# Patient Record
Sex: MALE | Race: WHITE | NOT HISPANIC OR LATINO | URBAN - METROPOLITAN AREA
[De-identification: names, ages, dates, MRNs, and addresses within clinical notes are randomized per-mention and may not be internally consistent; named-entity substitution may affect disease eponyms.]

---

## 2018-12-07 ENCOUNTER — APPOINTMENT (RX ONLY)
Dept: URBAN - METROPOLITAN AREA CLINIC 4 | Facility: CLINIC | Age: 68
Setting detail: DERMATOLOGY
End: 2018-12-07

## 2018-12-07 DIAGNOSIS — L81.4 OTHER MELANIN HYPERPIGMENTATION: ICD-10-CM

## 2018-12-07 DIAGNOSIS — L91.8 OTHER HYPERTROPHIC DISORDERS OF THE SKIN: ICD-10-CM

## 2018-12-07 DIAGNOSIS — D18.0 HEMANGIOMA: ICD-10-CM

## 2018-12-07 DIAGNOSIS — L82.1 OTHER SEBORRHEIC KERATOSIS: ICD-10-CM

## 2018-12-07 DIAGNOSIS — D22 MELANOCYTIC NEVI: ICD-10-CM

## 2018-12-07 DIAGNOSIS — L57.0 ACTINIC KERATOSIS: ICD-10-CM

## 2018-12-07 PROBLEM — Z85.828 PERSONAL HISTORY OF OTHER MALIGNANT NEOPLASM OF SKIN: Status: ACTIVE | Noted: 2018-12-07

## 2018-12-07 PROBLEM — D22.61 MELANOCYTIC NEVI OF RIGHT UPPER LIMB, INCLUDING SHOULDER: Status: ACTIVE | Noted: 2018-12-07

## 2018-12-07 PROBLEM — D48.5 NEOPLASM OF UNCERTAIN BEHAVIOR OF SKIN: Status: ACTIVE | Noted: 2018-12-07

## 2018-12-07 PROBLEM — D22.5 MELANOCYTIC NEVI OF TRUNK: Status: ACTIVE | Noted: 2018-12-07

## 2018-12-07 PROBLEM — D22.62 MELANOCYTIC NEVI OF LEFT UPPER LIMB, INCLUDING SHOULDER: Status: ACTIVE | Noted: 2018-12-07

## 2018-12-07 PROBLEM — D18.01 HEMANGIOMA OF SKIN AND SUBCUTANEOUS TISSUE: Status: ACTIVE | Noted: 2018-12-07

## 2018-12-07 PROCEDURE — ? COUNSELING

## 2018-12-07 PROCEDURE — 11100: CPT

## 2018-12-07 PROCEDURE — ? DEFER

## 2018-12-07 PROCEDURE — 99202 OFFICE O/P NEW SF 15 MIN: CPT | Mod: 25

## 2018-12-07 PROCEDURE — ? BIOPSY BY SHAVE METHOD

## 2018-12-07 ASSESSMENT — LOCATION SIMPLE DESCRIPTION DERM
LOCATION SIMPLE: CHEST
LOCATION SIMPLE: RIGHT CHEEK
LOCATION SIMPLE: LEFT AXILLARY VAULT
LOCATION SIMPLE: RIGHT UPPER ARM
LOCATION SIMPLE: RIGHT FOREARM
LOCATION SIMPLE: RIGHT LOWER BACK
LOCATION SIMPLE: LEFT HAND
LOCATION SIMPLE: GLABELLA
LOCATION SIMPLE: LEFT FOREARM
LOCATION SIMPLE: LEFT UPPER BACK
LOCATION SIMPLE: LEFT UPPER ARM
LOCATION SIMPLE: LOWER BACK
LOCATION SIMPLE: ABDOMEN
LOCATION SIMPLE: RIGHT HAND
LOCATION SIMPLE: LEFT CHEEK
LOCATION SIMPLE: LEFT ELBOW

## 2018-12-07 ASSESSMENT — LOCATION DETAILED DESCRIPTION DERM
LOCATION DETAILED: GLABELLA
LOCATION DETAILED: LEFT LATERAL INFERIOR CHEST
LOCATION DETAILED: SUPERIOR LUMBAR SPINE
LOCATION DETAILED: LEFT INFERIOR CENTRAL MALAR CHEEK
LOCATION DETAILED: EPIGASTRIC SKIN
LOCATION DETAILED: LEFT RADIAL DORSAL HAND
LOCATION DETAILED: RIGHT VENTRAL PROXIMAL FOREARM
LOCATION DETAILED: LEFT INFERIOR UPPER BACK
LOCATION DETAILED: LEFT ANTERIOR DISTAL UPPER ARM
LOCATION DETAILED: RIGHT CENTRAL MALAR CHEEK
LOCATION DETAILED: LEFT ULNAR DORSAL HAND
LOCATION DETAILED: LEFT MEDIAL INFERIOR CHEST
LOCATION DETAILED: RIGHT RADIAL DORSAL HAND
LOCATION DETAILED: LEFT VENTRAL PROXIMAL FOREARM
LOCATION DETAILED: RIGHT ANTERIOR DISTAL UPPER ARM
LOCATION DETAILED: LEFT AXILLARY VAULT
LOCATION DETAILED: LEFT VENTRAL DISTAL FOREARM
LOCATION DETAILED: RIGHT PROXIMAL DORSAL FOREARM
LOCATION DETAILED: LEFT ANTECUBITAL SKIN
LOCATION DETAILED: RIGHT SUPERIOR MEDIAL MIDBACK

## 2018-12-07 ASSESSMENT — LOCATION ZONE DERM
LOCATION ZONE: AXILLAE
LOCATION ZONE: HAND
LOCATION ZONE: TRUNK
LOCATION ZONE: FACE
LOCATION ZONE: ARM

## 2018-12-07 NOTE — PROCEDURE: BIOPSY BY SHAVE METHOD
Post-Care Instructions: I reviewed with the patient in detail post-care instructions. Patient is to keep the biopsy site dry overnight, and then apply bacitracin twice daily until healed. Patient may apply hydrogen peroxide soaks to remove any crusting.
Was A Bandage Applied: Yes
Silver Nitrate Text: The wound bed was treated with silver nitrate after the biopsy was performed.
Hemostasis: Electrocautery
Bill For Surgical Tray: no
Biopsy Method: Personna blade
Detail Level: Detailed
Notification Instructions: Patient will be notified of biopsy results. However, patient instructed to call the office if not contacted within 2 weeks.
Curettage Text: The wound bed was treated with curettage after the biopsy was performed.
Lab: [id number]
Consent: Written consent was obtained and risks were reviewed including but not limited to scarring, infection, bleeding, scabbing, incomplete removal, nerve damage and allergy to anesthesia.
Anesthesia Type: 1% lidocaine with epinephrine
Wound Care: Vaseline
Lab Facility: 
Additional Anesthesia Volume In Cc (Will Not Render If 0): 0
Cryotherapy Text: The wound bed was treated with cryotherapy after the biopsy was performed.
Size Of Lesion In Cm: 0.8
Electrodesiccation Text: The wound bed was treated with electrodesiccation after the biopsy was performed.
Depth Of Biopsy: dermis
Billing Type: Third-Party Bill
Biopsy Type: H and E
Electrodesiccation And Curettage Text: The wound bed was treated with electrodesiccation and curettage after the biopsy was performed.
Anesthesia Volume In Cc: 0.5
Type Of Destruction Used: Electrodesiccation
Dressing: bandage

## 2019-01-11 ENCOUNTER — APPOINTMENT (RX ONLY)
Dept: URBAN - METROPOLITAN AREA CLINIC 4 | Facility: CLINIC | Age: 69
Setting detail: DERMATOLOGY
End: 2019-01-11

## 2019-01-11 DIAGNOSIS — L82.1 OTHER SEBORRHEIC KERATOSIS: ICD-10-CM

## 2019-01-11 DIAGNOSIS — L81.4 OTHER MELANIN HYPERPIGMENTATION: ICD-10-CM

## 2019-01-11 PROBLEM — I10 ESSENTIAL (PRIMARY) HYPERTENSION: Status: ACTIVE | Noted: 2019-01-11

## 2019-01-11 PROCEDURE — 99213 OFFICE O/P EST LOW 20 MIN: CPT

## 2019-01-11 PROCEDURE — ? COUNSELING

## 2019-01-11 PROCEDURE — ? LIQUID NITROGEN

## 2019-01-11 ASSESSMENT — LOCATION DETAILED DESCRIPTION DERM
LOCATION DETAILED: LEFT SUPERIOR LATERAL NECK
LOCATION DETAILED: LEFT SUPERIOR MEDIAL FOREHEAD
LOCATION DETAILED: LEFT INFERIOR MEDIAL FOREHEAD
LOCATION DETAILED: SUBMENTAL CHIN
LOCATION DETAILED: LEFT SUPERIOR LATERAL BUCCAL CHEEK
LOCATION DETAILED: RIGHT CENTRAL FRONTAL SCALP
LOCATION DETAILED: RIGHT MEDIAL FRONTAL SCALP
LOCATION DETAILED: SUPERIOR MID FOREHEAD
LOCATION DETAILED: RIGHT CENTRAL LATERAL NECK
LOCATION DETAILED: RIGHT CENTRAL BUCCAL CHEEK
LOCATION DETAILED: LEFT INFERIOR LATERAL FOREHEAD
LOCATION DETAILED: RIGHT SUPERIOR FOREHEAD
LOCATION DETAILED: LEFT INFERIOR POSTAURICULAR SKIN
LOCATION DETAILED: RIGHT SUPERIOR LATERAL NECK

## 2019-01-11 ASSESSMENT — LOCATION ZONE DERM
LOCATION ZONE: FACE
LOCATION ZONE: SCALP
LOCATION ZONE: NECK

## 2019-01-11 ASSESSMENT — LOCATION SIMPLE DESCRIPTION DERM
LOCATION SIMPLE: RIGHT FOREHEAD
LOCATION SIMPLE: LEFT FOREHEAD
LOCATION SIMPLE: RIGHT SCALP
LOCATION SIMPLE: RIGHT CHEEK
LOCATION SIMPLE: NECK
LOCATION SIMPLE: SCALP
LOCATION SIMPLE: SUPERIOR FOREHEAD
LOCATION SIMPLE: SUBMENTAL CHIN
LOCATION SIMPLE: LEFT CHEEK

## 2019-01-11 NOTE — PROCEDURE: LIQUID NITROGEN
Duration Of Freeze Thaw-Cycle (Seconds): 0
Bill Insurance (You Assume Risk Of Denial Or Audit By Selecting Yes): No
Detail Level: Detailed
Medical Necessity Information: It is in your best interest to select a reason for this procedure from the list below. All of these items fulfill various CMS LCD requirements except the new and changing color options.
Post-Care Instructions: I reviewed with the patient in detail post-care instructions. Patient is to wear sunprotection, and avoid picking at any of the treated lesions. Pt may apply Vaseline to crusted or scabbing areas.
Consent: The patient's consent was obtained including but not limited to risks of crusting, scabbing, blistering, scarring, darker or lighter pigmentary change, recurrence, incomplete removal and infection.
Medical Necessity Clause: This procedure was medically necessary because the lesions that were treated were:  If lesion does not resolve, bx is needed.

## 2019-01-25 ENCOUNTER — APPOINTMENT (RX ONLY)
Dept: URBAN - METROPOLITAN AREA CLINIC 4 | Facility: CLINIC | Age: 69
Setting detail: DERMATOLOGY
End: 2019-01-25

## 2019-01-25 DIAGNOSIS — D22 MELANOCYTIC NEVI: ICD-10-CM

## 2019-01-25 DIAGNOSIS — L82.1 OTHER SEBORRHEIC KERATOSIS: ICD-10-CM

## 2019-01-25 PROBLEM — D22.5 MELANOCYTIC NEVI OF TRUNK: Status: ACTIVE | Noted: 2019-01-25

## 2019-01-25 PROCEDURE — ? COUNSELING

## 2019-01-25 PROCEDURE — 12031 INTMD RPR S/A/T/EXT 2.5 CM/<: CPT | Mod: 59

## 2019-01-25 PROCEDURE — ? LIQUID NITROGEN

## 2019-01-25 PROCEDURE — 17000 DESTRUCT PREMALG LESION: CPT

## 2019-01-25 PROCEDURE — ? EXCISION

## 2019-01-25 PROCEDURE — 11402 EXC TR-EXT B9+MARG 1.1-2 CM: CPT | Mod: 59

## 2019-01-25 PROCEDURE — 17003 DESTRUCT PREMALG LES 2-14: CPT

## 2019-01-25 ASSESSMENT — LOCATION ZONE DERM
LOCATION ZONE: SCALP
LOCATION ZONE: TRUNK

## 2019-01-25 ASSESSMENT — LOCATION DETAILED DESCRIPTION DERM
LOCATION DETAILED: RIGHT CENTRAL FRONTAL SCALP
LOCATION DETAILED: LEFT LATERAL INFERIOR CHEST
LOCATION DETAILED: RIGHT MEDIAL FRONTAL SCALP
LOCATION DETAILED: MEDIAL FRONTAL SCALP
LOCATION DETAILED: PERIUMBILICAL SKIN

## 2019-01-25 ASSESSMENT — LOCATION SIMPLE DESCRIPTION DERM
LOCATION SIMPLE: FRONTAL SCALP
LOCATION SIMPLE: CHEST
LOCATION SIMPLE: RIGHT SCALP
LOCATION SIMPLE: ABDOMEN

## 2019-09-23 ENCOUNTER — HOSPITAL ENCOUNTER (INPATIENT)
Facility: REHABILITATION | Age: 69
LOS: 16 days | DRG: 057 | End: 2019-10-09
Attending: PHYSICAL MEDICINE & REHABILITATION | Admitting: PHYSICAL MEDICINE & REHABILITATION
Payer: MEDICARE

## 2019-09-23 ENCOUNTER — HOSPITAL ENCOUNTER (OUTPATIENT)
Dept: RADIOLOGY | Facility: MEDICAL CENTER | Age: 69
End: 2019-09-23

## 2019-09-23 PROBLEM — I48.91 A-FIB (HCC): Status: ACTIVE | Noted: 2019-09-23

## 2019-09-23 PROBLEM — M75.92 SHOULDER LESION, LEFT: Status: ACTIVE | Noted: 2019-09-23

## 2019-09-23 PROBLEM — R33.9 URINARY RETENTION: Status: ACTIVE | Noted: 2019-09-23

## 2019-09-23 PROBLEM — I10 ESSENTIAL HYPERTENSION: Status: ACTIVE | Noted: 2019-09-23

## 2019-09-23 PROBLEM — R60.9 EDEMA: Status: ACTIVE | Noted: 2019-09-23

## 2019-09-23 PROBLEM — R91.1 LUNG NODULE: Status: ACTIVE | Noted: 2019-09-23

## 2019-09-23 PROBLEM — I63.9 STROKE (CEREBRUM) (HCC): Status: ACTIVE | Noted: 2019-09-23

## 2019-09-23 PROBLEM — G47.33 OSA (OBSTRUCTIVE SLEEP APNEA): Status: ACTIVE | Noted: 2019-09-23

## 2019-09-23 PROBLEM — G62.9 PERIPHERAL NEUROPATHY: Status: ACTIVE | Noted: 2019-09-23

## 2019-09-23 PROBLEM — G47.09 OTHER INSOMNIA: Status: ACTIVE | Noted: 2019-09-23

## 2019-09-23 PROBLEM — E78.5 DYSLIPIDEMIA: Status: ACTIVE | Noted: 2019-09-23

## 2019-09-23 PROBLEM — I71.03 DISSECTION OF THORACOABDOMINAL AORTA (HCC): Status: ACTIVE | Noted: 2019-09-23

## 2019-09-23 PROCEDURE — A9270 NON-COVERED ITEM OR SERVICE: HCPCS

## 2019-09-23 PROCEDURE — 99223 1ST HOSP IP/OBS HIGH 75: CPT | Mod: AI | Performed by: PHYSICAL MEDICINE & REHABILITATION

## 2019-09-23 PROCEDURE — A9270 NON-COVERED ITEM OR SERVICE: HCPCS | Performed by: PHYSICAL MEDICINE & REHABILITATION

## 2019-09-23 PROCEDURE — 700102 HCHG RX REV CODE 250 W/ 637 OVERRIDE(OP): Performed by: PHYSICAL MEDICINE & REHABILITATION

## 2019-09-23 PROCEDURE — 99223 1ST HOSP IP/OBS HIGH 75: CPT | Mod: AI | Performed by: HOSPITALIST

## 2019-09-23 PROCEDURE — 700102 HCHG RX REV CODE 250 W/ 637 OVERRIDE(OP)

## 2019-09-23 PROCEDURE — 770010 HCHG ROOM/CARE - REHAB SEMI PRIVAT*

## 2019-09-23 PROCEDURE — 94760 N-INVAS EAR/PLS OXIMETRY 1: CPT

## 2019-09-23 RX ORDER — AMIODARONE HYDROCHLORIDE 200 MG/1
200 TABLET ORAL
Status: DISCONTINUED | OUTPATIENT
Start: 2019-09-28 | End: 2019-10-09 | Stop reason: HOSPADM

## 2019-09-23 RX ORDER — BENZOCAINE/MENTHOL 6 MG-10 MG
LOZENGE MUCOUS MEMBRANE 2 TIMES DAILY
Status: DISCONTINUED | OUTPATIENT
Start: 2019-09-23 | End: 2019-09-26

## 2019-09-23 RX ORDER — TRAZODONE HYDROCHLORIDE 50 MG/1
50 TABLET ORAL
Status: DISCONTINUED | OUTPATIENT
Start: 2019-09-23 | End: 2019-09-23

## 2019-09-23 RX ORDER — ACETAMINOPHEN 325 MG/1
650 TABLET ORAL EVERY 4 HOURS PRN
Status: DISCONTINUED | OUTPATIENT
Start: 2019-09-23 | End: 2019-10-09 | Stop reason: HOSPADM

## 2019-09-23 RX ORDER — POLYETHYLENE GLYCOL 3350 17 G/17G
1 POWDER, FOR SOLUTION ORAL
Status: DISCONTINUED | OUTPATIENT
Start: 2019-09-23 | End: 2019-10-01

## 2019-09-23 RX ORDER — ALUMINA, MAGNESIA, AND SIMETHICONE 2400; 2400; 240 MG/30ML; MG/30ML; MG/30ML
20 SUSPENSION ORAL
Status: DISCONTINUED | OUTPATIENT
Start: 2019-09-23 | End: 2019-10-09 | Stop reason: HOSPADM

## 2019-09-23 RX ORDER — POTASSIUM CHLORIDE 20 MEQ/1
10 TABLET, EXTENDED RELEASE ORAL 2 TIMES DAILY
Status: DISCONTINUED | OUTPATIENT
Start: 2019-09-23 | End: 2019-09-27

## 2019-09-23 RX ORDER — SIMETHICONE 80 MG
TABLET,CHEWABLE ORAL
Status: COMPLETED
Start: 2019-09-23 | End: 2019-09-23

## 2019-09-23 RX ORDER — FINASTERIDE 5 MG/1
5 TABLET, FILM COATED ORAL DAILY
Status: DISCONTINUED | OUTPATIENT
Start: 2019-09-24 | End: 2019-10-09 | Stop reason: HOSPADM

## 2019-09-23 RX ORDER — GABAPENTIN 100 MG/1
CAPSULE ORAL
Status: COMPLETED
Start: 2019-09-23 | End: 2019-09-23

## 2019-09-23 RX ORDER — POLYVINYL ALCOHOL 14 MG/ML
1 SOLUTION/ DROPS OPHTHALMIC PRN
Status: DISCONTINUED | OUTPATIENT
Start: 2019-09-23 | End: 2019-10-09 | Stop reason: HOSPADM

## 2019-09-23 RX ORDER — BISACODYL 10 MG
10 SUPPOSITORY, RECTAL RECTAL
Status: DISCONTINUED | OUTPATIENT
Start: 2019-09-23 | End: 2019-10-01

## 2019-09-23 RX ORDER — ONDANSETRON 2 MG/ML
4 INJECTION INTRAMUSCULAR; INTRAVENOUS 4 TIMES DAILY PRN
Status: DISCONTINUED | OUTPATIENT
Start: 2019-09-23 | End: 2019-10-09 | Stop reason: HOSPADM

## 2019-09-23 RX ORDER — TRAZODONE HYDROCHLORIDE 50 MG/1
50 TABLET ORAL
Status: DISCONTINUED | OUTPATIENT
Start: 2019-09-23 | End: 2019-09-26

## 2019-09-23 RX ORDER — HYDROXYZINE HYDROCHLORIDE 25 MG/1
50 TABLET, FILM COATED ORAL EVERY 6 HOURS PRN
Status: DISCONTINUED | OUTPATIENT
Start: 2019-09-23 | End: 2019-10-09 | Stop reason: HOSPADM

## 2019-09-23 RX ORDER — ASPIRIN 325 MG
325 TABLET ORAL DAILY
Status: DISCONTINUED | OUTPATIENT
Start: 2019-09-24 | End: 2019-10-09 | Stop reason: HOSPADM

## 2019-09-23 RX ORDER — AMOXICILLIN 250 MG
1 CAPSULE ORAL EVERY EVENING
Status: DISCONTINUED | OUTPATIENT
Start: 2019-09-23 | End: 2019-09-23

## 2019-09-23 RX ORDER — AMIODARONE HYDROCHLORIDE 200 MG/1
400 TABLET ORAL
Status: COMPLETED | OUTPATIENT
Start: 2019-09-24 | End: 2019-09-27

## 2019-09-23 RX ORDER — DOCUSATE SODIUM 100 MG/1
100 CAPSULE, LIQUID FILLED ORAL DAILY
Status: DISCONTINUED | OUTPATIENT
Start: 2019-09-23 | End: 2019-09-23

## 2019-09-23 RX ORDER — HYDRALAZINE HYDROCHLORIDE 10 MG/1
10 TABLET, FILM COATED ORAL EVERY 8 HOURS PRN
Status: DISCONTINUED | OUTPATIENT
Start: 2019-09-23 | End: 2019-10-09 | Stop reason: HOSPADM

## 2019-09-23 RX ORDER — BISACODYL 10 MG
10 SUPPOSITORY, RECTAL RECTAL
Status: DISCONTINUED | OUTPATIENT
Start: 2019-09-23 | End: 2019-09-23

## 2019-09-23 RX ORDER — ONDANSETRON 4 MG/1
4 TABLET, ORALLY DISINTEGRATING ORAL 4 TIMES DAILY PRN
Status: DISCONTINUED | OUTPATIENT
Start: 2019-09-23 | End: 2019-10-09 | Stop reason: HOSPADM

## 2019-09-23 RX ORDER — FUROSEMIDE 20 MG/1
20 TABLET ORAL
Status: DISCONTINUED | OUTPATIENT
Start: 2019-09-24 | End: 2019-09-27

## 2019-09-23 RX ORDER — OMEPRAZOLE 20 MG/1
20 CAPSULE, DELAYED RELEASE ORAL
Status: DISCONTINUED | OUTPATIENT
Start: 2019-09-24 | End: 2019-10-09 | Stop reason: HOSPADM

## 2019-09-23 RX ORDER — SIMETHICONE 80 MG
160 TABLET,CHEWABLE ORAL
Status: DISCONTINUED | OUTPATIENT
Start: 2019-09-23 | End: 2019-10-09 | Stop reason: HOSPADM

## 2019-09-23 RX ORDER — ECHINACEA PURPUREA EXTRACT 125 MG
2 TABLET ORAL PRN
Status: DISCONTINUED | OUTPATIENT
Start: 2019-09-23 | End: 2019-10-09 | Stop reason: HOSPADM

## 2019-09-23 RX ORDER — FENOFIBRATE 67 MG/1
67 CAPSULE ORAL
Status: DISCONTINUED | OUTPATIENT
Start: 2019-09-24 | End: 2019-10-09 | Stop reason: HOSPADM

## 2019-09-23 RX ORDER — AMOXICILLIN 250 MG
2 CAPSULE ORAL 2 TIMES DAILY
Status: DISCONTINUED | OUTPATIENT
Start: 2019-09-23 | End: 2019-10-01

## 2019-09-23 RX ORDER — LACTULOSE 20 G/30ML
30 SOLUTION ORAL
Status: DISCONTINUED | OUTPATIENT
Start: 2019-09-23 | End: 2019-09-23

## 2019-09-23 RX ORDER — TAMSULOSIN HYDROCHLORIDE 0.4 MG/1
0.8 CAPSULE ORAL
Status: DISCONTINUED | OUTPATIENT
Start: 2019-09-23 | End: 2019-10-09 | Stop reason: HOSPADM

## 2019-09-23 RX ORDER — TRAZODONE HYDROCHLORIDE 50 MG/1
TABLET ORAL
Status: COMPLETED
Start: 2019-09-23 | End: 2019-09-23

## 2019-09-23 RX ORDER — GABAPENTIN 100 MG/1
200 CAPSULE ORAL 3 TIMES DAILY
Status: DISCONTINUED | OUTPATIENT
Start: 2019-09-23 | End: 2019-09-24

## 2019-09-23 RX ORDER — LANOLIN ALCOHOL/MO/W.PET/CERES
3 CREAM (GRAM) TOPICAL
Status: DISCONTINUED | OUTPATIENT
Start: 2019-09-23 | End: 2019-09-26

## 2019-09-23 RX ADMIN — GABAPENTIN 200 MG: 100 CAPSULE ORAL at 20:56

## 2019-09-23 RX ADMIN — POTASSIUM CHLORIDE 10 MEQ: 20 TABLET, EXTENDED RELEASE ORAL at 20:24

## 2019-09-23 RX ADMIN — SIMETHICONE CHEW TAB 80 MG 160 MG: 80 TABLET ORAL at 20:59

## 2019-09-23 RX ADMIN — MELATONIN 3 MG: at 20:24

## 2019-09-23 RX ADMIN — METOPROLOL TARTRATE 25 MG: 25 TABLET ORAL at 17:58

## 2019-09-23 RX ADMIN — TRAZODONE HYDROCHLORIDE 50 MG: 50 TABLET ORAL at 21:00

## 2019-09-23 RX ADMIN — TAMSULOSIN HYDROCHLORIDE 0.8 MG: 0.4 CAPSULE ORAL at 20:24

## 2019-09-23 RX ADMIN — ACETAMINOPHEN 650 MG: 325 TABLET, FILM COATED ORAL at 20:23

## 2019-09-23 RX ADMIN — SENNOSIDES,DOCUSATE SODIUM 2 TABLET: 8.6; 5 TABLET, FILM COATED ORAL at 20:24

## 2019-09-23 ASSESSMENT — LIFESTYLE VARIABLES
EVER FELT BAD OR GUILTY ABOUT YOUR DRINKING: NO
HAVE PEOPLE ANNOYED YOU BY CRITICIZING YOUR DRINKING: NO
TOTAL SCORE: 0
EVER HAD A DRINK FIRST THING IN THE MORNING TO STEADY YOUR NERVES TO GET RID OF A HANGOVER: NO
EVER_SMOKED: YES
CONSUMPTION TOTAL: INCOMPLETE
ON A TYPICAL DAY WHEN YOU DRINK ALCOHOL HOW MANY DRINKS DO YOU HAVE: 1
HAVE YOU EVER FELT YOU SHOULD CUT DOWN ON YOUR DRINKING: NO
TOTAL SCORE: 0
ALCOHOL_USE: YES
DOES PATIENT WANT TO STOP DRINKING: NO
TOTAL SCORE: 0

## 2019-09-23 ASSESSMENT — ENCOUNTER SYMPTOMS
COUGH: 0
ABDOMINAL PAIN: 0
FEVER: 0
EYES NEGATIVE: 1
NAUSEA: 0
SHORTNESS OF BREATH: 0
VOMITING: 0
ROS GI COMMENTS: BLOATING
PALPITATIONS: 0
CHILLS: 0

## 2019-09-23 ASSESSMENT — PATIENT HEALTH QUESTIONNAIRE - PHQ9
3. TROUBLE FALLING OR STAYING ASLEEP OR SLEEPING TOO MUCH: NEARLY EVERY DAY
7. TROUBLE CONCENTRATING ON THINGS, SUCH AS READING THE NEWSPAPER OR WATCHING TELEVISION: MORE THAN HALF THE DAYS
1. LITTLE INTEREST OR PLEASURE IN DOING THINGS: MORE THAN HALF THE DAYS
2. FEELING DOWN, DEPRESSED, IRRITABLE, OR HOPELESS: MORE THAN HALF THE DAYS
4. FEELING TIRED OR HAVING LITTLE ENERGY: NEARLY EVERY DAY
6. FEELING BAD ABOUT YOURSELF - OR THAT YOU ARE A FAILURE OR HAVE LET YOURSELF OR YOUR FAMILY DOWN: MORE THAN HALF THE DAYS
8. MOVING OR SPEAKING SO SLOWLY THAT OTHER PEOPLE COULD HAVE NOTICED. OR THE OPPOSITE, BEING SO FIGETY OR RESTLESS THAT YOU HAVE BEEN MOVING AROUND A LOT MORE THAN USUAL: MORE THAN HALF THE DAYS
SUM OF ALL RESPONSES TO PHQ9 QUESTIONS 1 AND 2: 4
SUM OF ALL RESPONSES TO PHQ QUESTIONS 1-9: 18
5. POOR APPETITE OR OVEREATING: MORE THAN HALF THE DAYS
9. THOUGHTS THAT YOU WOULD BE BETTER OFF DEAD, OR OF HURTING YOURSELF: NOT AT ALL

## 2019-09-23 NOTE — H&P
REHABILITATION HISTORY AND PHYSICAL/POST ADMISSION EVALUATION    9/23/2019  4:28 PM  Xavier Richardson  [id number]  Admission  9/23/2019  1:51 PM  C Code/Reason for admission: 01.3 Bilateral Involvement   Etiologic diagnosis/problem: Stroke (cerebrum) (HCC)  Chief Complaint: left sided weakness    HPI:  The patient is a 69 y.o. male with a past medical history of hypertension off his medications for 3 weeks, sleep apnea, BPH; now admitted for acute inpatient rehabilitation with severe functional debility after an acute aortic dissection.      On admission the patient and medical record report he developed acute onset of chest pain for which she went to his local hospital in [facility], was found to have a type I thoracic aortic aneurysm and dissection for which he was flown to [facility].  He is now status post graft repair on September 4.  Postoperatively patient had atrial fibrillation, acute urinary retention, and a left pleural effusion.  Prior to his surgery he was noted to have left-sided weakness and MRI showed watershed infarcts in the bilateral brain as well as left embolic strokes.  Angiogram showed severe right ICA stenosis.  MRI also showed petechial hemorrhage in the right parietal occipital lobe.  He was seen by ophthalmology with complaints of visual changes thought secondary to hypertension and cardiovascular disease.  Incidental finding on imaging of a left lung nodule as well as left shoulder bone lesion, both of which needs outpatient follow-up.  Needs CT of the chest in 3 months.  Recommended follow-up with Dr. Robles in the aortic clinic in 1 month, [phone].  We will also need to follow-up with urology for the acute urinary retention.    Patient current reports left-sided weakness.  He is right-hand dominant.  He denies any numbness or tingling but just reports his left arm feels heavy.  His family at bedside feels that his cognition is off since his strokes.   He also continues to  "complain of abnormal vision including seeing floaters.  His daughter reports he had 1.5 L of urine in his bladder several days ago prior to the Barnard being replaced.  Patient does report history of enlarged prostate for which he took Flomax. He also reports a history of peripheral neuropathy in his feet as well as \"pain all over\".  He was taking gabapentin 400 mg 3 times a day at home but this was held on his acute admission.    Patient was evaluated by Rehab Medicine physician and Physical Therapy, Occupational Therapy and Speech Therapy and determined to be appropriate for acute inpatient rehab and was transferred to [facility] on 9/23/2019.     With this acute therapeutic intervention, this patient hopes to improve his functional status, and return to independent living with the supportive care of family.    REVIEW OF SYSTEMS:     A complete review of systems was performed and was negative in detail with the exception of items mentioned elsewhere in this document.    PMH:  Past Medical History:   Diagnosis Date   • Hypertension    • JESUS MANUEL (obstructive sleep apnea)        PSH:  No past surgical history on file.    No family history on file.     MEDICATIONS:  Current Facility-Administered Medications   Medication Dose   • Respiratory Care per Protocol     • Pharmacy Consult Request ...Pain Management Review 1 Each  1 Each   • acetaminophen (TYLENOL) tablet 650 mg  650 mg   • hydrALAZINE (APRESOLINE) tablet 10 mg  10 mg   • senna-docusate (PERICOLACE or SENOKOT S) 8.6-50 MG per tablet 2 Tab  2 Tab    And   • polyethylene glycol/lytes (MIRALAX) PACKET 1 Packet  1 Packet    And   • magnesium hydroxide (MILK OF MAGNESIA) suspension 30 mL  30 mL    And   • bisacodyl (DULCOLAX) suppository 10 mg  10 mg   • docusate sodium (COLACE) capsule 100 mg  100 mg    And   • senna-docusate (PERICOLACE or SENOKOT S) 8.6-50 MG per tablet 1 Tab  1 Tab    And   • lactulose 20 GM/30ML solution 30 mL  30 mL    And   • " bisacodyl (DULCOLAX) suppository 10 mg  10 mg   • artificial tears ophthalmic solution 1 Drop  1 Drop   • benzocaine-menthol (CEPACOL) lozenge 1 Lozenge  1 Lozenge   • mag hydrox-al hydrox-simeth (MAALOX PLUS ES or MYLANTA DS) suspension 20 mL  20 mL   • ondansetron (ZOFRAN ODT) dispertab 4 mg  4 mg    Or   • ondansetron (ZOFRAN) syringe/vial injection 4 mg  4 mg   • traZODone (DESYREL) tablet 50 mg  50 mg   • sodium chloride (OCEAN) 0.65 % nasal spray 2 Spray  2 Spray   • hydrOXYzine HCl (ATARAX) tablet 50 mg  50 mg   • melatonin tablet 3 mg  3 mg   • MD Alert...Warfarin per Pharmacy     • [START ON 9/24/2019] amiodarone (CORDARONE) tablet 400 mg  400 mg    Followed by   • [START ON 9/28/2019] amiodarone (CORDARONE) tablet 200 mg  200 mg   • [START ON 9/24/2019] aspirin (ASA) tablet 325 mg  325 mg   • [START ON 9/24/2019] fenofibrate micronized (LOFIBRA) capsule 67 mg  67 mg   • [START ON 9/24/2019] finasteride (PROSCAR) tablet 5 mg  5 mg   • [START ON 9/24/2019] furosemide (LASIX) tablet 20 mg  20 mg   • hydrocortisone 1 % cream     • metoprolol (LOPRESSOR) tablet 25 mg  25 mg   • [START ON 9/24/2019] omeprazole (PRILOSEC) capsule 20 mg  20 mg   • potassium chloride SA (Kdur) tablet 10 mEq  10 mEq   • tamsulosin (FLOMAX) capsule 0.8 mg  0.8 mg       ALLERGIES:  Patient has no known allergies.    PSYCHOSOCIAL HISTORY:  Pre-mobidly, the patient lived in a single level home in [facility] with 2-3 steps to enter.  He was living alone on 4 acres.  He is .  He has a son and a daughter.  His daughter is here from [place] and plans to return to assist him at discharge.  He is to work in distribution and logistics.  He denies tobacco, denies drugs, drinks about 2 alcoholic drinks per week.  His PCP is here in [place] as they only have traveling [facility] ten[facility] doctors in [place].    LEVEL OF FUNCTION PRIOR TO DISABILTY:  Independent, driving    LEVEL OF FUNCTION PRIOR TO ADMISSION to [facility]  Hospital:  Min assist for mobility up to 40 feet  Min to max assist for ADLs    CURRENT LEVEL OF FUNCTION:   Same as level of function prior to admission to [facility]    PHYSICAL EXAM:     VITAL SIGNS:   height is 1.829 m (6'). His oral temperature is 36.5 °C (97.7 °F). His blood pressure is 124/66 and his pulse is 73. His respiration is 18 and oxygen saturation is 92%.     GENERAL: No apparent distress, looks younger than his age  HEENT: Normocephalic/atraumatic, EOMI, PERRL and No nystagmus  CARDIAC: Regular rate and rhythm, normal S1, S2, no murmurs, no peripheral edema   LUNGS: Clear to auscultation, normal respiratory effort, on room air   ABDOMINAL: bowel sounds present, soft, nontender and nondistended    EXTREMITIES: no spasticity, no edema or no calf tenderness bilaterally  MSK: No joint swelling    NEURO:    Mental status: alert, oriented  Speech: fluent, no aphasia or dysarthria    CRANIAL NERVES:  2,3: visual acuity grossly intact, PERRL  3,4,6: EOMI bilaterally, no nystagmus or diplopia  5: intact in all branches  7: no facial asymmetry  8: decreased in right ear  9,10: symmetric palate elevation  11: Decreased 4/5 on left shoulder shrug  12: tongue protrudes midline    Motor:  Shoulder flexors:  Right -  5/5, Left -  4/5  Elbow flexors:  Right -  5/5, Left -  4/5  Elbow extensors:  Right -  5/5, Left -  4/5  Weaker  on left  Hip flexors:  Right -  5/5, Left -  5/5  Knee ext:  Right -  5/5, Left -  5/5  Dorsiflexors:  Right -  5/5, Left -  5/5  EHL:  Right -  5/5, Left -  5/5  Plantar flexors:  Right -  5/5, Left -  5/5   Positive left sided Pronator drift    Sensory:   intact to light touch through out  absent to vibration at bilateral great toes    DTRs: 2+ in bilateral biceps, triceps, brachioradialis, 2+ in bilateral patellar and achilles tendons  No clonus at bilateral ankles  Negative babinski b/l  Negative Ramirez b/l     PRIMARY REHAB DIAGNOSIS:    This patient is a 69  y.o. male admitted for acute inpatient rehabilitation with Stroke (cerebrum) (HCC).    IMPAIRMENTS:   Cognitive  ADLs/IADLs  Mobility    SECONDARY DIAGNOSIS/MEDICAL CO-MORBIDITIES AFFECTING FUNCTION:    Acute urinary retention  Visual deficits  Peripheral neuropathy  Aortic dissection  Hypertension  Atrial fibrillation  Sleep apnea  Left pleural effusion  Left lung nodule  Left shoulder lesion    RELEVANT CHANGES SINCE PREADMISSION EVALUATION:    Status unchanged    The patient's rehabilitation potential is Very Good  The patient's medical prognosis is good    PLAN:   Discussion and Recommendations, discussed with the patient and/or family:   1. The patient requires an acute inpatient rehabilitation program with a coordinated program of care at an intensity and frequency not available at a lower level of care. This recommendation is substantiated by the patient's medical physicians who recommend that the patient's intervention and assessment of medical issues needs to be done at an acute level of care for patient's safety and maximum outcome.     2. A coordinated program of care will be supplied by an interdisciplinary team of physical therapy, occupational therapy, rehab physician, rehab nursing, and, if needed, speech therapy and rehab psychology. Rehab team presents a patient-specific rehabilitation and education program concentrating on prevention of future problems related to accessibility, mobility, skin, bowel, bladder, sexuality, and psychosocial and medical/surgical problems.     3. Need for Rehabilitation Physician: The rehab physician will be evaluating the patient on a multi-weekly basis to help coordinate the program of care. The rehab physician communicates between medical physicians, therapists, and nurses to maximize the patient's potential outcome. Specific areas in which the rehab physician will be providing daily assessment include the following:   A. Assessing the patient's heart rate and blood  pressure response (vitals monitoring) to activity and making adjustments in medications or conservative measures as needed.   B. The rehab physician will be assessing the frequency at which the program can be increased to allow the patient to reach optimal functional outcome.   C. The rehab physician will also provide assessments in daily skin care, especially in light of patient's impairments in mobility.   D. The rehab physician will provide special expertise in understanding how to work with functional impairment and recommend appropriate interventions, compensatory techniques, and education that will facilitate the patient's outcome.     4. Rehab R.N.   The rehab RN will be working with patient to carry over in room mobility and activities of daily living when the patient is not in 3 hours of skilled therapy. Rehab nursing will be working in conjunction with rehab physician to address all the medical issues above and continue to assess laboratory work and discuss abnormalities with the treating physicians, assess vitals, and response to activity, and discuss and report abnormalities with the rehab physician. Rehab RN will also continue daily skin care, supervise bladder/bowel program, instruct in medication administration, and ensure patient safety.     5. Therapies to treat at intensity and frequency of (may change after completion of evaluation by all therapeutic disciplines):       PT:  Physical therapy to address mobility, transfer, gait training and evaluation for adaptive equipment needs 1hour/day at least 5 days/week for the duration of the ELOS (see below)       OT:  Occupational therapy to address ADLs, self-care, home management training, functional mobility/transfers and assistive device evaluation, and community re-integration 1hour/day at least 5 days/week for the duration of the ELOS (see below).        ST/Dysphagia:  Speech therapy to address speech, language, and cognitive deficits as well as  swallowing difficulties with retraining/dysphagia management and community re-integration with comprehension, expression, cognitive training 1hour/day at least 5 days/week for the duration of the ELOS (see below).     6. Medical management / Rehabilitation Issues/Adverse Potential affecting function as part of rehabilitation plan.    Acute urinary retention  Maintain Barnard for now  Does have a history of BPH  Continue Proscar and Flomax  Attempt voiding trial in 1 week  May need outpatient follow-up    Visual deficits  Refer to neuro-ophthalmology on discharge    Peripheral neuropathy  Restart gabapentin at half of his home dosing    Appreciate the assistance of the hospitalist with the patient's medical comorbidities:    Aortic dissection  Hypertension  Atrial fibrillation  Sleep apnea  Left pleural effusion  Left lung nodule  Left shoulder lesion    I performed a complete drug regimen review and did not identify any potential clinically significant medication issues.    The patient's CODE STATUS was confirmed as FULL CODE on admission, with the patient and/or family at bedside.    REHABILITATION ISSUES/ADVERSE POTENTIAL:  1.  CVA (Cerebrovascular Accident): Cont aspirin and coumadin for secondary prophylaxis as well as lipid and blood pressure management. Patient demonstrates functional deficits in strength, balance, coordination, and ADL's. Patient is admitted to [facility] for comprehensive rehabilitation therapy as described below.   Rehabilitation nursing monitors bowel and bladder control, educates on medication administration, co-morbidities and monitors patient safety.    2.  Neurostimulants: None at this time but continue to assess daily for need to initiate should status change.    3.  DVT prophylaxis:  Patient is on coumadin for anticoagulation upon transfer. Encourage OOB. Monitor daily for signs and symptoms of DVT including but not limited to swelling and pain to prevent the  development of DVT that may interfere with therapies.    4.  Pain: No issues with pain currently / Controlled with as needed oral analgesics.    5.  Nutrition/Dysphagia: Dietician monitors nutrient intake, recommend supplements prn and provide nutrition education to pt/family to promote optimal nutrition for wound healing/recovery.     6.  Bladder/bowel:  Start bowel and bladder program as described below, to prevent constipation, urinary retention (which may lead to UTI), and urinary incontinence (which will impact upon pt's functional independence).   - TV Q3h while awake with post void bladder scans, I&O cath for PVRs >400  - up to commode after meal     7.  Skin/dermal ulcer prophylaxis: Monitor for new skin conditions with q.2 h. turns as required to prevent the development of skin breakdown.     8.  Cognition/Behavior:  Psychologist Dr. Mcguire provides adjustment counseling to illness and psychosocial barriers that may be potential barriers to rehabilitation.     9. Respiratory therapy: RT performs O2 management prn, breathing retraining, pulmonary hygiene and bronchospasm management prn to optimize participation in therapies.    Pt was seen today for 75 min, and entire time spent in face-to-face contact was >50% in counseling and coordination of care as detailed in A/P above.        GOALS/EXPECTED LEVEL OF FUNCTION BASED ON CURRENT MEDICAL AND FUNCTIONAL STATUS (may change based on patient's medical status and rate of impairment recovery):  Transfers:   Modified Independent  Mobility/Gait:   Supervision  ADL's:   Modified Independent  Cognition:  Supervision    DISPOSITION: Discharge to pre-morbid independent living setting with the supportive care of patient's family.      ELOS: 14 days    Amira Davis M.D.  Physical Medicine and Rehabilitation

## 2019-09-23 NOTE — FLOWSHEET NOTE
09/23/19 1459   Type of Assessment   Assessment Yes   Patient History   Pulmonary Diagnosis JESUS MANUEL  (pt does not use CPAP since septum surgery)   Surgical Procedures Thoracic for aortic dissection   Home O2 No   Home Treatments/Frequency No   COPD Risk Screening   Do you have a history of COPD? No   Smoking History   Have you ever smoked Yes   Have you smoked in the last 12 months No   Confirm Quit Date 09/23/74   Level Of Consciousness   Level of Consciousness Alert   Respiratory WDL   Respiratory (WDL) X   Chest Exam   Respiration 18   Pulse 73   Breath Sounds   RLL Breath Sounds Diminished   LLL Breath Sounds Diminished   Oximetry   #Pulse Oximetry (Single Determination) Yes   Oxygen   Home O2 Use Prior To Admission? No   Pulse Oximetry 92 %   O2 Daily Delivery Respiratory  Room Air with O2 Available   Protocol Pathways   Protocol Pathways Yes   Incentive Spirometer Instruct   Predicted (ml) 3100   60% (ml) 1860   40% (ml) 1240   O2 Protocol   O2 Protocol Indications Room Air SpO2 Less Than 90%   PRN oxygen initiated for: Sleep Apnea (nocturnal O2)   O2 Protocol Goals/Outcome SpO2 greater than 90% with exertion

## 2019-09-23 NOTE — PROGRESS NOTES
Pharmacy Warfarin Consult   9/23/2019     69 y.o.   male     Indication for anticoagulation: Stroke    Goal INR = 2 - 3    No results for input(s): INR, HEMOGLOBIN, HEMATOCRIT in the last 72 hours.    Pertinent Drug/Drug Interactions:  Amiodarone, Asa, Fenofibrate, PPI, PRN trazodone  Outpatient Warfarin Regimen:  Not noted  Recent Warfarin Dosing:   Date 9/21 9/22 9/23   INR 1.05 1.00 1.02   Dose 4 mg 5 mg 6 mg (given at [facility])       Bridge Therapy: no         1.  Coumadin 6 mg given today at [facility] prior to transport here.        Catalino Stubbs [facility]

## 2019-09-24 ENCOUNTER — ANCILLARY PROCEDURE (OUTPATIENT)
Dept: CARDIOLOGY | Facility: REHABILITATION | Age: 69
DRG: 057 | End: 2019-09-24
Attending: HOSPITALIST
Payer: MEDICARE

## 2019-09-24 PROBLEM — E87.70 FLUID OVERLOAD: Status: ACTIVE | Noted: 2019-09-23

## 2019-09-24 LAB
25(OH)D3 SERPL-MCNC: 27 NG/ML (ref 30–100)
ALBUMIN SERPL BCP-MCNC: 3.1 G/DL (ref 3.2–4.9)
ALBUMIN/GLOB SERPL: 1.3 G/DL
ALP SERPL-CCNC: 64 U/L (ref 30–99)
ALT SERPL-CCNC: 9 U/L (ref 2–50)
ANION GAP SERPL CALC-SCNC: 5 MMOL/L (ref 0–11.9)
ANISOCYTOSIS BLD QL SMEAR: ABNORMAL
AST SERPL-CCNC: 12 U/L (ref 12–45)
BASOPHILS # BLD AUTO: 0.5 % (ref 0–1.8)
BASOPHILS # BLD: 0.02 K/UL (ref 0–0.12)
BILIRUB SERPL-MCNC: 0.6 MG/DL (ref 0.1–1.5)
BUN SERPL-MCNC: 13 MG/DL (ref 8–22)
CALCIUM SERPL-MCNC: 9.2 MG/DL (ref 8.5–10.5)
CHLORIDE SERPL-SCNC: 105 MMOL/L (ref 96–112)
CHOLEST SERPL-MCNC: 104 MG/DL (ref 100–199)
CO2 SERPL-SCNC: 29 MMOL/L (ref 20–33)
COMMENT 1642: NORMAL
CREAT SERPL-MCNC: 1.05 MG/DL (ref 0.5–1.4)
EOSINOPHIL # BLD AUTO: 0.09 K/UL (ref 0–0.51)
EOSINOPHIL NFR BLD: 2.2 % (ref 0–6.9)
ERYTHROCYTE [DISTWIDTH] IN BLOOD BY AUTOMATED COUNT: 59.6 FL (ref 35.9–50)
EST. AVERAGE GLUCOSE BLD GHB EST-MCNC: 74 MG/DL
GLOBULIN SER CALC-MCNC: 2.4 G/DL (ref 1.9–3.5)
GLUCOSE SERPL-MCNC: 86 MG/DL (ref 65–99)
HBA1C MFR BLD: 4.2 % (ref 0–5.6)
HCT VFR BLD AUTO: 26.1 % (ref 42–52)
HDLC SERPL-MCNC: 36 MG/DL
HGB BLD-MCNC: 7.8 G/DL (ref 14–18)
IMM GRANULOCYTES # BLD AUTO: 0.04 K/UL (ref 0–0.11)
IMM GRANULOCYTES NFR BLD AUTO: 1 % (ref 0–0.9)
INR PPP: 1.3 (ref 0.87–1.13)
LDLC SERPL CALC-MCNC: 54 MG/DL
LV EJECT FRACT  99904: 60
LV EJECT FRACT MOD 2C 99903: 70.6
LV EJECT FRACT MOD 4C 99902: 66.28
LV EJECT FRACT MOD BP 99901: 68.85
LYMPHOCYTES # BLD AUTO: 0.59 K/UL (ref 1–4.8)
LYMPHOCYTES NFR BLD: 14.2 % (ref 22–41)
MACROCYTES BLD QL SMEAR: ABNORMAL
MAGNESIUM SERPL-MCNC: 2.1 MG/DL (ref 1.5–2.5)
MCH RBC QN AUTO: 29.4 PG (ref 27–33)
MCHC RBC AUTO-ENTMCNC: 29.9 G/DL (ref 33.7–35.3)
MCV RBC AUTO: 98.5 FL (ref 81.4–97.8)
MONOCYTES # BLD AUTO: 0.43 K/UL (ref 0–0.85)
MONOCYTES NFR BLD AUTO: 10.4 % (ref 0–13.4)
MORPHOLOGY BLD-IMP: NORMAL
NEUTROPHILS # BLD AUTO: 2.98 K/UL (ref 1.82–7.42)
NEUTROPHILS NFR BLD: 71.7 % (ref 44–72)
NRBC # BLD AUTO: 0 K/UL
NRBC BLD-RTO: 0 /100 WBC
NT-PROBNP SERPL IA-MCNC: 2189 PG/ML (ref 0–125)
PLATELET # BLD AUTO: 255 K/UL (ref 164–446)
PLATELET BLD QL SMEAR: NORMAL
PMV BLD AUTO: 8.5 FL (ref 9–12.9)
POLYCHROMASIA BLD QL SMEAR: NORMAL
POTASSIUM SERPL-SCNC: 3.9 MMOL/L (ref 3.6–5.5)
PROT SERPL-MCNC: 5.5 G/DL (ref 6–8.2)
PROTHROMBIN TIME: 16.5 SEC (ref 12–14.6)
RBC # BLD AUTO: 2.65 M/UL (ref 4.7–6.1)
RBC BLD AUTO: PRESENT
SODIUM SERPL-SCNC: 139 MMOL/L (ref 135–145)
TRIGL SERPL-MCNC: 69 MG/DL (ref 0–149)
WBC # BLD AUTO: 4.2 K/UL (ref 4.8–10.8)

## 2019-09-24 PROCEDURE — 97535 SELF CARE MNGMENT TRAINING: CPT

## 2019-09-24 PROCEDURE — 92523 SPEECH SOUND LANG COMPREHEN: CPT

## 2019-09-24 PROCEDURE — 97166 OT EVAL MOD COMPLEX 45 MIN: CPT

## 2019-09-24 PROCEDURE — 99232 SBSQ HOSP IP/OBS MODERATE 35: CPT | Performed by: HOSPITALIST

## 2019-09-24 PROCEDURE — 700102 HCHG RX REV CODE 250 W/ 637 OVERRIDE(OP): Performed by: PHYSICAL MEDICINE & REHABILITATION

## 2019-09-24 PROCEDURE — 83735 ASSAY OF MAGNESIUM: CPT

## 2019-09-24 PROCEDURE — 97530 THERAPEUTIC ACTIVITIES: CPT

## 2019-09-24 PROCEDURE — 83036 HEMOGLOBIN GLYCOSYLATED A1C: CPT

## 2019-09-24 PROCEDURE — 93306 TTE W/DOPPLER COMPLETE: CPT

## 2019-09-24 PROCEDURE — 83880 ASSAY OF NATRIURETIC PEPTIDE: CPT

## 2019-09-24 PROCEDURE — 36415 COLL VENOUS BLD VENIPUNCTURE: CPT

## 2019-09-24 PROCEDURE — A9270 NON-COVERED ITEM OR SERVICE: HCPCS | Performed by: HOSPITALIST

## 2019-09-24 PROCEDURE — 94760 N-INVAS EAR/PLS OXIMETRY 1: CPT

## 2019-09-24 PROCEDURE — 770010 HCHG ROOM/CARE - REHAB SEMI PRIVAT*

## 2019-09-24 PROCEDURE — 93306 TTE W/DOPPLER COMPLETE: CPT | Mod: 26 | Performed by: INTERNAL MEDICINE

## 2019-09-24 PROCEDURE — 97162 PT EVAL MOD COMPLEX 30 MIN: CPT

## 2019-09-24 PROCEDURE — 85610 PROTHROMBIN TIME: CPT

## 2019-09-24 PROCEDURE — 80053 COMPREHEN METABOLIC PANEL: CPT

## 2019-09-24 PROCEDURE — 700102 HCHG RX REV CODE 250 W/ 637 OVERRIDE(OP): Performed by: HOSPITALIST

## 2019-09-24 PROCEDURE — 82306 VITAMIN D 25 HYDROXY: CPT

## 2019-09-24 PROCEDURE — 80061 LIPID PANEL: CPT

## 2019-09-24 PROCEDURE — A9270 NON-COVERED ITEM OR SERVICE: HCPCS | Performed by: PHYSICAL MEDICINE & REHABILITATION

## 2019-09-24 PROCEDURE — 85025 COMPLETE CBC W/AUTO DIFF WBC: CPT

## 2019-09-24 PROCEDURE — 99233 SBSQ HOSP IP/OBS HIGH 50: CPT | Performed by: PHYSICAL MEDICINE & REHABILITATION

## 2019-09-24 RX ORDER — WARFARIN SODIUM 5 MG/1
5 TABLET ORAL DAILY
Status: DISCONTINUED | OUTPATIENT
Start: 2019-09-24 | End: 2019-09-24

## 2019-09-24 RX ORDER — GABAPENTIN 300 MG/1
300 CAPSULE ORAL 3 TIMES DAILY
Status: DISCONTINUED | OUTPATIENT
Start: 2019-09-24 | End: 2019-09-25

## 2019-09-24 RX ORDER — WARFARIN SODIUM 5 MG/1
5 TABLET ORAL
Status: COMPLETED | OUTPATIENT
Start: 2019-09-24 | End: 2019-09-24

## 2019-09-24 RX ADMIN — ALUMINUM HYDROXIDE, MAGNESIUM HYDROXIDE, AND DIMETHICONE 20 ML: 400; 400; 40 SUSPENSION ORAL at 14:17

## 2019-09-24 RX ADMIN — ACETAMINOPHEN 650 MG: 325 TABLET, FILM COATED ORAL at 05:34

## 2019-09-24 RX ADMIN — ASPIRIN 325 MG ORAL TABLET 325 MG: 325 PILL ORAL at 08:37

## 2019-09-24 RX ADMIN — SIMETHICONE CHEW TAB 80 MG 160 MG: 80 TABLET ORAL at 20:37

## 2019-09-24 RX ADMIN — TRAZODONE HYDROCHLORIDE 50 MG: 50 TABLET ORAL at 20:37

## 2019-09-24 RX ADMIN — GABAPENTIN 300 MG: 300 CAPSULE ORAL at 20:37

## 2019-09-24 RX ADMIN — FENOFIBRATE 67 MG: 67 CAPSULE ORAL at 08:38

## 2019-09-24 RX ADMIN — METOPROLOL TARTRATE 25 MG: 25 TABLET ORAL at 05:28

## 2019-09-24 RX ADMIN — VITAMIN D, TAB 1000IU (100/BT) 1000 UNITS: 25 TAB at 11:32

## 2019-09-24 RX ADMIN — HYDROCORTISONE: 1 CREAM TOPICAL at 09:06

## 2019-09-24 RX ADMIN — WARFARIN SODIUM 5 MG: 5 TABLET ORAL at 17:16

## 2019-09-24 RX ADMIN — METOPROLOL TARTRATE 12.5 MG: 25 TABLET ORAL at 17:16

## 2019-09-24 RX ADMIN — FINASTERIDE 5 MG: 5 TABLET, FILM COATED ORAL at 08:37

## 2019-09-24 RX ADMIN — GABAPENTIN 200 MG: 100 CAPSULE ORAL at 08:37

## 2019-09-24 RX ADMIN — AMIODARONE HYDROCHLORIDE 400 MG: 200 TABLET ORAL at 05:28

## 2019-09-24 RX ADMIN — SENNOSIDES,DOCUSATE SODIUM 2 TABLET: 8.6; 5 TABLET, FILM COATED ORAL at 20:37

## 2019-09-24 RX ADMIN — ACETAMINOPHEN 650 MG: 325 TABLET, FILM COATED ORAL at 20:44

## 2019-09-24 RX ADMIN — SIMETHICONE CHEW TAB 80 MG 160 MG: 80 TABLET ORAL at 08:37

## 2019-09-24 RX ADMIN — POTASSIUM CHLORIDE 10 MEQ: 20 TABLET, EXTENDED RELEASE ORAL at 08:38

## 2019-09-24 RX ADMIN — SENNOSIDES,DOCUSATE SODIUM 2 TABLET: 8.6; 5 TABLET, FILM COATED ORAL at 08:37

## 2019-09-24 RX ADMIN — MELATONIN 3 MG: at 20:37

## 2019-09-24 RX ADMIN — POTASSIUM CHLORIDE 10 MEQ: 20 TABLET, EXTENDED RELEASE ORAL at 20:36

## 2019-09-24 RX ADMIN — OMEPRAZOLE 20 MG: 20 CAPSULE, DELAYED RELEASE ORAL at 08:38

## 2019-09-24 RX ADMIN — SIMETHICONE CHEW TAB 80 MG 160 MG: 80 TABLET ORAL at 11:32

## 2019-09-24 RX ADMIN — TAMSULOSIN HYDROCHLORIDE 0.8 MG: 0.4 CAPSULE ORAL at 20:37

## 2019-09-24 RX ADMIN — FUROSEMIDE 20 MG: 20 TABLET ORAL at 05:28

## 2019-09-24 RX ADMIN — GABAPENTIN 200 MG: 100 CAPSULE ORAL at 14:16

## 2019-09-24 RX ADMIN — SIMETHICONE CHEW TAB 80 MG 160 MG: 80 TABLET ORAL at 17:16

## 2019-09-24 ASSESSMENT — ENCOUNTER SYMPTOMS
NERVOUS/ANXIOUS: 0
NAUSEA: 0
ABDOMINAL PAIN: 0
SHORTNESS OF BREATH: 0
FEVER: 0
CHILLS: 0
VOMITING: 0
DIARRHEA: 0

## 2019-09-24 ASSESSMENT — BRIEF INTERVIEW FOR MENTAL STATUS (BIMS)
WHAT MONTH IS IT: ACCURATE WITHIN 5 DAYS
ASKED TO RECALL BLUE: NO, COULD NOT RECALL
ASKED TO RECALL SOCK: YES, NO CUE REQUIRED
WHAT YEAR IS IT: CORRECT
WHAT DAY OF THE WEEK IS IT: CORRECT
INITIAL REPETITION OF BED BLUE SOCK - FIRST ATTEMPT: 3
BIMS SUMMARY SCORE: 11
ASKED TO RECALL BED: NO, COULD NOT RECALL

## 2019-09-24 ASSESSMENT — PATIENT HEALTH QUESTIONNAIRE - PHQ9
3. TROUBLE FALLING OR STAYING ASLEEP OR SLEEPING TOO MUCH: NEARLY EVERY DAY
6. FEELING BAD ABOUT YOURSELF - OR THAT YOU ARE A FAILURE OR HAVE LET YOURSELF OR YOUR FAMILY DOWN: MORE THAN HALF THE DAYS
2. FEELING DOWN, DEPRESSED, IRRITABLE, OR HOPELESS: MORE THAN HALF THE DAYS
1. LITTLE INTEREST OR PLEASURE IN DOING THINGS: MORE THAN HALF THE DAYS
SUM OF ALL RESPONSES TO PHQ QUESTIONS 1-9: 18
7. TROUBLE CONCENTRATING ON THINGS, SUCH AS READING THE NEWSPAPER OR WATCHING TELEVISION: MORE THAN HALF THE DAYS
1. LITTLE INTEREST OR PLEASURE IN DOING THINGS: MORE THAN HALF THE DAYS
5. POOR APPETITE OR OVEREATING: MORE THAN HALF THE DAYS
7. TROUBLE CONCENTRATING ON THINGS, SUCH AS READING THE NEWSPAPER OR WATCHING TELEVISION: MORE THAN HALF THE DAYS
SUM OF ALL RESPONSES TO PHQ QUESTIONS 1-9: 18
5. POOR APPETITE OR OVEREATING: MORE THAN HALF THE DAYS
3. TROUBLE FALLING OR STAYING ASLEEP OR SLEEPING TOO MUCH: NEARLY EVERY DAY
4. FEELING TIRED OR HAVING LITTLE ENERGY: NEARLY EVERY DAY
SUM OF ALL RESPONSES TO PHQ9 QUESTIONS 1 AND 2: 4
8. MOVING OR SPEAKING SO SLOWLY THAT OTHER PEOPLE COULD HAVE NOTICED. OR THE OPPOSITE, BEING SO FIGETY OR RESTLESS THAT YOU HAVE BEEN MOVING AROUND A LOT MORE THAN USUAL: MORE THAN HALF THE DAYS
9. THOUGHTS THAT YOU WOULD BE BETTER OFF DEAD, OR OF HURTING YOURSELF: NOT AT ALL
9. THOUGHTS THAT YOU WOULD BE BETTER OFF DEAD, OR OF HURTING YOURSELF: NOT AT ALL
4. FEELING TIRED OR HAVING LITTLE ENERGY: NEARLY EVERY DAY
6. FEELING BAD ABOUT YOURSELF - OR THAT YOU ARE A FAILURE OR HAVE LET YOURSELF OR YOUR FAMILY DOWN: MORE THAN HALF THE DAYS
2. FEELING DOWN, DEPRESSED, IRRITABLE, OR HOPELESS: MORE THAN HALF THE DAYS
8. MOVING OR SPEAKING SO SLOWLY THAT OTHER PEOPLE COULD HAVE NOTICED. OR THE OPPOSITE, BEING SO FIGETY OR RESTLESS THAT YOU HAVE BEEN MOVING AROUND A LOT MORE THAN USUAL: MORE THAN HALF THE DAYS
SUM OF ALL RESPONSES TO PHQ9 QUESTIONS 1 AND 2: 4

## 2019-09-24 ASSESSMENT — ACTIVITIES OF DAILY LIVING (ADL): TOILETING: INDEPENDENT

## 2019-09-24 NOTE — THERAPY
09/24/19 0929   Prior Living Situation   Prior Services None   Housing / Facility 1 Story House   Steps Into Home 3   Steps In Home 0   Rail Both Rail (Steps into Home)   Elevator No   Equipment Owned None   Lives with - Patient's Self Care Capacity Alone and Able to Care For Self   IRF-MANDO:  Prior Functioning: Everyday Activities   Self Care Independent   Indoor Mobility (Ambulation) Independent   Stairs Independent   Functional Cognition Independent   Prior Device Use None of the given options   Vitals   Pulse 70   Patient BP Position Sitting   Blood Pressure  (!) 98/45   Pain 0 - 10 Group   Location Sternum   Therapist Pain Assessment During Activity   Cognition    Speech/ Communication Delayed Responses   Level of Consciousness Alert   Sequencing Impaired   Passive ROM Lower Body   Passive ROM Lower Body WDL   Active ROM Lower Body    Active ROM Lower Body  WDL   Strength Lower Body   Lower Body Strength  WDL   Sensation Lower Body   Lower Extremity Sensation   WDL   Lower Body Muscle Tone   Lower Body Muscle Tone  WDL   Balance Assessment   Sitting Balance (Static) Fair +   Sitting Balance (Dynamic) Fair +   Standing Balance (Static) Fair   Standing Balance (Dynamic) Fair -   Weight Shift Sitting Good   Weight Shift Standing Fair   Bed Mobility    Supine to Sit Minimal Assist   Sit to Supine Minimal Assist   Sit to Stand Contact Guard Assist   Scooting Stand by Assist   Rolling Supervised   Neurological Concerns   Neurological Concerns No   Coordination Lower Body    Coordination Lower Body  Not Tested   IRF-MANDO:  Roll Left and Right   Assistance Needed Supervision;Verbal cues;Adaptive equipment   Physical Assistance Level No physical assistance or only touching/steadying assist   CARE Score 4   Discharge Goal:  Assistance Needed Independent;Adaptive equipment   Discharge Goal:  Physical Assistance Level No physical assistance or only touching/steadying assist   Discharge Goal:  Score 6   IRF-MANDO:  Sit to  Lying   Assistance Needed Incidental touching;Supervision;Adaptive equipment;Verbal cues   Physical Assistance Level Less than 25%   CARE Score 3   Discharge Goal:  Assistance Needed Independent;Adaptive equipment   Discharge Goal:  Physical Assistance Level No physical assistance or only touching/steadying assist   Discharge Goal:  Score 6   IRF-MANDO:  Lying to Sitting on Side of Bed   Assistance Needed Incidental touching;Verbal cues;Supervision;Adaptive equipment   Physical Assistance Level Less than 25%   CARE Score 3   Discharge Goal:  Assistance Needed Independent;Adaptive equipment   Discharge Goal:  Physical Assistance Level No physical assistance or only touching/steadying assist   Discharge Goal:  Score 6   IRF-MANDO:  Sit to Stand   Assistance Needed Supervision;Verbal cues;Adaptive equipment   Physical Assistance Level No physical assistance or only touching/steadying assist   CARE Score 4   Discharge Goal:  Assistance Needed Independent;Adaptive equipment   Discharge Goal:  Physical Assistance Level No physical assistance or only touching/steadying assist   Discahrge Goal:  Score 6   IRF-MANDO:  Chair/Bed-to-Chair Transfer   Assistance Needed Incidental touching;Verbal cues;Supervision;Adaptive equipment   Physical Assistance Level No physical assistance or only touching/steadying assist   CARE Score 4   Discharge Goal:  Assistance Needed Independent;Adaptive equipment   Discharge Goal:  Physical Assistance Level No physical assistance or only touching/steadying assist   Discharge Goal:  Score 6   IRF-MANDO:  Toilet Transfer   Reason if not Attempted Activity not applicable   CARE Score 9   Discharge Goal:  Assistance Needed Independent;Adaptive equipment   Discharge Goal:  Physical Assistance Level No physical assistance or only touching/steadying assist   Discahrge Goal:  Score 6   IRF-MANDO:  Car Transfer   Reason if not Attempted Medical concerns  (Sternal precaution and low blood pressure)   CARE Score 88    Discharge Goal:  Assistance Needed Independent;Adaptive equipment   Discharge Goal:  Physical Assistance Level No physical assistance or only touching/steadying assist   Discharge Goal:  Score 6   IRF MANDO:  Walking   Does the Patient Walk? Yes   IRF MANDO:  Walk 10 Feet   Assistance Needed Supervision;Verbal cues;Adaptive equipment   Physical Assistance Level No physical assistance or only touching/steadying assist   CARE Score 4   Discharge Goal:  Assistance Needed Independent;Adaptive equipment   Discharge Goal:  Physical Assistance Level No physical assistance or only touching/steadying assist   Discharge Goal:  Score 6   IRF-MANDO:  Walk 50 Feet with Two Turns   Assistance Needed Supervision;Verbal cues;Adaptive equipment   Physical Assistance Level No physical assistance or only touching/steadying assist   CARE Score 4   Discharge Goal:  Assistance Needed Independent;Adaptive equipment   Discharge Goal:  Physical Assistance Level No physical assistance or only touching/steadying assist   Discharge Goal:  Score 6   IRF-MANDO:  Walk 150 Feet   Assistance Needed Supervision;Verbal cues;Adaptive equipment   Physical Assistance Level No physical assistance or only touching/steadying assist   CARE Score 4   Discharge Goal:  Assistance Needed Independent;Adaptive equipment   Discharge Goal:  Physical Assistance Level No physical assistance or only touching/steadying assist   Discharge Goal:  Score 6   IRF MANDO:  Walking 10 Feet on Uneven Surfaces   Reason if not Attempted Safety concerns   CARE Score 88   Discharge Goal:  Assistance Needed Independent;Adaptive equipment   Discharge Goal:  Physical Assistance Level No physical assistance or only touching/steadying assist   Discharge Goal:  Score 6   IRF MANDO:  1 Step (Curb)   Reason if not Attempted Medical concerns  (Sternal precautions)   CARE Score 88   Discharge Goal:  Assistance Needed Independent;Adaptive equipment   Discharge Goal:  Physical Assistance Level No  physical assistance or only touching/steadying assist   Discharge Goal:  Score 6   IRF-MANDO:  4 Steps   Reason if not Attempted Medical concerns  (Low blood pressure, symptomatic)   CARE Score 88   Discharge Goal:  Assistance Needed Independent;Adaptive equipment   Discharge Goal:  Physical Assistance Level No physical assistance or only touching/steadying assist   Discharge Goal:  Score 6   IRF MANDO:  12 Steps   Reason if not Attempted Activity not applicable   CARE Score 9   IRF MANDO:  Picking Up Object   Reason if not Attempted Safety concerns   CARE Score 88   Discharge Goal:  Assistance Needed Independent;Adaptive equipment   Discharge Goal:  Physical Assistance Level No physical assistance or only touching/steadying assist   Discharge Goal:  Score 6   IRF-MANDO:  Wheel 50 Feet with Two Turns   Reason if not Attempted Activity not applicable   CARE Score 9   IRF-MANDO:  Wheel 150 Feet   Reason if not Attempted Activity not applicable   CARE Score 9   Problem List    Problems Impaired Bed Mobility;Impaired Transfers;Impaired Ambulation;Functional Strength Deficit;Impaired Balance;Impaired Vision;Decreased Activity Tolerance;Motor Planning / Sequencing;Other (Comments)  (Symptomatic low blood pressure)   Precautions   Precautions Sternal Precautions (See Comments);Fall Risk   Comments Aortic aneurysm sx   Current Discharge Plan   Current Discharge Plan Return to Prior Living Situation  (The patient's daughter available to assist at discharge)   Interdisciplinary Plan of Care Collaboration   IDT Collaboration with  Occupational Therapist;Physician;Nursing   Collaboration Comments Current functional status and caution for low blood pressure   Benefit   Therapy Benefit Patient Would Benefit from Inpatient Rehabilitation Physical Therapy to Maximize Functional [place] with ADLs, IADLs and Mobility.   PT Total Time Spent   PT Individual Total Time Spent (Mins) 60   PT Charge Group   PT Therapeutic Activities 1   PT  Evaluation PT Evaluation Mod   Physical Therapy   Initial Evaluation     Patient Name: Xavier Richardson  Age:  69 y.o., Sex:  male  Medical Record #: [id number]  Today's Date: 9/24/2019     Subjective    The patient was resting in bed and he agreed to the PT evaluation     Objective    The patient participated in the PT evaluation.  He was unable to participate in all of the activities such as going up and down the stairs due to symptomatic low blood pressure.  He was not able to step up and down a curb due to sternal precautions.    FIM Bed/Chair/Wheelchair Transfers Score: 4 - Minimal Assistance  Bed/Chair/Wheelchair Transfers Description:  Increased time, Verbal cueing, Adaptive equipment(Assist supine to sit torso)    FIM Walking Score:  5 - Standby Prompting/Supervision or Set-up  Walking Description:  Extra time, Verbal cueing, Supervision for safety(235 ft fww)    FIM Wheelchair Score:   0 - Not tested, Activity is not applicable  Wheelchair Description:      FIM Stairs Score:  0 - Not tested,medical condition  Stairs Description:       Assessment  Patient is 69 y.o. male with a diagnosis of left embolic CVA, right parietal occipital CVA, thoracic aortic aneurysm repair, left pleural effusion, left-sided weakness.  Additional factors influencing patient status / progress (ie: cognitive factors, co-morbidities, social support, etc): Impaired tolerance for activity, bed mobility and transfers, gait, fall risk, delayed cognitive processing.      Plan  Recommend Physical Therapy  minutes per day 5-7 days per week for 2 weeks for the following treatments:  PT Gait Training, PT Therapeutic Exercises, PT Neuro Re-Ed/Balance, PT Therapeutic Activity and PT Evaluation.    Goals:  Long term and short term goals have been discussed with patient and they are in agreement.    Physical Therapy Problems     Problem: Mobility     Dates: Start: 09/24/19       Description:     Goal: STG-Within one week, patient will ascend  and descend four to six stairs     Dates: Start: 09/24/19       Description: 1) Individualized goal: Up/down 4-6 stairs, handrails, SBA, 1 week  2) Interventions:  PT Gait Training, PT Therapeutic Exercises, PT Therapeutic Activity and PT Evaluation             Goal: STG-Within one week, patient will     Dates: Start: 09/24/19       Description: 1) Individualized goal: Gait FWW, SBA, 250 FT, 1 week  2) Interventions:  PT Gait Training, PT Therapeutic Exercises, PT Neuro Re-Ed/Balance and PT Therapeutic Activity                   Problem: Mobility Transfers     Dates: Start: 09/24/19       Description:     Goal: STG-Within one week, patient will move supine to sit     Dates: Start: 09/24/19       Description: 1) Individualized goal: Transfer supine to/from sit SBA, sternal precautions, 1 week  2) Interventions:  PT Gait Training, PT Therapeutic Exercises, PT Neuro Re-Ed/Balance and PT Therapeutic Activity             Goal: STG-Within one week, patient will transfer bed to chair     Dates: Start: 09/24/19       Description: 1) Individualized goal: Transfer bed to/from chair SBA, FWW 1 week  2) Interventions:  PT Gait Training, PT Therapeutic Exercises, PT Neuro Re-Ed/Balance and PT Therapeutic Activity                   Problem: PT-Long Term Goals     Dates: Start: 09/24/19       Description:     Goal: LTG-By discharge, patient will ambulate     Dates: Start: 09/24/19       Description: 1) Individualized goal: Gait FWW/-400 FT modified independent at discharge  2) Interventions:  PT Gait Training, PT Therapeutic Exercises, PT Neuro Re-Ed/Balance and PT Therapeutic Activity             Goal: LTG-By discharge, patient will transfer one surface to another     Dates: Start: 09/24/19       Description: 1) Individualized goal: Transfer one surface to another FWW/SPC modified independent at discharge  2) Interventions:  PT Gait Training, PT Therapeutic Exercises, PT Neuro Re-Ed/Balance and PT Therapeutic Activity              Goal: LTG-By discharge, patient will ambulate up/down 4-6 stairs     Dates: Start: 09/24/19       Description: 1) Individualized goal: Up/down 4-6 stairs modified independent at discharge  2) Interventions:  PT Gait Training, PT Therapeutic Exercises, PT Neuro Re-Ed/Balance and PT Therapeutic Activity             Goal: LTG-By discharge, patient will transfer in/out of a car     Dates: Start: 09/24/19       Description: 1) Individualized goal: Car transfer FWW/SPC modified independent at discharge  2) Interventions:  PT Gait Training, PT Therapeutic Exercises, PT Neuro Re-Ed/Balance and PT Therapeutic Activity

## 2019-09-24 NOTE — ASSESSMENT & PLAN NOTE
Likely 2nd to recent heart surgery  Has edema -- has hx but much more recently  O2 sats ok on RA  BNP: 2189 (9/24) --> 1011 (9/29) --> 1406 (10/6)  Echo (9/24): EF 60%, RVSP 45  On Lasix: 40 mg daily   On KCL: 20 meq bid  US LE: no DVT  Monitor K+: 3.8 (10/7)

## 2019-09-24 NOTE — ASSESSMENT & PLAN NOTE
S/P hypothermic protocal and repair at [facility]  Echo EF 60% and RVSP 45 mmHg, suspect mobile echodensity in aortic arch represents post-op changes

## 2019-09-24 NOTE — DISCHARGE PLANNING
CASE MANAGEMENT INITIAL ASSESSMENT    Admit Date:  9/23/2019     I met with patient to discuss role of case management / discharge planning / team conference.   Patient is a  69 y.o. male transferred from [facility] for continued rehabilitation related to recent stroke. Pt retired about 3 months ago. He lives independently in [place], [place]. He has a son in [place], [place] and a daughter who lives in [place]. Patient's daughter plans to come back to [place] to assist patient at discharge. Patient was independent prior to recent hospitalizations.     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[facility]) 09/23/2019   • Lung nodule 09/23/2019   • Shoulder lesion, left 09/23/2019   • Dyslipidemia 09/23/2019   • Urinary retention 09/23/2019   • Edema 09/23/2019   • A-fib ([facility]) 09/23/2019     Prior Living Situation:  Housing / Facility: 1 Story House  Lives with - Patient's Self Care Capacity: Alone and Able to Care For Self    Prior Level of Function:  Medication Management: Independent  Finances: Independent  Home Management: Independent  Shopping: Independent  Prior Level Of Mobility: Independent Without Device in Community  Driving / Transportation: Driving Independent    Support Systems:  Primary : Efren Richardson- [phone]  Other support systems: Roslyn Richardson       Previous Services Utilized:   Equipment Owned: Grab Bar(s) In Tub / Shower  Prior Services: None, Home-Independent    Other Information:  Occupation (Pre-Hospital Vocational): Retired Due To Age     Primary Payor Source: Medicare A, Medicare B  Secondary Payor Source: Supplemental Insurance  Primary Care Practitioner :  Dr. Matos  Other MDs: Dr. Robles- [facility],     Additional Case Management Questions:  Have you ever received case management services for yourself or a family member? No    Do you feel you have and an understanding of what services  provide? Yes    Do you have any additional questions regarding case management? No.      CASE MANAGEMENT PLAN OF CARE   Individualized Goals:   1. To return home with [place] with assistance of daughter.       Barriers:   1. Patient does not have family locally, but family is willing to travel.       Patient / Family Goal:  Patient / Family Goal: To return home to [place], [place]    Plan:  1. Continue to follow patient through hospitalization and provide discharge planning in collaboration with patient, family, physicians and ancillary services.     2. Utilize community resources to ensure a safe discharge.

## 2019-09-24 NOTE — FLOWSHEET NOTE
09/24/19 1317   Events/Summary/Plan   Events/Summary/Plan 02 spot check.  Used noc 02 last night.  Needs reminders to use IS or do deep breathing   Interdisciplinary Plan of Care-Goals (Indications)   Hyperinflation Protocol Indications Chest Trauma (Blunt, Penetrative, or Surgical)   Interdisciplinary Plan of Care-Outcomes    Hyperinflation Protocol Goals/Outcome Greater Than 60% of Predicted I.S. Volume x 24 hrs   Education   Education Yes - Pt. / Family has been Instructed in use of Respiratory Equipment   Respiratory WDL   Respiratory (WDL) X   Chest Exam   Respiration 16   Pulse 74   Oximetry   #Pulse Oximetry (Single Determination) Yes   Oxygen   Home O2 Use Prior To Admission? No   Pulse Oximetry 91 %   O2 Daily Delivery Respiratory  Room Air with O2 Available

## 2019-09-24 NOTE — REHAB-CM IDT TEAM NOTE
Case Management    DC Planning  DC destination/dispostion:  Home with support of daughter  Referrals: None at this time.  DC Needs: Anticipate HH vs OP therapies, DME to be determined. F/u in [facility] in 1 month (Dr. Robles- [phone])  Barriers to discharge: Undetermined  Strengths: Supportive family    Section completed by:  Dina Dukes

## 2019-09-24 NOTE — CARE PLAN
Problem: Safety  Goal: Will remain free from injury  Outcome: PROGRESSING AS EXPECTED   Pt uses call light consistently and appropriately. Waits for assistance does not attempt self transfer this shift. Able to verbalize needs.   Problem: Infection  Goal: Will remain free from infection  Outcome: PROGRESSING AS EXPECTED   Patient remains free of infection as evidenced by normal vital signs and breath sounds. Will continue monitoring.   Problem: Pain Management  Goal: Pain level will decrease to patient's comfort goal  Outcome: PROGRESSING AS EXPECTED   Patient able to verbalize pain level and verbalize an acceptable level of pain.

## 2019-09-24 NOTE — PROGRESS NOTES
Pharmacy Warfarin Consult   9/24/2019     69 y.o.   male     Indication for anticoagulation: Stroke     Goal INR = 2 - 3    Recent Labs     09/24/19  0558   INR 1.30*   HEMOGLOBIN 7.8*   HEMATOCRIT 26.1*       Pertinent Drug/Drug Interactions:  Amiodarone, Asa, Fenofibrate, PPI, PRN trazodone  Outpatient Warfarin Regimen:  Not noted  Recent Warfarin Dosing:   Dose from last 7 days     Date/Time Dose (mg)    09/24/19 0558  5    09/23/19 1600  6        Date 9/21 9/22 9/23   INR 1.05 1.00 1.02   Dose 4 mg 5 mg 6 mg (given at [facility])          Bridge Therapy: no                1.  Coumadin 5 mg tonight per INR = 1.30.           Catalino Stubbs [facility]

## 2019-09-24 NOTE — THERAPY
Speech Language Pathology   Initial Assessment     Patient Name: Xavier Richardson  AGE:  69 y.o., SEX:  male  Medical Record #: [id number]  Today's Date: 9/24/2019     Subjective    The patient is a 69 y.o. male with severe functional debility after an acute aortic dissection. Status post graft repair on 9/4.  MRI showed watershed infarcts in the bilateral brain as well as left embolic strokes and petechial hemorrhage in the right parietal occipital lobe. Past medical history of hypertension, sleep apnea, BPH.  Patient previously lived alone and was independent for all IADL's.   Objective       09/24/19 1302   Prior Living Situation   Prior Services None;Home-Independent   Housing / Facility 1 Story House   Lives with - Patient's Self Care Capacity Alone and Able to Care For Self   Prior Level Of Function   Communication Within Functional Limits   Swallow Within Functional Limits   Dentition Intact   Dentures None   Hearing Within Functional Limits for Evaluation   Hearing Aid None   Vision Reading ;Distance;Wears Corrective Lenses   Patient's Primary Language English   Occupation (Pre-Hospital Vocational) Retired Due To Age   Receptive Language / Auditory Comprehension   Receptive Language / Auditory Comprehension WDL   Expressive Language   Expressive Language (WDL) WDL   Reading Comprehension    Reading Comprehension (WDL) WDL   Written Language Expression   Written Language Expression (WDL) WDL   Cognition   Verbal Short Term Memory 30 Minutes   Outcome Measures   Outcome Measures Utilized SCCAN   SCCAN (Scales of Cognitive and Communicative Ability for Neurorehabilitation)   Oral Expression - Raw Score 19   Oral Expression - Scale Performance Score 100   Orientation - Raw Score 12   Orientation - Scale Performance Score 100   Memory - Raw Score 12   Memory - Scale Performance Score 63   Speech Comprehension - Raw Score 13   Speech Comprehension - Scale Performance Score 100   Reading Comprehension - Raw Score 12    Reading Comprehension - Scale Performance Score 100   Writing - Raw Score 7   Writing - Scale Performance Score 100   Attention - Raw Score 15   Attention - Scale Performance Score 94   Problem Solving - Raw Score 22   Problem Solving - Scale Performance Score 96   SCCAN Total Raw Score 88   SCCAN Degree of Severity Typical Functioning   Problem List   Problem List Cognitive-Linguistic Deficits   Current Discharge Plan   Current Discharge Plan Return to Prior Living Situation   Benefit   Therapy Benefit Patient would not benefit from Inpatient Rehab Speech-Language Pathology.  No further Speech Therapy recommended at this time.   Interdisciplinary Plan of Care Collaboration   IDT Collaboration with  Physician   Collaboration Comments findings from [place]. ST not recommended   Speech Language Pathologist Assigned   Assigned SLP / Pager # No ST   SLP Total Time Spent   SLP Individual Total Time Spent (Mins) 60   SLP Charge Group   SLP Speech Language Evaluation Speech Sound Language Comprehension       FIM Comprehension Score:  7 - Independent  Comprehension Description:       FIM Expression Score:  7 - Independent  Expression Description:       FIM Social Interaction Score:  7 - Independent  Social Interaction Description:       FIM Problem Solving Score:  6 - Modified Independent  Problem Solving Description:       FIM Memory Score:  6 - Modified Independent  Memory Description:       Assessment    Patient is 69 y.o. male with a diagnosis of CVA.  Additional factors influencing patient status/progress (ie: cognitive factors, co-morbidities, social support, etc): family support, IPLOF, family support.  Cognitive linguistic evaluation was completed. Patient reports baseline memory difficulties and denies cognitive changes with current hospitalization. SCCAN was completed. Patient achieved a final raw score of 88 indicating cognition WFL. A sub score of 63% was noted on memory subtest, however, patient does not report  new deficits with memory and states he has baseline memory deficits. All other cognitive domains received score of % Speech therapy is not recommended at this time.     Plan  Speech therapy is not warranted at this time. Discussed with patient and they are in agreement.    Speech Therapy Problems (Active)      There are no active problems.

## 2019-09-24 NOTE — PROGRESS NOTES
Admitted per alberto via ambulance from [facility] s/p cva, open heart surgery, alert oriented x4 with family.  Mid-sternal incision open to air scabbed.  Right groin incision with dressing changed & intact.  Barnard catheter in place with yellow urine.  On sternal precautions, transfers with SBA to [facility]. Assist.  Oriented to hospital routine, admission care done.

## 2019-09-24 NOTE — PROGRESS NOTES
[facility] Medicine Daily Progress Note    Date of Service  9/24/2019    Chief Complaint:  S/P aortic dissection  Hypertension  Afib    Interval History:  No significant events or changes since last visit    Review of Systems  Review of Systems   Constitutional: Negative for chills and fever.   Eyes:        Has occ visual changes (see spots and lines, etc...).   Respiratory: Negative for shortness of breath.    Cardiovascular: Negative for chest pain.   Gastrointestinal: Negative for abdominal pain, diarrhea, nausea and vomiting.   Psychiatric/Behavioral: The patient is not nervous/anxious.         Physical Exam  Temp:  [36.5 °C (97.7 °F)-37.1 °C (98.7 °F)] 36.8 °C (98.2 °F)  Pulse:  [70-81] 70  Resp:  [17-20] 17  BP: ()/(45-68) 98/45  SpO2:  [90 %-96 %] 96 %    Physical Exam   Constitutional: He is oriented to person, place, and time. He appears well-nourished.   HENT:   Head: Atraumatic.   Eyes: Pupils are equal, round, and reactive to light. Conjunctivae are normal.   Neck: Normal range of motion. Neck supple.   Cardiovascular: Normal rate, regular rhythm, S1 normal and S2 normal.   No murmur heard.  Pulmonary/Chest: Effort normal. He has no wheezes. He has no rhonchi. He has no rales.   Abdominal: Soft. He exhibits no distension. There is no tenderness.   Musculoskeletal: He exhibits edema.   Mostly pedal swelling   Neurological: He is alert and oriented to person, place, and time. No sensory deficit.   Skin: Skin is warm and dry. No rash noted. No cyanosis.   Psychiatric: He has a normal mood and affect. His behavior is normal.   Nursing note and vitals reviewed.      Fluids    Intake/Output Summary (Last 24 hours) at 9/24/2019 1313  Last data filed at 9/24/2019 0500  Gross per 24 hour   Intake 560 ml   Output 3225 ml   Net -2665 ml       Laboratory  Recent Labs     09/24/19  0558   WBC 4.2*   RBC 2.65*   HEMOGLOBIN 7.8*   HEMATOCRIT 26.1*   MCV 98.5*   MCH 29.4   MCHC 29.9*   RDW 59.6*   PLATELETCT 255    MPV 8.5*     Recent Labs     09/24/19  0558   SODIUM 139   POTASSIUM 3.9   CHLORIDE 105   CO2 29   GLUCOSE 86   BUN 13   CREATININE 1.05   CALCIUM 9.2     Recent Labs     09/24/19  0558   INR 1.30*         Recent Labs     09/24/19  0558   TRIGLYCERIDE 69   HDL 36*   LDL 54       Imaging    Assessment/Plan  * 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)  Assessment & Plan  Has some visual changes  On ASA and Fenofibrate    A-fib (HCC)  Assessment & Plan  HR ok  On Lopressor: 25 mg bid --> will decrease to 12.5 mg bid 2nd to lower BP (9/24)  On Amiodarone  On Coumadin    Fluid overload  Assessment & Plan  Has pedal swelling -- has hx  BNP: 2189 (no other values in Epic)  F/U echo  2nd to recent heart surgery  On Lasix: 20 mg daily  On KCL: 10 meq daily  On fluid restrictions: 1500 cc/day  Monitor K+: 3.9 (9/24)    Dyslipidemia  Assessment & Plan  On Fenofibrate    Shoulder lesion, left- (present on admission)  Assessment & Plan  Will need F/U imaging    Lung nodule- (present on admission)  Assessment & Plan  Needs f/u imaging    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

## 2019-09-24 NOTE — ASSESSMENT & PLAN NOTE
HR ok  Echo (9/24): EF 60%, RVSP 45; mobile echodensity noted in the aortic arch/proximal left subclavian artery  On Lopressor: 12.5 mg bid   On Amiodarone  On Coumadin  On Lasix

## 2019-09-24 NOTE — REHAB-PHARMACY IDT TEAM NOTE
Pharmacy   Pharmacy  Antibiotics/Antifungals/Antivirals:  Medication:      Active Orders (From admission, onward)    None        Route:        NA  Stop Date:  NA  Reason:      NA  Antihypertensives/Cardiac:  Medication:    Orders (72h ago, onward)     Start     Ordered    09/28/19 0600  amiodarone (CORDARONE) tablet 200 mg  Q DAY      09/23/19 1520    09/24/19 0830  fenofibrate micronized (LOFIBRA) capsule 67 mg  AFTER BREAKFAST      09/23/19 1520    09/24/19 0600  amiodarone (CORDARONE) tablet 400 mg  Q DAY      09/23/19 1520    09/24/19 0600  furosemide (LASIX) tablet 20 mg  Q DAY      09/23/19 1520    09/23/19 1800  metoprolol (LOPRESSOR) tablet 25 mg  TWICE DAILY      09/23/19 1520    09/23/19 1520  hydrALAZINE (APRESOLINE) tablet 10 mg  EVERY 8 HOURS PRN      09/23/19 1520              Patient Vitals for the past 24 hrs:   BP Pulse   09/24/19 0929 (!) 98/45 70   09/24/19 0700 113/64 76   09/24/19 0528 109/60 76   09/23/19 2023 105/68 76   09/23/19 1915 (!) 94/53 81   09/23/19 1459 -- 73   09/23/19 1400 124/66 75     Anticoagulation:  Medication: Coumadin per pharmacy, ASA    Indication for anticoagulation: Stroke     Goal INR = 2 - 3    INR:    Recent Labs     09/24/19  0558   INR 1.30*   Pertinent Drug/Drug Interactions:  Amiodarone, Asa, Fenofibrate, PPI, PRN trazodone  Outpatient Warfarin Regimen:  Not noted  Recent Warfarin Dosing:        Dose from last 7 days      Date/Time Dose (mg)     09/24/19 0558  5     09/23/19 1600  6          Date 9/21 9/22 9/23   INR 1.05 1.00 1.02   Dose 4 mg 5 mg 6 mg (given at [facility])       Other key medications: A review of the medication list reveals no issues at this time. Patient is currently on antihypertensive(s). Recommend home blood pressure monitoring/recording if antihypertensive(s) regimen(s) continue.    Section completed by: Catalino Stubbs [facility]
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
## 2019-09-24 NOTE — CARE PLAN
Problem: Communication  Goal: The ability to communicate needs accurately and effectively will improve  Outcome: PROGRESSING AS EXPECTED  Note:   Patient able to verbalize needs.  Will continue to monitor.      Problem: Safety  Goal: Will remain free from injury  Outcome: PROGRESSING AS EXPECTED  Note:   Pt uses call light consistently and appropriately. Waits for assistance does not attempt self transfer this shift. Able to verbalize needs.      Problem: Bowel/Gastric:  Goal: Normal bowel function is maintained or improved  Outcome: PROGRESSING AS EXPECTED  Note:   Patient having regular bowel movements; last BM 9/23  Denies s/s constipation; bowel meds available if needed.  Will continue to monitor.      Problem: Pain Management  Goal: Pain level will decrease to patient's comfort goal  Outcome: PROGRESSING AS EXPECTED  Note:   Patient able to verbalize pain level and verbalize an acceptable level of pain.

## 2019-09-24 NOTE — THERAPY
"Occupational Therapy   Initial Evaluation     Patient Name: Xavier Richardson  Age:  69 y.o., Sex:  male  Medical Record #: [id number]  Today's Date: 9/24/2019     Subjective    \"I want to be as independent as I can when I get home\"     Objective       09/24/19 1031   Prior Living Situation   Prior Services None;Home-Independent   Housing / Facility 1 Story House   Steps Into Home 3   Steps In Home 0   Bathroom Set up Walk In Shower;Grab Bars  (built in corner seat, one diagonal grab bar inside shower)   Equipment Owned Grab Bar(s) In Tub / Shower   Lives with - Patient's Self Care Capacity Alone and Able to Care For Self   Comments Pt lives alone, has one son in [place] and one daughter in [place], daughter plans to assist at d/c   Prior Level of ADL Function   Self Feeding Independent   Grooming / Hygiene Independent   Bathing Independent   Dressing Independent   Toileting Independent   Prior Level of IADL Function   Medication Management Independent   Laundry Independent   Kitchen Mobility Independent   Finances Independent   Home Management Independent   Shopping Independent   Prior Level Of Mobility Independent Without Device in Community   Driving / Transportation Driving Independent   Occupation (Pre-Hospital Vocational) Retired Due To Age   Leisure Interests Hobbies  (house projects, landscaping, woodworking)   Comments Pt recently (3 months) moved into new home after retiring and has been fixing it up, landscaping, etc.   IRF-MANDO:  Prior Functioning: Everyday Activities   Self Care Independent   Indoor Mobility (Ambulation) Independent   Stairs Independent   Functional Cognition Independent   Prior Device Use None of the given options   Vitals   Pulse 73   Blood Pressure  106/58   Cognition    Speech/ Communication Delayed Responses   Level of Consciousness Alert   IRF-MANDO:  Cognitive Pattern Assessment   Cognitive Pattern Assessment Used BIMS   IRF-MANDO:  Brief Interview for Mental Status (BIMS)   Repetition of Three " "Words (First Attempt) 3   Temporal Orientation: Able to Report the Correct Year Correct   Temporal Orientation: Able to Report the Correct Month Accurate within 5 days   Temporal Orientation: Able to Report the Correct Day of the Week Correct   Able to Recall \"Sock\" Yes, no cue required   Able to Recall \"Blue\" No, could not recall   Able to Recall \"Bed\" No, could not recall   BIMS Summary Score 11   Vision Screen   Vision Not tested  (pt reports intermittently seeing spots)   Passive ROM Upper Body   Passive ROM Upper Body WDL   Comments WDL within limits of spinal precautions   Active ROM Upper Body   Active ROM Upper Body  X   Dominant Hand Right   Comments RUE WFL, LUE WFL distally, L shoulder to 80 degrees flexion and abduction   Strength Upper Body   Upper Body Strength  X   Lt Shoulder Flexion Strength 3- (F-)   Lt Shoulder Abduction Strength 3- (F-)   Left  Impaired   Sensation Upper Body   Upper Extremity Sensation  Not Tested   Upper Body Muscle Tone   Upper Body Muscle Tone  Not Tested   Balance Assessment   Sitting Balance (Static) Fair +   Sitting Balance (Dynamic) Fair +   Standing Balance (Static) Fair   Standing Balance (Dynamic) Fair -   Weight Shift Sitting Good   Weight Shift Standing Fair   Bed Mobility    Sit to Supine Contact Guard Assist   Sit to Stand Contact Guard Assist   Coordination Upper Body   Coordination X   Fine Motor Coordination impaired L hand   Gross Motor Coordination impaired LUE   IRF-MANDO:  Eating   Assistance Needed Set-up / clean-up   [place] Physical Assistance Level No physical assistance or only touching/steadying assist   CARE Score 5   Discharge Goal:  Assistance Needed Independent   Discharge Goal:  Physical Assistance Level No physical assistance or only touching/steadying assist   Discharge Goal:  Score 6   IRF-MANDO:  Oral Hygiene   Assistance Needed Set-up / clean-up   Physical Assistance Level No physical assistance   CARE Score 5   Discharge Goal:  Assistance " Needed Independent   Discharge Goal:  Physical Assistance Level No physical assistance or only touching/steadying assist   Discharge Goal:  Score 6   IRF-MANDO:  Shower/Bathe Self   Assistance Needed Incidental touching   Physical Assistance Level No physical assistance or only touching/steadying assist   CARE Score 4   Discharge Goal:  Assistance Needed Adaptive equipment   Discharge Goal:  Physical Assistance Level No physical assistance or only touching/steadying assist   Discharge Goal Score 6   IRF-MANDO:  Upper Body Dressing   Assistance Needed Set-up / clean-up   Physical Assistance Level No physical assistance or only touching/steadying assist   CARE Score 5   Discharge Goal:  Assistance Needed Independent   Discharge Goal:  Physical Assistance Level No physical assistance or only touching/steadying assist   Dischage Goal:  Score 6   IRF-MANDO:  Lower Body Dressing   Assistance Needed Physical assistance   Physical Assistance Level 25%-49%   CARE Score 3   Discharge Goal:  Assistance Needed Adaptive equipment   Discharge Goal:  Physical Assistance Level No physical assistance or only touching/steadying assist   Discharge Goal:  Score 6   IRF MANDO:  Putting On/Taking Off Footwear   Assistance Needed Physical assistance   Physical Assistance Level 50%-74%   CARE Score 2   Discharge Goal:  Assistance Needed Adaptive equipment   Discharge Goal:  Physical Assistance Level No physical assistance or only touching/steadying assist   Discharge Goal:  Score 6   IRF-MANDO:  Toileting Hygiene   Reason if not Attempted Refused to perform   CARE Score 7   Discharge Goal:  Assistance Needed Independent   Discharge Goal:  Physical Assistance Level No physical assistance or only touching/steadying assist   Discharge Goal:  Score 6   IRF-MANDO:  Toilet Transfer   Assistance Needed Physical assistance   Physical Assistance Level Less than 25%   CARE Score 3   Discharge Goal:  Assistance Needed Adaptive equipment   Discharge Goal:   Physical Assistance Level No physical assistance or only touching/steadying assist   Discahrge Goal:  Score 6   IRF-MANDO:  Hearing, Speech, and Vision   Expression of Ideas and Wants 4   Understanding Verbal and Non-Verbal Content 4   Problem List   Problem List Decreased Active Daily Living Skills;Decreased Homemaking Skills;Decreased Upper Extremity Strength Left;Decreased Upper Extremity AROM Left;Decreased Functional Mobility;Decreased Activity Tolerance;Safety Awareness Deficits / Cognition;Impaired Coordination Left Upper Extremity;Impaired Cognitive Function;Impaired Vision;Impaired Postural Control / Balance;Limited Knowledge of Post Op Precautions   Precautions   Precautions Sternal Precautions (See Comments);Fall Risk   Current Discharge Plan   Current Discharge Plan Return to Prior Living Situation   Benefit    Therapy Benefit Patient Would Benefit from Inpatient Rehab Occupational Therapy to Maximize [place] with ADLs, IADLs and Functional Mobility.   Interdisciplinary Plan of Care Collaboration   IDT Collaboration with  Physical Therapist   Patient Position at End of Therapy In Bed  (for echo)   Collaboration Comments Pt status, BP   Equipment Needs   Assistive Device / DME Grab Bars In Shower / Tub;Shower Chair;Front-Wheel Walker   Adaptive Equipment Long Handled Shoe Horn   OT Total Time Spent   OT Individual Total Time Spent (Mins) 60   OT Charge Group   Charges Yes   OT Self Care / ADL 1   OT Evaluation OT Evaluation Mod       FIM Eating Score:  5 - Standby Prompting/Supervision or Set-up  Eating Description:  (setup for bilateral tasks)    FIM Grooming Score:  5 - Standby Prompting/Supervision or Set-up  Grooming Description:  (SBA standing at sink to comb hair)    FIM Bathing Score:  4 - Minimal Assistance  Bathing Description:  Grab bar, Tub bench, Hand held shower, Supervision for safety, Verbal cueing, Requires minimal contact(Completed primarily in sitting, min A for standing stability,  verbal cues for safety awareness)    FIM Upper Body Dressin - Standby Prompting/Supervision or Set-up  Upper Body Dressing Description:  Set-up of equipment, Increased time    FIM Lower Body Dressing Score:  3 - Moderate Assistance  Lower Body Dressing Description:  (Min A for catheter management and threading, SBA while pt pulled up in standing, min A for shoes)    FIM Toileting Body Dressing:   Declined need this session  Toileting Description:       FIM Toilet Transfer Score:   Declined need this session  Toilet Transfer Description:       FIM Tub/Shower Transfers Score:  4 - Minimal Assistance  Tub/Shower Transfers Description:  Grab bar, Increased time, Supervision for safety, Verbal cueing(stand pivot w/c <> shower chair via grab bar)    Assessment  Patient is 69 y.o. 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 During hospitalization patient found to have watershed infarcts in the bilateral brain as well as L embolic strokes, R ICA stenosis, petechial hemorrhage in R parietal occipital lobe.  Additional factors influencing patient status / progress (ie: cognitive factors, co-morbidities, social support, etc): past medical history of hypertension off his medications for 3 weeks, sleep apnea, BPH. Urinary retention with brenner.      At time of OT evaluation patient presents below baseline of independent requiring min to mod A for ADLs and min A for mobility with FWW and grab bar. His primarily limitations are LUE weakness and impaired coordination, impaired standing balance, impaired functional endurance, impaired safety awareness, and a need for continued education on incorporation of sternal precautions into daily activities.     Plan  Recommend Occupational Therapy  minutes per day 5-7 days per week for 2 weeks for the following treatments:  OT E Stim Attended, OT Group Therapy, OT Self Care/ADL, OT  Cognitive Skill Dev, OT Community Reintegration, OT Manual Ther Technique, OT Neuro Re-Ed/Balance, OT Sensory Int Techniques, OT Therapeutic Activity, OT Evaluation and OT Therapeutic Exercise.    Goals:  Long term and short term goals have been discussed with patient and daughter and they are in agreement.    Occupational Therapy Goals     Problem: Bathing     Dates: Start: 09/24/19       Description:     Goal: STG-Within one week, patient will bathe     Dates: Start: 09/24/19       Description: 1) Individualized Goal:  With SBA  2) Interventions:  OT E Stim Attended, OT Group Therapy, OT Self Care/ADL, OT Cognitive Skill Dev, OT Community Reintegration, OT Manual Ther Technique, OT Neuro Re-Ed/Balance, OT Sensory Int Techniques, OT Therapeutic Activity, OT Evaluation and OT Therapeutic Exercise                   Problem: Dressing     Dates: Start: 09/24/19       Description:     Goal: STG-Within one week, patient will dress LB     Dates: Start: 09/24/19       Description: 1) Individualized Goal:  With SBA  2) Interventions:  OT E Stim Attended, OT Group Therapy, OT Self Care/ADL, OT Cognitive Skill Dev, OT Community Reintegration, OT Manual Ther Technique, OT Neuro Re-Ed/Balance, OT Sensory Int Techniques, OT Therapeutic Activity, OT Evaluation and OT Therapeutic Exercise                   Problem: Functional Transfers     Dates: Start: 09/24/19       Description:     Goal: STG-Within one week, patient will     Dates: Start: 09/24/19       Description: 1) Individualized Goal:  Ambulate to/from bathroom and complete toilet and shower txs with CGA  2) Interventions:  OT E Stim Attended, OT Group Therapy, OT Self Care/ADL, OT Cognitive Skill Dev, OT Community Reintegration, OT Manual Ther Technique, OT Neuro Re-Ed/Balance, OT Sensory Int Techniques, OT Therapeutic Activity, OT Evaluation and OT Therapeutic Exercise                   Problem: OT Long Term Goals     Dates: Start: 09/24/19       Description:     Goal:  LTG-By discharge, patient will complete basic self care tasks     Dates: Start: 09/24/19       Description: 1) Individualized Goal:  With supervision to modified independence  2) Interventions:  OT E Stim Attended, OT Group Therapy, OT Self Care/ADL, OT Cognitive Skill Dev, OT Community Reintegration, OT Manual Ther Technique, OT Neuro Re-Ed/Balance, OT Sensory Int Techniques, OT Therapeutic Activity, OT Evaluation and OT Therapeutic Exercise             Goal: LTG-By discharge, patient will perform bathroom transfers     Dates: Start: 09/24/19       Description: 1) Individualized Goal:  With supervision to modified independence  2) Interventions:  OT E Stim Attended, OT Group Therapy, OT Self Care/ADL, OT Cognitive Skill Dev, OT Community Reintegration, OT Manual Ther Technique, OT Neuro Re-Ed/Balance, OT Sensory Int Techniques, OT Therapeutic Activity, OT Evaluation and OT Therapeutic Exercise             Goal: LTG-By discharge, patient will complete basic home management     Dates: Start: 09/24/19       Description: 1) Individualized Goal:  With supervision to modified independence  2) Interventions:  OT E Stim Attended, OT Group Therapy, OT Self Care/ADL, OT Cognitive Skill Dev, OT Community Reintegration, OT Manual Ther Technique, OT Neuro Re-Ed/Balance, OT Sensory Int Techniques, OT Therapeutic Activity, OT Evaluation and OT Therapeutic Exercise                   Problem: Toileting     Dates: Start: 09/24/19       Description:     Goal: STG-Within one week, patient will complete toileting tasks     Dates: Start: 09/24/19       Description: 1) Individualized Goal:  With SBA  2) Interventions:  OT E Stim Attended, OT Group Therapy, OT Self Care/ADL, OT Cognitive Skill Dev, OT Community Reintegration, OT Manual Ther Technique, OT Neuro Re-Ed/Balance, OT Sensory Int Techniques, OT Therapeutic Activity, OT Evaluation and OT Therapeutic Exercise

## 2019-09-24 NOTE — PROGRESS NOTES
Rehab Progress Note     Date of Service: 9/24/2019  Chief Complaint: follow up stroke    Interval Events (Subjective)    Patient seen and examined in his room today. He is resting after his therapies. His BP was low this morning with therapy and he felt dizzy. Speech therapy evaluation today determined cognitive was within normal.    Patient reports poor sleep, here and at home. Only sleeps on and off for about 5 hours. He does have sleep apnea, non-complaint with his CPAP as he doesn't like the mask.    He has no new complaints today.    Objective:  VITAL SIGNS: /59   Pulse 79   Temp 36.8 °C (98.2 °F) (Oral)   Resp 16   Ht 1.829 m (6')   Wt 123.8 kg (273 lb)   SpO2 90%   BMI 37.03 kg/m²   Gen: alert, no apparent distress  CV: regular rate and rhythm, no murmurs, 1-2+ peripheral edema  Resp: clear to ascultation bilaterally, normal respiratory effort  GI: soft, non-tender abdomen, bowel sounds present  Neuro: notable for mild left arm weakness  : brenner in place      Recent Results (from the past 72 hour(s))   CBC with Differential    Collection Time: 09/24/19  5:58 AM   Result Value Ref Range    WBC 4.2 (L) 4.8 - 10.8 K/uL    RBC 2.65 (L) 4.70 - 6.10 M/uL    Hemoglobin 7.8 (L) 14.0 - 18.0 g/dL    Hematocrit 26.1 (L) 42.0 - 52.0 %    MCV 98.5 (H) 81.4 - 97.8 fL    MCH 29.4 27.0 - 33.0 pg    MCHC 29.9 (L) 33.7 - 35.3 g/dL    RDW 59.6 (H) 35.9 - 50.0 fL    Platelet Count 255 164 - 446 K/uL    MPV 8.5 (L) 9.0 - 12.9 fL    Neutrophils-Polys 71.70 44.00 - 72.00 %    Lymphocytes 14.20 (L) 22.00 - 41.00 %    Monocytes 10.40 0.00 - 13.40 %    Eosinophils 2.20 0.00 - 6.90 %    Basophils 0.50 0.00 - 1.80 %    Immature Granulocytes 1.00 (H) 0.00 - 0.90 %    Nucleated RBC 0.00 /100 WBC    Neutrophils (Absolute) 2.98 1.82 - 7.42 K/uL    Lymphs (Absolute) 0.59 (L) 1.00 - 4.80 K/uL    Monos (Absolute) 0.43 0.00 - 0.85 K/uL    Eos (Absolute) 0.09 0.00 - 0.51 K/uL    Baso (Absolute) 0.02 0.00 - 0.12 K/uL    Immature  Granulocytes (abs) 0.04 0.00 - 0.11 K/uL    NRBC (Absolute) 0.00 K/uL    Anisocytosis 1+     Macrocytosis 1+    Comp Metabolic Panel (CMP)    Collection Time: 09/24/19  5:58 AM   Result Value Ref Range    Sodium 139 135 - 145 mmol/L    Potassium 3.9 3.6 - 5.5 mmol/L    Chloride 105 96 - 112 mmol/L    Co2 29 20 - 33 mmol/L    Anion Gap 5.0 0.0 - 11.9    Glucose 86 65 - 99 mg/dL    Bun 13 8 - 22 mg/dL    Creatinine 1.05 0.50 - 1.40 mg/dL    Calcium 9.2 8.5 - 10.5 mg/dL    AST(SGOT) 12 12 - 45 U/L    ALT(SGPT) 9 2 - 50 U/L    Alkaline Phosphatase 64 30 - 99 U/L    Total Bilirubin 0.6 0.1 - 1.5 mg/dL    Albumin 3.1 (L) 3.2 - 4.9 g/dL    Total Protein 5.5 (L) 6.0 - 8.2 g/dL    Globulin 2.4 1.9 - 3.5 g/dL    A-G Ratio 1.3 g/dL   Magnesium    Collection Time: 09/24/19  5:58 AM   Result Value Ref Range    Magnesium 2.1 1.5 - 2.5 mg/dL   Vitamin D, 25-hydroxy (blood)    Collection Time: 09/24/19  5:58 AM   Result Value Ref Range    25-Hydroxy   Vitamin D 25 27 (L) 30 - 100 ng/mL   Lipid Profile (Lipid Panel)    Collection Time: 09/24/19  5:58 AM   Result Value Ref Range    Cholesterol,Tot 104 100 - 199 mg/dL    Triglycerides 69 0 - 149 mg/dL    HDL 36 (A) >=40 mg/dL    LDL 54 <100 mg/dL   HEMOGLOBIN A1C    Collection Time: 09/24/19  5:58 AM   Result Value Ref Range    Glycohemoglobin 4.2 0.0 - 5.6 %    Est Avg Glucose 74 mg/dL   Prothrombin Time    Collection Time: 09/24/19  5:58 AM   Result Value Ref Range    PT 16.5 (H) 12.0 - 14.6 sec    INR 1.30 (H) 0.87 - 1.13   proBrain Natriuretic Peptide, NT    Collection Time: 09/24/19  5:58 AM   Result Value Ref Range    NT-proBNP 2189 (H) 0 - 125 pg/mL   ESTIMATED GFR    Collection Time: 09/24/19  5:58 AM   Result Value Ref Range    GFR If African American >60 >60 mL/min/1.73 m 2    GFR If Non African American >60 >60 mL/min/1.73 m 2   PERIPHERAL SMEAR REVIEW    Collection Time: 09/24/19  5:58 AM   Result Value Ref Range    Peripheral Smear Review see below    PLATELET ESTIMATE     Collection Time: 09/24/19  5:58 AM   Result Value Ref Range    Plt Estimation Normal    MORPHOLOGY    Collection Time: 09/24/19  5:58 AM   Result Value Ref Range    RBC Morphology Present     Polychromia 1+    DIFFERENTIAL COMMENT    Collection Time: 09/24/19  5:58 AM   Result Value Ref Range    Comments-Diff see below    EC-ECHOCARDIOGRAM COMPLETE W/O CONT    Collection Time: 09/24/19 12:29 PM   Result Value Ref Range    Eject.Frac. MOD BP 68.85     Eject.Frac. MOD 4C 66.28     Eject.Frac. MOD 2C 70.6     Left Ventrical Ejection Fraction 60        Current Facility-Administered Medications   Medication Frequency   • warfarin (COUMADIN) tablet 5 mg DAILY AT 1800   • vitamin D (cholecalciferol) tablet 1,000 Units DAILY   • metoprolol (LOPRESSOR) tablet 12.5 mg TWICE DAILY   • Respiratory Care per Protocol Continuous RT   • Pharmacy Consult Request ...Pain Management Review 1 Each PHARMACY TO DOSE   • acetaminophen (TYLENOL) tablet 650 mg Q4HRS PRN   • hydrALAZINE (APRESOLINE) tablet 10 mg Q8HRS PRN   • senna-docusate (PERICOLACE or SENOKOT S) 8.6-50 MG per tablet 2 Tab BID    And   • polyethylene glycol/lytes (MIRALAX) PACKET 1 Packet QDAY PRN    And   • magnesium hydroxide (MILK OF MAGNESIA) suspension 30 mL QDAY PRN    And   • bisacodyl (DULCOLAX) suppository 10 mg QDAY PRN   • artificial tears ophthalmic solution 1 Drop PRN   • benzocaine-menthol (CEPACOL) lozenge 1 Lozenge Q2HRS PRN   • mag hydrox-al hydrox-simeth (MAALOX PLUS ES or MYLANTA DS) suspension 20 mL Q2HRS PRN   • ondansetron (ZOFRAN ODT) dispertab 4 mg 4X/DAY PRN    Or   • ondansetron (ZOFRAN) syringe/vial injection 4 mg 4X/DAY PRN   • sodium chloride (OCEAN) 0.65 % nasal spray 2 Spray PRN   • hydrOXYzine HCl (ATARAX) tablet 50 mg Q6HRS PRN   • melatonin tablet 3 mg QHS   • MD Alert...Warfarin per Pharmacy PHARMACY TO DOSE   • amiodarone (CORDARONE) tablet 400 mg Q DAY    Followed by   • [START ON 9/28/2019] amiodarone (CORDARONE) tablet 200 mg Q DAY    • aspirin (ASA) tablet 325 mg DAILY   • fenofibrate micronized (LOFIBRA) capsule 67 mg AFTER BREAKFAST   • finasteride (PROSCAR) tablet 5 mg DAILY   • furosemide (LASIX) tablet 20 mg Q DAY   • hydrocortisone 1 % cream BID   • omeprazole (PRILOSEC) capsule 20 mg QAM AC   • potassium chloride SA (Kdur) tablet 10 mEq BID   • tamsulosin (FLOMAX) capsule 0.8 mg QHS   • gabapentin (NEURONTIN) capsule 200 mg TID   • traZODone (DESYREL) tablet 50 mg QHS   • simethicone (MYLICON) chewable tab 160 mg 4X/DAY WITH MEALS + NIGHTLY       Orders Placed This Encounter   Procedures   • Diet Order Regular     Standing Status:   Standing     Number of Occurrences:   1     Order Specific Question:   Diet:     Answer:   Regular [1]     Order Specific Question:   Consistency/Fluid modifications:     Answer:   1500 ml Fluid Restriction [9]       Radiology  Transthoracic  Echo Report      Echocardiography Laboratory    CONCLUSIONS  No prior study is available for comparison.   The left ventricle was normal in size.  Mild concentric left ventricular hypertrophy.  Normal left ventricular systolic function and regional wall motion.  Left ventricular ejection fraction is estimated to be 60%.  Aortic sclerosis without stenosis.  Trace mitral regurgitation.  The left atrium is normal in size.  Dilated inferior vena cava with inspiratory collapse.  Estimated right ventricular systolic pressure  is 45 mmHg.  Mobile echodensity noted in the aortic arch/proximal left subclavian   artery.    DONALD KNOTT  Exam Date:         09/24/2019                      11:45  Exam Location:     Inpatient  Priority:          Routine    Assessment:  Active Hospital Problems    Diagnosis   • *Stroke (cerebrum) (HCC)   • Essential hypertension   • JESUS MANUEL (obstructive sleep apnea)   • Peripheral neuropathy   • Other insomnia   • Dissection of thoracoabdominal aorta (HCC)   • Lung nodule   • Shoulder lesion, left   • Dyslipidemia   • Urinary retention   • Fluid  "overload   • A-fib (HCC)     This patient is a 69 y.o. male admitted for acute inpatient rehabilitation with Stroke (cerebrum) (HCC).    Medical Decision Making and Plan:    Bilateral ischemic strokes  Hemorrhagic transformation in right parietal/occipital lobe  Left hemiparesis  Non-dominant  Visual deficits  PT/OT, 1.5 hr each discipline, 5 days per week  SLP eval, cog within normal  Continue aspirin and coumadin per outside recs    Acute urinary retention  Maintain [place] for now, retained >1.2 L  Does have a history of BPH  Continue Proscar and Flomax  Attempt voiding trial in 1 week  May need outpatient follow-up     Visual deficits  Refer to neuro-ophthalmology on discharge     Peripheral neuropathy  Restart gabapentin at half of his home dosing, increase today, if tolerated, will increase to home dose 400 TID    Bowel  Meds as needed  Last BM 9/23    DVT prophylaxis  Coumadin     Appreciate the assistance of the hospitalist with the patient's medical comorbidities:     Aortic dissection  Hypertension  Hyperlipidemia  Atrial fibrillation  Pulmonary hypertension, RSVP 45  Sleep apnea, non-compliant with CPAP  Left pleural effusion  Left lung nodule  Left shoulder lesion  Anemia  Leukopenia  Vit D insufficiency    ECHO with \"Mobile echodensity noted in the aortic arch/proximal left subclavian artery\" - discussed with Dr. Dawson. Patient is on coumadin.    Total time:  35 minutes.  I spent greater than 50% of the time for patient care, counseling, and coordination on this date, including patient face-to face time, unit/floor time with review of records/pertinent lab data and studies, as well as discussing diagnostic evaluation/work up, planned therapeutic interventions, and future disposition of care, as per the interval events/subjective and the assessment and plan as noted above.      Amira Davis M.D.   Physical Medicine and Rehabilitation        "

## 2019-09-25 ENCOUNTER — HOSPITAL ENCOUNTER (OUTPATIENT)
Dept: RADIOLOGY | Facility: MEDICAL CENTER | Age: 69
End: 2019-09-25

## 2019-09-25 LAB
INR PPP: 1.48 (ref 0.87–1.13)
PROTHROMBIN TIME: 18.3 SEC (ref 12–14.6)

## 2019-09-25 PROCEDURE — 770010 HCHG ROOM/CARE - REHAB SEMI PRIVAT*

## 2019-09-25 PROCEDURE — 97112 NEUROMUSCULAR REEDUCATION: CPT

## 2019-09-25 PROCEDURE — A9270 NON-COVERED ITEM OR SERVICE: HCPCS | Performed by: PHYSICAL MEDICINE & REHABILITATION

## 2019-09-25 PROCEDURE — 700102 HCHG RX REV CODE 250 W/ 637 OVERRIDE(OP): Performed by: HOSPITALIST

## 2019-09-25 PROCEDURE — 97116 GAIT TRAINING THERAPY: CPT

## 2019-09-25 PROCEDURE — 85610 PROTHROMBIN TIME: CPT

## 2019-09-25 PROCEDURE — 700102 HCHG RX REV CODE 250 W/ 637 OVERRIDE(OP): Performed by: PHYSICAL MEDICINE & REHABILITATION

## 2019-09-25 PROCEDURE — A9270 NON-COVERED ITEM OR SERVICE: HCPCS | Performed by: HOSPITALIST

## 2019-09-25 PROCEDURE — 36415 COLL VENOUS BLD VENIPUNCTURE: CPT

## 2019-09-25 PROCEDURE — 97110 THERAPEUTIC EXERCISES: CPT

## 2019-09-25 PROCEDURE — 99233 SBSQ HOSP IP/OBS HIGH 50: CPT | Performed by: PHYSICAL MEDICINE & REHABILITATION

## 2019-09-25 PROCEDURE — 97530 THERAPEUTIC ACTIVITIES: CPT

## 2019-09-25 PROCEDURE — 99232 SBSQ HOSP IP/OBS MODERATE 35: CPT | Performed by: HOSPITALIST

## 2019-09-25 PROCEDURE — 94760 N-INVAS EAR/PLS OXIMETRY 1: CPT

## 2019-09-25 RX ORDER — GABAPENTIN 400 MG/1
400 CAPSULE ORAL 3 TIMES DAILY
Status: DISCONTINUED | OUTPATIENT
Start: 2019-09-25 | End: 2019-10-01

## 2019-09-25 RX ORDER — WARFARIN SODIUM 5 MG/1
5 TABLET ORAL
Status: COMPLETED | OUTPATIENT
Start: 2019-09-25 | End: 2019-09-25

## 2019-09-25 RX ADMIN — OMEPRAZOLE 20 MG: 20 CAPSULE, DELAYED RELEASE ORAL at 08:27

## 2019-09-25 RX ADMIN — SIMETHICONE CHEW TAB 80 MG 160 MG: 80 TABLET ORAL at 21:23

## 2019-09-25 RX ADMIN — AMIODARONE HYDROCHLORIDE 400 MG: 200 TABLET ORAL at 05:42

## 2019-09-25 RX ADMIN — FUROSEMIDE 20 MG: 20 TABLET ORAL at 05:42

## 2019-09-25 RX ADMIN — SIMETHICONE CHEW TAB 80 MG 160 MG: 80 TABLET ORAL at 17:31

## 2019-09-25 RX ADMIN — WARFARIN SODIUM 5 MG: 5 TABLET ORAL at 17:31

## 2019-09-25 RX ADMIN — METOPROLOL TARTRATE 12.5 MG: 25 TABLET ORAL at 05:42

## 2019-09-25 RX ADMIN — TRAZODONE HYDROCHLORIDE 50 MG: 50 TABLET ORAL at 21:24

## 2019-09-25 RX ADMIN — SIMETHICONE CHEW TAB 80 MG 160 MG: 80 TABLET ORAL at 11:28

## 2019-09-25 RX ADMIN — FINASTERIDE 5 MG: 5 TABLET, FILM COATED ORAL at 08:26

## 2019-09-25 RX ADMIN — POTASSIUM CHLORIDE 10 MEQ: 20 TABLET, EXTENDED RELEASE ORAL at 21:23

## 2019-09-25 RX ADMIN — VITAMIN D, TAB 1000IU (100/BT) 1000 UNITS: 25 TAB at 08:26

## 2019-09-25 RX ADMIN — GABAPENTIN 400 MG: 400 CAPSULE ORAL at 21:21

## 2019-09-25 RX ADMIN — ACETAMINOPHEN 650 MG: 325 TABLET, FILM COATED ORAL at 08:26

## 2019-09-25 RX ADMIN — MELATONIN 3 MG: at 21:24

## 2019-09-25 RX ADMIN — GABAPENTIN 400 MG: 400 CAPSULE ORAL at 16:10

## 2019-09-25 RX ADMIN — POTASSIUM CHLORIDE 10 MEQ: 20 TABLET, EXTENDED RELEASE ORAL at 08:26

## 2019-09-25 RX ADMIN — METOPROLOL TARTRATE 12.5 MG: 25 TABLET ORAL at 17:32

## 2019-09-25 RX ADMIN — FENOFIBRATE 67 MG: 67 CAPSULE ORAL at 08:27

## 2019-09-25 RX ADMIN — SENNOSIDES,DOCUSATE SODIUM 2 TABLET: 8.6; 5 TABLET, FILM COATED ORAL at 08:27

## 2019-09-25 RX ADMIN — ASPIRIN 325 MG ORAL TABLET 325 MG: 325 PILL ORAL at 08:27

## 2019-09-25 RX ADMIN — GABAPENTIN 300 MG: 300 CAPSULE ORAL at 08:26

## 2019-09-25 RX ADMIN — SIMETHICONE CHEW TAB 80 MG 160 MG: 80 TABLET ORAL at 08:26

## 2019-09-25 RX ADMIN — SENNOSIDES,DOCUSATE SODIUM 2 TABLET: 8.6; 5 TABLET, FILM COATED ORAL at 21:21

## 2019-09-25 RX ADMIN — TAMSULOSIN HYDROCHLORIDE 0.8 MG: 0.4 CAPSULE ORAL at 21:23

## 2019-09-25 ASSESSMENT — ENCOUNTER SYMPTOMS
FEVER: 0
PALPITATIONS: 0
NAUSEA: 0
HALLUCINATIONS: 0
VOMITING: 0
BLURRED VISION: 0
HEADACHES: 0
SHORTNESS OF BREATH: 0
DIZZINESS: 0

## 2019-09-25 NOTE — CARE PLAN
Problem: Communication  Goal: The ability to communicate needs accurately and effectively will improve  Outcome: PROGRESSING AS EXPECTED  Note:   Patient able to verbalize needs.  Will continue to monitor.      Problem: Safety  Goal: Will remain free from injury  Outcome: PROGRESSING AS EXPECTED  Note:   Pt uses call light consistently and appropriately. Waits for assistance does not attempt self transfer this shift. Able to verbalize needs.      Problem: Bowel/Gastric:  Goal: Normal bowel function is maintained or improved  Outcome: PROGRESSING AS EXPECTED  Note:   Patient having regular bowel movements; last BM 9/24.  Denies s/s constipation; bowel meds available if needed.  Will continue to monitor.      Problem: Pain Management  Goal: Pain level will decrease to patient's comfort goal  Outcome: PROGRESSING AS EXPECTED  Note:   Patient able to verbalize pain level and verbalize an acceptable level of pain.

## 2019-09-25 NOTE — THERAPY
"Occupational Therapy  Daily Treatment     Patient Name: Xavier Richardson  Age:  69 y.o., Sex:  male  Medical Record #: [id number]  Today's Date: 9/25/2019     Precautions  Precautions: (P) Sternal Precautions (See Comments), Fall Risk  Comments: Arctic aneurysm SX         Subjective    \"The littlest things are so difficult\"     Objective       09/25/19 1401   Precautions   Precautions Sternal Precautions (See Comments);Fall Risk   Sitting Upper Body Exercises   Sitting Upper Body Exercises Yes   Upper Extremity Bike Level 2 Resistance  (motomed 15 minutes, 47/53 symmetry)   Other Exercise tabletop horizontal adduction/abduction with washcloth for decreased resistance 1x20 clockwise/counterclockwise, sliderbox 1x20 on level surface, 3x10 on level one incline   Sitting Lower Body Exercises   Sitting Lower Body Exercises Yes   Sit to Stand 1 set of 10  (no UE support)   OT Total Time Spent   OT Individual Total Time Spent (Mins) 60   OT Charge Group   Charges Yes   OT Neuromuscular Re-education / Balance 4     Assessment    Pt seen to progress LUE function, demos decreased active strength grossly but more significant proximally in deltoids and upper traps. Tolerated therex well with use of tabletop for reduced weight, fatigues quickly requiring consistent rest breaks for motor recovery. Sister present and supportive     Plan    Continue to progress LUE GM/FM coordination and strength, functional standing balance/tolerance, endurance, dynavision screen, continue to assess  deficits    "

## 2019-09-25 NOTE — THERAPY
09/25/19 0929   Precautions   Precautions Sternal Precautions (See Comments);Fall Risk;Other (See Comments)   Comments Arctic aneurysm SX   Vitals   Pulse 82   Patient BP Position Standing 3 minutes   Blood Pressure  126/50   Pain 0 - 10 Group   Therapist Pain Assessment 0   Cognition    Speech/ Communication Delayed Responses   Level of Consciousness Alert   Sequencing Impaired   Standing Lower Body Exercises   Hip Flexion 1 set of 15;Bilateral   Hip Extension 2 sets of 15;Bilateral    Hip Abduction 2 sets of 15;Bilateral   Heel Rise 2 sets of 15;Bilateral   Mini Squat 2 sets of 15;Partial   PT Total Time Spent   PT Individual Total Time Spent (Mins) 60   PT Charge Group   Charges Yes   PT Gait Training 1   PT Therapeutic Exercise 2   PT Therapeutic Activities 1   Physical Therapy   Daily Treatment     Patient Name: Xavier Richardson  Age:  69 y.o., Sex:  male  Medical Record #: [id number]  Today's Date: 9/25/2019     Precautions  Precautions: Sternal Precautions (See Comments), Fall Risk  Comments: Arctic aneurysm SX    Subjective    The patient was up in his chair and ready for PT.  He had no complaints of pain but he said he has mild lightheadedness.     Objective    The patient participated in gait training using a FWW and tolerated 225 FT x2, SBA.  He also participated in standing bilateral lower extremity therapeutic exercise 2 sets of 15 as indicated above.  He required 1 sitting rest during the exercises.    FIM Bed/Chair/Wheelchair Transfers Score: 5 - Standby Prompting/Supervision or Set-up  Bed/Chair/Wheelchair Transfers Description:  Increased time, Verbal cueing, Adaptive equipment    FIM Walking Score:  5 - Standby Prompting/Supervision or Set-up  Walking Description:  Extra time, Verbal cueing, Supervision for safety, Walker(225 FT x2, FWW, SBA)    FIM Wheelchair Score:     Wheelchair Description:  Extra time, Supervision for safety    FIM Stairs Score:  0 - Not tested,medical condition  Stairs  Description:         Assessment    The patient is making progress and gait and increasing his strength.  His primary complaint is feeling lethargic and sleepy and not fully present.  He does have some lightheadedness as well.    Plan    Reinforce sternal precautions, bed mobility training with sternal precautions, gait training, NuStep, therapeutic exercise, safety education

## 2019-09-25 NOTE — REHAB-PT IDT TEAM NOTE
Physical Therapy   Mobility  Bed mobility:   Minimal to contact-guard assistance supine to/from sit  Bed /Chair/Wheelchair Transfer Initial:  4 - Minimal Assistance  Bed /Chair/Wheelchair Transfer Current:  5 - Standby  FWW Prompting/Supervision    Bed/Chair/Wheelchair Transfer Description:  Increased time, Verbal cueing, Adaptive equipment  Walk Initial:  5 - Standby Prompting/Supervision or Set-up  Walk Current:  5 - Standby Prompting/Supervision or Set-up   Walk Description:  Extra time, Verbal cueing, Supervision for safety, Walker(225 FT x2, FWW, SBA)  Wheelchair Initial:     Wheelchair Current:      Wheelchair Description:  Extra time, Supervision for safety  Stairs Initial:  0 - Not tested,medical condition  Stairs Current: 0 - Not tested,medical condition   Stairs Description:    Patient/Family Training/Education: In progress  DME/DC Recommendations:  TBD  Strengths:  Making steady progress towards goals, Motivated for self care and independence, Pleasant and cooperative, Supportive family and Willingly participates in therapeutic activities  Barriers:   Other: Sternal precautions, impaired bed mobility, gait, endurance tolerance for activity, hypotension, fall risk  # of short term goals set= 4  # of short term goals met=1  Physical Therapy Problems     Problem: Mobility     Dates: Start: 09/24/19       Description:     Goal: STG-Within one week, patient will ascend and descend four to six stairs     Dates: Start: 09/24/19       Description: 1) Individualized goal: Up/down 4-6 stairs, handrails, SBA, 1 week  2) Interventions:  PT Gait Training, PT Therapeutic Exercises, PT Therapeutic Activity and PT Evaluation             Goal: STG-Within one week, patient will     Dates: Start: 09/24/19       Description: 1) Individualized goal: Gait FWW, SBA, 250 FT, 1 week  2) Interventions:  PT Gait Training, PT Therapeutic Exercises, PT Neuro Re-Ed/Balance and PT Therapeutic Activity                   Problem:  Mobility Transfers     Dates: Start: 09/24/19       Description:     Goal: STG-Within one week, patient will move supine to sit     Dates: Start: 09/24/19       Description: 1) Individualized goal: Transfer supine to/from sit SBA, sternal precautions, 1 week  2) Interventions:  PT Gait Training, PT Therapeutic Exercises, PT Neuro Re-Ed/Balance and PT Therapeutic Activity                   Problem: PT-Long Term Goals     Dates: Start: 09/24/19       Description:     Goal: LTG-By discharge, patient will ambulate     Dates: Start: 09/24/19       Description: 1) Individualized goal: Gait FWW/-400 FT modified independent at discharge  2) Interventions:  PT Gait Training, PT Therapeutic Exercises, PT Neuro Re-Ed/Balance and PT Therapeutic Activity             Goal: LTG-By discharge, patient will transfer one surface to another     Dates: Start: 09/24/19       Description: 1) Individualized goal: Transfer one surface to another FWW/SPC modified independent at discharge  2) Interventions:  PT Gait Training, PT Therapeutic Exercises, PT Neuro Re-Ed/Balance and PT Therapeutic Activity             Goal: LTG-By discharge, patient will ambulate up/down 4-6 stairs     Dates: Start: 09/24/19       Description: 1) Individualized goal: Up/down 4-6 stairs modified independent at discharge  2) Interventions:  PT Gait Training, PT Therapeutic Exercises, PT Neuro Re-Ed/Balance and PT Therapeutic Activity             Goal: LTG-By discharge, patient will transfer in/out of a car     Dates: Start: 09/24/19       Description: 1) Individualized goal: Car transfer FWW/SPC modified independent at discharge  2) Interventions:  PT Gait Training, PT Therapeutic Exercises, PT Neuro Re-Ed/Balance and PT Therapeutic Activity                           Section completed by:  YULIA Toscano

## 2019-09-25 NOTE — PROGRESS NOTES
Pharmacy Warfarin Consult   9/25/2019     69 y.o.   male     Indication for anticoagulation: Stroke    Goal INR = 2 - 3    Recent Labs     09/24/19  0558 09/25/19  0604   INR 1.30* 1.48*   HEMOGLOBIN 7.8*  --    HEMATOCRIT 26.1*  --        Pertinent Drug/Drug Interactions:  Amiodarone, ASA, Fenofibrate, PPI, trazodone  Outpatient Warfarin Regimen:  Not noted  Recent Warfarin Dosing:    Dose from last 7 days     Date/Time Dose (mg)    09/25/19 0604  5    09/24/19 0558  5    09/23/19 1600  6          Bridge Therapy: No        1.  Warfarin 5 mg tonight for INR = 1.48        Artem Wiggins [facility]

## 2019-09-25 NOTE — THERAPY
"Occupational Therapy  Daily Treatment     Patient Name: Xavier Richardson  Age:  69 y.o., Sex:  male  Medical Record #: [id number]  Today's Date: 9/25/2019     Precautions  Precautions: (P) Sternal Precautions (See Comments), Fall Risk  Comments: Aortic aneurysm sx         Subjective    \"I just feel slower than usual. In my thinking\"     Objective       09/25/19 0931   Precautions   Precautions Sternal Precautions (See Comments);Fall Risk   Hand Strengthening   Hand Strengthening Left ;Left Pinch;Left Finger Extension;Left Finger Flexion;Left Thumb Opposition;Theraputty (Comment on Resistance);Gross Grasp Left  (red/medium firm)   Comment Pt issued squeeze block and theraputty HEP with focus on gross grasp, finger extension, and isolated and sustained tip to tip and three jaw sebastian pinch   OT Total Time Spent   OT Individual Total Time Spent (Mins) 30   OT Charge Group   Charges Yes   OT Therapeutic Exercise  2     Assessment    Pt issued HEP for in-room use to promote increased FM/GM coordination and dexterity in L hand. He was able to return demo the therex appropriately given additional time and able to repeat exercises back at end of session. Issued simple written instructions to assist with carryover due to STM deficits.     Plan    Continue to progress LUE GM/FM coordination and strength, functional standing balance/tolerance, endurance, dynavision screen, continue to assess  deficits    "

## 2019-09-25 NOTE — REHAB-RESPIRATORY IDT TEAM NOTE
Respiratory Therapy   Respiratory Therapy    Pt is stable on RA during the day with SATS in the low 90s.  He does not like the IS so encouraged and reminded to do deep breathing on his own.  He is using 2 lpm NOCS for JESUS MANUEL.    Section completed by:  Demi Vásquez, RRT

## 2019-09-25 NOTE — REHAB-NURSING IDT TEAM NOTE
Nursing   Nursing  Diet/Nutrition:  Regular  Medication Administration:  Whole with Liquid Wash  % consumed at meals in last 24 hours:  Consumed % of meals per documentation.  Meal/Snack Consumption for the past 24 hrs:   Oral Nutrition   09/24/19 1900 Dinner;Between % Consumed     Snack schedule:  None  Supplement:  Other:   Appetite:  Excellent  Fluid Intake/Output in past 24 hours: In: 240 [P.O.:240]  Out: 2875   Admit Weight:  Weight: 123.8 kg (273 lb)  Weight Last 7 Days   [123.8 kg (273 lb)] 123.8 kg (273 lb) (09/23 1400)    Pain Issues:    Location:  Sternum (09/24 2044)  Mid;Posterior (09/24 2044)         Severity:  Moderate   Scheduled pain medications:  gabapentin (NEURONTIN)      PRN pain medications used in last 24 hours:  acetaminophen (TYLENOL)    Non Pharmacologic Interventions:  repositioned  Effectiveness of pain management plan:  good=patient states acceptable comfort after interventions    Bowel:    Bowel Assist Initial Score:  5 - Standby Prompting/Supervision or Set-up  Bowel Assist Current Score:  5 - Standby Prompting/Supervision or Set-up  Bowl Accidents in last 7 days:     Last bowel movement: 09/24/19  Stool Description: Large, Brown, Formed     Usual bowel pattern:  every other day  Scheduled bowel medications:  senna-docusate (PERICOLACE or SENOKOT S)   PRN bowel medications used in last 24 hours:  None  Nursing Interventions:  Scheduled medication, Positioning on commode/toilet, Increased time  Effectiveness of bowel program:   good=regular, predictable, controlled emptying of bowel  Bladder:    Bladder Assist Initial Score:  6 - Modified Independent  Bladder Assist Current Score:  6 - Modified Independent  Bladder Accidents in last 7 days:     PVR range for past 24-48 hours: -- ()  Intermittent Catheterization:  na  Medications affecting bladder:  Flomax    Time void schedule/voiding pattern:  Indwelling catheter  Interventions:  Indwelling catheter, Emptying of  device  Effectiveness of bladder training:  Good=regular, predictable, emptying of bladder, patient initiates time voiding    [place]:    Type:     Patient Lines/Drains/Airways Status    Active Catheter     None              Date placed:          Justification:    Diabetes:  Blood Sugar Frequency:  None    Range of BS for last 48 hours:       Scheduled diabetic medications:  None  Sliding scale usage in past 24 hours:  No  Total Short acting insulin in the past 24 hours:  None  Total Long acting insulin in the past 24 hours:  None    Wound:         Patient Lines/Drains/Airways Status    Active Current Wounds     None                   Interventions:  Dressing  Effectiveness of intervention:  wound is unchanged     Wound Vac Location:  Not applicable  Dressing last changed:  Not applicable  Pump Mode Pressure Setting       Description of drainage:  Not applicable    Sleep/wake cycle:   Average hours slept:  Sleeps greater than 6 hours without waking  Sleep medication usage:  Other Trazadone     Patient/Family Training/Education:  Diet/Nutrition, Fall Prevention, General Self Care and Skin Care  Discharge Supply Recommendations:  Wound Care Supplies  Strengths: Alert and oriented, Pleasant and cooperative and Good carryover of learning   Barriers:   Fatigue and Generalized weakness       Nursing Problems     Problem: Bowel/Gastric:     Description:     Goal: Normal bowel function is maintained or improved     Description:           Goal: Will not experience complications related to bowel motility     Description:                 Problem: Communication     Description:     Goal: The ability to communicate needs accurately and effectively will improve     Description:                 Problem: Discharge Barriers/Planning     Description:     Goal: Patient's continuum of care needs will be met     Description:                 Problem: Infection     Description:     Goal: Will remain free from infection     Description:                  Problem: Knowledge Deficit     Description:     Goal: Knowledge of disease process/condition, treatment plan, diagnostic tests, and medications will improve     Description:           Goal: Knowledge of the prescribed therapeutic regimen will improve     Description:                 Problem: Pain Management     Description:     Goal: Pain level will decrease to patient's comfort goal     Description:                 Problem: Safety     Description:     Goal: Will remain free from injury     Description:           Goal: Will remain free from falls     Description:                 Problem: Skin Integrity     Description:     Goal: Risk for impaired skin integrity will decrease     Description:                 Problem: Venous Thromboembolism (VTW)/Deep Vein Thrombosis (DVT) Prevention:     Description:     Goal: Patient will participate in Venous Thrombosis (VTE)/Deep Vein Thrombosis (DVT)Prevention Measures     Description:                        Long Term Goals:   At discharge patient will be able to function safely at home and in the community with support.    Section completed by:  Dolly Huggins R.N.       Read and reviewed by: Princess Sommer R.N.

## 2019-09-25 NOTE — REHAB-COLLABORATIVE ONGOING IDT TEAM NOTE
Weekly Interdisciplinary Team Conference Note    Weekly Interdisciplinary Team Conference # 2  Date:  9/25/2019    Clinicians present and reporting at team conference include the following:   MD: Amira Davis MD   RN:  Princess Sommer RN    PT:   Juan Luis Jackson PT, MPT, MEd  OT:  Kalyn Galindo MS, OTR/L    ST:  Not Applicable.   CM:  Sara Anand RN CCM  REC:  Not Applicable  RT:  Not Applicable  RPh:  Law Wiggins, [facility]  Other:   None  All reporting clinicians have a working knowledge of this patient's plan of care.    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   • Lung nodule 09/23/2019   • Shoulder lesion, left 09/23/2019   • Dyslipidemia 09/23/2019   • Urinary retention 09/23/2019   • Fluid overload 09/23/2019   • A-fib ([facility]) 09/23/2019     Results     Procedure Component Value Ref Range Date/Time    Prothrombin Time [[id number]]  (Abnormal) Collected:  09/25/19 0604    Order Status:  Completed Lab Status:  Final result Updated:  09/25/19 0649    Specimen:  Blood      PT 18.3 12.0 - 14.6 sec      INR 1.48 0.87 - 1.13      Comment: INR - Non-therapeutic Reference Range: 0.87-1.13  INR - Therapeutic Reference Range: 2.0-4.0         Narrative:       Indicate which anticoagulants the patient is on:->COUMADIN        Nursing  Diet/Nutrition:  Regular  Medication Administration:  Whole with Liquid Wash  % consumed at meals in last 24 hours:  Consumed % of meals per documentation.  Meal/Snack Consumption for the past 24 hrs:   Oral Nutrition   09/24/19 1900 Dinner;Between % Consumed     Snack schedule:  None  Supplement:  Other:   Appetite:  Excellent  Fluid Intake/Output in past 24 hours: In: 240 [P.O.:240]  Out:  2870   Admit Weight:  Weight: 123.8 kg (273 lb)  Weight Last 7 Days   [123.8 kg (273 lb)] 123.8 kg (273 lb) (09/23 1400)    Pain Issues:    Location:  Sternum (09/24 2044)  Mid;Posterior (09/24 2044)         Severity:  Moderate   Scheduled pain medications:  gabapentin (NEURONTIN)      PRN pain medications used in last 24 hours:  acetaminophen (TYLENOL)    Non Pharmacologic Interventions:  repositioned  Effectiveness of pain management plan:  good=patient states acceptable comfort after interventions    Bowel:    Bowel Assist Initial Score:  5 - Standby Prompting/Supervision or Set-up  Bowel Assist Current Score:  5 - Standby Prompting/Supervision or Set-up  Bowl Accidents in last 7 days:     Last bowel movement: 09/24/19  Stool Description: Large, Brown, Formed     Usual bowel pattern:  every other day  Scheduled bowel medications:  senna-docusate (PERICOLACE or SENOKOT S)   PRN bowel medications used in last 24 hours:  None  Nursing Interventions:  Scheduled medication, Positioning on commode/toilet, Increased time  Effectiveness of bowel program:   good=regular, predictable, controlled emptying of bowel  Bladder:    Bladder Assist Initial Score:  6 - Modified Independent  Bladder Assist Current Score:  6 - Modified Independent  Bladder Accidents in last 7 days:     PVR range for past 24-48 hours: -- ()  Intermittent Catheterization:  na  Medications affecting bladder:  Flomax    Time void schedule/voiding pattern:  Indwelling catheter  Interventions:  Indwelling catheter, Emptying of device  Effectiveness of bladder training:  Good=regular, predictable, emptying of bladder, patient initiates time voiding    [place]:    Type:     Patient Lines/Drains/Airways Status    Active Catheter     None              Date placed:          Justification:    Diabetes:  Blood Sugar Frequency:  None    Range of BS for last 48 hours:       Scheduled diabetic medications:  None  Sliding scale usage in past 24 hours:  No  Total Short  acting insulin in the past 24 hours:  None  Total Long acting insulin in the past 24 hours:  None    Wound:         Patient Lines/Drains/Airways Status    Active Current Wounds     None                   Interventions:  Dressing  Effectiveness of intervention:  wound is unchanged     Wound Vac Location:  Not applicable  Dressing last changed:  Not applicable  Pump Mode Pressure Setting       Description of drainage:  Not applicable    Sleep/wake cycle:   Average hours slept:  Sleeps greater than 6 hours without waking  Sleep medication usage:  Other Trazadone     Patient/Family Training/Education:  Diet/Nutrition, Fall Prevention, General Self Care and Skin Care  Discharge Supply Recommendations:  Wound Care Supplies  Strengths: Alert and oriented, Pleasant and cooperative and Good carryover of learning   Barriers:   Fatigue and Generalized weakness       Nursing Problems     Problem: Bowel/Gastric:     Description:     Goal: Normal bowel function is maintained or improved     Description:           Goal: Will not experience complications related to bowel motility     Description:                 Problem: Communication     Description:     Goal: The ability to communicate needs accurately and effectively will improve     Description:                 Problem: Discharge Barriers/Planning     Description:     Goal: Patient's continuum of care needs will be met     Description:                 Problem: Infection     Description:     Goal: Will remain free from infection     Description:                 Problem: Knowledge Deficit     Description:     Goal: Knowledge of disease process/condition, treatment plan, diagnostic tests, and medications will improve     Description:           Goal: Knowledge of the prescribed therapeutic regimen will improve     Description:                 Problem: Pain Management     Description:     Goal: Pain level will decrease to patient's comfort goal     Description:                  Problem: Safety     Description:     Goal: Will remain free from injury     Description:           Goal: Will remain free from falls     Description:                 Problem: Skin Integrity     Description:     Goal: Risk for impaired skin integrity will decrease     Description:                 Problem: Venous Thromboembolism (VTW)/Deep Vein Thrombosis (DVT) Prevention:     Description:     Goal: Patient will participate in Venous Thrombosis (VTE)/Deep Vein Thrombosis (DVT)Prevention Measures     Description:                        Long Term Goals:   At discharge patient will be able to function safely at home and in the community with support.    Section completed by:  Dolly Huggins R.N.        Respiratory Therapy    Pt is stable on RA during the day with SATS in the low 90s.  He does not like the IS so encouraged and reminded to do deep breathing on his own.  He is using 2 lpm NOCS for JESUS MANUEL.    Section completed by:  Demi Vásquez, RRT    Mobility  Bed mobility:   Minimal to contact-guard assistance supine to/from sit  Bed /Chair/Wheelchair Transfer Initial:  4 - Minimal Assistance  Bed /Chair/Wheelchair Transfer Current:  5 - Standby  FWW Prompting/Supervision    Bed/Chair/Wheelchair Transfer Description:  Increased time, Verbal cueing, Adaptive equipment  Walk Initial:  5 - Standby Prompting/Supervision or Set-up  Walk Current:  5 - Standby Prompting/Supervision or Set-up   Walk Description:  Extra time, Verbal cueing, Supervision for safety, Walker(225 FT x2, FWW, SBA)  Wheelchair Initial:     Wheelchair Current:      Wheelchair Description:  Extra time, Supervision for safety  Stairs Initial:  0 - Not tested,medical condition  Stairs Current: 0 - Not tested,medical condition   Stairs Description:    Patient/Family Training/Education: In progress  DME/DC Recommendations:  TBD  Strengths:  Making steady progress towards goals, Motivated for self care and independence, Pleasant and cooperative,  Supportive family and Willingly participates in therapeutic activities  Barriers:   Other: Sternal precautions, impaired bed mobility, gait, endurance tolerance for activity, hypotension, fall risk  # of short term goals set= 4  # of short term goals met=1  Physical Therapy Problems     Problem: Mobility     Dates: Start: 09/24/19       Description:     Goal: STG-Within one week, patient will ascend and descend four to six stairs     Dates: Start: 09/24/19       Description: 1) Individualized goal: Up/down 4-6 stairs, handrails, SBA, 1 week  2) Interventions:  PT Gait Training, PT Therapeutic Exercises, PT Therapeutic Activity and PT Evaluation             Goal: STG-Within one week, patient will     Dates: Start: 09/24/19       Description: 1) Individualized goal: Gait FWW, SBA, 250 FT, 1 week  2) Interventions:  PT Gait Training, PT Therapeutic Exercises, PT Neuro Re-Ed/Balance and PT Therapeutic Activity                   Problem: Mobility Transfers     Dates: Start: 09/24/19       Description:     Goal: STG-Within one week, patient will move supine to sit     Dates: Start: 09/24/19       Description: 1) Individualized goal: Transfer supine to/from sit SBA, sternal precautions, 1 week  2) Interventions:  PT Gait Training, PT Therapeutic Exercises, PT Neuro Re-Ed/Balance and PT Therapeutic Activity                   Problem: PT-Long Term Goals     Dates: Start: 09/24/19       Description:     Goal: LTG-By discharge, patient will ambulate     Dates: Start: 09/24/19       Description: 1) Individualized goal: Gait FWW/-400 FT modified independent at discharge  2) Interventions:  PT Gait Training, PT Therapeutic Exercises, PT Neuro Re-Ed/Balance and PT Therapeutic Activity             Goal: LTG-By discharge, patient will transfer one surface to another     Dates: Start: 09/24/19       Description: 1) Individualized goal: Transfer one surface to another FWW/SPC modified independent at discharge  2) Interventions:   PT Gait Training, PT Therapeutic Exercises, PT Neuro Re-Ed/Balance and PT Therapeutic Activity             Goal: LTG-By discharge, patient will ambulate up/down 4-6 stairs     Dates: Start: 09/24/19       Description: 1) Individualized goal: Up/down 4-6 stairs modified independent at discharge  2) Interventions:  PT Gait Training, PT Therapeutic Exercises, PT Neuro Re-Ed/Balance and PT Therapeutic Activity             Goal: LTG-By discharge, patient will transfer in/out of a car     Dates: Start: 09/24/19       Description: 1) Individualized goal: Car transfer FWW/SPC modified independent at discharge  2) Interventions:  PT Gait Training, PT Therapeutic Exercises, PT Neuro Re-Ed/Balance and PT Therapeutic Activity                           Section completed by:  YULIA Toscano    Activities of Daily Living  Eating Initial:  5 - Standby Prompting/Supervision or Set-up  Eating Current:  5 - Standby Prompting/Supervision or Set-up   Eating Description:  (setup for bilateral tasks)  Grooming Initial:  5 - Standby Prompting/Supervision or Set-up  Grooming Current:  5 - Standby Prompting/Supervision or Set-up   Grooming Description:  (SBA standing at sink to comb hair)  Bathing Initial:  4 - Minimal Assistance  Bathing Current:  4 - Minimal Assistance   Bathing Description:  Grab bar, Tub bench, Hand held shower, Supervision for safety, Verbal cueing, Requires minimal contact(Completed primarily in sitting, min A for standing stability, verbal cues for safety awareness)  Upper Body Dressing Initial:  5 - Standby Prompting/Supervision or Set-up  Upper Body Dressing Current:  5 - Standby Prompting/Supervision or Set-up   Upper Body Dressing Description:  Set-up of equipment, Increased time  Lower Body Dressing Initial:  3 - Moderate Assistance  Lower Body Dressing Current:  3 - Moderate Assistance   Lower Body Dressing Description:  3 - Moderate Assistance  Toileting Initial:     Toileting Current:      Toileting  Description:     Toilet Transfer Initial:     Toilet Transfer Current:      Toilet Transfer Description:     Tub / Shower Transfer Initial:  4 - Minimal Assistance  Tub / Shower Transfer Current:  4 - Minimal Assistance   Tub / Shower Transfer Description:  Grab bar, Increased time, Supervision for safety, Verbal cueing(stand pivot w/c <> shower chair via grab bar)  IADL:  Not yet addressed   Family Training/Education:  Daughter educated on role of OT, goals of care   DME/DC Recommendations:  TBD pending progress; may need additional grab bar placement in shower, shower chair, toilet riser     Strengths:  Able to follow instructions, Alert and oriented, Independent PLOF, Making steady progress towards goals, Manages pain appropriately, Motivated for self care and independence, Pleasant and cooperative, Supportive family and Willingly participates in therapeutic activities  Barriers:  Decreased endurance, Poor balance and Other: LUE weakness and impaired FM skills     # of short term goals set= 4    # of short term goals met= 0 (pt admitted yesterday)     Occupational Therapy Goals     Problem: Bathing     Dates: Start: 09/24/19       Description:     Goal: STG-Within one week, patient will bathe     Dates: Start: 09/24/19       Description: 1) Individualized Goal:  With SBA  2) Interventions:  OT E Stim Attended, OT Group Therapy, OT Self Care/ADL, OT Cognitive Skill Dev, OT Community Reintegration, OT Manual Ther Technique, OT Neuro Re-Ed/Balance, OT Sensory Int Techniques, OT Therapeutic Activity, OT Evaluation and OT Therapeutic Exercise       Note:     Goal Note filed on 09/25/19 0818 by Kalyn Galindo, MS,OTR/L    Admitted yesterday                        Problem: Dressing     Dates: Start: 09/24/19       Description:     Goal: STG-Within one week, patient will dress LB     Dates: Start: 09/24/19       Description: 1) Individualized Goal:  With SBA  2) Interventions:  OT E Stim Attended, OT Group Therapy, OT Self  Care/ADL, OT Cognitive Skill Dev, OT Community Reintegration, OT Manual Ther Technique, OT Neuro Re-Ed/Balance, OT Sensory Int Techniques, OT Therapeutic Activity, OT Evaluation and OT Therapeutic Exercise       Note:     Goal Note filed on 09/25/19 0818 by Kalyntarsha Galindo, MS,OTR/L    Admitted yesterday                        Problem: Functional Transfers     Dates: Start: 09/24/19       Description:     Goal: STG-Within one week, patient will     Dates: Start: 09/24/19       Description: 1) Individualized Goal:  Ambulate to/from bathroom and complete toilet and shower txs with CGA  2) Interventions:  OT E Stim Attended, OT Group Therapy, OT Self Care/ADL, OT Cognitive Skill Dev, OT Community Reintegration, OT Manual Ther Technique, OT Neuro Re-Ed/Balance, OT Sensory Int Techniques, OT Therapeutic Activity, OT Evaluation and OT Therapeutic Exercise       Note:     Goal Note filed on 09/25/19 0818 by Kalyn Galindo, MS,OTR/L    Admitted yesterday                        Problem: OT Long Term Goals     Dates: Start: 09/24/19       Description:     Goal: LTG-By discharge, patient will complete basic self care tasks     Dates: Start: 09/24/19       Description: 1) Individualized Goal:  With supervision to modified independence  2) Interventions:  OT E Stim Attended, OT Group Therapy, OT Self Care/ADL, OT Cognitive Skill Dev, OT Community Reintegration, OT Manual Ther Technique, OT Neuro Re-Ed/Balance, OT Sensory Int Techniques, OT Therapeutic Activity, OT Evaluation and OT Therapeutic Exercise             Goal: LTG-By discharge, patient will perform bathroom transfers     Dates: Start: 09/24/19       Description: 1) Individualized Goal:  With supervision to modified independence  2) Interventions:  OT E Stim Attended, OT Group Therapy, OT Self Care/ADL, OT Cognitive Skill Dev, OT Community Reintegration, OT Manual Ther Technique, OT Neuro Re-Ed/Balance, OT Sensory Int Techniques, OT Therapeutic Activity, OT Evaluation and OT  Therapeutic Exercise             Goal: LTG-By discharge, patient will complete basic home management     Dates: Start: 09/24/19       Description: 1) Individualized Goal:  With supervision to modified independence  2) Interventions:  OT E Stim Attended, OT Group Therapy, OT Self Care/ADL, OT Cognitive Skill Dev, OT Community Reintegration, OT Manual Ther Technique, OT Neuro Re-Ed/Balance, OT Sensory Int Techniques, OT Therapeutic Activity, OT Evaluation and OT Therapeutic Exercise                   Problem: Toileting     Dates: Start: 09/24/19       Description:     Goal: STG-Within one week, patient will complete toileting tasks     Dates: Start: 09/24/19       Description: 1) Individualized Goal:  With SBA  2) Interventions:  OT E Stim Attended, OT Group Therapy, OT Self Care/ADL, OT Cognitive Skill Dev, OT Community Reintegration, OT Manual Ther Technique, OT Neuro Re-Ed/Balance, OT Sensory Int Techniques, OT Therapeutic Activity, OT Evaluation and OT Therapeutic Exercise       Note:     Goal Note filed on 09/25/19 0818 by Kalyn Galindo MS,OTR/L    Admitted yesterday                              Section completed by:  Kalyn Galindo MS,OTR/L             REHAB-Pharmacy IDT Team Note by Catalino Stubbs RPH at 9/24/2019 12:58 PM  Version 1 of 1    Author:  Catalino Stubbs RPH Service:  -- Author Type:  Pharmacist    Filed:  9/24/2019  1:00 PM Date of Service:  9/24/2019 12:58 PM Status:  Signed    :  Catalino Stubbs RPH (Pharmacist)         Pharmacy   Pharmacy  Antibiotics/Antifungals/Antivirals:  Medication:      Active Orders (From admission, onward)    None        Route:        NA  Stop Date:  NA  Reason:      NA  Antihypertensives/Cardiac:  Medication:    Orders (72h ago, onward)     Start     Ordered    09/28/19 0600  amiodarone (CORDARONE) tablet 200 mg  Q DAY      09/23/19 1520    09/24/19 0830  fenofibrate micronized (LOFIBRA) capsule 67 mg  AFTER BREAKFAST      09/23/19 1520    09/24/19 0600   amiodarone (CORDARONE) tablet 400 mg  Q DAY      09/23/19 1520    09/24/19 0600  furosemide (LASIX) tablet 20 mg  Q DAY      09/23/19 1520    09/23/19 1800  metoprolol (LOPRESSOR) tablet 25 mg  TWICE DAILY      09/23/19 1520    09/23/19 1520  hydrALAZINE (APRESOLINE) tablet 10 mg  EVERY 8 HOURS PRN      09/23/19 1520              Patient Vitals for the past 24 hrs:   BP Pulse   09/24/19 0929 (!) 98/45 70   09/24/19 0700 113/64 76   09/24/19 0528 109/60 76   09/23/19 2023 105/68 76   09/23/19 1915 (!) 94/53 81   09/23/19 1459 -- 73   09/23/19 1400 124/66 75     Anticoagulation:  Medication: Coumadin per pharmacy, ASA    Indication for anticoagulation: Stroke     Goal INR = 2 - 3    INR:    Recent Labs     09/24/19  0558   INR 1.30*   Pertinent Drug/Drug Interactions:  Amiodarone, Asa, Fenofibrate, PPI, PRN trazodone  Outpatient Warfarin Regimen:  Not noted  Recent Warfarin Dosing:        Dose from last 7 days      Date/Time Dose (mg)     09/24/19 0558  5     09/23/19 1600  6          Date 9/21 9/22 9/23   INR 1.05 1.00 1.02   Dose 4 mg 5 mg 6 mg (given at [facility])       Other key medications: A review of the medication list reveals no issues at this time. Patient is currently on antihypertensive(s). Recommend home blood pressure monitoring/recording if antihypertensive(s) regimen(s) continue.    Section completed by: Catalino Stubbs [facility][[email or web address]]     Attribution Key     AW.1 - Catalino Stubbs [facility] on 9/24/2019 12:58 PM                  DC Planning  DC destination/dispostion:  Home with support of daughter  Referrals: None at this time.  DC Needs: Anticipate HH vs OP therapies, DME to be determined. F/u in [facility] in 1 month (Dr. Robles- [phone])  Barriers to discharge: Undetermined  Strengths: Supportive family    Section completed by:  Dina Dukes      Physician Summary  Amira Davis MD participated and led team conference discussion.

## 2019-09-25 NOTE — PROGRESS NOTES
Rehab Progress Note     Date of Service: 9/25/2019  Chief Complaint: follow up stroke    Interval Events (Subjective)    Patient seen and examined in his room today. His sister is present. They have questions about his follow up for his lung lesion and his left shoulder lesion. Patient denies any pain in his left shoulder. He still has some mild weakness in his left arm.    Objective:  VITAL SIGNS: /76   Pulse 77   Temp 36.6 °C (97.8 °F) (Oral)   Resp 18   Ht 1.829 m (6')   Wt 123.8 kg (273 lb)   SpO2 93%   BMI 37.03 kg/m²   Gen: alert, no apparent distress  CV: regular rate and rhythm, no murmurs, no peripheral edema  Resp: clear to ascultation bilaterally, normal respiratory effort  GI: soft, non-tender abdomen, bowel sounds present  Neuro: notable for left arm/hand weakness/incoordination      Recent Results (from the past 72 hour(s))   CBC with Differential    Collection Time: 09/24/19  5:58 AM   Result Value Ref Range    WBC 4.2 (L) 4.8 - 10.8 K/uL    RBC 2.65 (L) 4.70 - 6.10 M/uL    Hemoglobin 7.8 (L) 14.0 - 18.0 g/dL    Hematocrit 26.1 (L) 42.0 - 52.0 %    MCV 98.5 (H) 81.4 - 97.8 fL    MCH 29.4 27.0 - 33.0 pg    MCHC 29.9 (L) 33.7 - 35.3 g/dL    RDW 59.6 (H) 35.9 - 50.0 fL    Platelet Count 255 164 - 446 K/uL    MPV 8.5 (L) 9.0 - 12.9 fL    Neutrophils-Polys 71.70 44.00 - 72.00 %    Lymphocytes 14.20 (L) 22.00 - 41.00 %    Monocytes 10.40 0.00 - 13.40 %    Eosinophils 2.20 0.00 - 6.90 %    Basophils 0.50 0.00 - 1.80 %    Immature Granulocytes 1.00 (H) 0.00 - 0.90 %    Nucleated RBC 0.00 /100 WBC    Neutrophils (Absolute) 2.98 1.82 - 7.42 K/uL    Lymphs (Absolute) 0.59 (L) 1.00 - 4.80 K/uL    Monos (Absolute) 0.43 0.00 - 0.85 K/uL    Eos (Absolute) 0.09 0.00 - 0.51 K/uL    Baso (Absolute) 0.02 0.00 - 0.12 K/uL    Immature Granulocytes (abs) 0.04 0.00 - 0.11 K/uL    NRBC (Absolute) 0.00 K/uL    Anisocytosis 1+     Macrocytosis 1+    Comp Metabolic Panel (CMP)    Collection Time: 09/24/19  5:58 AM    Result Value Ref Range    Sodium 139 135 - 145 mmol/L    Potassium 3.9 3.6 - 5.5 mmol/L    Chloride 105 96 - 112 mmol/L    Co2 29 20 - 33 mmol/L    Anion Gap 5.0 0.0 - 11.9    Glucose 86 65 - 99 mg/dL    Bun 13 8 - 22 mg/dL    Creatinine 1.05 0.50 - 1.40 mg/dL    Calcium 9.2 8.5 - 10.5 mg/dL    AST(SGOT) 12 12 - 45 U/L    ALT(SGPT) 9 2 - 50 U/L    Alkaline Phosphatase 64 30 - 99 U/L    Total Bilirubin 0.6 0.1 - 1.5 mg/dL    Albumin 3.1 (L) 3.2 - 4.9 g/dL    Total Protein 5.5 (L) 6.0 - 8.2 g/dL    Globulin 2.4 1.9 - 3.5 g/dL    A-G Ratio 1.3 g/dL   Magnesium    Collection Time: 09/24/19  5:58 AM   Result Value Ref Range    Magnesium 2.1 1.5 - 2.5 mg/dL   Vitamin D, 25-hydroxy (blood)    Collection Time: 09/24/19  5:58 AM   Result Value Ref Range    25-Hydroxy   Vitamin D 25 27 (L) 30 - 100 ng/mL   Lipid Profile (Lipid Panel)    Collection Time: 09/24/19  5:58 AM   Result Value Ref Range    Cholesterol,Tot 104 100 - 199 mg/dL    Triglycerides 69 0 - 149 mg/dL    HDL 36 (A) >=40 mg/dL    LDL 54 <100 mg/dL   HEMOGLOBIN A1C    Collection Time: 09/24/19  5:58 AM   Result Value Ref Range    Glycohemoglobin 4.2 0.0 - 5.6 %    Est Avg Glucose 74 mg/dL   Prothrombin Time    Collection Time: 09/24/19  5:58 AM   Result Value Ref Range    PT 16.5 (H) 12.0 - 14.6 sec    INR 1.30 (H) 0.87 - 1.13   proBrain Natriuretic Peptide, NT    Collection Time: 09/24/19  5:58 AM   Result Value Ref Range    NT-proBNP 2189 (H) 0 - 125 pg/mL   ESTIMATED GFR    Collection Time: 09/24/19  5:58 AM   Result Value Ref Range    GFR If African American >60 >60 mL/min/1.73 m 2    GFR If Non African American >60 >60 mL/min/1.73 m 2   PERIPHERAL SMEAR REVIEW    Collection Time: 09/24/19  5:58 AM   Result Value Ref Range    Peripheral Smear Review see below    PLATELET ESTIMATE    Collection Time: 09/24/19  5:58 AM   Result Value Ref Range    Plt Estimation Normal    MORPHOLOGY    Collection Time: 09/24/19  5:58 AM   Result Value Ref Range    RBC  Morphology Present     Polychromia 1+    DIFFERENTIAL COMMENT    Collection Time: 09/24/19  5:58 AM   Result Value Ref Range    Comments-Diff see below    EC-ECHOCARDIOGRAM COMPLETE W/O CONT    Collection Time: 09/24/19 12:29 PM   Result Value Ref Range    Eject.Frac. MOD BP 68.85     Eject.Frac. MOD 4C 66.28     Eject.Frac. MOD 2C 70.6     Left Ventrical Ejection Fraction 60    Prothrombin Time    Collection Time: 09/25/19  6:04 AM   Result Value Ref Range    PT 18.3 (H) 12.0 - 14.6 sec    INR 1.48 (H) 0.87 - 1.13       Current Facility-Administered Medications   Medication Frequency   • warfarin (COUMADIN) tablet 5 mg ONCE AT 1800   • vitamin D (cholecalciferol) tablet 1,000 Units DAILY   • metoprolol (LOPRESSOR) tablet 12.5 mg TWICE DAILY   • gabapentin (NEURONTIN) capsule 300 mg TID   • Respiratory Care per Protocol Continuous RT   • Pharmacy Consult Request ...Pain Management Review 1 Each PHARMACY TO DOSE   • acetaminophen (TYLENOL) tablet 650 mg Q4HRS PRN   • hydrALAZINE (APRESOLINE) tablet 10 mg Q8HRS PRN   • senna-docusate (PERICOLACE or SENOKOT S) 8.6-50 MG per tablet 2 Tab BID    And   • polyethylene glycol/lytes (MIRALAX) PACKET 1 Packet QDAY PRN    And   • magnesium hydroxide (MILK OF MAGNESIA) suspension 30 mL QDAY PRN    And   • bisacodyl (DULCOLAX) suppository 10 mg QDAY PRN   • artificial tears ophthalmic solution 1 Drop PRN   • benzocaine-menthol (CEPACOL) lozenge 1 Lozenge Q2HRS PRN   • mag hydrox-al hydrox-simeth (MAALOX PLUS ES or MYLANTA DS) suspension 20 mL Q2HRS PRN   • ondansetron (ZOFRAN ODT) dispertab 4 mg 4X/DAY PRN    Or   • ondansetron (ZOFRAN) syringe/vial injection 4 mg 4X/DAY PRN   • sodium chloride (OCEAN) 0.65 % nasal spray 2 Spray PRN   • hydrOXYzine HCl (ATARAX) tablet 50 mg Q6HRS PRN   • melatonin tablet 3 mg QHS   • MD Alert...Warfarin per Pharmacy PHARMACY TO DOSE   • amiodarone (CORDARONE) tablet 400 mg Q DAY    Followed by   • [START ON 9/28/2019] amiodarone (CORDARONE)  tablet 200 mg Q DAY   • aspirin (ASA) tablet 325 mg DAILY   • fenofibrate micronized (LOFIBRA) capsule 67 mg AFTER BREAKFAST   • finasteride (PROSCAR) tablet 5 mg DAILY   • furosemide (LASIX) tablet 20 mg Q DAY   • hydrocortisone 1 % cream BID   • omeprazole (PRILOSEC) capsule 20 mg QAM AC   • potassium chloride SA (Kdur) tablet 10 mEq BID   • tamsulosin (FLOMAX) capsule 0.8 mg QHS   • traZODone (DESYREL) tablet 50 mg QHS   • simethicone (MYLICON) chewable tab 160 mg 4X/DAY WITH MEALS + NIGHTLY       Orders Placed This Encounter   Procedures   • Diet Order Regular     Standing Status:   Standing     Number of Occurrences:   1     Order Specific Question:   Diet:     Answer:   Regular [1]     Order Specific Question:   Consistency/Fluid modifications:     Answer:   1500 ml Fluid Restriction [9]       Radiology  Transthoracic  Echo Report      Echocardiography Laboratory    CONCLUSIONS  No prior study is available for comparison.   The left ventricle was normal in size.  Mild concentric left ventricular hypertrophy.  Normal left ventricular systolic function and regional wall motion.  Left ventricular ejection fraction is estimated to be 60%.  Aortic sclerosis without stenosis.  Trace mitral regurgitation.  The left atrium is normal in size.  Dilated inferior vena cava with inspiratory collapse.  Estimated right ventricular systolic pressure  is 45 mmHg.  Mobile echodensity noted in the aortic arch/proximal left subclavian   artery.    DONALD KNOTT  Exam Date:         09/24/2019                      11:45  Exam Location:     Inpatient  Priority:          Routine    Assessment:  Active Hospital Problems    Diagnosis   • *Stroke (cerebrum) (HCC)   • Essential hypertension   • JESUS MANUEL (obstructive sleep apnea)   • Peripheral neuropathy   • Other insomnia   • Dissection of thoracoabdominal aorta (HCC)   • Lung nodule   • Shoulder lesion, left   • Dyslipidemia   • Urinary retention   • Fluid overload   • A-fib (HCC)      This patient is a 69 y.o. male admitted for acute inpatient rehabilitation with Stroke (cerebrum) (HCC).    I led and attended the weekly conference today, and agree with the IDT conference documentation and plan of care as noted below.    Date of conference: 9/25/2019    Goals and barriers: See IDT note.    Biggest barriers: low blood pressures, left sided arm/hand weakness/incoordination, decreased endurance, fatigue    Therapy update 9/25/2019  Supervision eating  Supervision grooming  Min assist bathing  Supervision upper body dressing  Mod assist lower body dressing  Not tested toileting  Modified Independentbladder  Supervision bowel  Min assist bed/chair transfer  Not tested toilet transfer  Min assist tub/shower transfer  Supervision ambulation  Not tested wheelchair propulsion  Not tested stairs  Independent comprehension  Independent expression  Modified Independent social interaction  Modified Independent problem solving  Modified Independent memory    CM/social support: lives alone, daughter to return from [facility] to assist at discharge    Anticipated DC date: 10/4/2019    Home health: PT/OT/RN    Equip: FWW    Follow up: PCP, cardiology    Medical Decision Making and Plan:    Bilateral ischemic strokes  Hemorrhagic transformation in right parietal/occipital lobe  Left hemiparesis  Non-dominant  Visual deficits  PT/OT, 1.5 hr each discipline, 5 days per week  SLP eval, cog within normal  Continue aspirin and coumadin per outside recs  Neuro-ophthalmology referral - Dr. Wilder    Acute urinary retention  Maintain Barnard for now, retained >1.2 L  Does have a history of BPH  Continue Proscar and Flomax  Attempt voiding trial in 1 week  May need outpatient follow-up     Peripheral neuropathy  Increase gabapentin 400 mg TID, home dosing    Bowel  Meds as needed  Last  9/24    DVT prophylaxis  Coumadin     Appreciate the assistance of the hospitalist with the patient's medical comorbidities:     Aortic  dissection  Hypertension  Hyperlipidemia  Atrial fibrillation  Pulmonary hypertension, RSVP 45  Sleep apnea, non-compliant with CPAP  Left pleural effusion  Left lung nodule, outpatient follow up  Left shoulder lesion, outpatient follow up  Anemia, recheck CBC in am  Leukopenia  Vit D insufficiency    Total time:  40 minutes.  I spent greater than 50% of the time for patient care, counseling, and coordination on this date, including patient face-to face time, unit/floor time with review of records/pertinent lab data and studies, as well as discussing diagnostic evaluation/work up, planned therapeutic interventions, and future disposition of care, as per the interval events/subjective and the assessment and plan as noted above.        Amira Davis M.D.   Physical Medicine and Rehabilitation

## 2019-09-25 NOTE — PROGRESS NOTES
[facility] Daily Progress Note    Date of Service  9/25/2019    Chief Complaint:  S/P aortic dissection  Hypertension  Afib    Interval History:  No significant events or changes since last visit    Review of Systems  Review of Systems   Constitutional: Negative for fever.   Eyes: Negative for blurred vision.   Respiratory: Negative for shortness of breath.    Cardiovascular: Negative for palpitations.   Gastrointestinal: Negative for nausea and vomiting.   Neurological: Negative for dizziness and headaches.   Psychiatric/Behavioral: Negative for hallucinations.        Physical Exam  Temp:  [36.5 °C (97.7 °F)-36.6 °C (97.8 °F)] 36.6 °C (97.8 °F)  Pulse:  [73-79] 77  Resp:  [16-20] 18  BP: (103-128)/(58-76) 128/76  SpO2:  [90 %-96 %] 93 %    Physical Exam   Constitutional: He is oriented to person, place, and time.   HENT:   Mouth/Throat: Oropharynx is clear and moist.   Eyes: No scleral icterus.   Cardiovascular: Normal rate, regular rhythm, S1 normal and S2 normal.   No murmur heard.  Pulmonary/Chest: Effort normal and breath sounds normal. No stridor. He has no rhonchi.   Abdominal: Soft. He exhibits no distension. There is no tenderness.   Musculoskeletal: He exhibits edema.   Mostly pedal swelling   Neurological: He is alert and oriented to person, place, and time. No sensory deficit.   Skin: Skin is warm and dry. No rash noted. He is not diaphoretic. No cyanosis.   Psychiatric: He has a normal mood and affect. His behavior is normal.   Nursing note and vitals reviewed.      Fluids    Intake/Output Summary (Last 24 hours) at 9/25/2019 1011  Last data filed at 9/25/2019 0900  Gross per 24 hour   Intake 660 ml   Output 2875 ml   Net -2215 ml       Laboratory  Recent Labs     09/24/19  0558   WBC 4.2*   RBC 2.65*   HEMOGLOBIN 7.8*   HEMATOCRIT 26.1*   MCV 98.5*   MCH 29.4   MCHC 29.9*   RDW 59.6*   PLATELETCT 255   MPV 8.5*     Recent Labs     09/24/19  0558   SODIUM 139   POTASSIUM 3.9   CHLORIDE 105   CO2  29   GLUCOSE 86   BUN 13   CREATININE 1.05   CALCIUM 9.2     Recent Labs     09/24/19  0558 09/25/19  0604   INR 1.30* 1.48*         Recent Labs     09/24/19  0558   TRIGLYCERIDE 69   HDL 36*   LDL 54       Imaging    Assessment/Plan  * 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)  Assessment & Plan  Has some visual changes  On ASA and Fenofibrate    A-fib (HCC)  Assessment & Plan  HR ok  Echo (9/24): EF 60%, RVSP 45; mobile echodensity noted in the aortic arch/proximal left subclavian artery  On Lopressor: 25 mg bid --> 12.5 mg bid 2nd to lower BP (9/24)  On Amiodarone  On Coumadin    Fluid overload  Assessment & Plan  Likely 2nd to recent heart surgery  Has pedal swelling -- has hx  BNP: 2189 (no other values in Epic)  On Lasix: 20 mg daily  On KCL: 10 meq daily  On fluid restrictions: 1500 cc/day  Monitor K+: 3.9 (9/24)    Dyslipidemia  Assessment & Plan  On Fenofibrate    Shoulder lesion, left- (present on admission)  Assessment & Plan  Will need F/U imaging    Lung nodule- (present on admission)  Assessment & Plan  Needs f/u imaging    Dissection of thoracoabdominal aorta (HCC)- (present on admission)  Assessment & Plan  S/P hypothermic protocal and repair at [facility]    Essential hypertension- (present on admission)  Assessment & Plan  BP better recently  On Lopressor: 25 mg bid --> 12.5 mg bid (9/24)  Note: on Lasix 20 mg daily  Note: on Flomax & Proscar  Cont to monitor

## 2019-09-25 NOTE — FLOWSHEET NOTE
09/25/19 0806   Events/Summary/Plan   Events/Summary/Plan 02 spot check, breathing exercises   Interdisciplinary Plan of Care-Goals (Indications)   Hyperinflation Protocol Indications Chest Trauma (Blunt, Penetrative, or Surgical)   Interdisciplinary Plan of Care-Outcomes    Hyperinflation Protocol Goals/Outcome Greater Than 60% of Predicted I.S. Volume x 24 hrs   Education   Education Yes - Pt. / Family has been Instructed in use of Respiratory Equipment   Incentive Spirometry Group   Incentive Spirometry Instruction Yes   Breathing Exercises   (pt does breathing exercises on his own)   Incentive Spirometer Volume   (max inhalation)   Respiratory WDL   Respiratory (WDL) X   Chest Exam   Respiration 18   Pulse 77   Oximetry   #Pulse Oximetry (Single Determination) Yes   Oxygen   Pulse Oximetry 93 %   O2 Daily Delivery Respiratory  Room Air with O2 Available

## 2019-09-25 NOTE — THERAPY
09/25/19 1529   Precautions   Precautions Sternal Precautions (See Comments);Fall Risk   Comments Aortic aneurysm sx   Pain 0 - 10 Group   Therapist Pain Assessment 0   PT Total Time Spent   PT Individual Total Time Spent (Mins) 30   PT Charge Group   PT Gait Training 1   PT Therapeutic Activities 1   Physical Therapy   Daily Treatment     Patient Name: Xavier Richardson  Age:  69 y.o., Sex:  male  Medical Record #: [id number]  Today's Date: 9/25/2019     Precautions  Precautions: Sternal Precautions (See Comments), Fall Risk  Comments: Aortic aneurysm sx    Subjective    The patient agreed to PT.  He said he was tired but was motivated.     Objective    The patient participated in practicing sequencing sit to/from stand, gait with a front wheeled walker and tolerated 220 FT and 550 FT    FIM Bed/Chair/Wheelchair Transfers Score: 5 - Standby Prompting/Supervision or Set-up  Bed/Chair/Wheelchair Transfers Description:  Increased time, Verbal cueing, Adaptive equipment    FIM Walking Score:  5 - Standby Prompting/Supervision or Set-up  Walking Description:  Extra time, Supervision for safety, Walker, Verbal cueing(FWW, 220 FT, 550 FT)    FIM Wheelchair Score:     Wheelchair Description:  Extra time, Supervision for safety    FIM Stairs Score:  0 - Not tested,medical condition  Stairs Description:         Assessment    The patient demonstrated improved gait tolerance and quality.  He increased his distance during one event to 550 FT.  He was not shuffling his gait but walked it a more normal pace and quality.    Plan    Therapeutic exercise, practice sit to/from supine on a bed, gait training, stairs, safety education

## 2019-09-25 NOTE — REHAB-OT IDT TEAM NOTE
Occupational Therapy   Activities of Daily Living  Eating Initial:  5 - Standby Prompting/Supervision or Set-up  Eating Current:  5 - Standby Prompting/Supervision or Set-up   Eating Description:  (setup for bilateral tasks)  Grooming Initial:  5 - Standby Prompting/Supervision or Set-up  Grooming Current:  5 - Standby Prompting/Supervision or Set-up   Grooming Description:  (SBA standing at sink to comb hair)  Bathing Initial:  4 - Minimal Assistance  Bathing Current:  4 - Minimal Assistance   Bathing Description:  Grab bar, Tub bench, Hand held shower, Supervision for safety, Verbal cueing, Requires minimal contact(Completed primarily in sitting, min A for standing stability, verbal cues for safety awareness)  Upper Body Dressing Initial:  5 - Standby Prompting/Supervision or Set-up  Upper Body Dressing Current:  5 - Standby Prompting/Supervision or Set-up   Upper Body Dressing Description:  Set-up of equipment, Increased time  Lower Body Dressing Initial:  3 - Moderate Assistance  Lower Body Dressing Current:  3 - Moderate Assistance   Lower Body Dressing Description:  3 - Moderate Assistance  Toileting Initial:     Toileting Current:      Toileting Description:     Toilet Transfer Initial:     Toilet Transfer Current:      Toilet Transfer Description:     Tub / Shower Transfer Initial:  4 - Minimal Assistance  Tub / Shower Transfer Current:  4 - Minimal Assistance   Tub / Shower Transfer Description:  Grab bar, Increased time, Supervision for safety, Verbal cueing(stand pivot w/c <> shower chair via grab bar)  IADL:  Not yet addressed   Family Training/Education:  Daughter educated on role of OT, goals of care   DME/DC Recommendations:  TBD pending progress; may need additional grab bar placement in shower, shower chair, toilet riser     Strengths:  Able to follow instructions, Alert and oriented, Independent PLOF, Making steady progress towards goals, Manages pain appropriately, Motivated for self care and  independence, Pleasant and cooperative, Supportive family and Willingly participates in therapeutic activities  Barriers:  Decreased endurance, Poor balance and Other: LUE weakness and impaired FM skills     # of short term goals set= 4    # of short term goals met= 0 (pt admitted yesterday)     Occupational Therapy Goals     Problem: Bathing     Dates: Start: 09/24/19       Description:     Goal: STG-Within one week, patient will bathe     Dates: Start: 09/24/19       Description: 1) Individualized Goal:  With SBA  2) Interventions:  OT E Stim Attended, OT Group Therapy, OT Self Care/ADL, OT Cognitive Skill Dev, OT Community Reintegration, OT Manual Ther Technique, OT Neuro Re-Ed/Balance, OT Sensory Int Techniques, OT Therapeutic Activity, OT Evaluation and OT Therapeutic Exercise       Note:     Goal Note filed on 09/25/19 0818 by Kalyn Galindo MS,OTR/L    Admitted yesterday                        Problem: Dressing     Dates: Start: 09/24/19       Description:     Goal: STG-Within one week, patient will dress LB     Dates: Start: 09/24/19       Description: 1) Individualized Goal:  With SBA  2) Interventions:  OT E Stim Attended, OT Group Therapy, OT Self Care/ADL, OT Cognitive Skill Dev, OT Community Reintegration, OT Manual Ther Technique, OT Neuro Re-Ed/Balance, OT Sensory Int Techniques, OT Therapeutic Activity, OT Evaluation and OT Therapeutic Exercise       Note:     Goal Note filed on 09/25/19 0818 by Kalyn Galindo MS,OTR/L    Admitted yesterday                        Problem: Functional Transfers     Dates: Start: 09/24/19       Description:     Goal: STG-Within one week, patient will     Dates: Start: 09/24/19       Description: 1) Individualized Goal:  Ambulate to/from bathroom and complete toilet and shower txs with CGA  2) Interventions:  OT E Stim Attended, OT Group Therapy, OT Self Care/ADL, OT Cognitive Skill Dev, OT Community Reintegration, OT Manual Ther Technique, OT Neuro Re-Ed/Balance, OT Sensory  Int Techniques, OT Therapeutic Activity, OT Evaluation and OT Therapeutic Exercise       Note:     Goal Note filed on 09/25/19 0818 by Kalyn Galindo, MS,OTR/L    Admitted yesterday                        Problem: OT Long Term Goals     Dates: Start: 09/24/19       Description:     Goal: LTG-By discharge, patient will complete basic self care tasks     Dates: Start: 09/24/19       Description: 1) Individualized Goal:  With supervision to modified independence  2) Interventions:  OT E Stim Attended, OT Group Therapy, OT Self Care/ADL, OT Cognitive Skill Dev, OT Community Reintegration, OT Manual Ther Technique, OT Neuro Re-Ed/Balance, OT Sensory Int Techniques, OT Therapeutic Activity, OT Evaluation and OT Therapeutic Exercise             Goal: LTG-By discharge, patient will perform bathroom transfers     Dates: Start: 09/24/19       Description: 1) Individualized Goal:  With supervision to modified independence  2) Interventions:  OT E Stim Attended, OT Group Therapy, OT Self Care/ADL, OT Cognitive Skill Dev, OT Community Reintegration, OT Manual Ther Technique, OT Neuro Re-Ed/Balance, OT Sensory Int Techniques, OT Therapeutic Activity, OT Evaluation and OT Therapeutic Exercise             Goal: LTG-By discharge, patient will complete basic home management     Dates: Start: 09/24/19       Description: 1) Individualized Goal:  With supervision to modified independence  2) Interventions:  OT E Stim Attended, OT Group Therapy, OT Self Care/ADL, OT Cognitive Skill Dev, OT Community Reintegration, OT Manual Ther Technique, OT Neuro Re-Ed/Balance, OT Sensory Int Techniques, OT Therapeutic Activity, OT Evaluation and OT Therapeutic Exercise                   Problem: Toileting     Dates: Start: 09/24/19       Description:     Goal: STG-Within one week, patient will complete toileting tasks     Dates: Start: 09/24/19       Description: 1) Individualized Goal:  With SBA  2) Interventions:  OT E Stim Attended, OT Group Therapy,  OT Self Care/ADL, OT Cognitive Skill Dev, OT Community Reintegration, OT Manual Ther Technique, OT Neuro Re-Ed/Balance, OT Sensory Int Techniques, OT Therapeutic Activity, OT Evaluation and OT Therapeutic Exercise       Note:     Goal Note filed on 09/25/19 0818 by Kalyn Galindo MS,OTR/L    Admitted yesterday                              Section completed by:  Kalyn Galindo MS,OTR/L

## 2019-09-26 LAB
BASOPHILS # BLD AUTO: 0.5 % (ref 0–1.8)
BASOPHILS # BLD: 0.02 K/UL (ref 0–0.12)
EOSINOPHIL # BLD AUTO: 0.1 K/UL (ref 0–0.51)
EOSINOPHIL NFR BLD: 2.4 % (ref 0–6.9)
ERYTHROCYTE [DISTWIDTH] IN BLOOD BY AUTOMATED COUNT: 61.1 FL (ref 35.9–50)
HCT VFR BLD AUTO: 26.3 % (ref 42–52)
HGB BLD-MCNC: 8.1 G/DL (ref 14–18)
IMM GRANULOCYTES # BLD AUTO: 0.03 K/UL (ref 0–0.11)
IMM GRANULOCYTES NFR BLD AUTO: 0.7 % (ref 0–0.9)
INR PPP: 1.82 (ref 0.87–1.13)
LYMPHOCYTES # BLD AUTO: 0.7 K/UL (ref 1–4.8)
LYMPHOCYTES NFR BLD: 16.9 % (ref 22–41)
MCH RBC QN AUTO: 30.3 PG (ref 27–33)
MCHC RBC AUTO-ENTMCNC: 30.8 G/DL (ref 33.7–35.3)
MCV RBC AUTO: 98.5 FL (ref 81.4–97.8)
MONOCYTES # BLD AUTO: 0.49 K/UL (ref 0–0.85)
MONOCYTES NFR BLD AUTO: 11.8 % (ref 0–13.4)
NEUTROPHILS # BLD AUTO: 2.8 K/UL (ref 1.82–7.42)
NEUTROPHILS NFR BLD: 67.7 % (ref 44–72)
NRBC # BLD AUTO: 0 K/UL
NRBC BLD-RTO: 0 /100 WBC
PLATELET # BLD AUTO: 213 K/UL (ref 164–446)
PMV BLD AUTO: 8.5 FL (ref 9–12.9)
PROTHROMBIN TIME: 21.7 SEC (ref 12–14.6)
RBC # BLD AUTO: 2.67 M/UL (ref 4.7–6.1)
WBC # BLD AUTO: 4.1 K/UL (ref 4.8–10.8)

## 2019-09-26 PROCEDURE — 99233 SBSQ HOSP IP/OBS HIGH 50: CPT | Performed by: PHYSICAL MEDICINE & REHABILITATION

## 2019-09-26 PROCEDURE — 85610 PROTHROMBIN TIME: CPT

## 2019-09-26 PROCEDURE — 97150 GROUP THERAPEUTIC PROCEDURES: CPT

## 2019-09-26 PROCEDURE — 36415 COLL VENOUS BLD VENIPUNCTURE: CPT

## 2019-09-26 PROCEDURE — 97112 NEUROMUSCULAR REEDUCATION: CPT

## 2019-09-26 PROCEDURE — 97110 THERAPEUTIC EXERCISES: CPT

## 2019-09-26 PROCEDURE — A9270 NON-COVERED ITEM OR SERVICE: HCPCS | Performed by: PHYSICAL MEDICINE & REHABILITATION

## 2019-09-26 PROCEDURE — 700102 HCHG RX REV CODE 250 W/ 637 OVERRIDE(OP): Performed by: PHYSICAL MEDICINE & REHABILITATION

## 2019-09-26 PROCEDURE — 94760 N-INVAS EAR/PLS OXIMETRY 1: CPT

## 2019-09-26 PROCEDURE — 85025 COMPLETE CBC W/AUTO DIFF WBC: CPT

## 2019-09-26 PROCEDURE — A9270 NON-COVERED ITEM OR SERVICE: HCPCS | Performed by: HOSPITALIST

## 2019-09-26 PROCEDURE — 700102 HCHG RX REV CODE 250 W/ 637 OVERRIDE(OP): Performed by: HOSPITALIST

## 2019-09-26 PROCEDURE — 99232 SBSQ HOSP IP/OBS MODERATE 35: CPT | Performed by: HOSPITALIST

## 2019-09-26 PROCEDURE — 97530 THERAPEUTIC ACTIVITIES: CPT

## 2019-09-26 PROCEDURE — 770010 HCHG ROOM/CARE - REHAB SEMI PRIVAT*

## 2019-09-26 PROCEDURE — 97116 GAIT TRAINING THERAPY: CPT

## 2019-09-26 RX ORDER — TRAZODONE HYDROCHLORIDE 50 MG/1
100 TABLET ORAL
Status: DISCONTINUED | OUTPATIENT
Start: 2019-09-26 | End: 2019-09-27

## 2019-09-26 RX ORDER — LANOLIN ALCOHOL/MO/W.PET/CERES
6 CREAM (GRAM) TOPICAL
Status: DISCONTINUED | OUTPATIENT
Start: 2019-09-26 | End: 2019-10-09 | Stop reason: HOSPADM

## 2019-09-26 RX ORDER — WARFARIN SODIUM 5 MG/1
5 TABLET ORAL
Status: COMPLETED | OUTPATIENT
Start: 2019-09-26 | End: 2019-09-26

## 2019-09-26 RX ADMIN — POTASSIUM CHLORIDE 10 MEQ: 20 TABLET, EXTENDED RELEASE ORAL at 21:55

## 2019-09-26 RX ADMIN — FUROSEMIDE 20 MG: 20 TABLET ORAL at 06:01

## 2019-09-26 RX ADMIN — SIMETHICONE CHEW TAB 80 MG 160 MG: 80 TABLET ORAL at 11:43

## 2019-09-26 RX ADMIN — GABAPENTIN 400 MG: 400 CAPSULE ORAL at 21:54

## 2019-09-26 RX ADMIN — ASPIRIN 325 MG ORAL TABLET 325 MG: 325 PILL ORAL at 08:20

## 2019-09-26 RX ADMIN — WARFARIN SODIUM 5 MG: 5 TABLET ORAL at 17:13

## 2019-09-26 RX ADMIN — TAMSULOSIN HYDROCHLORIDE 0.8 MG: 0.4 CAPSULE ORAL at 21:54

## 2019-09-26 RX ADMIN — TRAZODONE HYDROCHLORIDE 100 MG: 50 TABLET ORAL at 21:55

## 2019-09-26 RX ADMIN — SIMETHICONE CHEW TAB 80 MG 160 MG: 80 TABLET ORAL at 17:12

## 2019-09-26 RX ADMIN — SIMETHICONE CHEW TAB 80 MG 160 MG: 80 TABLET ORAL at 08:20

## 2019-09-26 RX ADMIN — POTASSIUM CHLORIDE 10 MEQ: 20 TABLET, EXTENDED RELEASE ORAL at 08:20

## 2019-09-26 RX ADMIN — FINASTERIDE 5 MG: 5 TABLET, FILM COATED ORAL at 09:00

## 2019-09-26 RX ADMIN — ACETAMINOPHEN 650 MG: 325 TABLET, FILM COATED ORAL at 01:34

## 2019-09-26 RX ADMIN — SENNOSIDES,DOCUSATE SODIUM 2 TABLET: 8.6; 5 TABLET, FILM COATED ORAL at 08:22

## 2019-09-26 RX ADMIN — OMEPRAZOLE 20 MG: 20 CAPSULE, DELAYED RELEASE ORAL at 08:21

## 2019-09-26 RX ADMIN — VITAMIN D, TAB 1000IU (100/BT) 1000 UNITS: 25 TAB at 08:21

## 2019-09-26 RX ADMIN — SENNOSIDES,DOCUSATE SODIUM 2 TABLET: 8.6; 5 TABLET, FILM COATED ORAL at 21:57

## 2019-09-26 RX ADMIN — METOPROLOL TARTRATE 12.5 MG: 25 TABLET ORAL at 17:13

## 2019-09-26 RX ADMIN — METOPROLOL TARTRATE 12.5 MG: 25 TABLET ORAL at 06:00

## 2019-09-26 RX ADMIN — SIMETHICONE CHEW TAB 80 MG 160 MG: 80 TABLET ORAL at 21:58

## 2019-09-26 RX ADMIN — AMIODARONE HYDROCHLORIDE 400 MG: 200 TABLET ORAL at 05:59

## 2019-09-26 RX ADMIN — MELATONIN 6 MG: at 21:53

## 2019-09-26 RX ADMIN — GABAPENTIN 400 MG: 400 CAPSULE ORAL at 14:59

## 2019-09-26 RX ADMIN — FENOFIBRATE 67 MG: 67 CAPSULE ORAL at 08:20

## 2019-09-26 RX ADMIN — GABAPENTIN 400 MG: 400 CAPSULE ORAL at 08:20

## 2019-09-26 ASSESSMENT — ENCOUNTER SYMPTOMS
FEVER: 0
COUGH: 0
DIARRHEA: 0
DIZZINESS: 0
NERVOUS/ANXIOUS: 0
BLURRED VISION: 0

## 2019-09-26 NOTE — THERAPY
Occupational Therapy  Daily Treatment     Patient Name: Xavier Richardson  Age:  69 y.o., Sex:  male  Medical Record #: [id number]  Today's Date: 9/26/2019     Precautions  Precautions: (P) Fall Risk, Sternal Precautions (See Comments)  Comments: (P) Aortic aneurysm sx         Subjective    Pt attended stroke education class. Pt was alert and participatory throughout.      Objective       09/26/19 1031   Precautions   Precautions Fall Risk;Sternal Precautions (See Comments)   Comments Aortic aneurysm sx   Cognition    Level of Consciousness Alert   Reason for Group Therapy   Reason for Group Therapy To Increase Active Participation through Peer Pressure;To Enhance Motivation;To Increase Patient Interaction during Physical Recovery;To Facilitate Goal Discussion    Interdisciplinary Plan of Care Collaboration   IDT Collaboration with  Pharmacist   Patient Position at End of Therapy Seated;Other (Comments)  (cafeteria for lunch)   Collaboration Comments pharmacist led stroke education class.   OT Total Time Spent   OT Group Total Time Spent (Mins) 45   OT Charge Group   OT Group Therapy Group Activities     Pt was educated on stroke risk factors via auditory and visual modalities (printed handout reviewed and provided). Topics reviewed included stroke types, controllable stroke risk factors including HTN and cholesterol management medications, weight management, diet, stress management, daily BP monitoring, smoking cessation, and moderating alcohol intake. Patient attended appropriately to information. Patient was encouraged to ask questions, did so, and all questions were answered to patient’s satisfaction. Patient was counseled on the importance of active communication with MD regarding medications, BP tracking and trends to ensure safe BP management. Patient benefited from participating in this stroke education class as evidenced by verbalizing improved understanding of hypertension and management  strategies.    Assessment    Pt benefited from participating in the stroke education class and demonstrated increased understanding of controllable risk factors as evidenced by participation in group stroke discussion.     Plan    Continue to progress LUE GM/FM coordination and strength, functional standing balance/tolerance, endurance, dynavision screen, continue to assess  deficits.

## 2019-09-26 NOTE — PROGRESS NOTES
Rehab Progress Note     Date of Service: 9/26/2019  Chief Complaint: follow up stroke    Interval Events (Subjective)    Patient seen and examined in his room today. His family and friends are present. Patient reports he's still not sleeping well at night. He is concerned about his discharge date and being ready.     We reviewed his MRI imaging. His biggest area of stroke was in his right occipital lobe, which may explain his visual deficits. Explained he cannot return to driving until cleared by a physician and may need an outpatient OT assessment.     Objective:  VITAL SIGNS: /73   Pulse 76   Temp 36.8 °C (98.3 °F) (Temporal)   Resp 20   Ht 1.829 m (6')   Wt 123.8 kg (273 lb)   SpO2 94%   BMI 37.03 kg/m²   Gen: alert, no apparent distress  CV: regular rate and rhythm, no murmurs, no peripheral edema  Resp: clear to ascultation bilaterally, normal respiratory effort  GI: soft, non-tender abdomen, bowel sounds present  Neuro: notable for very mild left hand weakness/incoordination      Recent Results (from the past 72 hour(s))   CBC with Differential    Collection Time: 09/24/19  5:58 AM   Result Value Ref Range    WBC 4.2 (L) 4.8 - 10.8 K/uL    RBC 2.65 (L) 4.70 - 6.10 M/uL    Hemoglobin 7.8 (L) 14.0 - 18.0 g/dL    Hematocrit 26.1 (L) 42.0 - 52.0 %    MCV 98.5 (H) 81.4 - 97.8 fL    MCH 29.4 27.0 - 33.0 pg    MCHC 29.9 (L) 33.7 - 35.3 g/dL    RDW 59.6 (H) 35.9 - 50.0 fL    Platelet Count 255 164 - 446 K/uL    MPV 8.5 (L) 9.0 - 12.9 fL    Neutrophils-Polys 71.70 44.00 - 72.00 %    Lymphocytes 14.20 (L) 22.00 - 41.00 %    Monocytes 10.40 0.00 - 13.40 %    Eosinophils 2.20 0.00 - 6.90 %    Basophils 0.50 0.00 - 1.80 %    Immature Granulocytes 1.00 (H) 0.00 - 0.90 %    Nucleated RBC 0.00 /100 WBC    Neutrophils (Absolute) 2.98 1.82 - 7.42 K/uL    Lymphs (Absolute) 0.59 (L) 1.00 - 4.80 K/uL    Monos (Absolute) 0.43 0.00 - 0.85 K/uL    Eos (Absolute) 0.09 0.00 - 0.51 K/uL    Baso (Absolute) 0.02 0.00 - 0.12  K/uL    Immature Granulocytes (abs) 0.04 0.00 - 0.11 K/uL    NRBC (Absolute) 0.00 K/uL    Anisocytosis 1+     Macrocytosis 1+    Comp Metabolic Panel (CMP)    Collection Time: 09/24/19  5:58 AM   Result Value Ref Range    Sodium 139 135 - 145 mmol/L    Potassium 3.9 3.6 - 5.5 mmol/L    Chloride 105 96 - 112 mmol/L    Co2 29 20 - 33 mmol/L    Anion Gap 5.0 0.0 - 11.9    Glucose 86 65 - 99 mg/dL    Bun 13 8 - 22 mg/dL    Creatinine 1.05 0.50 - 1.40 mg/dL    Calcium 9.2 8.5 - 10.5 mg/dL    AST(SGOT) 12 12 - 45 U/L    ALT(SGPT) 9 2 - 50 U/L    Alkaline Phosphatase 64 30 - 99 U/L    Total Bilirubin 0.6 0.1 - 1.5 mg/dL    Albumin 3.1 (L) 3.2 - 4.9 g/dL    Total Protein 5.5 (L) 6.0 - 8.2 g/dL    Globulin 2.4 1.9 - 3.5 g/dL    A-G Ratio 1.3 g/dL   Magnesium    Collection Time: 09/24/19  5:58 AM   Result Value Ref Range    Magnesium 2.1 1.5 - 2.5 mg/dL   Vitamin D, 25-hydroxy (blood)    Collection Time: 09/24/19  5:58 AM   Result Value Ref Range    25-Hydroxy   Vitamin D 25 27 (L) 30 - 100 ng/mL   Lipid Profile (Lipid Panel)    Collection Time: 09/24/19  5:58 AM   Result Value Ref Range    Cholesterol,Tot 104 100 - 199 mg/dL    Triglycerides 69 0 - 149 mg/dL    HDL 36 (A) >=40 mg/dL    LDL 54 <100 mg/dL   HEMOGLOBIN A1C    Collection Time: 09/24/19  5:58 AM   Result Value Ref Range    Glycohemoglobin 4.2 0.0 - 5.6 %    Est Avg Glucose 74 mg/dL   Prothrombin Time    Collection Time: 09/24/19  5:58 AM   Result Value Ref Range    PT 16.5 (H) 12.0 - 14.6 sec    INR 1.30 (H) 0.87 - 1.13   proBrain Natriuretic Peptide, NT    Collection Time: 09/24/19  5:58 AM   Result Value Ref Range    NT-proBNP 2189 (H) 0 - 125 pg/mL   ESTIMATED GFR    Collection Time: 09/24/19  5:58 AM   Result Value Ref Range    GFR If African American >60 >60 mL/min/1.73 m 2    GFR If Non African American >60 >60 mL/min/1.73 m 2   PERIPHERAL SMEAR REVIEW    Collection Time: 09/24/19  5:58 AM   Result Value Ref Range    Peripheral Smear Review see below     PLATELET ESTIMATE    Collection Time: 09/24/19  5:58 AM   Result Value Ref Range    Plt Estimation Normal    MORPHOLOGY    Collection Time: 09/24/19  5:58 AM   Result Value Ref Range    RBC Morphology Present     Polychromia 1+    DIFFERENTIAL COMMENT    Collection Time: 09/24/19  5:58 AM   Result Value Ref Range    Comments-Diff see below    EC-ECHOCARDIOGRAM COMPLETE W/O CONT    Collection Time: 09/24/19 12:29 PM   Result Value Ref Range    Eject.Frac. MOD BP 68.85     Eject.Frac. MOD 4C 66.28     Eject.Frac. MOD 2C 70.6     Left Ventrical Ejection Fraction 60    Prothrombin Time    Collection Time: 09/25/19  6:04 AM   Result Value Ref Range    PT 18.3 (H) 12.0 - 14.6 sec    INR 1.48 (H) 0.87 - 1.13   Prothrombin Time    Collection Time: 09/26/19  5:56 AM   Result Value Ref Range    PT 21.7 (H) 12.0 - 14.6 sec    INR 1.82 (H) 0.87 - 1.13   CBC WITH DIFFERENTIAL    Collection Time: 09/26/19  5:56 AM   Result Value Ref Range    WBC 4.1 (L) 4.8 - 10.8 K/uL    RBC 2.67 (L) 4.70 - 6.10 M/uL    Hemoglobin 8.1 (L) 14.0 - 18.0 g/dL    Hematocrit 26.3 (L) 42.0 - 52.0 %    MCV 98.5 (H) 81.4 - 97.8 fL    MCH 30.3 27.0 - 33.0 pg    MCHC 30.8 (L) 33.7 - 35.3 g/dL    RDW 61.1 (H) 35.9 - 50.0 fL    Platelet Count 213 164 - 446 K/uL    MPV 8.5 (L) 9.0 - 12.9 fL    Neutrophils-Polys 67.70 44.00 - 72.00 %    Lymphocytes 16.90 (L) 22.00 - 41.00 %    Monocytes 11.80 0.00 - 13.40 %    Eosinophils 2.40 0.00 - 6.90 %    Basophils 0.50 0.00 - 1.80 %    Immature Granulocytes 0.70 0.00 - 0.90 %    Nucleated RBC 0.00 /100 WBC    Neutrophils (Absolute) 2.80 1.82 - 7.42 K/uL    Lymphs (Absolute) 0.70 (L) 1.00 - 4.80 K/uL    Monos (Absolute) 0.49 0.00 - 0.85 K/uL    Eos (Absolute) 0.10 0.00 - 0.51 K/uL    Baso (Absolute) 0.02 0.00 - 0.12 K/uL    Immature Granulocytes (abs) 0.03 0.00 - 0.11 K/uL    NRBC (Absolute) 0.00 K/uL       Current Facility-Administered Medications   Medication Frequency   • warfarin (COUMADIN) tablet 5 mg ONCE AT 1800    • gabapentin (NEURONTIN) capsule 400 mg TID   • vitamin D (cholecalciferol) tablet 1,000 Units DAILY   • metoprolol (LOPRESSOR) tablet 12.5 mg TWICE DAILY   • Respiratory Care per Protocol Continuous RT   • Pharmacy Consult Request ...Pain Management Review 1 Each PHARMACY TO DOSE   • acetaminophen (TYLENOL) tablet 650 mg Q4HRS PRN   • hydrALAZINE (APRESOLINE) tablet 10 mg Q8HRS PRN   • senna-docusate (PERICOLACE or SENOKOT S) 8.6-50 MG per tablet 2 Tab BID    And   • polyethylene glycol/lytes (MIRALAX) PACKET 1 Packet QDAY PRN    And   • magnesium hydroxide (MILK OF MAGNESIA) suspension 30 mL QDAY PRN    And   • bisacodyl (DULCOLAX) suppository 10 mg QDAY PRN   • artificial tears ophthalmic solution 1 Drop PRN   • benzocaine-menthol (CEPACOL) lozenge 1 Lozenge Q2HRS PRN   • mag hydrox-al hydrox-simeth (MAALOX PLUS ES or MYLANTA DS) suspension 20 mL Q2HRS PRN   • ondansetron (ZOFRAN ODT) dispertab 4 mg 4X/DAY PRN    Or   • ondansetron (ZOFRAN) syringe/vial injection 4 mg 4X/DAY PRN   • sodium chloride (OCEAN) 0.65 % nasal spray 2 Spray PRN   • hydrOXYzine HCl (ATARAX) tablet 50 mg Q6HRS PRN   • melatonin tablet 3 mg QHS   • MD Alert...Warfarin per Pharmacy PHARMACY TO DOSE   • amiodarone (CORDARONE) tablet 400 mg Q DAY    Followed by   • [START ON 9/28/2019] amiodarone (CORDARONE) tablet 200 mg Q DAY   • aspirin (ASA) tablet 325 mg DAILY   • fenofibrate micronized (LOFIBRA) capsule 67 mg AFTER BREAKFAST   • finasteride (PROSCAR) tablet 5 mg DAILY   • furosemide (LASIX) tablet 20 mg Q DAY   • hydrocortisone 1 % cream BID   • omeprazole (PRILOSEC) capsule 20 mg QAM AC   • potassium chloride SA (Kdur) tablet 10 mEq BID   • tamsulosin (FLOMAX) capsule 0.8 mg QHS   • traZODone (DESYREL) tablet 50 mg QHS   • simethicone (MYLICON) chewable tab 160 mg 4X/DAY WITH MEALS + NIGHTLY       Orders Placed This Encounter   Procedures   • Diet Order Regular     Standing Status:   Standing     Number of Occurrences:   1     Order  Specific Question:   Diet:     Answer:   Regular [1]       Radiology  Transthoracic  Echo Report      Echocardiography Laboratory    CONCLUSIONS  No prior study is available for comparison.   The left ventricle was normal in size.  Mild concentric left ventricular hypertrophy.  Normal left ventricular systolic function and regional wall motion.  Left ventricular ejection fraction is estimated to be 60%.  Aortic sclerosis without stenosis.  Trace mitral regurgitation.  The left atrium is normal in size.  Dilated inferior vena cava with inspiratory collapse.  Estimated right ventricular systolic pressure  is 45 mmHg.  Mobile echodensity noted in the aortic arch/proximal left subclavian   artery.    DONALD KNOTT  Exam Date:         09/24/2019                      11:45  Exam Location:     Inpatient  Priority:          Routine    Assessment:  Active Hospital Problems    Diagnosis   • *Stroke (cerebrum) (HCC)   • Essential hypertension   • JESUS MANUEL (obstructive sleep apnea)   • Peripheral neuropathy   • Other insomnia   • Dissection of thoracoabdominal aorta (HCC)   • Lung nodule   • Shoulder lesion, left   • Dyslipidemia   • Urinary retention   • Fluid overload   • A-fib (HCC)     This patient is a 69 y.o. male admitted for acute inpatient rehabilitation with Stroke (cerebrum) (HCC).    I led and attended the weekly conference and agree with the IDT conference documentation and plan of care as noted below.    Date of conference: 9/25/2019    Goals and barriers: See IDT note.    Biggest barriers: low blood pressures, left sided arm/hand weakness/incoordination, decreased endurance, fatigue    Admission FIM 80    CM/social support: lives alone, daughter to return from [facility] to assist at discharge    Anticipated DC date: 10/4/2019    Home health: PT/OT/RN    Equip: FWW    Follow up: PCP, cardiology    Medical Decision Making and Plan:    Bilateral ischemic strokes  Hemorrhagic transformation in right parietal/occipital  lobe  Left hemiparesis, improved  Non-dominant  Visual deficits, continued  PT/OT, 1.5 hr each discipline, 5 days per week  SLP eval, cog within normal  Continue aspirin and coumadin per outside recs  Neuro-ophthalmology referral - Dr. Wilder    MRI imaging reviewed with patient/family today    Acute urinary retention  Maintain Barnard for now, retained >1.2 L  Does have a history of BPH  Continue Proscar and Flomax  Attempt voiding trial in 1 week - will try next Monday  May need outpatient follow-up     Peripheral neuropathy  Increase gabapentin 400 mg TID, home dosing    Insomnia  Increase melatonin and trazodone  Poor sleep may also be due to untreated sleep apnea    Bowel  Meds as needed  Last BM 9/25    DVT prophylaxis  Coumadin     Appreciate the assistance of the hospitalist with the patient's medical comorbidities:     Aortic dissection  Hypertension  Hyperlipidemia  Atrial fibrillation  Pulmonary hypertension, RSVP 45  Sleep apnea, non-compliant with CPAP  Left pleural effusion  Left lung nodule, outpatient follow up  Left shoulder lesion, outpatient follow up  Anemia, recheck CBC in am  Leukopenia  Vit D insufficiency    Total time:  36 minutes.  I spent greater than 50% of the time for patient care, counseling, and coordination on this date, including patient face-to face time, unit/floor time with review of records/pertinent lab data and studies, as well as discussing diagnostic evaluation/work up, planned therapeutic interventions, and future disposition of care, as per the interval events/subjective and the assessment and plan as noted above.    I have performed a physical exam, reviewed and updated ROS, as well as the assessment and plan today 9/26/2019. In review of note from 9/25/2019 there are no new changes except as documented above.            Amira Davis M.D.   Physical Medicine and Rehabilitation

## 2019-09-26 NOTE — DISCHARGE PLANNING
Met with patient and his sister. Patient had to leave for therapy so finished conversation with sister. Patient's daughter will return next week and will most likely be here on 10/3/19. Set up family training for discharge date 10/4/19 at 9AM. Ginny Hummel notified and schedule 30 minutes with PT and OT.   Patient is calling his PCP to see if PCP can come see him while he is here. If not, he will see if he can get an appointment for 10/4 afternoon. If not, PCP accepts walk in appointments. PCP is here in [place] and patient lives in [place], [place].  There is a pulmonary appointment with CIELO Garg with Dr. Panchal on 10/18/2019 @ 1:30PM. I will try to make f/u with aorta clinic on Thursday afternoon or Friday AM to minimize travel.

## 2019-09-26 NOTE — PROGRESS NOTES
Pharmacy Warfarin Consult   9/26/2019     69 y.o.   male     Indication for anticoagulation: Stroke     Goal INR = 2 - 3    Recent Labs     09/24/19  0558 09/25/19  0604 09/26/19  0556   INR 1.30* 1.48* 1.82*   HEMOGLOBIN 7.8*  --  8.1*   HEMATOCRIT 26.1*  --  26.3*       Pertinent Drug/Drug Interactions:  Amiodarone, ASA, Fenofibrate, PPI, trazodone  Outpatient Warfarin Regimen:  Not noted  Recent Warfarin Dosing:    Dose from last 7 days     Date/Time Dose (mg)    09/26/19 0556  5    09/25/19 0604  5    09/24/19 0558  5    09/23/19 1600  6          Bridge Therapy: No           1.  Warfarin 5 mg tonight for INR = 1.82           Catalino Stubbs [facility]

## 2019-09-26 NOTE — THERAPY
09/26/19 0929   Precautions   Precautions Sternal Precautions (See Comments);Fall Risk;Other (See Comments)   Comments Aortic aneurysm SX   Pain 0 - 10 Group   Therapist Pain Assessment 0   Cognition    Level of Consciousness Alert   Standing Lower Body Exercises   Hip Flexion 2 sets of 15;Bilateral   Hip Extension 2 sets of 15;Bilateral    Hip Abduction 2 sets of 15;Bilateral   Heel Rise 2 sets of 15;Bilateral   Mini Squat 2 sets of 15;Partial   Other Exercises 2 sets of 15 hip flexion diagonals   Bed Mobility    Supine to Sit Stand by Assist   Sit to Supine Stand by Assist   Sit to Stand Stand by Assist   Scooting Stand by Assist   Rolling Supervised   PT Total Time Spent   PT Individual Total Time Spent (Mins) 60   PT Charge Group   PT Gait Training 1   PT Therapeutic Exercise 2   PT Therapeutic Activities 1   Physical Therapy   Daily Treatment     Patient Name: Xavier Richardson  Age:  69 y.o., Sex:  male  Medical Record #: [id number]  Today's Date: 9/26/2019     Precautions  Precautions: Fall Risk, Sternal Precautions (See Comments)  Comments: Aortic aneurysm sx    Subjective    The patient was in his room and he agreed to PT.  He said his legs were tired today because of yesterday's activities.     Objective    Patient participated in gait training in the parallel bars without upper extremity support.  He tolerated 100 FT without any loss of balance.  In the parallel bars he also participated in standing lower extremity therapeutic exercise as indicated above.  He tolerated gait using a walker 250 FT x2.  He was able to transfer on and off a normal flat bed with verbal cues for his sternal precautions.  He was able to do this without any stress on the sternum.    FIM Bed/Chair/Wheelchair Transfers Score: 5 - Standby Prompting/Supervision or Set-up  Bed/Chair/Wheelchair Transfers Description:  Increased time, Adaptive equipment, Verbal cueing    FIM Walking Score:  5 - Standby Prompting/Supervision or  Set-up  Walking Description:  Extra time, Supervision for safety, Walker(250 FT x2, FWW, SBA, 100 FT x1 parallel bars without UE support)    FIM Wheelchair Score:     Wheelchair Description:       FIM Stairs Score:  0 - Not tested,medical condition  Stairs Description:         Assessment    The patient continues to demonstrate improvement in gait quality and distance, functional mobility such as sit to/from supine or in a normal flat bed and he successfully walked without upper extremity support in the parallel bars 100 feet.  Stairs were not attempted because his lower extremities were fatigued from yesterday's activities.    Plan    Therapeutic exercise, bed mobility without physical assistance, gait training, safety education and sternal precautions during functional tasks.

## 2019-09-26 NOTE — DISCHARGE PLANNING
Met with patient following Team Conference on 9/25/2019 and providing progress report and anticipated discharge date of 10/4/2019.   Patient's daughter was on her way home to [facility] to see her . She will return to accompany patient home and provide support at his home when he is discharged. Home health care was recommended by the team, but there is no home health service in [place], [place].   Patient states he can get to OP therapy and will find out the name of the OP therapy services a friend has used to provide me choice.   Patient needs a FWW. Patient has no preference for DME provider.   We discussed patient's sister providing support. He does not want to do that as he would have to stay at his sister's home here in [place].  If he stayed with his sister, we discussed that he could receive home health care for a short time.   Patient plans to have his daughter drive him until he is able to care for himself. He will then use neighbors and friends to drive him.   Patient is used to living alone and being self reliant.

## 2019-09-26 NOTE — CARE PLAN
Problem: Safety  Goal: Will remain free from injury  Intervention: Educate patient and significant other/support system about adaptive mobility strategies and safe transfers  Note:   Using call light for assist as needed. Bed in low position, call light within reach.      Problem: Pain Management  Goal: Pain level will decrease to patient's comfort goal  Intervention: Follow pain managment plan developed in collaboration with patient and Interdisciplinary Team  Note:   PRN pain med administered as requested. Good effect, resting in bed, eyes closed, resp even, unlabored.

## 2019-09-26 NOTE — THERAPY
"Occupational Therapy  Daily Treatment     Patient Name: Xavier Richardson  Age:  69 y.o., Sex:  male  Medical Record #: [id number]  Today's Date: 9/26/2019     Precautions  Precautions: (P) Fall Risk, Sternal Precautions (See Comments)  Comments: Aortic aneurysm sx         Subjective    \"This thing is kind of cool\"     Objective       09/26/19 1331   Precautions   Precautions Fall Risk;Sternal Precautions (See Comments)   Sitting Upper Body Exercises   Other Exercise AAROM scapular retraction 1x10 w/ 5 sec holds, 1x10 scapular elevation/depression AAROM with 2 sec holds   Neuro-Muscular Treatments   Neuro-Muscular Treatments Electrical Stimulation;Postural Facilitation   Comments Setup for  for FES to promote functional use of LUE; distance travelled 3.31 miles, 0 kCal 0.0 Dillon/hour, 0 W 0% asymmetry. Pt completed 34:30 minutes - completed all in passive to avoid sternal irritation   OT Total Time Spent   OT Individual Total Time Spent (Mins) 90   OT Charge Group   OT Neuromuscular Re-education / Balance 6     Assessment    Pt setup for  for FES to LUE to promote funcitonal use. Tolerated electrical stimulation well, completed first trial w/ passive support to avoid sternal irritation. No c/o sternal irritation this session so advised to increase active motion during next session. Pt continues with decreased active movement in upper traps and deltoids, demonstrating increased activation for scapular retraction and elevation after FES.     Plan    Continue to progress LUE GM/FM coordination and strength, functional standing balance/tolerance, endurance, dynavision screen, continue to assess  deficits,  with tech as adjunct     "

## 2019-09-26 NOTE — PROGRESS NOTES
Received patient during shift change, report rec'd from day shift RN. Resting in bed, VS stable on room air. Per report continent of Bowel, brenner cath in place. Max assist for transfers. A&O x 4, able to make needs known. Bed in low position, call light within reach.

## 2019-09-26 NOTE — CARE PLAN
Problem: Safety  Goal: Will remain free from injury  Note:   Reinforced safety precautions to pt. Pt uses call light when assistance is needed. Wears non skid socks, uses lap belt when in w/c, bed is lock and at lowest position.      Problem: Infection  Goal: Will remain free from infection  Note:   No acute signs and symptoms of infection noted to pt.

## 2019-09-26 NOTE — THERAPY
Physical Therapy   Daily Treatment     Patient Name: Xavier Richardson  Age:  69 y.o., Sex:  male  Medical Record #: [id number]  Today's Date: 9/26/2019     Precautions  Precautions: Fall Risk, Sternal Precautions (See Comments)  Comments: Aortic aneurysm sx    Subjective    Patient agreeable to PT.     Objective       09/26/19 0931   Vitals   O2 (LPM) 0   O2 Delivery None (Room Air)   Sitting Lower Body Exercises   Nustep Resistance Level 3  (1x 3 min and 1x 9 min; BUE/BLE; avg 35 steps/min)   Bed Mobility    Sit to Stand Contact Guard Assist   Interdisciplinary Plan of Care Collaboration   IDT Collaboration with  Physical Therapist   Collaboration Comments handoff for PT; pt fatigue, just having completed sit<>supine, ambulation   PT Total Time Spent   PT Individual Total Time Spent (Mins) 30   PT Charge Group   PT Therapeutic Exercise 2     Discussed treatment goals with repetition for carryover.    FIM Wheelchair Score:  1 - Total Assistance  Wheelchair Description:         Assessment    Patient has improving endurance with NuStep usage; education requires repetition; great motivation.    Plan    Therapeutic exercise, bed mobility without physical assistance, gait training, safety education and sternal precautions during functional tasks.

## 2019-09-26 NOTE — PROGRESS NOTES
[facility] Medicine Daily Progress Note    Date of Service  9/26/2019    Chief Complaint:  S/P aortic dissection  Hypertension  Afib    Interval History:  No significant events or changes since last visit    Review of Systems  Review of Systems   Constitutional: Negative for fever.   Eyes: Negative for blurred vision.   Respiratory: Negative for cough.    Cardiovascular: Negative for chest pain.   Gastrointestinal: Negative for diarrhea.   Musculoskeletal: Negative for joint pain.   Neurological: Negative for dizziness.   Psychiatric/Behavioral: The patient is not nervous/anxious.         Physical Exam  Temp:  [36.3 °C (97.4 °F)-36.7 °C (98.1 °F)] 36.3 °C (97.4 °F)  Pulse:  [72-73] 73  Resp:  [16-18] 18  BP: (116-123)/(64-69) 116/64  SpO2:  [94 %-97 %] 97 %    Physical Exam   Constitutional: He is oriented to person, place, and time. No distress.   HENT:   Mouth/Throat: No oropharyngeal exudate.   Eyes: EOM are normal.   Neck: No JVD present. No tracheal deviation present.   Cardiovascular: Normal rate, regular rhythm, S1 normal and S2 normal.   No murmur heard.  Pulmonary/Chest: Effort normal and breath sounds normal. He has no rhonchi.   Abdominal: Soft. Bowel sounds are normal.   Musculoskeletal: He exhibits edema.   Mostly pedal swelling   Neurological: He is alert and oriented to person, place, and time. No sensory deficit.   Skin: Skin is warm and dry. No rash noted. No cyanosis.   Psychiatric: He has a normal mood and affect. His behavior is normal.   Nursing note and vitals reviewed.      Fluids    Intake/Output Summary (Last 24 hours) at 9/26/2019 1038  Last data filed at 9/26/2019 1000  Gross per 24 hour   Intake 740 ml   Output 1900 ml   Net -1160 ml       Laboratory  Recent Labs     09/24/19  0558 09/26/19  0556   WBC 4.2* 4.1*   RBC 2.65* 2.67*   HEMOGLOBIN 7.8* 8.1*   HEMATOCRIT 26.1* 26.3*   MCV 98.5* 98.5*   MCH 29.4 30.3   MCHC 29.9* 30.8*   RDW 59.6* 61.1*   PLATELETCT 255 213   MPV 8.5* 8.5*      Recent Labs     09/24/19  0558   SODIUM 139   POTASSIUM 3.9   CHLORIDE 105   CO2 29   GLUCOSE 86   BUN 13   CREATININE 1.05   CALCIUM 9.2     Recent Labs     09/24/19  0558 09/25/19  0604 09/26/19  0556   INR 1.30* 1.48* 1.82*         Recent Labs     09/24/19  0558   TRIGLYCERIDE 69   HDL 36*   LDL 54       Imaging    Assessment/Plan  * 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)  Assessment & Plan  Has some visual changes  On ASA and Fenofibrate    A-fib (HCC)  Assessment & Plan  HR ok  Echo (9/24): EF 60%, RVSP 45; mobile echodensity noted in the aortic arch/proximal left subclavian artery  On Lopressor: 25 mg bid --> 12.5 mg bid 2nd to lower BP (9/24)  On Amiodarone  On Coumadin    Fluid overload  Assessment & Plan  Likely 2nd to recent heart surgery  Has pedal swelling -- has hx  O2 sats ok on RA  BNP: 2189 (9/24) -- no other values in Epic  Echo (9/24): EF 60%, RVSP 45  On Lasix: 20 mg daily  On KCL: 10 meq daily  On fluid restrictions: 1500 cc/day --> will remove (9/26)  Monitor K+: 3.9 (9/24)    Dyslipidemia  Assessment & Plan  On Fenofibrate    Shoulder lesion, left- (present on admission)  Assessment & Plan  Will need F/U imaging    Lung nodule- (present on admission)  Assessment & Plan  Needs f/u imaging    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

## 2019-09-27 DIAGNOSIS — I63.9 CEREBROVASCULAR ACCIDENT (CVA), UNSPECIFIED MECHANISM (HCC): ICD-10-CM

## 2019-09-27 DIAGNOSIS — H54.7 VISUAL IMPAIRMENT: ICD-10-CM

## 2019-09-27 LAB
INR PPP: 2.15 (ref 0.87–1.13)
PROTHROMBIN TIME: 24.7 SEC (ref 12–14.6)

## 2019-09-27 PROCEDURE — 97535 SELF CARE MNGMENT TRAINING: CPT

## 2019-09-27 PROCEDURE — 97116 GAIT TRAINING THERAPY: CPT

## 2019-09-27 PROCEDURE — A9270 NON-COVERED ITEM OR SERVICE: HCPCS | Performed by: PHYSICAL MEDICINE & REHABILITATION

## 2019-09-27 PROCEDURE — 99231 SBSQ HOSP IP/OBS SF/LOW 25: CPT | Performed by: PHYSICAL MEDICINE & REHABILITATION

## 2019-09-27 PROCEDURE — 36415 COLL VENOUS BLD VENIPUNCTURE: CPT

## 2019-09-27 PROCEDURE — 97112 NEUROMUSCULAR REEDUCATION: CPT

## 2019-09-27 PROCEDURE — 85610 PROTHROMBIN TIME: CPT

## 2019-09-27 PROCEDURE — A9270 NON-COVERED ITEM OR SERVICE: HCPCS | Performed by: HOSPITALIST

## 2019-09-27 PROCEDURE — 97110 THERAPEUTIC EXERCISES: CPT

## 2019-09-27 PROCEDURE — 700102 HCHG RX REV CODE 250 W/ 637 OVERRIDE(OP): Performed by: HOSPITALIST

## 2019-09-27 PROCEDURE — 770010 HCHG ROOM/CARE - REHAB SEMI PRIVAT*

## 2019-09-27 PROCEDURE — G0515 COGNITIVE SKILLS DEVELOPMENT: HCPCS

## 2019-09-27 PROCEDURE — 99232 SBSQ HOSP IP/OBS MODERATE 35: CPT | Performed by: HOSPITALIST

## 2019-09-27 PROCEDURE — 94760 N-INVAS EAR/PLS OXIMETRY 1: CPT

## 2019-09-27 PROCEDURE — 700102 HCHG RX REV CODE 250 W/ 637 OVERRIDE(OP): Performed by: PHYSICAL MEDICINE & REHABILITATION

## 2019-09-27 PROCEDURE — 97530 THERAPEUTIC ACTIVITIES: CPT

## 2019-09-27 RX ORDER — WARFARIN SODIUM 5 MG/1
5 TABLET ORAL
Status: COMPLETED | OUTPATIENT
Start: 2019-09-27 | End: 2019-09-27

## 2019-09-27 RX ORDER — TRAZODONE HYDROCHLORIDE 50 MG/1
50 TABLET ORAL
Status: DISCONTINUED | OUTPATIENT
Start: 2019-09-27 | End: 2019-10-09 | Stop reason: HOSPADM

## 2019-09-27 RX ORDER — POTASSIUM CHLORIDE 20 MEQ/1
20 TABLET, EXTENDED RELEASE ORAL 2 TIMES DAILY
Status: DISCONTINUED | OUTPATIENT
Start: 2019-09-27 | End: 2019-10-09 | Stop reason: HOSPADM

## 2019-09-27 RX ORDER — FUROSEMIDE 40 MG/1
40 TABLET ORAL
Status: DISCONTINUED | OUTPATIENT
Start: 2019-09-28 | End: 2019-10-09 | Stop reason: HOSPADM

## 2019-09-27 RX ORDER — FUROSEMIDE 20 MG/1
20 TABLET ORAL ONCE
Status: COMPLETED | OUTPATIENT
Start: 2019-09-27 | End: 2019-09-27

## 2019-09-27 RX ADMIN — FENOFIBRATE 67 MG: 67 CAPSULE ORAL at 08:16

## 2019-09-27 RX ADMIN — VITAMIN D, TAB 1000IU (100/BT) 1000 UNITS: 25 TAB at 08:16

## 2019-09-27 RX ADMIN — FUROSEMIDE 20 MG: 20 TABLET ORAL at 06:26

## 2019-09-27 RX ADMIN — METOPROLOL TARTRATE 12.5 MG: 25 TABLET ORAL at 06:27

## 2019-09-27 RX ADMIN — SIMETHICONE CHEW TAB 80 MG 160 MG: 80 TABLET ORAL at 22:13

## 2019-09-27 RX ADMIN — MELATONIN 6 MG: at 22:12

## 2019-09-27 RX ADMIN — SENNOSIDES,DOCUSATE SODIUM 2 TABLET: 8.6; 5 TABLET, FILM COATED ORAL at 22:12

## 2019-09-27 RX ADMIN — POTASSIUM CHLORIDE 20 MEQ: 1500 TABLET, EXTENDED RELEASE ORAL at 21:00

## 2019-09-27 RX ADMIN — GABAPENTIN 400 MG: 400 CAPSULE ORAL at 16:00

## 2019-09-27 RX ADMIN — TAMSULOSIN HYDROCHLORIDE 0.8 MG: 0.4 CAPSULE ORAL at 22:11

## 2019-09-27 RX ADMIN — METOPROLOL TARTRATE 12.5 MG: 25 TABLET ORAL at 18:04

## 2019-09-27 RX ADMIN — SENNOSIDES,DOCUSATE SODIUM 2 TABLET: 8.6; 5 TABLET, FILM COATED ORAL at 08:16

## 2019-09-27 RX ADMIN — OMEPRAZOLE 20 MG: 20 CAPSULE, DELAYED RELEASE ORAL at 08:17

## 2019-09-27 RX ADMIN — SIMETHICONE CHEW TAB 80 MG 160 MG: 80 TABLET ORAL at 08:16

## 2019-09-27 RX ADMIN — ASPIRIN 325 MG ORAL TABLET 325 MG: 325 PILL ORAL at 08:16

## 2019-09-27 RX ADMIN — WARFARIN SODIUM 5 MG: 5 TABLET ORAL at 18:03

## 2019-09-27 RX ADMIN — GABAPENTIN 400 MG: 400 CAPSULE ORAL at 08:15

## 2019-09-27 RX ADMIN — FINASTERIDE 5 MG: 5 TABLET, FILM COATED ORAL at 08:16

## 2019-09-27 RX ADMIN — GABAPENTIN 400 MG: 400 CAPSULE ORAL at 22:09

## 2019-09-27 RX ADMIN — FUROSEMIDE 20 MG: 40 TABLET ORAL at 15:59

## 2019-09-27 RX ADMIN — POTASSIUM CHLORIDE 10 MEQ: 20 TABLET, EXTENDED RELEASE ORAL at 08:17

## 2019-09-27 RX ADMIN — TRAZODONE HYDROCHLORIDE 50 MG: 50 TABLET ORAL at 22:13

## 2019-09-27 RX ADMIN — AMIODARONE HYDROCHLORIDE 400 MG: 200 TABLET ORAL at 06:26

## 2019-09-27 RX ADMIN — SIMETHICONE CHEW TAB 80 MG 160 MG: 80 TABLET ORAL at 18:03

## 2019-09-27 RX ADMIN — SIMETHICONE CHEW TAB 80 MG 160 MG: 80 TABLET ORAL at 12:16

## 2019-09-27 ASSESSMENT — ENCOUNTER SYMPTOMS
CHILLS: 0
NERVOUS/ANXIOUS: 0
ABDOMINAL PAIN: 0
FEVER: 0
DIARRHEA: 0
VOMITING: 0
NAUSEA: 0
SHORTNESS OF BREATH: 0

## 2019-09-27 NOTE — CARE PLAN
Problem: Safety  Goal: Will remain free from injury  Intervention: Educate patient and significant other/support system about adaptive mobility strategies and safe transfers  Note:   Using call light for assist as needed. Bed in low position, call light within reach.     Problem: Pain Management  Goal: Pain level will decrease to patient's comfort goal  Intervention: Follow pain managment plan developed in collaboration with patient and Interdisciplinary Team  Note:   No c/o pain at hs, no request for PRN pain meds. Resting in bed, eyes closed, resp even, unlabored.

## 2019-09-27 NOTE — THERAPY
"Physical Therapy   Daily Treatment     Patient Name: Xavier Richardson  Age:  69 y.o., Sex:  male  Medical Record #: [id number]  Today's Date: 9/27/2019     Precautions  Precautions: (P) Fall Risk, Sternal Precautions (See Comments)  Comments: (P)  Aortic aneurysm sx    Subjective    Pt up in wc upon arrival, he was agreeable to PT     Objective       09/27/19 0931   Precautions   Precautions Fall Risk;Sternal Precautions (See Comments)   Comments  Aortic aneurysm sx   Bed Mobility    Sit to Supine Supervised   Sit to Stand Stand by Assist   Rolling Supervised   Neuro-Muscular Treatments   Neuro-Muscular Treatments Weight Shift Left;Weight Shift Right;Verbal Cuing;Postural Changes;Biofeedback   Comments see note   Interdisciplinary Plan of Care Collaboration   IDT Collaboration with  Occupational Therapist   Patient Position at End of Therapy In Bed;Call Light within Reach;Tray Table within Reach;Phone within Reach   Collaboration Comments CLOF   PT Total Time Spent   PT Individual Total Time Spent (Mins) 60   PT Charge Group   PT Gait Training 2   PT Neuromuscular Re-Education / Balance 1   PT Therapeutic Activities 1   Supervising Physical Therapist Errol Jackson       FIM Walking Score:  5 - Standby Prompting/Supervision or Set-up  Walking Description:  Assist device/equipment, Verbal cueing, Supervision for safety, Requires incidental assist, Extra time(220' x 2 SPC SBA indoor level surfaces)  -additional gait training completed with ' x 2 SPV    Neuor re ed   Static/dynamic balance activities in // including step ups to 5.5\" step with 1 UE progressing to no UE support, 2 sets x 10 alt toe taps   Standing on air ex pad with normal and narrow KIKI, no UE, with a/p and lateral ws to promote ankle/hip strategy       Assessment    The patient tolerated gait training with a SPC, he initially needed slight steadying assist with the first few feet and quickly progressed to SBA. Pt demonstrated good chitra and " appropriate sequencing following demonstration and instruction.    Plan    Therapeutic exercise, bed mobility without physical assistance, gait training, safety education and sternal precautions during functional tasks.

## 2019-09-27 NOTE — PROGRESS NOTES
Rehab Progress Note     Date of Service: 9/27/2019  Chief Complaint: follow up stroke    Interval Events (Subjective)    Patient seen and examined in the therapy gym today. He reports he felt too sedated this morning on the higher dose of the trazodone, and would like to decrease back down. He thinks that therapy is going well. He denies any pain. He was able to walk with a cane today. He has no new complaints.    Objective:  VITAL SIGNS: /70   Pulse 77   Temp 37 °C (98.6 °F) (Temporal)   Resp 18   Ht 1.829 m (6')   Wt 123.8 kg (273 lb)   SpO2 94%   BMI 37.03 kg/m²   Gen: alert, no apparent distress  CV: regular rate and rhythm, no murmurs, no peripheral edema  Resp: clear to ascultation bilaterally, normal respiratory effort  GI: soft, non-tender abdomen, bowel sounds present  Neuro: notable for mild right hemiparesis/incoordination    Recent Results (from the past 72 hour(s))   Prothrombin Time    Collection Time: 09/25/19  6:04 AM   Result Value Ref Range    PT 18.3 (H) 12.0 - 14.6 sec    INR 1.48 (H) 0.87 - 1.13   Prothrombin Time    Collection Time: 09/26/19  5:56 AM   Result Value Ref Range    PT 21.7 (H) 12.0 - 14.6 sec    INR 1.82 (H) 0.87 - 1.13   CBC WITH DIFFERENTIAL    Collection Time: 09/26/19  5:56 AM   Result Value Ref Range    WBC 4.1 (L) 4.8 - 10.8 K/uL    RBC 2.67 (L) 4.70 - 6.10 M/uL    Hemoglobin 8.1 (L) 14.0 - 18.0 g/dL    Hematocrit 26.3 (L) 42.0 - 52.0 %    MCV 98.5 (H) 81.4 - 97.8 fL    MCH 30.3 27.0 - 33.0 pg    MCHC 30.8 (L) 33.7 - 35.3 g/dL    RDW 61.1 (H) 35.9 - 50.0 fL    Platelet Count 213 164 - 446 K/uL    MPV 8.5 (L) 9.0 - 12.9 fL    Neutrophils-Polys 67.70 44.00 - 72.00 %    Lymphocytes 16.90 (L) 22.00 - 41.00 %    Monocytes 11.80 0.00 - 13.40 %    Eosinophils 2.40 0.00 - 6.90 %    Basophils 0.50 0.00 - 1.80 %    Immature Granulocytes 0.70 0.00 - 0.90 %    Nucleated RBC 0.00 /100 WBC    Neutrophils (Absolute) 2.80 1.82 - 7.42 K/uL    Lymphs (Absolute) 0.70 (L) 1.00 -  4.80 K/uL    Monos (Absolute) 0.49 0.00 - 0.85 K/uL    Eos (Absolute) 0.10 0.00 - 0.51 K/uL    Baso (Absolute) 0.02 0.00 - 0.12 K/uL    Immature Granulocytes (abs) 0.03 0.00 - 0.11 K/uL    NRBC (Absolute) 0.00 K/uL   Prothrombin Time    Collection Time: 09/27/19  6:04 AM   Result Value Ref Range    PT 24.7 (H) 12.0 - 14.6 sec    INR 2.15 (H) 0.87 - 1.13       Current Facility-Administered Medications   Medication Frequency   • warfarin (COUMADIN) tablet 5 mg ONCE AT 1800   • traZODone (DESYREL) tablet 50 mg QHS   • [START ON 9/28/2019] furosemide (LASIX) tablet 40 mg Q DAY   • furosemide (LASIX) tablet 20 mg Once   • potassium chloride SA (Kdur) tablet 20 mEq BID   • melatonin tablet 6 mg QHS   • gabapentin (NEURONTIN) capsule 400 mg TID   • vitamin D (cholecalciferol) tablet 1,000 Units DAILY   • metoprolol (LOPRESSOR) tablet 12.5 mg TWICE DAILY   • Respiratory Care per Protocol Continuous RT   • Pharmacy Consult Request ...Pain Management Review 1 Each PHARMACY TO DOSE   • acetaminophen (TYLENOL) tablet 650 mg Q4HRS PRN   • hydrALAZINE (APRESOLINE) tablet 10 mg Q8HRS PRN   • senna-docusate (PERICOLACE or SENOKOT S) 8.6-50 MG per tablet 2 Tab BID    And   • polyethylene glycol/lytes (MIRALAX) PACKET 1 Packet QDAY PRN    And   • magnesium hydroxide (MILK OF MAGNESIA) suspension 30 mL QDAY PRN    And   • bisacodyl (DULCOLAX) suppository 10 mg QDAY PRN   • artificial tears ophthalmic solution 1 Drop PRN   • benzocaine-menthol (CEPACOL) lozenge 1 Lozenge Q2HRS PRN   • mag hydrox-al hydrox-simeth (MAALOX PLUS ES or MYLANTA DS) suspension 20 mL Q2HRS PRN   • ondansetron (ZOFRAN ODT) dispertab 4 mg 4X/DAY PRN    Or   • ondansetron (ZOFRAN) syringe/vial injection 4 mg 4X/DAY PRN   • sodium chloride (OCEAN) 0.65 % nasal spray 2 Spray PRN   • hydrOXYzine HCl (ATARAX) tablet 50 mg Q6HRS PRN   • MD Alert...Warfarin per Pharmacy PHARMACY TO DOSE   • [START ON 9/28/2019] amiodarone (CORDARONE) tablet 200 mg Q DAY   • aspirin  (ASA) tablet 325 mg DAILY   • fenofibrate micronized (LOFIBRA) capsule 67 mg AFTER BREAKFAST   • finasteride (PROSCAR) tablet 5 mg DAILY   • omeprazole (PRILOSEC) capsule 20 mg QAM AC   • tamsulosin (FLOMAX) capsule 0.8 mg QHS   • simethicone (MYLICON) chewable tab 160 mg 4X/DAY WITH MEALS + NIGHTLY       Orders Placed This Encounter   Procedures   • Diet Order Regular     Standing Status:   Standing     Number of Occurrences:   1     Order Specific Question:   Diet:     Answer:   Regular [1]       Radiology  Transthoracic  Echo Report      Echocardiography Laboratory    CONCLUSIONS  No prior study is available for comparison.   The left ventricle was normal in size.  Mild concentric left ventricular hypertrophy.  Normal left ventricular systolic function and regional wall motion.  Left ventricular ejection fraction is estimated to be 60%.  Aortic sclerosis without stenosis.  Trace mitral regurgitation.  The left atrium is normal in size.  Dilated inferior vena cava with inspiratory collapse.  Estimated right ventricular systolic pressure  is 45 mmHg.  Mobile echodensity noted in the aortic arch/proximal left subclavian   artery.    DONALD KNOTT  Exam Date:         09/24/2019                      11:45  Exam Location:     Inpatient  Priority:          Routine    Assessment:  Active Hospital Problems    Diagnosis   • *Stroke (cerebrum) (HCC)   • Essential hypertension   • JESUS MANUEL (obstructive sleep apnea)   • Peripheral neuropathy   • Other insomnia   • Dissection of thoracoabdominal aorta (HCC)   • Lung nodule   • Shoulder lesion, left   • Dyslipidemia   • Urinary retention   • Fluid overload   • A-fib (HCC)     This patient is a 69 y.o. male admitted for acute inpatient rehabilitation with Stroke (cerebrum) (HCC).    I led and attended the weekly conference and agree with the IDT conference documentation and plan of care as noted below.    Date of conference: 9/25/2019    Goals and barriers: See IDT note.    Biggest  barriers: low blood pressures, left sided arm/hand weakness/incoordination, decreased endurance, fatigue    Admission FIM 80    CM/social support: lives alone, daughter to return from [place] to assist at discharge    Anticipated DC date: 10/4/2019    Home health: PT/OT/RN    Equip: FWW    Follow up: PCP, cardiology    Medical Decision Making and Plan:    Bilateral ischemic strokes  Hemorrhagic transformation in right parietal/occipital lobe  Left hemiparesis, improved  Non-dominant  Visual deficits, continued  PT/OT, 1.5 hr each discipline, 5 days per week  SLP eval, cog within normal  Continue aspirin and coumadin per outside recs  Neuro-ophthalmology referral - Dr. Wilder, made today    MRI imaging reviewed with patient/family    Acute urinary retention  Maintain Barnard for now, retained >1.2 L  Does have a history of BPH  Continue Proscar and Flomax  Attempt voiding trial in 1 week - will try next Monday  May need outpatient follow-up     Peripheral neuropathy  Increase gabapentin 400 mg TID, home dosing    Insomnia  Increase melatonin and trazodone, did not tolerate increase dose of trazodone, decrease back down to 50 mg  Poor sleep may also be due to untreated sleep apnea    Bowel  Meds as needed  Last BM 9/25    DVT prophylaxis  Coumadin     Appreciate the assistance of the hospitalist with the patient's medical comorbidities:     Aortic dissection  Hypertension  Hyperlipidemia  Atrial fibrillation  Pulmonary hypertension, RSVP 45  Sleep apnea, non-compliant with CPAP  Left pleural effusion  Left lung nodule, outpatient follow up  Left shoulder lesion, outpatient follow up  Anemia, stable  Leukopenia  Vit D insufficiency    Total time:  16 minutes.  I spent greater than 50% of the time for patient care, counseling, and coordination on this date, including patient face-to face time, unit/floor time with review of records/pertinent lab data and studies, as well as discussing diagnostic evaluation/work up,  planned therapeutic interventions, and future disposition of care, as per the interval events/subjective and the assessment and plan as noted above.      I have performed a physical exam, reviewed and updated ROS, as well as the assessment and plan today 9/27/2019. In review of note from 9/26/2019 there are no new changes except as documented above.            Amira Davis M.D.   Physical Medicine and Rehabilitation

## 2019-09-27 NOTE — PROGRESS NOTES
Pharmacy Warfarin Consult   9/27/2019     69 y.o.   male     Indication for anticoagulation: Stroke     Goal INR = 2 - 3    Recent Labs     09/25/19  0604 09/26/19  0556 09/27/19  0604   INR 1.48* 1.82* 2.15*   HEMOGLOBIN  --  8.1*  --    HEMATOCRIT  --  26.3*  --        Pertinent Drug/Drug Interactions:  Amiodarone, ASA, Fenofibrate, PPI, trazodone  Outpatient Warfarin Regimen:  Not noted  Recent Warfarin Dosing:    Dose from last 7 days     Date/Time Dose (mg)    09/27/19 0605  5    09/26/19 0556  5    09/25/19 0604  5    09/24/19 0558  5    09/23/19 1600  6      From [facility]  Date 9/21 9/22 9/23   INR 1.05 1.00 1.02   Dose 4 mg 5 mg 6 mg (given at [facility])       Bridge Therapy: No           1.  Warfarin 5 mg tonight for INR = 2.15           Catalino Stubbs [facility]

## 2019-09-27 NOTE — FLOWSHEET NOTE
09/26/19 1758   Events/Summary/Plan   Events/Summary/Plan O2 spot check   Chest Exam   Respiration 16   Pulse 74   Oximetry   #Pulse Oximetry (Single Determination) Yes   Oxygen   Home O2 Use Prior To Admission? No   Pulse Oximetry 95 %   O2 Daily Delivery Respiratory  Room Air with O2 Available

## 2019-09-27 NOTE — THERAPY
"Occupational Therapy  Daily Treatment     Patient Name: Xavier Richardson  Age:  69 y.o., Sex:  male  Medical Record #: [id number]  Today's Date: 2019     Precautions  Precautions: (P) Fall Risk, Sternal Precautions (See Comments)  Comments: (P) Aortic aneurysm sx    Safety   ADL Safety : (P) Requires Supervision for Safety  Bathroom Safety: (P) Requires Supervision for Safety    Subjective    \" That would be great .\" when offered shower this AM      Objective       19 0701   Precautions   Precautions Fall Risk;Sternal Precautions (See Comments)   Comments Aortic aneurysm sx   Safety    ADL Safety  Requires Supervision for Safety   Bathroom Safety Requires Supervision for Safety   Interdisciplinary Plan of Care Collaboration   Patient Position at End of Therapy Seated;Call Light within Reach;Tray Table within Reach   OT Total Time Spent   OT Individual Total Time Spent (Mins) 45   OT Charge Group   OT Self Care / ADL 3       FIM Bathing Score:  5 - Standby Prompting/Supervision or Set-up  Bathing Description:       FIM Upper Body Dressin - Standby Prompting/Supervision or Set-up  Upper Body Dressing Description:       FIM Lower Body Dressing Score:  4 - Minimal Assistance  Lower Body Dressing Description:  ( min breezy to don over left LE ( note did not use julian technique)  assist to adjust back of each slipper. )    FIM Tub/Shower Transfers Score:  5 - Standby Prompting/Supervision or Set-up  Tub/Shower Transfers Description:  Grab bar      Assessment    Supervision cues for julian technique LB dressing . Reported no fatigue at end of session    patient reported improved left UE function with tasks performed     Plan    Continue to progress LUE GM/FM coordination and strength, functional standing balance/tolerance, endurance, dynavision screen, continue to assess  deficits,  with tech as adjunct       "

## 2019-09-27 NOTE — PROGRESS NOTES
[facility] Medicine Daily Progress Note    Date of Service  9/27/2019    Chief Complaint:  S/P aortic dissection  Hypertension  Afib    Interval History:  No significant events or changes since last visit    Review of Systems  Review of Systems   Constitutional: Negative for chills and fever.   Respiratory: Negative for shortness of breath.    Cardiovascular: Negative for chest pain.   Gastrointestinal: Negative for abdominal pain, diarrhea, nausea and vomiting.   Psychiatric/Behavioral: The patient is not nervous/anxious.         Physical Exam  Temp:  [36.6 °C (97.8 °F)-37.1 °C (98.7 °F)] 36.6 °C (97.8 °F)  Pulse:  [72-78] 78  Resp:  [16-20] 16  BP: (121-125)/(68-73) 121/68  SpO2:  [92 %-96 %] 92 %    Physical Exam   Constitutional: He is oriented to person, place, and time. He appears well-nourished.   HENT:   Head: Atraumatic.   Eyes: Pupils are equal, round, and reactive to light. Conjunctivae are normal.   Neck: Normal range of motion. Neck supple.   Cardiovascular: Normal rate, regular rhythm, S1 normal and S2 normal.   Pulmonary/Chest: Effort normal and breath sounds normal. He has no rhonchi.   Abdominal: Soft. Bowel sounds are normal.   Musculoskeletal: He exhibits edema.   Neurological: He is alert and oriented to person, place, and time. No sensory deficit.   Skin: Skin is warm and dry. No cyanosis.   Psychiatric: He has a normal mood and affect. His behavior is normal.   Nursing note and vitals reviewed.      Fluids    Intake/Output Summary (Last 24 hours) at 9/27/2019 1152  Last data filed at 9/27/2019 1002  Gross per 24 hour   Intake 500 ml   Output 1850 ml   Net -1350 ml       Laboratory  Recent Labs     09/26/19  0556   WBC 4.1*   RBC 2.67*   HEMOGLOBIN 8.1*   HEMATOCRIT 26.3*   MCV 98.5*   MCH 30.3   MCHC 30.8*   RDW 61.1*   PLATELETCT 213   MPV 8.5*         Recent Labs     09/25/19  0604 09/26/19  0556 09/27/19  0604   INR 1.48* 1.82* 2.15*               Imaging    Assessment/Plan  * Stroke  (cerebrum) (HCC)- (present on admission)  Assessment & Plan  Has some visual changes  On ASA and Fenofibrate    A-fib (HCC)  Assessment & Plan  HR ok  Echo (9/24): EF 60%, RVSP 45; mobile echodensity noted in the aortic arch/proximal left subclavian artery  On Lopressor: 25 mg bid --> 12.5 mg bid 2nd to lower BP (9/24)  On Amiodarone  On Coumadin  On Lasix    Fluid overload  Assessment & Plan  Likely 2nd to recent heart surgery  Has edema -- has hx but much more recently  O2 sats ok on RA  BNP: 2189 (9/24) -- no other values in Epic  Echo (9/24): EF 60%, RVSP 45  On Lasix: 20 mg daily --> will increase to 40 mg daily  On KCL: 10 meq daily --> will increase to 20 meq daily  Off fluid restrictions (9/26)  Will get US LE to r/o DVT  Monitor K+: 3.9 (9/24)    Dyslipidemia  Assessment & Plan  On Fenofibrate    Shoulder lesion, left- (present on admission)  Assessment & Plan  Will need F/U imaging    Lung nodule- (present on admission)  Assessment & Plan  Needs f/u imaging    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

## 2019-09-27 NOTE — THERAPY
Occupational Therapy  Daily Treatment     Patient Name: Xavier Richardson  Age:  69 y.o., Sex:  male  Medical Record #: [id number]  Today's Date: 9/27/2019     Precautions  Precautions: (P) Fall Risk, Sternal Precautions (See Comments)  Comments: aortic aneurysm sx    Safety   ADL Safety : Requires Supervision for Safety  Bathroom Safety: Requires Supervision for Safety    Subjective    Patient agreeable to OT     Objective       09/27/19 1401   Precautions   Precautions Fall Risk;Sternal Precautions (See Comments)   Sitting Upper Body Exercises   Other Exercise core stabilization seated edge of mat; modified sit ups back 2x10, side to side 1x10 each side, knee raise 2x10 each side   Sitting Lower Body Exercises   Sit to Stand 3 sets of 10  (no UE support, 2nd & 3rd set with 4# ball)   Outcome Measures   Outcome Measures Utilized DPAB   DPAB (Driving Performance Assessment Battery)   Simple Dynavision Task (SDT) # of Hits 39   Difficult Dynavision Task (DDT) # of Hits 14   Complex Dynavision Task (CDT) # Correct Responses 33  (9/10 #s)   Endurance Dynavision Task (EDT) # of Hits 165   Level of Severity   (scored below norms in 4/4 categories)   Interdisciplinary Plan of Care Collaboration   IDT Collaboration with  Physical Therapist Assistant (PTA)   Patient Position at End of Therapy Seated;Tray Table within Reach;Call Light within Reach;Phone within Reach   Collaboration Comments mobility recommendations   OT Total Time Spent   OT Individual Total Time Spent (Mins) 60   OT Charge Group   Charges Yes   OT Cognitive Skill Development 2   OT Therapeutic Exercise  2     Assessment    Pt tolerated session well, continues with decreased strength/coordination in LUE but improving. Demonstrates delayed reaction time globally during dynavision driving screening, discussed results of screen and plan for follow up next week to monitor progress. Continues to fatigue quickly requiring consistent rest breaks.     Plan    Continue to  progress LUE GM/FM coordination and strength, functional standing balance/tolerance, endurance, continue to assess  deficits,  with tech as adjunct

## 2019-09-27 NOTE — FLOWSHEET NOTE
09/27/19 1109   Events/Summary/Plan   Events/Summary/Plan O2 spot check   Chest Exam   Respiration 16   Pulse 78   Oximetry   #Pulse Oximetry (Single Determination) Yes   Oxygen   Home O2 Use Prior To Admission? No   Pulse Oximetry 92 %   O2 Daily Delivery Respiratory  Room Air with O2 Available

## 2019-09-27 NOTE — CARE PLAN
Problem: Safety  Goal: Will remain free from injury  Outcome: PROGRESSING AS EXPECTED     Problem: Venous Thromboembolism (VTW)/Deep Vein Thrombosis (DVT) Prevention:  Goal: Patient will participate in Venous Thrombosis (VTE)/Deep Vein Thrombosis (DVT)Prevention Measures  Outcome: PROGRESSING AS EXPECTED     Problem: Bowel/Gastric:  Goal: Normal bowel function is maintained or improved  Outcome: PROGRESSING AS EXPECTED     Problem: Knowledge Deficit  Goal: Knowledge of disease process/condition, treatment plan, diagnostic tests, and medications will improve  Outcome: PROGRESSING AS EXPECTED

## 2019-09-27 NOTE — THERAPY
Physical Therapy   Daily Treatment     Patient Name: Xavier Richardson  Age:  69 y.o., Sex:  male  Medical Record #: [id number]  Today's Date: 9/27/2019     Precautions  Precautions: (P) Fall Risk, Sternal Precautions (See Comments)  Comments: (P) aortic aneurysm sx    Subjective    Pt is up in wc agreeable to CV exercise training     Objective       09/27/19 1331   Precautions   Precautions Fall Risk;Sternal Precautions (See Comments)   Comments aortic aneurysm sx   Sitting Lower Body Exercises   Nustep Resistance Level 3;Resistance Level 1;Time (See Comments)  (10' L3 and 5' L1)   Interdisciplinary Plan of Care Collaboration   Patient Position at End of Therapy Seated;Other (Comments)  (transfer of care to OT)   PT Total Time Spent   PT Individual Total Time Spent (Mins) 30   PT Charge Group   PT Gait Training 1   PT Therapeutic Exercise 1   Supervising Physical Therapist Errol Jackson       Gait training 200' x 1 room > therapy gym SB/SPV    Assessment    Pt tolerated LE exercise on nustep with a total of 15minutes and 1 rest break. Hr 80-83 with exercise    Plan    10MWT with SPC, 6MWT as appropriate, Therapeutic exercise, bed mobility without physical assistance, gait training with SPC, safety education and sternal precautions during functional tasks, energy cons, pacing edu

## 2019-09-27 NOTE — CARE PLAN
Problem: Safety  Goal: Will remain free from injury  Outcome: PROGRESSING AS EXPECTED     Problem: Bowel/Gastric:  Goal: Normal bowel function is maintained or improved  Outcome: PROGRESSING AS EXPECTED  Flowsheets (Taken 9/26/2019 0710)  Last BM: 09/25/19

## 2019-09-28 LAB
INR PPP: 2.45 (ref 0.87–1.13)
PROTHROMBIN TIME: 27.5 SEC (ref 12–14.6)

## 2019-09-28 PROCEDURE — 36415 COLL VENOUS BLD VENIPUNCTURE: CPT

## 2019-09-28 PROCEDURE — 700102 HCHG RX REV CODE 250 W/ 637 OVERRIDE(OP): Performed by: HOSPITALIST

## 2019-09-28 PROCEDURE — A9270 NON-COVERED ITEM OR SERVICE: HCPCS | Performed by: HOSPITALIST

## 2019-09-28 PROCEDURE — A9270 NON-COVERED ITEM OR SERVICE: HCPCS | Performed by: PHYSICAL MEDICINE & REHABILITATION

## 2019-09-28 PROCEDURE — 99232 SBSQ HOSP IP/OBS MODERATE 35: CPT | Performed by: HOSPITALIST

## 2019-09-28 PROCEDURE — 770010 HCHG ROOM/CARE - REHAB SEMI PRIVAT*

## 2019-09-28 PROCEDURE — 700102 HCHG RX REV CODE 250 W/ 637 OVERRIDE(OP): Performed by: PHYSICAL MEDICINE & REHABILITATION

## 2019-09-28 PROCEDURE — 85610 PROTHROMBIN TIME: CPT

## 2019-09-28 RX ORDER — WARFARIN SODIUM 5 MG/1
5 TABLET ORAL DAILY
Status: COMPLETED | OUTPATIENT
Start: 2019-09-28 | End: 2019-09-28

## 2019-09-28 RX ADMIN — ASPIRIN 325 MG ORAL TABLET 325 MG: 325 PILL ORAL at 08:41

## 2019-09-28 RX ADMIN — VITAMIN D, TAB 1000IU (100/BT) 1000 UNITS: 25 TAB at 08:41

## 2019-09-28 RX ADMIN — AMIODARONE HYDROCHLORIDE 200 MG: 200 TABLET ORAL at 05:58

## 2019-09-28 RX ADMIN — METOPROLOL TARTRATE 12.5 MG: 25 TABLET ORAL at 05:59

## 2019-09-28 RX ADMIN — ACETAMINOPHEN 650 MG: 325 TABLET, FILM COATED ORAL at 08:46

## 2019-09-28 RX ADMIN — TAMSULOSIN HYDROCHLORIDE 0.8 MG: 0.4 CAPSULE ORAL at 21:55

## 2019-09-28 RX ADMIN — POTASSIUM CHLORIDE 20 MEQ: 1500 TABLET, EXTENDED RELEASE ORAL at 21:54

## 2019-09-28 RX ADMIN — ACETAMINOPHEN 650 MG: 325 TABLET, FILM COATED ORAL at 21:53

## 2019-09-28 RX ADMIN — SIMETHICONE CHEW TAB 80 MG 160 MG: 80 TABLET ORAL at 12:03

## 2019-09-28 RX ADMIN — POTASSIUM CHLORIDE 20 MEQ: 1500 TABLET, EXTENDED RELEASE ORAL at 08:42

## 2019-09-28 RX ADMIN — MELATONIN 6 MG: at 21:54

## 2019-09-28 RX ADMIN — FUROSEMIDE 40 MG: 40 TABLET ORAL at 05:58

## 2019-09-28 RX ADMIN — GABAPENTIN 400 MG: 400 CAPSULE ORAL at 21:53

## 2019-09-28 RX ADMIN — GABAPENTIN 400 MG: 400 CAPSULE ORAL at 15:29

## 2019-09-28 RX ADMIN — TRAZODONE HYDROCHLORIDE 50 MG: 50 TABLET ORAL at 21:55

## 2019-09-28 RX ADMIN — OMEPRAZOLE 20 MG: 20 CAPSULE, DELAYED RELEASE ORAL at 08:41

## 2019-09-28 RX ADMIN — GABAPENTIN 400 MG: 400 CAPSULE ORAL at 08:41

## 2019-09-28 RX ADMIN — SIMETHICONE CHEW TAB 80 MG 160 MG: 80 TABLET ORAL at 08:41

## 2019-09-28 RX ADMIN — METOPROLOL TARTRATE 12.5 MG: 25 TABLET ORAL at 18:01

## 2019-09-28 RX ADMIN — WARFARIN SODIUM 5 MG: 5 TABLET ORAL at 18:00

## 2019-09-28 RX ADMIN — SIMETHICONE CHEW TAB 80 MG 160 MG: 80 TABLET ORAL at 18:00

## 2019-09-28 RX ADMIN — SENNOSIDES,DOCUSATE SODIUM 2 TABLET: 8.6; 5 TABLET, FILM COATED ORAL at 08:42

## 2019-09-28 RX ADMIN — SENNOSIDES,DOCUSATE SODIUM 2 TABLET: 8.6; 5 TABLET, FILM COATED ORAL at 21:54

## 2019-09-28 RX ADMIN — FINASTERIDE 5 MG: 5 TABLET, FILM COATED ORAL at 08:41

## 2019-09-28 RX ADMIN — SIMETHICONE CHEW TAB 80 MG 160 MG: 80 TABLET ORAL at 21:56

## 2019-09-28 RX ADMIN — FENOFIBRATE 67 MG: 67 CAPSULE ORAL at 08:41

## 2019-09-28 ASSESSMENT — ENCOUNTER SYMPTOMS
SHORTNESS OF BREATH: 0
VOMITING: 0
BLURRED VISION: 0
PALPITATIONS: 0
DIZZINESS: 0
NAUSEA: 0
FEVER: 0
HEADACHES: 0
HALLUCINATIONS: 0

## 2019-09-28 NOTE — CARE PLAN
Problem: Safety  Goal: Will remain free from injury  Intervention: Educate patient and significant other/support system about adaptive mobility strategies and safe transfers  Note:   Using call light for assist as needed. Bed in low position, call light within reach.     Problem: Skin Integrity  Goal: Risk for impaired skin integrity will decrease  Intervention: Assess and monitor skin integrity, appearance and/or temperature  Note:   Incision sites clean dry, edges approximated. New photos uploaded to file.

## 2019-09-28 NOTE — FLOWSHEET NOTE
09/28/19 1030   Events/Summary/Plan   Events/Summary/Plan 02 check   Non-Invasive Resp Device Site Inspection Completed Intact   Chest Exam   Respiration 16   Pulse 72   Oxygen   Home O2 Use Prior To Admission? No   Pulse Oximetry 93 %   O2 Daily Delivery Respiratory  Room Air with O2 Available

## 2019-09-28 NOTE — PROGRESS NOTES
[facility] Medicine Daily Progress Note    Date of Service  9/28/2019    Chief Complaint:  S/P aortic dissection  Hypertension  Afib    Interval History:  No significant events or changes since last visit    Review of Systems  Review of Systems   Constitutional: Negative for fever.   Eyes: Negative for blurred vision.   Respiratory: Negative for shortness of breath.    Cardiovascular: Negative for palpitations.   Gastrointestinal: Negative for nausea and vomiting.   Neurological: Negative for dizziness and headaches.   Psychiatric/Behavioral: Negative for hallucinations.        Physical Exam  Temp:  [36.7 °C (98.1 °F)-37 °C (98.6 °F)] 36.7 °C (98.1 °F)  Pulse:  [76-84] 76  Resp:  [16-18] 18  BP: (116-124)/(66-71) 116/71  SpO2:  [92 %-94 %] 93 %    Physical Exam   Constitutional: He is oriented to person, place, and time.   HENT:   Mouth/Throat: Oropharynx is clear and moist.   Eyes: No scleral icterus.   Cardiovascular: Normal rate, regular rhythm, S1 normal and S2 normal.   Pulmonary/Chest: Effort normal. No stridor. He has no wheezes. He has no rhonchi. He has no rales.   Abdominal: Soft. Bowel sounds are normal.   Musculoskeletal: He exhibits edema.   Neurological: He is alert and oriented to person, place, and time. No sensory deficit.   Skin: Skin is warm and dry. He is not diaphoretic. No cyanosis.   Psychiatric: He has a normal mood and affect. His behavior is normal.   Nursing note and vitals reviewed.      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)  Assessment & Plan  Legally blind -- cortical blindness  On ASA and Fenofibrate    A-fib (HCC)  Assessment & Plan  HR ok  Echo (9/24): EF 60%, RVSP 45; mobile echodensity noted in the aortic arch/proximal left subclavian artery  On Lopressor: 25 mg bid --> 12.5 mg bid 2nd to lower BP (9/24)  On Amiodarone  On Coumadin  On Lasix    Fluid overload  Assessment & Plan  Likely 2nd to recent heart surgery  Has edema -- has hx but much more recently  O2 sats ok on RA  BNP: 2189 (9/24) -- no other values in Epic  Echo (9/24): EF 60%, RVSP 45  On Lasix: 20 mg daily --> 40 mg daily (9/27)  On KCL: 10 meq bidy --> 20 meq bid  Off fluid restrictions (9/26)  F/U US LE to r/o DVT  Monitor K+: 3.9 (9/24)    Dyslipidemia  Assessment & Plan  On Fenofibrate    Shoulder lesion, left- (present on admission)  Assessment & Plan  Will need F/U imaging    Lung nodule- (present on admission)  Assessment & Plan  Needs f/u imaging    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

## 2019-09-29 ENCOUNTER — ANCILLARY PROCEDURE (OUTPATIENT)
Dept: CARDIOLOGY | Facility: REHABILITATION | Age: 69
DRG: 057 | End: 2019-09-29
Attending: HOSPITALIST
Payer: MEDICARE

## 2019-09-29 PROBLEM — D64.9 ANEMIA: Status: ACTIVE | Noted: 2019-09-29

## 2019-09-29 LAB
ANION GAP SERPL CALC-SCNC: 8 MMOL/L (ref 0–11.9)
BUN SERPL-MCNC: 12 MG/DL (ref 8–22)
CALCIUM SERPL-MCNC: 9.1 MG/DL (ref 8.5–10.5)
CHLORIDE SERPL-SCNC: 107 MMOL/L (ref 96–112)
CO2 SERPL-SCNC: 25 MMOL/L (ref 20–33)
CREAT SERPL-MCNC: 1.02 MG/DL (ref 0.5–1.4)
ERYTHROCYTE [DISTWIDTH] IN BLOOD BY AUTOMATED COUNT: 60.2 FL (ref 35.9–50)
GLUCOSE SERPL-MCNC: 83 MG/DL (ref 65–99)
HCT VFR BLD AUTO: 27.6 % (ref 42–52)
HGB BLD-MCNC: 8.4 G/DL (ref 14–18)
INR PPP: 2.96 (ref 0.87–1.13)
MAGNESIUM SERPL-MCNC: 2 MG/DL (ref 1.5–2.5)
MCH RBC QN AUTO: 29.3 PG (ref 27–33)
MCHC RBC AUTO-ENTMCNC: 30.4 G/DL (ref 33.7–35.3)
MCV RBC AUTO: 96.2 FL (ref 81.4–97.8)
NT-PROBNP SERPL IA-MCNC: 1011 PG/ML (ref 0–125)
PHOSPHATE SERPL-MCNC: 3.8 MG/DL (ref 2.5–4.5)
PLATELET # BLD AUTO: 169 K/UL (ref 164–446)
PMV BLD AUTO: 8.1 FL (ref 9–12.9)
POTASSIUM SERPL-SCNC: 3.8 MMOL/L (ref 3.6–5.5)
PROTHROMBIN TIME: 31.9 SEC (ref 12–14.6)
RBC # BLD AUTO: 2.87 M/UL (ref 4.7–6.1)
SODIUM SERPL-SCNC: 140 MMOL/L (ref 135–145)
WBC # BLD AUTO: 3.9 K/UL (ref 4.8–10.8)

## 2019-09-29 PROCEDURE — 97112 NEUROMUSCULAR REEDUCATION: CPT

## 2019-09-29 PROCEDURE — 97110 THERAPEUTIC EXERCISES: CPT

## 2019-09-29 PROCEDURE — 80048 BASIC METABOLIC PNL TOTAL CA: CPT

## 2019-09-29 PROCEDURE — 83735 ASSAY OF MAGNESIUM: CPT

## 2019-09-29 PROCEDURE — 770010 HCHG ROOM/CARE - REHAB SEMI PRIVAT*

## 2019-09-29 PROCEDURE — 700102 HCHG RX REV CODE 250 W/ 637 OVERRIDE(OP): Performed by: HOSPITALIST

## 2019-09-29 PROCEDURE — A9270 NON-COVERED ITEM OR SERVICE: HCPCS | Performed by: HOSPITALIST

## 2019-09-29 PROCEDURE — 85027 COMPLETE CBC AUTOMATED: CPT

## 2019-09-29 PROCEDURE — 84100 ASSAY OF PHOSPHORUS: CPT

## 2019-09-29 PROCEDURE — 97530 THERAPEUTIC ACTIVITIES: CPT

## 2019-09-29 PROCEDURE — 85610 PROTHROMBIN TIME: CPT

## 2019-09-29 PROCEDURE — 700102 HCHG RX REV CODE 250 W/ 637 OVERRIDE(OP): Performed by: PHYSICAL MEDICINE & REHABILITATION

## 2019-09-29 PROCEDURE — 83880 ASSAY OF NATRIURETIC PEPTIDE: CPT

## 2019-09-29 PROCEDURE — 97032 APPL MODALITY 1+ESTIM EA 15: CPT

## 2019-09-29 PROCEDURE — 97116 GAIT TRAINING THERAPY: CPT

## 2019-09-29 PROCEDURE — 36415 COLL VENOUS BLD VENIPUNCTURE: CPT

## 2019-09-29 PROCEDURE — 99232 SBSQ HOSP IP/OBS MODERATE 35: CPT | Performed by: HOSPITALIST

## 2019-09-29 PROCEDURE — 94760 N-INVAS EAR/PLS OXIMETRY 1: CPT

## 2019-09-29 PROCEDURE — A9270 NON-COVERED ITEM OR SERVICE: HCPCS | Performed by: PHYSICAL MEDICINE & REHABILITATION

## 2019-09-29 PROCEDURE — 93970 EXTREMITY STUDY: CPT

## 2019-09-29 RX ORDER — WARFARIN SODIUM 1 MG/1
1 TABLET ORAL DAILY
Status: COMPLETED | OUTPATIENT
Start: 2019-09-29 | End: 2019-09-29

## 2019-09-29 RX ADMIN — SIMETHICONE CHEW TAB 80 MG 160 MG: 80 TABLET ORAL at 08:20

## 2019-09-29 RX ADMIN — FINASTERIDE 5 MG: 5 TABLET, FILM COATED ORAL at 08:20

## 2019-09-29 RX ADMIN — SIMETHICONE CHEW TAB 80 MG 160 MG: 80 TABLET ORAL at 17:56

## 2019-09-29 RX ADMIN — ACETAMINOPHEN 650 MG: 325 TABLET, FILM COATED ORAL at 12:03

## 2019-09-29 RX ADMIN — FUROSEMIDE 40 MG: 40 TABLET ORAL at 06:06

## 2019-09-29 RX ADMIN — TAMSULOSIN HYDROCHLORIDE 0.8 MG: 0.4 CAPSULE ORAL at 22:29

## 2019-09-29 RX ADMIN — AMIODARONE HYDROCHLORIDE 200 MG: 200 TABLET ORAL at 06:05

## 2019-09-29 RX ADMIN — SIMETHICONE CHEW TAB 80 MG 160 MG: 80 TABLET ORAL at 22:29

## 2019-09-29 RX ADMIN — FENOFIBRATE 67 MG: 67 CAPSULE ORAL at 08:20

## 2019-09-29 RX ADMIN — GABAPENTIN 400 MG: 400 CAPSULE ORAL at 22:29

## 2019-09-29 RX ADMIN — GABAPENTIN 400 MG: 400 CAPSULE ORAL at 08:20

## 2019-09-29 RX ADMIN — TRAZODONE HYDROCHLORIDE 50 MG: 50 TABLET ORAL at 22:29

## 2019-09-29 RX ADMIN — WARFARIN SODIUM 1 MG: 1 TABLET ORAL at 17:56

## 2019-09-29 RX ADMIN — MELATONIN 6 MG: at 22:29

## 2019-09-29 RX ADMIN — VITAMIN D, TAB 1000IU (100/BT) 1000 UNITS: 25 TAB at 08:20

## 2019-09-29 RX ADMIN — SENNOSIDES,DOCUSATE SODIUM 2 TABLET: 8.6; 5 TABLET, FILM COATED ORAL at 22:29

## 2019-09-29 RX ADMIN — SENNOSIDES,DOCUSATE SODIUM 2 TABLET: 8.6; 5 TABLET, FILM COATED ORAL at 08:20

## 2019-09-29 RX ADMIN — METOPROLOL TARTRATE 12.5 MG: 25 TABLET ORAL at 17:56

## 2019-09-29 RX ADMIN — POTASSIUM CHLORIDE 20 MEQ: 1500 TABLET, EXTENDED RELEASE ORAL at 22:28

## 2019-09-29 RX ADMIN — ASPIRIN 325 MG ORAL TABLET 325 MG: 325 PILL ORAL at 08:20

## 2019-09-29 RX ADMIN — GABAPENTIN 400 MG: 400 CAPSULE ORAL at 14:34

## 2019-09-29 RX ADMIN — METOPROLOL TARTRATE 12.5 MG: 25 TABLET ORAL at 06:06

## 2019-09-29 RX ADMIN — ACETAMINOPHEN 650 MG: 325 TABLET, FILM COATED ORAL at 22:29

## 2019-09-29 RX ADMIN — SIMETHICONE CHEW TAB 80 MG 160 MG: 80 TABLET ORAL at 12:01

## 2019-09-29 RX ADMIN — OMEPRAZOLE 20 MG: 20 CAPSULE, DELAYED RELEASE ORAL at 08:20

## 2019-09-29 RX ADMIN — POTASSIUM CHLORIDE 20 MEQ: 1500 TABLET, EXTENDED RELEASE ORAL at 08:20

## 2019-09-29 ASSESSMENT — PATIENT HEALTH QUESTIONNAIRE - PHQ9
9. THOUGHTS THAT YOU WOULD BE BETTER OFF DEAD, OR OF HURTING YOURSELF: NOT AT ALL
5. POOR APPETITE OR OVEREATING: MORE THAN HALF THE DAYS
8. MOVING OR SPEAKING SO SLOWLY THAT OTHER PEOPLE COULD HAVE NOTICED. OR THE OPPOSITE, BEING SO FIGETY OR RESTLESS THAT YOU HAVE BEEN MOVING AROUND A LOT MORE THAN USUAL: MORE THAN HALF THE DAYS
1. LITTLE INTEREST OR PLEASURE IN DOING THINGS: MORE THAN HALF THE DAYS
SUM OF ALL RESPONSES TO PHQ9 QUESTIONS 1 AND 2: 4
7. TROUBLE CONCENTRATING ON THINGS, SUCH AS READING THE NEWSPAPER OR WATCHING TELEVISION: MORE THAN HALF THE DAYS
2. FEELING DOWN, DEPRESSED, IRRITABLE, OR HOPELESS: MORE THAN HALF THE DAYS
6. FEELING BAD ABOUT YOURSELF - OR THAT YOU ARE A FAILURE OR HAVE LET YOURSELF OR YOUR FAMILY DOWN: MORE THAN HALF THE DAYS
4. FEELING TIRED OR HAVING LITTLE ENERGY: NEARLY EVERY DAY
SUM OF ALL RESPONSES TO PHQ QUESTIONS 1-9: 18
3. TROUBLE FALLING OR STAYING ASLEEP OR SLEEPING TOO MUCH: NEARLY EVERY DAY

## 2019-09-29 ASSESSMENT — ENCOUNTER SYMPTOMS
COUGH: 0
DIARRHEA: 0
FEVER: 0
DIZZINESS: 0
NERVOUS/ANXIOUS: 0
BLURRED VISION: 0

## 2019-09-29 NOTE — FLOWSHEET NOTE
09/29/19 1252   Events/Summary/Plan   Events/Summary/Plan 02 spot check. Pt is doing well using oxygen at night   Interdisciplinary Plan of Care-Goals (Indications)   Hyperinflation Protocol Indications Chest Trauma (Blunt, Penetrative, or Surgical)   Interdisciplinary Plan of Care-Outcomes    Hyperinflation Protocol Goals/Outcome Greater Than 60% of Predicted I.S. Volume x 24 hrs   Education   Education Yes - Pt. / Family has been Instructed in use of Respiratory Equipment  (on continuing to do breathing exercises)   Respiratory WDL   Respiratory (WDL) X   Chest Exam   Respiration 18   Pulse 80   Oximetry   #Pulse Oximetry (Single Determination) Yes   Oxygen   Home O2 Use Prior To Admission? No   Pulse Oximetry 95 %   O2 Daily Delivery Respiratory  Room Air with O2 Available

## 2019-09-29 NOTE — THERAPY
"Occupational Therapy  Daily Treatment     Patient Name: Xavier Richardson  Age:  69 y.o., Sex:  male  Medical Record #: [id number]  Today's Date: 9/29/2019     Precautions  Precautions: (P) Fall Risk, Sternal Precautions (See Comments)  Comments: (P) aortic aneurysm sx      Subjective    \"I need to work on strengthening this shoulder\"     Objective       09/29/19 1001   Precautions   Precautions Fall Risk;Sternal Precautions (See Comments)   Comments aortic aneurysm sx   Cognition    Level of Consciousness Alert   Sitting Upper Body Exercises   Other Exercise BUE strengthening exercises with 3# dowel, performed seated in front of mirror for visual feedback. Reviewed sternal prec with pt prior to completing exercises, and pt maintained throughout. 3x10 each: front arm raise, fwd/bwd arm circles, and bicep curls.    Interdisciplinary Plan of Care Collaboration   Patient Position at End of Therapy Seated;Self Releasing Lap Belt Applied;Call Light within Reach;Tray Table within Reach;Phone within Reach   OT Total Time Spent   OT Individual Total Time Spent (Mins) 30   OT Charge Group   OT Therapeutic Exercise  2       Assessment    Pt tolerated session well and con't to make gains with LUE strength. Pt maintained sternal prec during therex and had no c/o pain throughout. Pt con't to fatigue quickly and requires consistent rest breaks. Pt would like a BUE strengtheing HEP to complete after d/c.     Plan       Continue to progress LUE GM/FM coordination and strength, functional standing balance/tolerance, endurance, continue to assess  deficits,  with tech as adjunct     "

## 2019-09-29 NOTE — PROGRESS NOTES
Inpatient Anticoagulation Service Note    Date: 9/28/2019    Reason for Anticoagulation: Stroke   Target INR: 2.0 to 3.0         Hemoglobin Value: (Abnormal) 8.1  Hematocrit Value: (Abnormal) 26.3    INR from last 7 days     Date/Time INR Value    09/28/19 0610  (Abnormal) 2.45    09/27/19 0604  (Abnormal) 2.15    09/26/19 0556  (Abnormal) 1.82    09/25/19 0604  (Abnormal) 1.48    09/24/19 0558  (Abnormal) 1.3        Dose from last 7 days     Date/Time Dose (mg)    09/28/19 1700  5    09/27/19 0605  5    09/26/19 0556  5    09/25/19 0604  5    09/24/19 0558  5    09/23/19 1600  6        Average Dose (mg): 5  Significant Interactions: Amiodarone, Aspirin, Proton Pump Inhibitor, Statin (If less than 5 days and overlap therapy discontinued -- document reason (i.e. Bleed Risk))    (If still on overlap therapy, if No -- document reason (i.e. Bleed Risk))         Plan:  Warfarin 5 mg PO today     Pharmacist suggested discharge dosing: to be determined     Cristian Jernigan, PharmD

## 2019-09-29 NOTE — PROGRESS NOTES
Received patient during shift change, report rec'd from day shift RN. Sitting up in w/c, VS stable on room air. Continent of Bowel, brenner catheter in place. Contact guard assist for transfers. A&O x 4, able to make needs known. Call light within reach.

## 2019-09-29 NOTE — THERAPY
"Physical Therapy   Daily Treatment     Patient Name: Xavier Richardson  Age:  69 y.o., Sex:  male  Medical Record #: [id number]  Today's Date: 9/29/2019     Precautions  Precautions: Fall Risk, Sternal Precautions (See Comments)  Comments: aortic aneurysm sx    Subjective    Patient reports he would like to do  UE more consistently, thinks it is \"really helping my arm function\"     Objective       09/29/19 0831   Precautions   Precautions Fall Risk;Sternal Precautions (See Comments)   Comments aortic aneurysm sx   Sitting Lower Body Exercises   Nustep Resistance Level 3  (15min warm-up)   Therapy Missed   Missed Therapy (Minutes) 15   Reason For Missed Therapy Non-Medical - Other (Please Comment)  (late breakfast; will make up today @12:45)   PT Total Time Spent   PT Individual Total Time Spent (Mins) 45   PT Charge Group   PT Gait Training 2   PT Therapeutic Exercise 1       FIM Bed/Chair/Wheelchair Transfers Score: 5 - Standby Prompting/Supervision or Set-up  Bed/Chair/Wheelchair Transfers Description:  (SBA SPT; requires assist for catheter management)    FIM Walking Score:  5 - Standby Prompting/Supervision or Set-up  Walking Description:  (200ft FWW SBA, 200ft SPC SBA; patient prefers SPC)      Assessment    Patient reports ambulation distance limited by general fatigue; no noted signs of legs shaking, knees buckling, or toe drag with fatigue    Plan    6MWT with SPC, begin stair sequencing    "

## 2019-09-29 NOTE — THERAPY
Physical Therapy   Daily Treatment     Patient Name: Xavier Richardson  Age:  69 y.o., Sex:  male  Medical Record #: [id number]  Today's Date: 9/29/2019     Precautions  Precautions: Fall Risk, Sternal Precautions (See Comments)  Comments: aortic aneurysm sx    Subjective    Patient asking for RT left UE session     Objective       09/29/19 1246   Precautions   Precautions Fall Risk   Comments aortic aneurysm sx   Interdisciplinary Plan of Care Collaboration   IDT Collaboration with  Family / Caregiver   Collaboration Comments sister observed session; encouraged patient progress   PT Total Time Spent   PT Individual Total Time Spent (Mins) 75   PT Charge Group   PT Electrical Stimulation Attended 1   PT Gait Training 1   PT Neuromuscular Re-Education / Balance 1   PT Therapeutic Activities 2     RT Left UE session 30:50= 6.17mi at 2.5kcal/hour; average power=2.9W, with energy expended=1.2kcal  FIM Walking Score:  5 - Standby Prompting/Supervision or Set-up  Walking Description:  (1000ft with SPC SBA in 6min)    FIM Stairs Score:  2 - Max Assistance  Stairs Description:  (up/down 4 6in stairs bilateral rails CGA, using foot-to-foot pattern)      Assessment    Patient completed [id number] with SPC SBA    Plan    Reinforce step-to stair sequencing; continue to progress ambulation distance with SPC

## 2019-09-29 NOTE — CONSULTS
DATE OF SERVICE:  09/25/2019    BEHAVIORAL MEDICINE EVALUATION    BRIEF HISTORY OF PRESENTING COMPLAINTS:  The patient is a 69-year-old single   white male who is referred for a behavioral medicine evaluation by Dr. Davis.    The patient was transferred to rehab from acute where he presented to an   outside hospital after developing an acute chest pain.  He was found to have a   type 1 thoracic aortic aneurysm and dissection for which he was flown to [facility].  The patient is now status post graft repair that he received on   09/04/2019.  Postoperatively, the patient developed atrial fibrillation, acute   urinary retention, and left pleural effusion.  The patient was treated for   these complications.  Eventually, he was stabilized acutely and then [place]   [facility] [place] continued care to address his general debility.    PAST MEDICAL HISTORY:  Significant for hypertension, obstructive sleep apnea.    PSYCHOLOGICAL STATUS:  MENTAL STATUS EXAMINATION:  The patient is a well-nourished, obese male of   tall stature who appeared his stated age of 69.  At presentation, the patient   was alert.  He was sitting in a wheelchair next to his bed when approached,   talking to his sister.  The patient was kempt in appearance.  He was dressed   casually in street attire that was appropriate to his age and setting.  The   patient's manner of presentation was cooperative and friendly.    The patient was grossly oriented to time, place, and person.  His language was   logical and goal oriented, and his speech was normal for rate and rhythm.    The patient's concentration and memory functioning appeared generally intact.    The patient's affect was well modulated, stable, and mildly intense.  He   related well.  His mood appeared euthymic and appropriate to the context.    There was no evidence of delusional or perceptual disturbance.  Also, the   patient showed no unusual pain or motor behavior during the interview.    SPECIFIC  BEHAVIORAL COMPLAINTS:  The patient denied any clinically significant   symptoms of a negative mood.  He reported no strong feelings of depression,   anxiety or anger.  The patient also reported no significant back pain or any   other acute or chronic pain condition.  He did mention that his sleep has been   very restless, but this is chronic condition.  He also reported his appetite   is good and his energy level is slowly returning.    The patient also reported no interpersonal discord or discomfort, family   disharmony or problems managing his day-to-day stressors.  Moreover, he denied   any ETOH or other drug abuse or any use of tobacco.    PSYCHIATRIC HISTORY:  The patient denied any history significant for   psychiatric disturbance or treatment including in or outpatient care.    PSYCHOMETRIC TESTING:  The patient was administered 2 psychometric tests and 2   screening instruments.  The PS/PC-R revealed no clinically significant   symptoms of a negative mood.  His CDR survey showed no problems with level of   consciousness, attention, thinking, perception, speech or memory.  He did   reveal deficits in behavioral activation and basic self-care.  He denied any   mood disturbance.  Finally, the patient reported some loss of vigor, chronic   sleep disturbance, and diminished appetite.    The patient was screened for any elder abuse or risk of suicide.  There is no   strong evidence for either problem.    SOCIAL HISTORY:  The patient is single and retired.  He lives alone in [place], [place].    IMPRESSION:  Minor adjustment difficulties.    RECOMMENDATIONS:  The patient will be followed for status and supportive care.       ____________________________________     SAUL SANDERSON, PHD    PAVEL / REMI    DD:  09/29/2019 12:23:40  DT:  09/29/2019 16:39:27    D#:  [id number]  Job#:  [id number]

## 2019-09-29 NOTE — CARE PLAN
Problem: Bowel/Gastric:  Goal: Normal bowel function is maintained or improved  Outcome: PROGRESSING AS EXPECTED  Goal: Will not experience complications related to bowel motility  Outcome: PROGRESSING AS EXPECTED     Problem: Knowledge Deficit  Goal: Knowledge of disease process/condition, treatment plan, diagnostic tests, and medications will improve  Outcome: PROGRESSING AS EXPECTED  Goal: Knowledge of the prescribed therapeutic regimen will improve  Outcome: PROGRESSING AS EXPECTED     Problem: Discharge Barriers/Planning  Goal: Patient's continuum of care needs will be met  Outcome: PROGRESSING AS EXPECTED

## 2019-09-29 NOTE — PROGRESS NOTES
[facility] Medicine Daily Progress Note    Date of Service  9/29/2019    Chief Complaint:  S/P aortic dissection  Hypertension  Afib    Interval History:  No significant events or changes since last visit    Review of Systems  Review of Systems   Constitutional: Negative for fever.   Eyes: Negative for blurred vision.   Respiratory: Negative for cough.    Cardiovascular: Negative for chest pain.   Gastrointestinal: Negative for diarrhea.   Musculoskeletal: Negative for joint pain.   Neurological: Negative for dizziness.   Psychiatric/Behavioral: The patient is not nervous/anxious.         Physical Exam  Temp:  [36.4 °C (97.5 °F)-36.9 °C (98.4 °F)] 36.7 °C (98 °F)  Pulse:  [70-80] 78  Resp:  [18] 18  BP: (121-132)/(65-82) 132/82    Physical Exam   Constitutional: He is oriented to person, place, and time. No distress.   HENT:   Mouth/Throat: No oropharyngeal exudate.   Eyes: EOM are normal.   Cardiovascular: Normal rate, regular rhythm, S1 normal and S2 normal.   Pulmonary/Chest: Effort normal. He has no wheezes. He has no rhonchi. He has no rales.   Abdominal: Soft. He exhibits no distension. There is no tenderness.   Musculoskeletal: He exhibits edema.   Neurological: He is alert and oriented to person, place, and time. No sensory deficit.   Skin: Skin is warm and dry. No cyanosis.   Psychiatric: He has a normal mood and affect. His behavior is normal.   Nursing note and vitals reviewed.      Fluids    Intake/Output Summary (Last 24 hours) at 9/29/2019 1234  Last data filed at 9/29/2019 1000  Gross per 24 hour   Intake 540 ml   Output 3500 ml   Net -2960 ml       Laboratory  Recent Labs     09/29/19  0547   WBC 3.9*   RBC 2.87*   HEMOGLOBIN 8.4*   HEMATOCRIT 27.6*   MCV 96.2   MCH 29.3   MCHC 30.4*   RDW 60.2*   PLATELETCT 169   MPV 8.1*     Recent Labs     09/29/19  0547   SODIUM 140   POTASSIUM 3.8   CHLORIDE 107   CO2 25   GLUCOSE 83   BUN 12   CREATININE 1.02   CALCIUM 9.1     Recent Labs     09/27/19  0604  09/28/19  0610 09/29/19  0547   INR 2.15* 2.45* 2.96*               Imaging    Assessment/Plan  * 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)  Assessment & Plan  Legally blind -- cortical blindness  On ASA and Fenofibrate    Anemia  Assessment & Plan  H&H slowly improving    A-fib (HCC)  Assessment & Plan  HR ok  Echo (9/24): EF 60%, RVSP 45; mobile echodensity noted in the aortic arch/proximal left subclavian artery  On Lopressor: 25 mg bid --> 12.5 mg bid 2nd to lower BP (9/24)  On Amiodarone  On Coumadin  On Lasix    Fluid overload  Assessment & Plan  Likely 2nd to recent heart surgery  Has edema -- has hx but much more recently  O2 sats ok on RA  BNP: 2189 (9/24) --> 1011 (9/29)  Echo (9/24): EF 60%, RVSP 45  On Lasix: 20 mg daily --> 40 mg daily (9/27)  On KCL: 10 meq bidy --> 20 meq bid  Off fluid restrictions (9/26)  US LE: no DVT  Monitor K+: 3.9 (9/24) --> 3.8 (9/29)    Dyslipidemia  Assessment & Plan  On Fenofibrate    Shoulder lesion, left- (present on admission)  Assessment & Plan  Will need F/U imaging    Lung nodule- (present on admission)  Assessment & Plan  Needs f/u imaging    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

## 2019-09-29 NOTE — THERAPY
"Occupational Therapy  Daily Treatment     Patient Name: Xavier Richardson  Age:  69 y.o., Sex:  male  Medical Record #: [id number]  Today's Date: 9/29/2019     Precautions  Precautions: (P) Fall Risk, Sternal Precautions (See Comments)  Comments: (P) aortic aneurysm sx    Safety   ADL Safety : Requires Supervision for Safety  Bathroom Safety: Requires Supervision for Safety    Subjective    \"I'm a little concerned that I won't make it to the bathroom in time when this is taken out.\" re: brenner bag    Patient education provided re: bladder retraining.      Objective       09/29/19 1401   Precautions   Precautions Fall Risk;Sternal Precautions (See Comments)   Comments aortic aneurysm sx   Sitting Upper Body Exercises   Sitting Upper Body Exercises Yes   Other Exercise BUE AROM HEP exercises seated EOB with visual demonstration and sternal precaution modification.     Neuro-Muscular Treatments   Neuro-Muscular Treatments Taping   Comments Tri-pull taping method applied to LUE shoulder secondary to slight subluxation (1 finger width)    Balance   Sitting Balance (Static) Good   Sitting Balance (Dynamic) Good   Standing Balance (Static) Fair -   Standing Balance (Dynamic) Fair -   Weight Shift Sitting Good   Weight Shift Standing Fair   Skilled Intervention Verbal Cuing;Tactile Cuing;Postural Facilitation   Comments Unsupported bimanual throwing task standing at trampoline with CGA support with increased reps between seated RBs (20, 30, 40).  In-facility ambulation with SPC at a CGA level with R/L head turns at conversational pace without LOB or lateral sway (415 ft).  Unsupported LUE throwing task with ladder ball at 5 and 10 ft distance with increasing grasp/release fluidity and throwing accuracy (1st trial 3/10; 2nd trial 7/10 at 5 ft; 6/10, and 4/4 at 10 ft)    Interdisciplinary Plan of Care Collaboration   IDT Collaboration with  Family / Caregiver   Patient Position at End of Therapy Seated;Family / Friend in Room "   Collaboration Comments Sister present for session   OT Total Time Spent   OT Individual Total Time Spent (Mins) 60   OT Charge Group   Charges Yes   OT Neuromuscular Re-education / Balance 1   OT Therapy Activity 3       Assessment    Pt agreeable to dynamic BUE tasks to increase speed/accuracy of LUE gross motor coordination in standing.  Pt demonstrated fair dynamic standing balance with dual attention tasks and good adherence to sternal precautions.  BUE HEP provided per patient request with pt demonstrating BUE movement seated EOB with fading verbal/visual cues.     Plan    Cont' LUE neuro re-ed, FES, dynamic balance activities with least restrictive AD, overall strengthening/endurance building for increased safety and independence with ADL/IADL pursuits.

## 2019-09-29 NOTE — CARE PLAN
Problem: Safety  Goal: Will remain free from injury  Intervention: Educate patient and significant other/support system about adaptive mobility strategies and safe transfers  Note:   Using call light for assist as needed. Bed in low position, call light within reach.     Problem: Pain Management  Goal: Pain level will decrease to patient's comfort goal  Intervention: Follow pain managment plan developed in collaboration with patient and Interdisciplinary Team  Note:   PRN tylenol administered at hs per request. Good effect, resting in bed, eyes closed, resp even, unlabored.

## 2019-09-30 LAB
INR PPP: 3.13 (ref 0.87–1.13)
PROTHROMBIN TIME: 33.4 SEC (ref 12–14.6)

## 2019-09-30 PROCEDURE — 700102 HCHG RX REV CODE 250 W/ 637 OVERRIDE(OP): Performed by: HOSPITALIST

## 2019-09-30 PROCEDURE — A9270 NON-COVERED ITEM OR SERVICE: HCPCS | Performed by: HOSPITALIST

## 2019-09-30 PROCEDURE — 36415 COLL VENOUS BLD VENIPUNCTURE: CPT

## 2019-09-30 PROCEDURE — 97530 THERAPEUTIC ACTIVITIES: CPT

## 2019-09-30 PROCEDURE — 97110 THERAPEUTIC EXERCISES: CPT

## 2019-09-30 PROCEDURE — 97112 NEUROMUSCULAR REEDUCATION: CPT

## 2019-09-30 PROCEDURE — 97116 GAIT TRAINING THERAPY: CPT

## 2019-09-30 PROCEDURE — 94760 N-INVAS EAR/PLS OXIMETRY 1: CPT

## 2019-09-30 PROCEDURE — 85610 PROTHROMBIN TIME: CPT

## 2019-09-30 PROCEDURE — 99232 SBSQ HOSP IP/OBS MODERATE 35: CPT | Performed by: HOSPITALIST

## 2019-09-30 PROCEDURE — 99232 SBSQ HOSP IP/OBS MODERATE 35: CPT | Performed by: PHYSICAL MEDICINE & REHABILITATION

## 2019-09-30 PROCEDURE — 700102 HCHG RX REV CODE 250 W/ 637 OVERRIDE(OP): Performed by: PHYSICAL MEDICINE & REHABILITATION

## 2019-09-30 PROCEDURE — 770010 HCHG ROOM/CARE - REHAB SEMI PRIVAT*

## 2019-09-30 PROCEDURE — A9270 NON-COVERED ITEM OR SERVICE: HCPCS | Performed by: PHYSICAL MEDICINE & REHABILITATION

## 2019-09-30 RX ORDER — WARFARIN SODIUM 2.5 MG/1
2.5 TABLET ORAL
Status: COMPLETED | OUTPATIENT
Start: 2019-09-30 | End: 2019-09-30

## 2019-09-30 RX ADMIN — ACETAMINOPHEN 650 MG: 325 TABLET, FILM COATED ORAL at 20:46

## 2019-09-30 RX ADMIN — SENNOSIDES,DOCUSATE SODIUM 2 TABLET: 8.6; 5 TABLET, FILM COATED ORAL at 07:46

## 2019-09-30 RX ADMIN — AMIODARONE HYDROCHLORIDE 200 MG: 200 TABLET ORAL at 05:10

## 2019-09-30 RX ADMIN — FENOFIBRATE 67 MG: 67 CAPSULE ORAL at 07:46

## 2019-09-30 RX ADMIN — GABAPENTIN 400 MG: 400 CAPSULE ORAL at 15:07

## 2019-09-30 RX ADMIN — SIMETHICONE CHEW TAB 80 MG 160 MG: 80 TABLET ORAL at 20:46

## 2019-09-30 RX ADMIN — SIMETHICONE CHEW TAB 80 MG 160 MG: 80 TABLET ORAL at 17:43

## 2019-09-30 RX ADMIN — MELATONIN 6 MG: at 20:46

## 2019-09-30 RX ADMIN — GABAPENTIN 400 MG: 400 CAPSULE ORAL at 07:46

## 2019-09-30 RX ADMIN — POTASSIUM CHLORIDE 20 MEQ: 1500 TABLET, EXTENDED RELEASE ORAL at 07:46

## 2019-09-30 RX ADMIN — GABAPENTIN 400 MG: 400 CAPSULE ORAL at 20:46

## 2019-09-30 RX ADMIN — VITAMIN D, TAB 1000IU (100/BT) 1000 UNITS: 25 TAB at 07:46

## 2019-09-30 RX ADMIN — TRAZODONE HYDROCHLORIDE 50 MG: 50 TABLET ORAL at 20:46

## 2019-09-30 RX ADMIN — TAMSULOSIN HYDROCHLORIDE 0.8 MG: 0.4 CAPSULE ORAL at 20:45

## 2019-09-30 RX ADMIN — POTASSIUM CHLORIDE 20 MEQ: 1500 TABLET, EXTENDED RELEASE ORAL at 20:46

## 2019-09-30 RX ADMIN — METOPROLOL TARTRATE 12.5 MG: 25 TABLET ORAL at 17:43

## 2019-09-30 RX ADMIN — FINASTERIDE 5 MG: 5 TABLET, FILM COATED ORAL at 07:45

## 2019-09-30 RX ADMIN — OMEPRAZOLE 20 MG: 20 CAPSULE, DELAYED RELEASE ORAL at 07:45

## 2019-09-30 RX ADMIN — SIMETHICONE CHEW TAB 80 MG 160 MG: 80 TABLET ORAL at 11:19

## 2019-09-30 RX ADMIN — SIMETHICONE CHEW TAB 80 MG 160 MG: 80 TABLET ORAL at 07:45

## 2019-09-30 RX ADMIN — METOPROLOL TARTRATE 12.5 MG: 25 TABLET ORAL at 05:10

## 2019-09-30 RX ADMIN — ASPIRIN 325 MG ORAL TABLET 325 MG: 325 PILL ORAL at 07:46

## 2019-09-30 RX ADMIN — FUROSEMIDE 40 MG: 40 TABLET ORAL at 05:10

## 2019-09-30 RX ADMIN — WARFARIN SODIUM 2.5 MG: 2.5 TABLET ORAL at 17:43

## 2019-09-30 ASSESSMENT — BALANCE ASSESSMENTS
STANDING UNSUPPORTED: 4
PICK UP OBJECT FROM THE FLOOR FROM A STANDING POSITION: 4
TURN 360 DEGREES: 2
LONG VERSION TOTAL SCORE (MAX 56): 49
TRANSFERS: 4
PLACE ALTERNATE FOOT ON STEP OR STOOL WHILE STANDING UNSUPPORTED: 4
SITTING UNSUPPORTED: 4
STANDING UNSUPPORTED WITH EYES CLOSED: 4
SITTING TO STANDING: 4
STANDING ON ONE LEG: 3
STANDING TO SITTING: 3
STANDING UNSUPPORTED ONE FOOT IN FRONT: 3
LOOK OVER LEFT AND RIGHT SHOULDERS WHILE STANDING: 3
REACHING FORWARD WITH OUTSTRETCHED ARM WHILE STANDING: 3
LONG VERSION TOTAL SCORE (MAX 56): 49
STANDING UNSUPPORTED WITH FEET TOGETHER: 4

## 2019-09-30 ASSESSMENT — PATIENT HEALTH QUESTIONNAIRE - PHQ9
SUM OF ALL RESPONSES TO PHQ QUESTIONS 1-9: 18
4. FEELING TIRED OR HAVING LITTLE ENERGY: NEARLY EVERY DAY
6. FEELING BAD ABOUT YOURSELF - OR THAT YOU ARE A FAILURE OR HAVE LET YOURSELF OR YOUR FAMILY DOWN: MORE THAN HALF THE DAYS
3. TROUBLE FALLING OR STAYING ASLEEP OR SLEEPING TOO MUCH: NEARLY EVERY DAY
1. LITTLE INTEREST OR PLEASURE IN DOING THINGS: MORE THAN HALF THE DAYS
5. POOR APPETITE OR OVEREATING: MORE THAN HALF THE DAYS
7. TROUBLE CONCENTRATING ON THINGS, SUCH AS READING THE NEWSPAPER OR WATCHING TELEVISION: MORE THAN HALF THE DAYS
9. THOUGHTS THAT YOU WOULD BE BETTER OFF DEAD, OR OF HURTING YOURSELF: NOT AT ALL
SUM OF ALL RESPONSES TO PHQ9 QUESTIONS 1 AND 2: 4
8. MOVING OR SPEAKING SO SLOWLY THAT OTHER PEOPLE COULD HAVE NOTICED. OR THE OPPOSITE, BEING SO FIGETY OR RESTLESS THAT YOU HAVE BEEN MOVING AROUND A LOT MORE THAN USUAL: MORE THAN HALF THE DAYS
2. FEELING DOWN, DEPRESSED, IRRITABLE, OR HOPELESS: MORE THAN HALF THE DAYS

## 2019-09-30 ASSESSMENT — LIFESTYLE VARIABLES
HOW MANY TIMES IN THE PAST YEAR HAVE YOU HAD 5 OR MORE DRINKS IN A DAY: 0
TOTAL SCORE: 0
AVERAGE NUMBER OF DAYS PER WEEK YOU HAVE A DRINK CONTAINING ALCOHOL: 2
EVER HAD A DRINK FIRST THING IN THE MORNING TO STEADY YOUR NERVES TO GET RID OF A HANGOVER: NO
DOES PATIENT WANT TO STOP DRINKING: NO
TOTAL SCORE: 0
TOTAL SCORE: 0
HAVE PEOPLE ANNOYED YOU BY CRITICIZING YOUR DRINKING: NO
ALCOHOL_USE: YES
EVER FELT BAD OR GUILTY ABOUT YOUR DRINKING: NO
CONSUMPTION TOTAL: NEGATIVE
ON A TYPICAL DAY WHEN YOU DRINK ALCOHOL HOW MANY DRINKS DO YOU HAVE: 1
HAVE YOU EVER FELT YOU SHOULD CUT DOWN ON YOUR DRINKING: NO

## 2019-09-30 ASSESSMENT — ENCOUNTER SYMPTOMS
VOMITING: 0
SHORTNESS OF BREATH: 0
DIARRHEA: 0
NERVOUS/ANXIOUS: 0
FEVER: 0
CHILLS: 0
NAUSEA: 0
ABDOMINAL PAIN: 0

## 2019-09-30 ASSESSMENT — 6 MINUTE WALK TEST (6MWT)
TOTAL DISTANCE WALKED (FT): 1000
LEVEL OF ASSISTANCE: SUPERVISION
GAIT SPEED - METERS PER SECOND: .85

## 2019-09-30 NOTE — PROGRESS NOTES
Received bedside shift report from Berkley NICOLE RN regarding patient and assumed care. Patient awake, calm and stable, currently positioned in bed for comfort and safety; call light within reach. Denies pain or discomfort at this time. Will continue to monitor.

## 2019-09-30 NOTE — THERAPY
Occupational Therapy  Daily Treatment     Patient Name: Xavier Richardson  Age:  69 y.o., Sex:  male  Medical Record #: [id number]  Today's Date: 9/30/2019     Precautions  Precautions: (P) Fall Risk, Sternal Precautions (See Comments)  Comments: aortic aneurysm sx    Safety   ADL Safety : Requires Supervision for Safety  Bathroom Safety: Requires Supervision for Safety    Subjective    Pt agreeable to OT     Objective       09/30/19 0931   Precautions   Precautions Fall Risk;Sternal Precautions (See Comments)   Supine Upper Body Exercises   Supine Upper Body Exercises Yes   Chest Press 2 sets of 15  (3# dowel, 5 sec holds in extension)   Front Arm Raise 2 sets of 15  (3# dowel)   Comments educated on using SPC to complete therex in room when supine in bed   Sitting Upper Body Exercises   Upper Extremity Bike Level 3 Resistance  (12 minutes)   Other Exercise tabletop horizontal adduction/abduction with washcloth for decreased resistance 1x20 clockwise/counterclockwise, sliderbox 1x20 on level surface, 1x20 level 1, 1x20 level 2, 1x20 level 3 incline, AAROM scap retraction 1x10 with 5 sec holds, shoulder shrugs   Balance   Comments standing at rebounder with unweighted ball 1x35 reps for bilateral integration   OT Total Time Spent   OT Individual Total Time Spent (Mins) 60   OT Charge Group   OT Therapeutic Exercise  4     Assessment    Pt continues with steady progress, tolerating mobility household distances with SPC at supervised level. Demos improved proximal strength in LUE though continues below baseline, tolerating therex well, remains motivated and participatory.     Plan    Continue to progress LUE GM/FM coordination and strength, functional standing balance/tolerance, endurance, continue to assess  deficits,  with tech as adjunct

## 2019-09-30 NOTE — PROGRESS NOTES
[facility] Medicine Daily Progress Note    Date of Service  9/30/2019    Chief Complaint:  S/P aortic dissection  Hypertension  Afib    Interval History:  No significant events or changes since last visit    Review of Systems  Review of Systems   Constitutional: Negative for chills and fever.   Respiratory: Negative for shortness of breath.    Cardiovascular: Negative for chest pain.   Gastrointestinal: Negative for abdominal pain, diarrhea, nausea and vomiting.   Psychiatric/Behavioral: The patient is not nervous/anxious.         Physical Exam  Temp:  [36.4 °C (97.5 °F)-36.8 °C (98.2 °F)] 36.8 °C (98.2 °F)  Pulse:  [65-80] 65  Resp:  [16-20] 16  BP: (116-138)/(70-80) 118/70  SpO2:  [95 %] 95 %    Physical Exam   Constitutional: He is oriented to person, place, and time. He appears well-nourished.   HENT:   Head: Atraumatic.   Eyes: Pupils are equal, round, and reactive to light. Conjunctivae are normal.   Neck: Normal range of motion. Neck supple.   Cardiovascular: Normal rate, regular rhythm, S1 normal and S2 normal.   No murmur heard.  Pulmonary/Chest: Effort normal. He has no wheezes. He has no rhonchi. He has no rales.   Abdominal: Soft. He exhibits no distension. There is no tenderness.   Musculoskeletal: He exhibits edema.   Neurological: He is alert and oriented to person, place, and time. No sensory deficit.   Skin: Skin is warm and dry. No rash noted. No cyanosis.   Psychiatric: He has a normal mood and affect. His behavior is normal.   Nursing note and vitals reviewed.      Fluids    Intake/Output Summary (Last 24 hours) at 9/30/2019 1101  Last data filed at 9/30/2019 0510  Gross per 24 hour   Intake 480 ml   Output 3500 ml   Net -3020 ml       Laboratory  Recent Labs     09/29/19  0547   WBC 3.9*   RBC 2.87*   HEMOGLOBIN 8.4*   HEMATOCRIT 27.6*   MCV 96.2   MCH 29.3   MCHC 30.4*   RDW 60.2*   PLATELETCT 169   MPV 8.1*     Recent Labs     09/29/19  0547   SODIUM 140   POTASSIUM 3.8   CHLORIDE 107   CO2 25    GLUCOSE 83   BUN 12   CREATININE 1.02   CALCIUM 9.1     Recent Labs     09/28/19  0610 09/29/19  0547 09/30/19  0555   INR 2.45* 2.96* 3.13*               Imaging    Assessment/Plan  * 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)  Assessment & Plan  Legally blind -- cortical blindness  On ASA and Fenofibrate    Anemia  Assessment & Plan  H&H slowly improving    A-fib (HCC)  Assessment & Plan  HR ok  Echo (9/24): EF 60%, RVSP 45; mobile echodensity noted in the aortic arch/proximal left subclavian artery  On Lopressor: 25 mg bid --> 12.5 mg bid 2nd to lower BP (9/24)  On Amiodarone  On Coumadin  On Lasix    Fluid overload  Assessment & Plan  Likely 2nd to recent heart surgery  Has edema -- has hx but much more recently  O2 sats ok on RA  BNP: 2189 (9/24) --> 1011 (9/29)  Echo (9/24): EF 60%, RVSP 45  On Lasix: 20 mg daily --> 40 mg daily (9/27)  On KCL: 10 meq bidy --> 20 meq bid  Off fluid restrictions (9/26)  US LE: no DVT  Monitor K+: 3.9 (9/24) --> 3.8 (9/29)    Dyslipidemia  Assessment & Plan  On Fenofibrate    Shoulder lesion, left- (present on admission)  Assessment & Plan  Will need F/U imaging    Lung nodule- (present on admission)  Assessment & Plan  Needs f/u imaging    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

## 2019-09-30 NOTE — CARE PLAN
Problem: Communication  Goal: The ability to communicate needs accurately and effectively will improve  Note:   Makes needs known to staff.  Encouraged to use call light for staff assist.      Problem: Safety  Goal: Will remain free from falls  Note:   Call light kept within reach and encouraged to use for assistance and with toileting needs. Encouraged to call staff and to wait for staff before transfers.      Problem: Pain Management  Goal: Pain level will decrease to patient's comfort goal  Note:   Complains of generalized pain.  Medicated with scheduled Gabapentin and prn Tylenol.  Has + relief.  See MAR and doc flow sheet.  Will continue to monitor patient.

## 2019-09-30 NOTE — PROGRESS NOTES
Inpatient Anticoagulation Service Note    Date: 9/29/2019    Reason for Anticoagulation: Stroke   Target INR: 2.0 to 3.0         Hemoglobin Value: (Abnormal) 8.4  Hematocrit Value: (Abnormal) 27.6    INR from last 7 days     Date/Time INR Value    09/29/19 0547  (Abnormal) 2.96    09/28/19 0610  (Abnormal) 2.45    09/27/19 0604  (Abnormal) 2.15    09/26/19 0556  (Abnormal) 1.82    09/25/19 0604  (Abnormal) 1.48    09/24/19 0558  (Abnormal) 1.3        Dose from last 7 days     Date/Time Dose (mg)    09/29/19 1700 1    09/28/19 1700  5    09/27/19 0605  5    09/26/19 0556  5    09/25/19 0604  5    09/24/19 0558  5    09/23/19 1600  6        Average Dose (mg): 5  Significant Interactions: Amiodarone, Aspirin, Proton Pump Inhibitor, Statin (If less than 5 days and overlap therapy discontinued -- document reason (i.e. Bleed Risk))    (If still on overlap therapy, if No -- document reason (i.e. Bleed Risk))         Plan:  Warfarin 1 mg PO today     Pharmacist suggested discharge dosing: to be determined     Cristian Jernigan, PharmD

## 2019-09-30 NOTE — THERAPY
"Occupational Therapy  Daily Treatment     Patient Name: Xavier Richardson  Age:  69 y.o., Sex:  male  Medical Record #: [id number]  Today's Date: 9/30/2019     Precautions  Precautions: (P) Fall Risk, Sternal Precautions (See Comments)  Comments: aortic aneurysm sx    Safety   ADL Safety : Requires Supervision for Safety  Bathroom Safety: Requires Supervision for Safety    Subjective    \"I don't do a lot of cooking, I will need to now. I need to get healthier\"     Objective       09/30/19 1431   Precautions   Precautions Fall Risk;Sternal Precautions (See Comments)   IADL Treatments   Kitchen Mobility Education Pt completed mobility in kitchen, able to reach high/low cabinets, low drawers in refridgerator, load/unload bottom drawer of  and oven. Required one verbal cue for optimal positioning when reaching for low items with good carryover for duration of session. Able to complete 2 laps in gym with SPC holding full water glass with no spilling. SBA for all activities    OT Total Time Spent   OT Individual Total Time Spent (Mins) 30   OT Charge Group   OT Therapy Activity 2     Assessment    Pt continues with good progress, tolerated 30 min on his feet with no rest breaks for simulated household mobility. Required SBA for kitchen mobility with initial verbal cues for optimal positioning for safety but good carryover during session. Discussed avoiding high reach and heavy items for adherence to sternal precautions. Completed stepping in/out of walk in shower with threshold, per pt an additional grab bar has been installed as well as a handrail for the entry stairs.     Plan    Continue to progress LUE GM/FM coordination and strength, functional standing balance/tolerance, endurance, continue to assess  deficits,  with tech as adjunct     "

## 2019-09-30 NOTE — THERAPY
Physical Therapy   Daily Treatment     Patient Name: Xavier Richardson  Age:  69 y.o., Sex:  male  Medical Record #: [id number]  Today's Date: 9/30/2019     Precautions  Precautions: (P) Fall Risk, Sternal Precautions (See Comments)  Comments: (P) aortic aneurysm sx    Subjective    Pt is visiting with CARLITA, agreeable to balance assessment     Objective       09/30/19 1231   Precautions   Precautions Fall Risk;Sternal Precautions (See Comments)   Comments aortic aneurysm sx   Bed Mobility    Supine to Sit Modified Independent   Sit to Supine Modified Independent   Sit to Stand Supervised   Scooting Modified Independent   Rolling Modified Independent   Neuro-Muscular Treatments   Neuro-Muscular Treatments Other (See Comments)   Comments TILLEY balance assessment   Tilley Balance Scale   Sitting Unsupported (Score 0-4) 4   Change Of Positon: Sitting To Standing (Score 0-4) 4   Change Of Positon: Standing To Sitting (Score 0-4) 3   Transfers (Score 0-4) 4   Standing Unsupported (Score 0-4) 4   Standing With Eyes Closed (Score 0-4) 4   Standing With Feet Together (Score 0-4) 4   Tandem Standing (Score 0-4) 3   Standing On One Leg (Score 0-4) 3   Turning Trunk (Feet Fixed) (Score 0-4) 3   Retrieving Objects From Floor (Score 0-4) 4   Turning 360 Degrees (Score 0-4) 2   Stool Stepping (Score 0-4) 4   Reaching Forward While Standing (Score 0-4) 3   Tilley Balance Total Score (0-56) 49   Interdisciplinary Plan of Care Collaboration   IDT Collaboration with  Occupational Therapist   Collaboration Comments CLOF/Verónica in room wc level   PT Total Time Spent   PT Individual Total Time Spent (Mins) 60   PT Charge Group   PT Gait Training 1   PT Neuromuscular Re-Education / Balance 2   PT Therapeutic Activities 1   Supervising Physical Therapist Errol Jackson     Pt amb room <> gym with SPC and SBA/SPV    Transfer training in room at wc level, EOB <> wc and wc <> toilet with emphasis on wc safety, sequencing, and pacing as  needed    Assessment    Pt demonstrated ability to transfer safely at wc level in room mod Indep. Pt scored 49/56 on Tinajero balance assessment indicating a low fall risk    Plan    Reinforce step-to stair sequencing; continue to progress ambulation distance with SPC. Assess Verónica in room SPC as appropriate

## 2019-09-30 NOTE — FLOWSHEET NOTE
09/30/19 1033   Events/Summary/Plan   Events/Summary/Plan 02 spot check   Interdisciplinary Plan of Care-Goals (Indications)   Hyperinflation Protocol Indications Chest Trauma (Blunt, Penetrative, or Surgical)   Interdisciplinary Plan of Care-Outcomes    Hyperinflation Protocol Goals/Outcome Greater Than 60% of Predicted I.S. Volume x 24 hrs   Education   Education Yes - Pt. / Family has been Instructed in use of Respiratory Equipment   Respiratory WDL   Respiratory (WDL) X   Chest Exam   Respiration 18   Pulse 69   Oximetry   #Pulse Oximetry (Single Determination) Yes   Oxygen   Home O2 Use Prior To Admission? No   Pulse Oximetry 95 %   O2 Daily Delivery Respiratory  Room Air with O2 Available

## 2019-09-30 NOTE — PROGRESS NOTES
Rehab Progress Note     Date of Service: 9/30/2019  Chief Complaint: follow up stroke    Interval Events (Subjective)    Patient seen and examined in his room today. He is ready for the catheter to be removed. He denies any pain. He reports his left arm is getting stronger, though is incoordinated. He is still sleeping about 5 hours per night, and prefers the lower dose of the trazodone. He has no new complaints.    Objective:  VITAL SIGNS: /70   Pulse 65   Temp 36.8 °C (98.2 °F) (Temporal)   Resp 16   Ht 1.829 m (6')   Wt 120.7 kg (266 lb 1.5 oz)   SpO2 95%   BMI 36.09 kg/m²   Gen: alert, no apparent distress  CV: regular rate and rhythm, no murmurs, no peripheral edema  Resp: clear to ascultation bilaterally, normal respiratory effort  GI: soft, non-tender abdomen, bowel sounds present  Neuro: notable for increase active ROM of left shoulder, mild weakness, +pronator drift, incoordination with finger to nose    Recent Results (from the past 72 hour(s))   Prothrombin Time    Collection Time: 09/28/19  6:10 AM   Result Value Ref Range    PT 27.5 (H) 12.0 - 14.6 sec    INR 2.45 (H) 0.87 - 1.13   Prothrombin Time    Collection Time: 09/29/19  5:47 AM   Result Value Ref Range    PT 31.9 (H) 12.0 - 14.6 sec    INR 2.96 (H) 0.87 - 1.13   Basic Metabolic Panel    Collection Time: 09/29/19  5:47 AM   Result Value Ref Range    Sodium 140 135 - 145 mmol/L    Potassium 3.8 3.6 - 5.5 mmol/L    Chloride 107 96 - 112 mmol/L    Co2 25 20 - 33 mmol/L    Glucose 83 65 - 99 mg/dL    Bun 12 8 - 22 mg/dL    Creatinine 1.02 0.50 - 1.40 mg/dL    Calcium 9.1 8.5 - 10.5 mg/dL    Anion Gap 8.0 0.0 - 11.9   MAGNESIUM    Collection Time: 09/29/19  5:47 AM   Result Value Ref Range    Magnesium 2.0 1.5 - 2.5 mg/dL   PHOSPHORUS    Collection Time: 09/29/19  5:47 AM   Result Value Ref Range    Phosphorus 3.8 2.5 - 4.5 mg/dL   CBC WITHOUT DIFFERENTIAL    Collection Time: 09/29/19  5:47 AM   Result Value Ref Range    WBC 3.9 (L) 4.8 -  10.8 K/uL    RBC 2.87 (L) 4.70 - 6.10 M/uL    Hemoglobin 8.4 (L) 14.0 - 18.0 g/dL    Hematocrit 27.6 (L) 42.0 - 52.0 %    MCV 96.2 81.4 - 97.8 fL    MCH 29.3 27.0 - 33.0 pg    MCHC 30.4 (L) 33.7 - 35.3 g/dL    RDW 60.2 (H) 35.9 - 50.0 fL    Platelet Count 169 164 - 446 K/uL    MPV 8.1 (L) 9.0 - 12.9 fL   proBrain Natriuretic Peptide, NT    Collection Time: 09/29/19  5:47 AM   Result Value Ref Range    NT-proBNP 1011 (H) 0 - 125 pg/mL   ESTIMATED GFR    Collection Time: 09/29/19  5:47 AM   Result Value Ref Range    GFR If African American >60 >60 mL/min/1.73 m 2    GFR If Non African American >60 >60 mL/min/1.73 m 2   Prothrombin Time    Collection Time: 09/30/19  5:55 AM   Result Value Ref Range    PT 33.4 (H) 12.0 - 14.6 sec    INR 3.13 (H) 0.87 - 1.13       Current Facility-Administered Medications   Medication Frequency   • warfarin (COUMADIN) tablet 2.5 mg ONCE AT 1800   • traZODone (DESYREL) tablet 50 mg QHS   • furosemide (LASIX) tablet 40 mg Q DAY   • potassium chloride SA (Kdur) tablet 20 mEq BID   • melatonin tablet 6 mg QHS   • gabapentin (NEURONTIN) capsule 400 mg TID   • vitamin D (cholecalciferol) tablet 1,000 Units DAILY   • metoprolol (LOPRESSOR) tablet 12.5 mg TWICE DAILY   • Respiratory Care per Protocol Continuous RT   • Pharmacy Consult Request ...Pain Management Review 1 Each PHARMACY TO DOSE   • acetaminophen (TYLENOL) tablet 650 mg Q4HRS PRN   • hydrALAZINE (APRESOLINE) tablet 10 mg Q8HRS PRN   • senna-docusate (PERICOLACE or SENOKOT S) 8.6-50 MG per tablet 2 Tab BID    And   • polyethylene glycol/lytes (MIRALAX) PACKET 1 Packet QDAY PRN    And   • magnesium hydroxide (MILK OF MAGNESIA) suspension 30 mL QDAY PRN    And   • bisacodyl (DULCOLAX) suppository 10 mg QDAY PRN   • artificial tears ophthalmic solution 1 Drop PRN   • benzocaine-menthol (CEPACOL) lozenge 1 Lozenge Q2HRS PRN   • mag hydrox-al hydrox-simeth (MAALOX PLUS ES or MYLANTA DS) suspension 20 mL Q2HRS PRN   • ondansetron (ZOFRAN  ODT) dispertab 4 mg 4X/DAY PRN    Or   • ondansetron (ZOFRAN) syringe/vial injection 4 mg 4X/DAY PRN   • sodium chloride (OCEAN) 0.65 % nasal spray 2 Spray PRN   • hydrOXYzine HCl (ATARAX) tablet 50 mg Q6HRS PRN   • MD Alert...Warfarin per Pharmacy PHARMACY TO DOSE   • amiodarone (CORDARONE) tablet 200 mg Q DAY   • aspirin (ASA) tablet 325 mg DAILY   • fenofibrate micronized (LOFIBRA) capsule 67 mg AFTER BREAKFAST   • finasteride (PROSCAR) tablet 5 mg DAILY   • omeprazole (PRILOSEC) capsule 20 mg QAM AC   • tamsulosin (FLOMAX) capsule 0.8 mg QHS   • simethicone (MYLICON) chewable tab 160 mg 4X/DAY WITH MEALS + NIGHTLY       Orders Placed This Encounter   Procedures   • Diet Order Regular     Standing Status:   Standing     Number of Occurrences:   1     Order Specific Question:   Diet:     Answer:   Regular [1]       Radiology  Transthoracic  Echo Report      Echocardiography Laboratory    CONCLUSIONS  No prior study is available for comparison.   The left ventricle was normal in size.  Mild concentric left ventricular hypertrophy.  Normal left ventricular systolic function and regional wall motion.  Left ventricular ejection fraction is estimated to be 60%.  Aortic sclerosis without stenosis.  Trace mitral regurgitation.  The left atrium is normal in size.  Dilated inferior vena cava with inspiratory collapse.  Estimated right ventricular systolic pressure  is 45 mmHg.  Mobile echodensity noted in the aortic arch/proximal left subclavian   artery.    DONALD KNOTT  Exam Date:         09/24/2019                      11:45  Exam Location:     Inpatient  Priority:          Routine    Assessment:  Active Hospital Problems    Diagnosis   • *Stroke (cerebrum) (HCC)   • Essential hypertension   • JESUS MANUEL (obstructive sleep apnea)   • Peripheral neuropathy   • Other insomnia   • Dissection of thoracoabdominal aorta (HCC)   • Lung nodule   • Shoulder lesion, left   • Dyslipidemia   • Urinary retention   • Fluid overload   •  A-fib ([facility])     This patient is a 69 y.o. male admitted for acute inpatient rehabilitation with Stroke (cerebrum) ([facility]).    I led and attended the weekly conference and agree with the IDT conference documentation and plan of care as noted below.    Date of conference: 9/25/2019    Goals and barriers: See IDT note.    Biggest barriers: low blood pressures, left sided arm/hand weakness/incoordination, decreased endurance, fatigue    Admission FIM 80    CM/social support: lives alone, daughter to return from [facility] to assist at discharge    Anticipated DC date: 10/4/2019    Home health: PT/OT/RN    Equip: FWW    Follow up: PCP, cardiology    Therapy update 9/30/2019  Supervision eating  Supervision grooming  Supervision bathing  Supervision upper body dressing  Min assist lower body dressing  Mod assist toileting  Supervisionbladder  Modified Independent bowel  Supervision bed/chair transfer  Not tested toilet transfer  Supervision tub/shower transfer  Supervision ambulation  Modified Independent wheelchair propulsion  Max assist stairs  Independent comprehension  Independent expression  Independent social interaction  Modified Independent problem solving  Modified Independent memory      Medical Decision Making and Plan:    Bilateral ischemic strokes  Hemorrhagic transformation in right parietal/occipital lobe  Left hemiparesis, improved  Non-dominant  Visual deficits, continued  PT/OT, 1.5 hr each discipline, 5 days per week  SLP eval, cog within normal  Continue aspirin and coumadin per outside recs  Neuro-ophthalmology referral - Dr. Wilder    MRI imaging reviewed with patient/family    Acute urinary retention  Does have a history of BPH  Continue Proscar and Flomax  Attempt voiding trial itoday  May need outpatient follow-up     Peripheral neuropathy  Increased gabapentin 400 mg TID, home dosing    Insomnia  Continue melatonin and trazodone at 50 mg - did not tolerate higher doses    Bowel  Meds as needed  Last  BM 9/28    DVT prophylaxis  Coumadin     Appreciate the assistance of the hospitalist with the patient's medical comorbidities:     Aortic dissection  Hypertension, controlled  Hyperlipidemia  Atrial fibrillation  Pulmonary hypertension, RSVP 45  Sleep apnea, non-compliant with CPAP  Left pleural effusion  Left lung nodule, outpatient follow up  Left shoulder lesion, outpatient follow up  Anemia, stable  Leukopenia, stable  Vit D insufficiency, on supplementation    Total time:  25 minutes.  I spent greater than 50% of the time for patient care, counseling, and coordination on this date, including patient face-to face time, unit/floor time with review of records/pertinent lab data and studies, as well as discussing diagnostic evaluation/work up, planned therapeutic interventions, and future disposition of care, as per the interval events/subjective and the assessment and plan as noted above.    Most of time today spent discussing the plan for a voiding trial and the plan if this is not successful.    I have performed a physical exam, reviewed and updated ROS, as well as the assessment and plan today 9/30/2019. In review of note from 9/27/2019 there are no new changes except as documented above.    Amira Davis M.D.   Physical Medicine and Rehabilitation

## 2019-09-30 NOTE — THERAPY
Physical Therapy   Daily Treatment     Patient Name: Xavier Richardson  Age:  69 y.o., Sex:  male  Medical Record #: [id number]  Today's Date: 9/30/2019     Precautions  Precautions: Fall Risk, Sternal Precautions (See Comments)  Comments: aortic aneurysm sx    Subjective    Pt is agreeable to therapy, he reports he had a good weekend and wants to continue to work on balance     Objective       09/30/19 0831   Precautions   Precautions Fall Risk;Sternal Precautions (See Comments)   Comments aortic aneurysm sx   Sitting Lower Body Exercises   Nustep Time (See Comments);Resistance Level 4  (10' L4 )   Bed Mobility    Sit to Stand Supervised   6 Minute Walk Test   Distance (feet) 1000   Gait Speed (meters per second) 0.85   Level of Assistance 5   Interdisciplinary Plan of Care Collaboration   Patient Position at End of Therapy Seated;Other (Comments)  (self propelled to room in wc)   PT Total Time Spent   PT Individual Total Time Spent (Mins) 30   PT Charge Group   PT Gait Training 1   PT Therapeutic Exercise 1   Supervising Physical Therapist Errol Jackson       FIM Walking Score:  5 - Standby Prompting/Supervision or Set-up  Walking Description:  Assist device/equipment, Extra time, Supervision for safety(200' x 1 SPC SPV)    Assessment    Pt tolerated 10' on recumbent stepper with increased resistance and no rest breaks.     Plan    Reinforce step-to stair sequencing; continue to progress ambulation distance with TREVA LEE. Assess Verónica in room wc level when elidia baeza

## 2019-10-01 LAB
INR PPP: 2.93 (ref 0.87–1.13)
PROTHROMBIN TIME: 31.7 SEC (ref 12–14.6)

## 2019-10-01 PROCEDURE — A9270 NON-COVERED ITEM OR SERVICE: HCPCS | Performed by: HOSPITALIST

## 2019-10-01 PROCEDURE — 97530 THERAPEUTIC ACTIVITIES: CPT

## 2019-10-01 PROCEDURE — 97110 THERAPEUTIC EXERCISES: CPT

## 2019-10-01 PROCEDURE — 94760 N-INVAS EAR/PLS OXIMETRY 1: CPT

## 2019-10-01 PROCEDURE — 770010 HCHG ROOM/CARE - REHAB SEMI PRIVAT*

## 2019-10-01 PROCEDURE — 700102 HCHG RX REV CODE 250 W/ 637 OVERRIDE(OP): Performed by: HOSPITALIST

## 2019-10-01 PROCEDURE — 99233 SBSQ HOSP IP/OBS HIGH 50: CPT | Performed by: PHYSICAL MEDICINE & REHABILITATION

## 2019-10-01 PROCEDURE — 85610 PROTHROMBIN TIME: CPT

## 2019-10-01 PROCEDURE — 97116 GAIT TRAINING THERAPY: CPT

## 2019-10-01 PROCEDURE — 36415 COLL VENOUS BLD VENIPUNCTURE: CPT

## 2019-10-01 PROCEDURE — 97112 NEUROMUSCULAR REEDUCATION: CPT

## 2019-10-01 PROCEDURE — 700102 HCHG RX REV CODE 250 W/ 637 OVERRIDE(OP): Performed by: PHYSICAL MEDICINE & REHABILITATION

## 2019-10-01 PROCEDURE — A9270 NON-COVERED ITEM OR SERVICE: HCPCS | Performed by: PHYSICAL MEDICINE & REHABILITATION

## 2019-10-01 PROCEDURE — 99232 SBSQ HOSP IP/OBS MODERATE 35: CPT | Performed by: HOSPITALIST

## 2019-10-01 RX ORDER — WARFARIN SODIUM 4 MG/1
4 TABLET ORAL
Status: COMPLETED | OUTPATIENT
Start: 2019-10-01 | End: 2019-10-01

## 2019-10-01 RX ORDER — GABAPENTIN 400 MG/1
400 CAPSULE ORAL 4 TIMES DAILY
Status: DISCONTINUED | OUTPATIENT
Start: 2019-10-01 | End: 2019-10-09 | Stop reason: HOSPADM

## 2019-10-01 RX ORDER — BISACODYL 10 MG
10 SUPPOSITORY, RECTAL RECTAL
Status: DISCONTINUED | OUTPATIENT
Start: 2019-10-01 | End: 2019-10-02

## 2019-10-01 RX ORDER — GABAPENTIN 400 MG/1
400 CAPSULE ORAL 3 TIMES DAILY
Status: DISCONTINUED | OUTPATIENT
Start: 2019-10-01 | End: 2019-10-01

## 2019-10-01 RX ORDER — AMOXICILLIN 250 MG
2 CAPSULE ORAL 2 TIMES DAILY PRN
Status: DISCONTINUED | OUTPATIENT
Start: 2019-10-01 | End: 2019-10-02

## 2019-10-01 RX ORDER — POLYETHYLENE GLYCOL 3350 17 G/17G
1 POWDER, FOR SOLUTION ORAL
Status: DISCONTINUED | OUTPATIENT
Start: 2019-10-01 | End: 2019-10-02

## 2019-10-01 RX ADMIN — POTASSIUM CHLORIDE 20 MEQ: 1500 TABLET, EXTENDED RELEASE ORAL at 20:04

## 2019-10-01 RX ADMIN — ACETAMINOPHEN 650 MG: 325 TABLET, FILM COATED ORAL at 05:46

## 2019-10-01 RX ADMIN — AMIODARONE HYDROCHLORIDE 200 MG: 200 TABLET ORAL at 05:08

## 2019-10-01 RX ADMIN — HYDROXYZINE HYDROCHLORIDE 50 MG: 25 TABLET, FILM COATED ORAL at 20:05

## 2019-10-01 RX ADMIN — ACETAMINOPHEN 650 MG: 325 TABLET, FILM COATED ORAL at 18:41

## 2019-10-01 RX ADMIN — METOPROLOL TARTRATE 12.5 MG: 25 TABLET ORAL at 05:08

## 2019-10-01 RX ADMIN — SIMETHICONE CHEW TAB 80 MG 160 MG: 80 TABLET ORAL at 08:17

## 2019-10-01 RX ADMIN — METOPROLOL TARTRATE 12.5 MG: 25 TABLET ORAL at 17:19

## 2019-10-01 RX ADMIN — MELATONIN 6 MG: at 20:04

## 2019-10-01 RX ADMIN — TAMSULOSIN HYDROCHLORIDE 0.8 MG: 0.4 CAPSULE ORAL at 20:04

## 2019-10-01 RX ADMIN — SIMETHICONE CHEW TAB 80 MG 160 MG: 80 TABLET ORAL at 11:09

## 2019-10-01 RX ADMIN — FENOFIBRATE 67 MG: 67 CAPSULE ORAL at 08:17

## 2019-10-01 RX ADMIN — POTASSIUM CHLORIDE 20 MEQ: 1500 TABLET, EXTENDED RELEASE ORAL at 08:18

## 2019-10-01 RX ADMIN — GABAPENTIN 400 MG: 400 CAPSULE ORAL at 14:26

## 2019-10-01 RX ADMIN — FINASTERIDE 5 MG: 5 TABLET, FILM COATED ORAL at 08:18

## 2019-10-01 RX ADMIN — VITAMIN D, TAB 1000IU (100/BT) 1000 UNITS: 25 TAB at 08:18

## 2019-10-01 RX ADMIN — SIMETHICONE CHEW TAB 80 MG 160 MG: 80 TABLET ORAL at 20:04

## 2019-10-01 RX ADMIN — WARFARIN SODIUM 4 MG: 4 TABLET ORAL at 17:19

## 2019-10-01 RX ADMIN — GABAPENTIN 400 MG: 400 CAPSULE ORAL at 08:17

## 2019-10-01 RX ADMIN — FUROSEMIDE 40 MG: 40 TABLET ORAL at 05:08

## 2019-10-01 RX ADMIN — SIMETHICONE CHEW TAB 80 MG 160 MG: 80 TABLET ORAL at 17:19

## 2019-10-01 RX ADMIN — OMEPRAZOLE 20 MG: 20 CAPSULE, DELAYED RELEASE ORAL at 08:17

## 2019-10-01 RX ADMIN — TRAZODONE HYDROCHLORIDE 50 MG: 50 TABLET ORAL at 20:04

## 2019-10-01 RX ADMIN — ASPIRIN 325 MG ORAL TABLET 325 MG: 325 PILL ORAL at 08:17

## 2019-10-01 RX ADMIN — GABAPENTIN 400 MG: 400 CAPSULE ORAL at 20:04

## 2019-10-01 NOTE — PROGRESS NOTES
Received bedside shift report from Berkley NICOLE RN regarding patient and assumed care. Patient awake, calm and stable, currently positioned in bed for comfort and safety; call light within reach. Denies pain or discomfort at this time. Will continue to monitor

## 2019-10-01 NOTE — THERAPY
10/01/19 1359   Precautions   Precautions Fall Risk;Sternal Precautions (See Comments)   Comments Aortic aneurysm SX   Pain 0 - 10 Group   Therapist Pain Assessment 0   Cognition    Level of Consciousness Alert   Sequencing Impaired   Sitting Lower Body Exercises   Nustep Resistance Level 4  (55 steps per minute, 12 minutes)   Bed Mobility    Supine to Sit Modified Independent   Sit to Stand Modified Independent   Scooting Modified Independent   Rolling Modified Independent   Neuro-Muscular Treatments   Neuro-Muscular Treatments Sequencing   PT Total Time Spent   PT Individual Total Time Spent (Mins) 30   PT Charge Group   PT Therapeutic Exercise 1   PT Therapeutic Activities 1   Physical Therapy   Daily Treatment     Patient Name: Xavier Richardson  Age:  69 y.o., Sex:  male  Medical Record #: [id number]  Today's Date: 10/1/2019     Precautions  Precautions: Fall Risk, Sternal Precautions (See Comments)  Comments: Aortic aneurysm SX    Subjective    The patient was sleeping in bed and he agreed to PT.  He said he was very tired but wanted to do therapy time.     Objective     He participated in practicing bed mobility and transfer to the wheelchair.  He demonstrated safe strategies and utilized sternal precautions.  This was followed by using the NuStep with the information indicated above.    FIM Bed/Chair/Wheelchair Transfers Score: 6 - Modified Independent  Bed/Chair/Wheelchair Transfers Description:  Adaptive equipment(Modified independent)    FIM Walking Score:  5 - Standby Prompting/Supervision or Set-up  Walking Description:  Extra time, Verbal cueing, Supervision for safety, Assist device/equipment(655 FT x2, SBA, SPC)    FIM Wheelchair Score:     Wheelchair Description:       FIM Stairs Score:  0 - Not tested,medical condition  Stairs Description:         Assessment    The patient participated in bed mobility and transfer training followed by utilizing the NuStep.  He complained of being very tired and  lethargic and was not sure why.  PT reported his complaint of fatigue and lethargy    to his doctor who requested vital signs and temperature.    Plan    Continue therapeutic exercise, bed mobility and transfer training, safety education gait training as tolerated

## 2019-10-01 NOTE — THERAPY
"Occupational Therapy  Daily Treatment     Patient Name: Xavier Richardson  Age:  69 y.o., Sex:  male  Medical Record #: [id number]  Today's Date: 10/1/2019     Precautions  Precautions: (P) Fall Risk, Sternal Precautions (See Comments)  Comments: Aortic aneurysm SX    Safety   ADL Safety : Requires Supervision for Safety  Bathroom Safety: Requires Supervision for Safety    Subjective    \"I'm still just so tired today. Its frustrating\"     Objective       10/01/19 1401   Precautions   Precautions Fall Risk;Sternal Precautions (See Comments)   Balance   Comments tolerated functional mobility outside with SPC over varying terrain and up/down slight incline, requiring close SBA for stability and consistent rest breaks   OT Total Time Spent   OT Individual Total Time Spent (Mins) 30   OT Charge Group   OT Therapy Activity 2     Assessment    Pt continues to present with increased fatigue and decreased stability but participates to best of ability. Demos good pacing and adjustment for varying terrain outside, initiates rest breaks as appropriate.     Plan    Continue to progress LUE GM/FM coordination and strength, functional standing balance/tolerance, endurance, continue to assess  deficits,  with tech as adjunct     "

## 2019-10-01 NOTE — FLOWSHEET NOTE
10/01/19 1250   Events/Summary/Plan   Events/Summary/Plan 02 spot check,    Interdisciplinary Plan of Care-Goals (Indications)   Hyperinflation Protocol Indications Chest Trauma (Blunt, Penetrative, or Surgical)   Interdisciplinary Plan of Care-Outcomes    Hyperinflation Protocol Goals/Outcome Greater Than 60% of Predicted I.S. Volume x 24 hrs   Education   Education Yes - Pt. / Family has been Instructed in use of Respiratory Equipment   Incentive Spirometry Group   Breathing Exercises Yes   Respiratory WDL   Respiratory (WDL) X   Chest Exam   Respiration 16   Pulse 90   Oximetry   #Pulse Oximetry (Single Determination) Yes   Oxygen   Home O2 Use Prior To Admission? No   Pulse Oximetry 95 %   O2 Daily Delivery Respiratory  Room Air with O2 Available

## 2019-10-01 NOTE — THERAPY
10/01/19 0929   Precautions   Precautions Fall Risk;Sternal Precautions (See Comments);Other (See Comments)   Comments Aortic aneurysm SX   Pain 0 - 10 Group   Therapist Pain Assessment 0   Cognition    Level of Consciousness Alert   Standing Lower Body Exercises   Hip Flexion 1 set of 15;Bilateral   Hip Extension 1 set of 15;Bilateral    Hip Abduction 1 set of 15;Bilateral   Heel Rise 1 set of 15;Bilateral   Mini Squat 1 set of 15;Partial   Other Exercises 2 sets of 15 lower extremity hip flexion diagonals   Neuro-Muscular Treatments   Neuro-Muscular Treatments Sequencing;Verbal Cuing;Weight Shift Right;Weight Shift Left   Comments Sequencing gait with a SPC and SBA, upper extremity diagonals and torso rotations holding a ball and standing on foam pad   PT Total Time Spent   PT Individual Total Time Spent (Mins) 60   PT Charge Group   PT Gait Training 1   PT Therapeutic Exercise 1   PT Neuromuscular Re-Education / Balance 1   PT Therapeutic Activities 1   Physical Therapy   Daily Treatment     Patient Name: Xavier Richardson  Age:  69 y.o., Sex:  male  Medical Record #: [id number]  Today's Date: 10/1/2019     Precautions  Precautions: Fall Risk, Sternal Precautions (See Comments)  Comments: Aortic aneurysm SX    Subjective    The patient agreed to PT.  He said he felt a little sluggish today but otherwise he was okay.     Objective    The patient participated in gait training using a SPC with SBA.  He tolerated 655 FT x2.  He also participated in mobility in his room.  The patient performed standing exercises for the lower extremities as indicated above as well as lower extremity hip flexion diagonals, upper extremity diagonals with torso rotations while standing on a foam pad.    FIM Bed/Chair/Wheelchair Transfers Score: 5 - Standby Prompting/Supervision or Set-up  Bed/Chair/Wheelchair Transfers Description:       FIM Walking Score:  5 - Standby Prompting/Supervision or Set-up  Walking Description:  Extra time,  Verbal cueing, Supervision for safety, Assist device/equipment(655 FT x2, SBA, SPC)    FIM Wheelchair Score:     Wheelchair Description:       FIM Stairs Score:  0 - Not tested,medical condition  Stairs Description:         Assessment    The patient demonstrates appropriate skill using the single-point cane for gait.  However, he was not a stable as he has been during recent days as demonstrated by catching his foot on the floor and very slight losses of balance.    Plan    Continue neuromuscular reeducation, change of direction gait, dynamic standing balance, safety education

## 2019-10-01 NOTE — PROGRESS NOTES
"Hospital Medicine Daily Progress Note        Chief Complaint  S/P Aortic Dissection, AFib, HTN    Interval Problem Update  Pt c/o not \"feeling right\" w/ body aches.  Will check morning labs.    Review of Systems  Review of Systems   Constitutional: Positive for malaise/fatigue. Negative for chills and fever.   HENT: Negative.    Eyes: Negative.    Respiratory: Negative for cough and shortness of breath.    Cardiovascular: Negative for chest pain and palpitations.   Gastrointestinal: Negative for abdominal pain, nausea and vomiting.   Skin: Negative for itching and rash.        Physical Exam  Temp:  [36.7 °C (98.1 °F)-37.6 °C (99.7 °F)] 37.3 °C (99.1 °F)  Pulse:  [85-98] 95  Resp:  [15-18] 18  BP: (113-142)/(66-76) 113/66  SpO2:  [90 %-95 %] 93 %    Physical Exam   Constitutional: He is oriented to person, place, and time. No distress.   HENT:   Head: Normocephalic and atraumatic.   Right Ear: External ear normal.   Left Ear: External ear normal.   Eyes: Conjunctivae and EOM are normal. Right eye exhibits no discharge. Left eye exhibits no discharge.   Neck: Normal range of motion. Neck supple. No tracheal deviation present.   Cardiovascular:   irreg irreg   Pulmonary/Chest: No stridor. No respiratory distress. He has no wheezes.   Decreased BS   Abdominal: Soft. Bowel sounds are normal. He exhibits no distension. There is no tenderness.   Musculoskeletal: He exhibits edema.   Neurological: He is alert and oriented to person, place, and time.   Skin: Skin is warm and dry. He is not diaphoretic.   Vitals reviewed.      Fluids    Intake/Output Summary (Last 24 hours) at 10/2/2019 0940  Last data filed at 10/2/2019 0800  Gross per 24 hour   Intake 1200 ml   Output 2525 ml   Net -1325 ml       Laboratory  Recent Labs     10/02/19  0545   WBC 14.0*   RBC 3.02*   HEMOGLOBIN 9.0*   HEMATOCRIT 28.8*   MCV 95.4   MCH 29.8   MCHC 31.3*   RDW 60.6*   PLATELETCT 148*   MPV 8.2*     Recent Labs     10/02/19  0545   SODIUM 135 "   POTASSIUM 3.6   CHLORIDE 104   CO2 24   GLUCOSE 101*   BUN 10   CREATININE 1.02   CALCIUM 9.2     Recent Labs     09/30/19  0555 10/01/19  0537 10/02/19  0545   INR 3.13* 2.93* 2.90*               Assessment/Plan  * 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)  Assessment & Plan  On ASA and Fenofibrate    Vitamin D deficiency  Assessment & Plan  Vit D level 27  On supplementation    Anemia  Assessment & Plan  Follow H/H on anticoagulation    A-fib (HCC)  Assessment & Plan  On Amiodarone  Anticoagulated on Coumadin    Fluid overload  Assessment & Plan  Echo EF 60% and RVSP 45 mmHg  U/S BLE negative for DVT  Edema most likely 2/2 recent cardiac surgery  BNP improving w/ diuresis   Continue Lasix    Urinary retention  Assessment & Plan  Monitor for orthostatic hypotension on Flomax and Proscar    Dyslipidemia  Assessment & Plan  On Fenofibrate    Shoulder lesion, left- (present on admission)  Assessment & Plan  Will need F/U imaging    Lung nodule- (present on admission)  Assessment & Plan  Will need F/U imaging    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

## 2019-10-01 NOTE — PROGRESS NOTES
Pharmacy Warfarin Consult   10/1/2019     69 y.o.   male     Indication for anticoagulation: Stroke    Goal INR = 2 - 3    Recent Labs     09/29/19  0547 09/30/19  0555 10/01/19  0537   INR 2.96* 3.13* 2.93*   HEMOGLOBIN 8.4*  --   --    HEMATOCRIT 27.6*  --   --        Pertinent Drug/Drug Interactions:  Amiodarone, Aspirin, Proton Pump Inhibitor, Statin (  Outpatient Warfarin Regimen: new start  Recent Warfarin Dosing:    Dose from last 7 days     Date/Time Dose (mg)    10/01/19 0537  4    09/30/19 0555  2.5    09/29/19 1700  1    09/28/19 1700  5    09/27/19 0605  5    09/26/19 0556  5    09/25/19 0604  5          Bridge Therapy: No           1.  Warfarin 4 mg tonight for INR = 2.93           Catalino Stubbs [facility]

## 2019-10-01 NOTE — REHAB-PHARMACY IDT TEAM NOTE
Pharmacy   Pharmacy  Antibiotics/Antifungals/Antivirals:  Medication:      Active Orders (From admission, onward)    None        Route:         n/a  Stop Date:  n/a  Reason:   Antihypertensives/Cardiac:  Medication:    Orders (72h ago, onward)     Start     Ordered    09/28/19 0600  amiodarone (CORDARONE) tablet 200 mg  Q DAY      09/23/19 1520    09/28/19 0600  furosemide (LASIX) tablet 40 mg  Q DAY      09/27/19 1339    09/24/19 1800  metoprolol (LOPRESSOR) tablet 12.5 mg  TWICE DAILY      09/24/19 1533    09/24/19 0830  fenofibrate micronized (LOFIBRA) capsule 67 mg  AFTER BREAKFAST      09/23/19 1520    09/23/19 1520  hydrALAZINE (APRESOLINE) tablet 10 mg  EVERY 8 HOURS PRN      09/23/19 1520              Patient Vitals for the past 24 hrs:   BP Pulse   10/01/19 0945 131/71 91   10/01/19 0700 149/77 81   10/01/19 0507 134/78 82   09/30/19 1900 130/79 80   09/30/19 1345 130/77 79     Anticoagulation:  Medication:  Aspirin, Coumadin   INR:    Recent Labs     09/28/19  0610 09/29/19  0547 09/30/19  0555 10/01/19  0537   INR 2.45* 2.96* 3.13* 2.93*     Other key medications:gabapentin, finasteride, tamsulosin    A review of the medication list reveals no issues at this time.  Section completed by:  Ondina Blackwell RP

## 2019-10-01 NOTE — THERAPY
"Occupational Therapy  Daily Treatment     Patient Name: Xavier Richardson  Age:  69 y.o., Sex:  male  Medical Record #: [id number]  Today's Date: 10/1/2019     Precautions  Precautions: (P) Fall Risk, Sternal Precautions (See Comments)  Comments: aortic aneurysm sx    Safety   ADL Safety : Requires Supervision for Safety  Bathroom Safety: Requires Supervision for Safety    Subjective    \"I'm just feeling off today I guess. It started this morning, I can't seem to shake it\"     Objective       10/01/19 0931   Precautions   Precautions Fall Risk;Sternal Precautions (See Comments)   Sitting Upper Body Exercises   Sitting Upper Body Exercises Yes   Front Arm Raise   (2x5 unweighted)   Side Arm Raise   (2x5 unweighted)   Other Exercise AAROM scap retraction 1x10 with 5 sec holds, shoulder shrugs. Punches with R/L UE fwd and across midline 2x20 each   Comments Noted delay in LUE after 5 reps of unweighted UE therex against gravity   Balance   Comments Functional mobility from room <> gym with SPC, outside one lap with SPC wiht transition in/out of picnice table. Req CGA due to increased instability   OT Total Time Spent   OT Individual Total Time Spent (Mins) 60   OT Charge Group   OT Therapy Activity 4     Assessment    Pt notably more lethargic this session, requiring CGA during mobility with SPC compared to SBA the previous 2 days. Req increased rest during therex. Remains motivated and participated to best of his ability. Vitals stable during session, MD notified of change in status.     Plan    Continue to progress LUE GM/FM coordination and strength, functional standing balance/tolerance, endurance, continue to assess  deficits,  with tech as adjunct     "

## 2019-10-01 NOTE — PROGRESS NOTES
Rehab Progress Note     Date of Service: 10/1/2019  Chief Complaint: follow up stroke    Interval Events (Subjective)    Patient seen and examined in the hallway today. He is asking to take his first gabapentin medication at 5 am. He was able to urinate after his catheter was removed, but is still retaining. He does have a history of BPH, but doesn't have an outpatient urology. He is agreeable for a referral.    OT reports he is slightly more tired today. No new focal deficits.     Objective:  VITAL SIGNS: /77   Pulse 81   Temp 36.8 °C (98.3 °F) (Temporal)   Resp 16   Ht 1.829 m (6')   Wt 120.7 kg (266 lb 1.5 oz)   SpO2 95%   BMI 36.09 kg/m²   Gen: alert, no apparent distress  CV: regular rate and rhythm, no murmurs, no peripheral edema  Resp: clear to ascultation bilaterally, normal respiratory effort  GI: soft, non-tender abdomen, bowel sounds present  Neuro: notable for mild left arm hemiparesis    Recent Results (from the past 72 hour(s))   Prothrombin Time    Collection Time: 09/29/19  5:47 AM   Result Value Ref Range    PT 31.9 (H) 12.0 - 14.6 sec    INR 2.96 (H) 0.87 - 1.13   Basic Metabolic Panel    Collection Time: 09/29/19  5:47 AM   Result Value Ref Range    Sodium 140 135 - 145 mmol/L    Potassium 3.8 3.6 - 5.5 mmol/L    Chloride 107 96 - 112 mmol/L    Co2 25 20 - 33 mmol/L    Glucose 83 65 - 99 mg/dL    Bun 12 8 - 22 mg/dL    Creatinine 1.02 0.50 - 1.40 mg/dL    Calcium 9.1 8.5 - 10.5 mg/dL    Anion Gap 8.0 0.0 - 11.9   MAGNESIUM    Collection Time: 09/29/19  5:47 AM   Result Value Ref Range    Magnesium 2.0 1.5 - 2.5 mg/dL   PHOSPHORUS    Collection Time: 09/29/19  5:47 AM   Result Value Ref Range    Phosphorus 3.8 2.5 - 4.5 mg/dL   CBC WITHOUT DIFFERENTIAL    Collection Time: 09/29/19  5:47 AM   Result Value Ref Range    WBC 3.9 (L) 4.8 - 10.8 K/uL    RBC 2.87 (L) 4.70 - 6.10 M/uL    Hemoglobin 8.4 (L) 14.0 - 18.0 g/dL    Hematocrit 27.6 (L) 42.0 - 52.0 %    MCV 96.2 81.4 - 97.8 fL    MCH  29.3 27.0 - 33.0 pg    MCHC 30.4 (L) 33.7 - 35.3 g/dL    RDW 60.2 (H) 35.9 - 50.0 fL    Platelet Count 169 164 - 446 K/uL    MPV 8.1 (L) 9.0 - 12.9 fL   proBrain Natriuretic Peptide, NT    Collection Time: 09/29/19  5:47 AM   Result Value Ref Range    NT-proBNP 1011 (H) 0 - 125 pg/mL   ESTIMATED GFR    Collection Time: 09/29/19  5:47 AM   Result Value Ref Range    GFR If African American >60 >60 mL/min/1.73 m 2    GFR If Non African American >60 >60 mL/min/1.73 m 2   Prothrombin Time    Collection Time: 09/30/19  5:55 AM   Result Value Ref Range    PT 33.4 (H) 12.0 - 14.6 sec    INR 3.13 (H) 0.87 - 1.13   Prothrombin Time    Collection Time: 10/01/19  5:37 AM   Result Value Ref Range    PT 31.7 (H) 12.0 - 14.6 sec    INR 2.93 (H) 0.87 - 1.13       Current Facility-Administered Medications   Medication Frequency   • senna-docusate (PERICOLACE or SENOKOT S) 8.6-50 MG per tablet 2 Tab BID PRN    And   • polyethylene glycol/lytes (MIRALAX) PACKET 1 Packet QDAY PRN    And   • magnesium hydroxide (MILK OF MAGNESIA) suspension 30 mL QDAY PRN    And   • bisacodyl (DULCOLAX) suppository 10 mg QDAY PRN   • gabapentin (NEURONTIN) capsule 400 mg TID   • traZODone (DESYREL) tablet 50 mg QHS   • furosemide (LASIX) tablet 40 mg Q DAY   • potassium chloride SA (Kdur) tablet 20 mEq BID   • melatonin tablet 6 mg QHS   • vitamin D (cholecalciferol) tablet 1,000 Units DAILY   • metoprolol (LOPRESSOR) tablet 12.5 mg TWICE DAILY   • Respiratory Care per Protocol Continuous RT   • Pharmacy Consult Request ...Pain Management Review 1 Each PHARMACY TO DOSE   • acetaminophen (TYLENOL) tablet 650 mg Q4HRS PRN   • hydrALAZINE (APRESOLINE) tablet 10 mg Q8HRS PRN   • artificial tears ophthalmic solution 1 Drop PRN   • benzocaine-menthol (CEPACOL) lozenge 1 Lozenge Q2HRS PRN   • mag hydrox-al hydrox-simeth (MAALOX PLUS ES or MYLANTA DS) suspension 20 mL Q2HRS PRN   • ondansetron (ZOFRAN ODT) dispertab 4 mg 4X/DAY PRN    Or   • ondansetron  (ZOFRAN) syringe/vial injection 4 mg 4X/DAY PRN   • sodium chloride (OCEAN) 0.65 % nasal spray 2 Spray PRN   • hydrOXYzine HCl (ATARAX) tablet 50 mg Q6HRS PRN   • MD Alert...Warfarin per Pharmacy PHARMACY TO DOSE   • amiodarone (CORDARONE) tablet 200 mg Q DAY   • aspirin (ASA) tablet 325 mg DAILY   • fenofibrate micronized (LOFIBRA) capsule 67 mg AFTER BREAKFAST   • finasteride (PROSCAR) tablet 5 mg DAILY   • omeprazole (PRILOSEC) capsule 20 mg QAM AC   • tamsulosin (FLOMAX) capsule 0.8 mg QHS   • simethicone (MYLICON) chewable tab 160 mg 4X/DAY WITH MEALS + NIGHTLY       Orders Placed This Encounter   Procedures   • Diet Order Regular     Standing Status:   Standing     Number of Occurrences:   1     Order Specific Question:   Diet:     Answer:   Regular [1]       Radiology  Transthoracic  Echo Report      Echocardiography Laboratory    CONCLUSIONS  No prior study is available for comparison.   The left ventricle was normal in size.  Mild concentric left ventricular hypertrophy.  Normal left ventricular systolic function and regional wall motion.  Left ventricular ejection fraction is estimated to be 60%.  Aortic sclerosis without stenosis.  Trace mitral regurgitation.  The left atrium is normal in size.  Dilated inferior vena cava with inspiratory collapse.  Estimated right ventricular systolic pressure  is 45 mmHg.  Mobile echodensity noted in the aortic arch/proximal left subclavian   artery.    DONALD KNOTT  Exam Date:         09/24/2019                      11:45  Exam Location:     Inpatient  Priority:          Routine    Assessment:  Active Hospital Problems    Diagnosis   • *Stroke (cerebrum) (HCC)   • Essential hypertension   • JESUS MANUEL (obstructive sleep apnea)   • Peripheral neuropathy   • Other insomnia   • Dissection of thoracoabdominal aorta (HCC)   • Lung nodule   • Shoulder lesion, left   • Dyslipidemia   • Urinary retention   • Fluid overload   • A-fib (HCC)     This patient is a 69 y.o. male  admitted for acute inpatient rehabilitation with Stroke (cerebrum) ([facility]).    I led and attended the weekly conference and agree with the IDT conference documentation and plan of care as noted below.    Date of conference: 9/25/2019    Goals and barriers: See IDT note.    Biggest barriers: low blood pressures, left sided arm/hand weakness/incoordination, decreased endurance, fatigue    Admission FIM 80    CM/social support: lives alone, daughter to return from [place] to assist at discharge    Anticipated DC date: 10/4/2019    Home health: PT/OT/RN    Equip: FWW    Follow up: PCP, cardiology    We will have his weekly conference tomorrow to get an update on his functional status and readiness for discharge.     Medical Decision Making and Plan:    Bilateral ischemic strokes  Hemorrhagic transformation in right parietal/occipital lobe  Left hemiparesis, improved  Non-dominant  Visual deficits, continued  PT/OT, 1.5 hr each discipline, 5 days per week  SLP eval, cog within normal  Continue aspirin and coumadin per outside recs  Neuro-ophthalmology referral - Dr. Wilder, made    MRI imaging reviewed with patient/family    Acute urinary retention  Does have a history of BPH  Continue Proscar and Flomax  Voiding after Barnard removal, but still retaining  Referral to urology     Peripheral neuropathy  Increased gabapentin 400 mg TID, home dosing  Change am dose to 5 am, may need QID, continue to monitor    Insomnia  Continue melatonin and trazodone at 50 mg - did not tolerate higher doses    Bowel  Meds as needed  Last BM 9/29    DVT prophylaxis  Coumadin     Appreciate the assistance of the hospitalist with the patient's medical comorbidities:     Aortic dissection  Hypertension, controlled  Hyperlipidemia  Atrial fibrillation  Pulmonary hypertension, RSVP 45  Sleep apnea, non-compliant with CPAP  Left pleural effusion  Left lung nodule, outpatient follow up  Left shoulder lesion, outpatient follow up  Anemia,  stable  Leukopenia, stable  Vit D insufficiency, on supplementation    Total time:  36 minutes.  I spent greater than 50% of the time for patient care, counseling, and coordination on this date, including patient face-to face time, unit/floor time with review of records/pertinent lab data and studies, as well as discussing diagnostic evaluation/work up, planned therapeutic interventions, and future disposition of care, as per the interval events/subjective and the assessment and plan as noted above.    I have performed a physical exam, reviewed and updated ROS, as well as the assessment and plan today 10/1/2019. In review of note from 9/30/2019 there are no new changes except as documented above.    Amira Davis M.D.   Physical Medicine and Rehabilitation

## 2019-10-02 ENCOUNTER — APPOINTMENT (OUTPATIENT)
Dept: RADIOLOGY | Facility: REHABILITATION | Age: 69
DRG: 057 | End: 2019-10-02
Attending: PHYSICAL MEDICINE & REHABILITATION
Payer: MEDICARE

## 2019-10-02 PROBLEM — D69.6 THROMBOCYTOPENIA (HCC): Status: ACTIVE | Noted: 2019-10-02

## 2019-10-02 PROBLEM — E55.9 VITAMIN D DEFICIENCY: Status: ACTIVE | Noted: 2019-10-02

## 2019-10-02 PROBLEM — D72.829 LEUKOCYTOSIS: Status: ACTIVE | Noted: 2019-10-02

## 2019-10-02 LAB
ANION GAP SERPL CALC-SCNC: 7 MMOL/L (ref 0–11.9)
APPEARANCE UR: CLEAR
BACTERIA #/AREA URNS HPF: ABNORMAL /HPF
BILIRUB UR QL STRIP.AUTO: NEGATIVE
BUN SERPL-MCNC: 10 MG/DL (ref 8–22)
CALCIUM SERPL-MCNC: 9.2 MG/DL (ref 8.5–10.5)
CHLORIDE SERPL-SCNC: 104 MMOL/L (ref 96–112)
CO2 SERPL-SCNC: 24 MMOL/L (ref 20–33)
COLOR UR: YELLOW
CREAT SERPL-MCNC: 1.02 MG/DL (ref 0.5–1.4)
EPI CELLS #/AREA URNS HPF: NEGATIVE /HPF
ERYTHROCYTE [DISTWIDTH] IN BLOOD BY AUTOMATED COUNT: 60.6 FL (ref 35.9–50)
GLUCOSE SERPL-MCNC: 101 MG/DL (ref 65–99)
GLUCOSE UR STRIP.AUTO-MCNC: NEGATIVE MG/DL
HCT VFR BLD AUTO: 28.8 % (ref 42–52)
HGB BLD-MCNC: 9 G/DL (ref 14–18)
HYALINE CASTS #/AREA URNS LPF: ABNORMAL /LPF
INR PPP: 2.9 (ref 0.87–1.13)
KETONES UR STRIP.AUTO-MCNC: NEGATIVE MG/DL
LEUKOCYTE ESTERASE UR QL STRIP.AUTO: ABNORMAL
MCH RBC QN AUTO: 29.8 PG (ref 27–33)
MCHC RBC AUTO-ENTMCNC: 31.3 G/DL (ref 33.7–35.3)
MCV RBC AUTO: 95.4 FL (ref 81.4–97.8)
MICRO URNS: ABNORMAL
NITRITE UR QL STRIP.AUTO: NEGATIVE
PH UR STRIP.AUTO: 6.5 [PH] (ref 5–8)
PLATELET # BLD AUTO: 148 K/UL (ref 164–446)
PMV BLD AUTO: 8.2 FL (ref 9–12.9)
POTASSIUM SERPL-SCNC: 3.6 MMOL/L (ref 3.6–5.5)
PROT UR QL STRIP: NEGATIVE MG/DL
PROTHROMBIN TIME: 31.4 SEC (ref 12–14.6)
RBC # BLD AUTO: 3.02 M/UL (ref 4.7–6.1)
RBC # URNS HPF: ABNORMAL /HPF
RBC UR QL AUTO: ABNORMAL
SODIUM SERPL-SCNC: 135 MMOL/L (ref 135–145)
SP GR UR STRIP.AUTO: 1.01
UROBILINOGEN UR STRIP.AUTO-MCNC: 1 MG/DL
WBC # BLD AUTO: 14 K/UL (ref 4.8–10.8)
WBC #/AREA URNS HPF: ABNORMAL /HPF

## 2019-10-02 PROCEDURE — 770010 HCHG ROOM/CARE - REHAB SEMI PRIVAT*

## 2019-10-02 PROCEDURE — 700102 HCHG RX REV CODE 250 W/ 637 OVERRIDE(OP): Performed by: PHYSICAL MEDICINE & REHABILITATION

## 2019-10-02 PROCEDURE — 71045 X-RAY EXAM CHEST 1 VIEW: CPT

## 2019-10-02 PROCEDURE — 700102 HCHG RX REV CODE 250 W/ 637 OVERRIDE(OP): Performed by: HOSPITALIST

## 2019-10-02 PROCEDURE — 80048 BASIC METABOLIC PNL TOTAL CA: CPT

## 2019-10-02 PROCEDURE — 700105 HCHG RX REV CODE 258: Performed by: HOSPITALIST

## 2019-10-02 PROCEDURE — A9270 NON-COVERED ITEM OR SERVICE: HCPCS | Performed by: HOSPITALIST

## 2019-10-02 PROCEDURE — 87077 CULTURE AEROBIC IDENTIFY: CPT | Mod: 91

## 2019-10-02 PROCEDURE — 87040 BLOOD CULTURE FOR BACTERIA: CPT

## 2019-10-02 PROCEDURE — 99233 SBSQ HOSP IP/OBS HIGH 50: CPT | Performed by: PHYSICAL MEDICINE & REHABILITATION

## 2019-10-02 PROCEDURE — 87086 URINE CULTURE/COLONY COUNT: CPT

## 2019-10-02 PROCEDURE — 94760 N-INVAS EAR/PLS OXIMETRY 1: CPT

## 2019-10-02 PROCEDURE — A9270 NON-COVERED ITEM OR SERVICE: HCPCS | Performed by: PHYSICAL MEDICINE & REHABILITATION

## 2019-10-02 PROCEDURE — 87070 CULTURE OTHR SPECIMN AEROBIC: CPT

## 2019-10-02 PROCEDURE — 99232 SBSQ HOSP IP/OBS MODERATE 35: CPT | Performed by: HOSPITALIST

## 2019-10-02 PROCEDURE — 36415 COLL VENOUS BLD VENIPUNCTURE: CPT

## 2019-10-02 PROCEDURE — 700111 HCHG RX REV CODE 636 W/ 250 OVERRIDE (IP): Performed by: HOSPITALIST

## 2019-10-02 PROCEDURE — 97530 THERAPEUTIC ACTIVITIES: CPT

## 2019-10-02 PROCEDURE — 87186 SC STD MICRODIL/AGAR DIL: CPT | Mod: 91

## 2019-10-02 PROCEDURE — 87205 SMEAR GRAM STAIN: CPT

## 2019-10-02 PROCEDURE — 85610 PROTHROMBIN TIME: CPT

## 2019-10-02 PROCEDURE — 97110 THERAPEUTIC EXERCISES: CPT

## 2019-10-02 PROCEDURE — 85027 COMPLETE CBC AUTOMATED: CPT

## 2019-10-02 PROCEDURE — 81001 URINALYSIS AUTO W/SCOPE: CPT

## 2019-10-02 RX ORDER — WARFARIN SODIUM 4 MG/1
4 TABLET ORAL
Status: COMPLETED | OUTPATIENT
Start: 2019-10-02 | End: 2019-10-02

## 2019-10-02 RX ORDER — BISACODYL 10 MG
10 SUPPOSITORY, RECTAL RECTAL
Status: DISCONTINUED | OUTPATIENT
Start: 2019-10-02 | End: 2019-10-09 | Stop reason: HOSPADM

## 2019-10-02 RX ORDER — POLYETHYLENE GLYCOL 3350 17 G/17G
1 POWDER, FOR SOLUTION ORAL
Status: DISCONTINUED | OUTPATIENT
Start: 2019-10-02 | End: 2019-10-09 | Stop reason: HOSPADM

## 2019-10-02 RX ORDER — LEVOFLOXACIN 250 MG/1
500 TABLET, FILM COATED ORAL EVERY 24 HOURS
Status: DISCONTINUED | OUTPATIENT
Start: 2019-10-02 | End: 2019-10-02

## 2019-10-02 RX ORDER — LINEZOLID 600 MG/1
600 TABLET, FILM COATED ORAL EVERY 12 HOURS
Status: DISCONTINUED | OUTPATIENT
Start: 2019-10-02 | End: 2019-10-04

## 2019-10-02 RX ORDER — AMOXICILLIN 250 MG
2 CAPSULE ORAL DAILY
Status: DISCONTINUED | OUTPATIENT
Start: 2019-10-02 | End: 2019-10-09 | Stop reason: HOSPADM

## 2019-10-02 RX ORDER — SODIUM CHLORIDE 9 MG/ML
1000 INJECTION, SOLUTION INTRAVENOUS ONCE
Status: COMPLETED | OUTPATIENT
Start: 2019-10-02 | End: 2019-10-02

## 2019-10-02 RX ADMIN — GABAPENTIN 400 MG: 400 CAPSULE ORAL at 15:38

## 2019-10-02 RX ADMIN — MELATONIN 6 MG: at 20:56

## 2019-10-02 RX ADMIN — VITAMIN D, TAB 1000IU (100/BT) 1000 UNITS: 25 TAB at 08:25

## 2019-10-02 RX ADMIN — WARFARIN SODIUM 4 MG: 4 TABLET ORAL at 18:38

## 2019-10-02 RX ADMIN — GABAPENTIN 400 MG: 400 CAPSULE ORAL at 10:58

## 2019-10-02 RX ADMIN — TRAZODONE HYDROCHLORIDE 50 MG: 50 TABLET ORAL at 20:56

## 2019-10-02 RX ADMIN — SIMETHICONE CHEW TAB 80 MG 160 MG: 80 TABLET ORAL at 08:25

## 2019-10-02 RX ADMIN — FENOFIBRATE 67 MG: 67 CAPSULE ORAL at 08:25

## 2019-10-02 RX ADMIN — AMIODARONE HYDROCHLORIDE 200 MG: 200 TABLET ORAL at 05:34

## 2019-10-02 RX ADMIN — FUROSEMIDE 40 MG: 40 TABLET ORAL at 05:34

## 2019-10-02 RX ADMIN — ASPIRIN 325 MG ORAL TABLET 325 MG: 325 PILL ORAL at 08:25

## 2019-10-02 RX ADMIN — LINEZOLID 600 MG: 600 TABLET, FILM COATED ORAL at 20:57

## 2019-10-02 RX ADMIN — TAMSULOSIN HYDROCHLORIDE 0.8 MG: 0.4 CAPSULE ORAL at 20:55

## 2019-10-02 RX ADMIN — POTASSIUM CHLORIDE 20 MEQ: 1500 TABLET, EXTENDED RELEASE ORAL at 08:25

## 2019-10-02 RX ADMIN — METOPROLOL TARTRATE 12.5 MG: 25 TABLET ORAL at 18:38

## 2019-10-02 RX ADMIN — ACETAMINOPHEN 650 MG: 325 TABLET, FILM COATED ORAL at 05:34

## 2019-10-02 RX ADMIN — LEVOFLOXACIN 500 MG: 250 TABLET, FILM COATED ORAL at 10:58

## 2019-10-02 RX ADMIN — SIMETHICONE CHEW TAB 80 MG 160 MG: 80 TABLET ORAL at 10:58

## 2019-10-02 RX ADMIN — GABAPENTIN 400 MG: 400 CAPSULE ORAL at 05:34

## 2019-10-02 RX ADMIN — PIPERACILLIN AND TAZOBACTAM 3.38 G: 3; .375 INJECTION, POWDER, LYOPHILIZED, FOR SOLUTION INTRAVENOUS; PARENTERAL at 23:42

## 2019-10-02 RX ADMIN — METOPROLOL TARTRATE 12.5 MG: 25 TABLET ORAL at 05:34

## 2019-10-02 RX ADMIN — SIMETHICONE CHEW TAB 80 MG 160 MG: 80 TABLET ORAL at 20:57

## 2019-10-02 RX ADMIN — GABAPENTIN 400 MG: 400 CAPSULE ORAL at 20:54

## 2019-10-02 RX ADMIN — ACETAMINOPHEN 650 MG: 325 TABLET, FILM COATED ORAL at 20:54

## 2019-10-02 RX ADMIN — OMEPRAZOLE 20 MG: 20 CAPSULE, DELAYED RELEASE ORAL at 08:25

## 2019-10-02 RX ADMIN — SIMETHICONE CHEW TAB 80 MG 160 MG: 80 TABLET ORAL at 18:38

## 2019-10-02 RX ADMIN — SENNOSIDES, DOCUSATE SODIUM 2 TABLET: 50; 8.6 TABLET, FILM COATED ORAL at 18:38

## 2019-10-02 RX ADMIN — SODIUM CHLORIDE 1000 ML: 9 INJECTION, SOLUTION INTRAVENOUS at 18:46

## 2019-10-02 RX ADMIN — PIPERACILLIN AND TAZOBACTAM 3.38 G: 3; .375 INJECTION, POWDER, LYOPHILIZED, FOR SOLUTION INTRAVENOUS; PARENTERAL at 18:40

## 2019-10-02 RX ADMIN — ACETAMINOPHEN 650 MG: 325 TABLET, FILM COATED ORAL at 15:38

## 2019-10-02 RX ADMIN — FINASTERIDE 5 MG: 5 TABLET, FILM COATED ORAL at 08:25

## 2019-10-02 RX ADMIN — POTASSIUM CHLORIDE 20 MEQ: 1500 TABLET, EXTENDED RELEASE ORAL at 20:55

## 2019-10-02 ASSESSMENT — ENCOUNTER SYMPTOMS
FEVER: 0
COUGH: 0
ABDOMINAL PAIN: 0
PALPITATIONS: 0
VOMITING: 0
EYES NEGATIVE: 1
SHORTNESS OF BREATH: 0
NAUSEA: 0
CHILLS: 0

## 2019-10-02 NOTE — REHAB-NURSING IDT TEAM NOTE
Nursing   Nursing  Diet/Nutrition:  Regular and Thin Liquids  Medication Administration:  Whole with Liquid Wash  % consumed at meals in last 24 hours:  Consumed 50-75% of meals per documentation as of 9/30/19.  No data found.  Snack schedule:  None  Supplement:  Other: N/A  Appetite:  Fair  Fluid Intake/Output in past 24 hours: In: 840 [P.O.:840]  Out: 1425   Admit Weight:  Weight: 123.8 kg (273 lb)  Weight Last 7 Days   [120.7 kg (266 lb 1.5 oz)] 120.7 kg (266 lb 1.5 oz) (09/29 1445)    Pain Issues:    Location:  Generalized (10/01 2000)  Mid;Posterior (10/01 0546)         Severity:  Moderate   Scheduled pain medications:  gabapentin (NEURONTIN)      PRN pain medications used in last 24 hours:  acetaminophen (TYLENOL)    Non Pharmacologic Interventions:  distraction and rest  Effectiveness of pain management plan:  fair=improved comfort with interventions but does not always meet goal    Bowel:    Bowel Assist Initial Score:  5 - Standby Prompting/Supervision or Set-up  Bowel Assist Current Score:  6 - Modified Independent  Bowl Accidents in last 7 days:     Last bowel movement: 09/29/19  Stool Description: Medium, Soft     Usual bowel pattern:  daily  Scheduled bowel medications:  None  PRN bowel medications used in last 24 hours:  None  Nursing Interventions:  Increased time, PRN medication, Positioning on commode/toilet, Supervision, Verbal cueing, Set-up  Effectiveness of bowel program:   good=regular, predictable, controlled emptying of bowel  Bladder:    Bladder Assist Initial Score:  6 - Modified Independent  Bladder Assist Current Score:  5 - Standby Prompting/Supervision or Set-up  Bladder Accidents in last 7 days:     PVR range for past 24-48 hours:  [137-362]  ()  Intermittent Catheterization:  N/A  Medications affecting bladder:  Flomax and Proscar    Time void schedule/voiding pattern:  Voiding every 2-4 hours  Interventions:  Increased time, Emptying of device, Urinal, Time void patient  initiated  Effectiveness of bladder training:  Good=regular, predictable, emptying of bladder, patient initiates time voiding Retaining >100 mls per bladder scan, see doc flow sheet.    Wound:         Patient Lines/Drains/Airways Status    Active Current Wounds     9/23/19 S/P Graft Repair Sternum, Chest, Abd  9/23/19 Right Groin                    Interventions:  Sternum,Chest, Abdominal Incisions WING, Right groin incision-open with nonadhesive drsg. ? Wound consult to right groin open incision.  Effectiveness of intervention:  wound is unchanged     Sleep/wake cycle:   Average hours slept:  Sleeps 3-4 hours without waking  Sleep medication usage:  Other Melatonin 6mg nightly and Trazodone 50 mg nightly.    Patient/Family Training/Education:  Fall Prevention, General Self Care, Safe Transfers, Safety, Skin Care and Wound Care  Discharge Supply Recommendations:  Wound Care Supplies and Other: Adaptive equipment.  Strengths: Alert and oriented, Supportive family, Pleasant and cooperative and Effective communication skills   Barriers:   Generalized weakness and Limited mobility       Nursing Problems     Problem: Bowel/Gastric:     Description:     Goal: Normal bowel function is maintained or improved     Description:     Flowsheet:     Taken at 09/26/19 0710    Last BM 09/25/19 by  Princess Dary Novoa RNeilNNeil                Goal: Will not experience complications related to bowel motility     Description:                 Problem: Communication     Description:     Goal: The ability to communicate needs accurately and effectively will improve     Description:                 Problem: Discharge Barriers/Planning     Description:     Goal: Patient's continuum of care needs will be met     Description:                 Problem: Infection     Description:     Goal: Will remain free from infection     Description:                 Problem: Knowledge Deficit     Description:     Goal: Knowledge of disease  process/condition, treatment plan, diagnostic tests, and medications will improve     Description:           Goal: Knowledge of the prescribed therapeutic regimen will improve     Description:                 Problem: Pain Management     Description:     Goal: Pain level will decrease to patient's comfort goal     Description:                 Problem: Respiratory:     Description:     Goal: Respiratory status will improve     Description:                 Problem: Safety     Description:     Goal: Will remain free from injury     Description:           Goal: Will remain free from falls     Description:                 Problem: Skin Integrity     Description:     Goal: Risk for impaired skin integrity will decrease     Description:                 Problem: Venous Thromboembolism (VTW)/Deep Vein Thrombosis (DVT) Prevention:     Description:     Goal: Patient will participate in Venous Thrombosis (VTE)/Deep Vein Thrombosis (DVT)Prevention Measures     Description:                        Long Term Goals:   At discharge patient will be able to function safely at home and in the community with support.    Section completed by:  Panda Treviño L.P.N.2

## 2019-10-02 NOTE — PROGRESS NOTES
Hospital Medicine Daily Progress Note        Chief Complaint  S/P Aortic Dissection, AFib, HTN    Interval Problem Update  Still feeling malaised.  Remains afebrile but WBC up this morning.    Review of Systems  Review of Systems   Constitutional: Positive for malaise/fatigue. Negative for chills and fever.   HENT: Negative.    Eyes: Negative.    Respiratory: Negative for cough and shortness of breath.    Cardiovascular: Negative for chest pain and palpitations.   Gastrointestinal: Negative for abdominal pain, nausea and vomiting.   Skin: Negative for itching and rash.        Physical Exam  Temp:  [36.7 °C (98.1 °F)-37.6 °C (99.7 °F)] 37.3 °C (99.1 °F)  Pulse:  [85-98] 95  Resp:  [16-18] 18  BP: (113-142)/(66-76) 113/66  SpO2:  [90 %-95 %] 93 %    Physical Exam   Constitutional: He is oriented to person, place, and time. No distress.   HENT:   Head: Normocephalic and atraumatic.   Right Ear: External ear normal.   Left Ear: External ear normal.   Eyes: Conjunctivae and EOM are normal. Right eye exhibits no discharge. Left eye exhibits no discharge.   Neck: Normal range of motion. Neck supple. No tracheal deviation present.   Cardiovascular:   irreg irreg   Pulmonary/Chest: No stridor. No respiratory distress. He has no wheezes.   Decreased BS   Abdominal: Soft. Bowel sounds are normal. He exhibits no distension. There is no tenderness.   Musculoskeletal: He exhibits edema.   Neurological: He is alert and oriented to person, place, and time.   Skin: Skin is warm and dry. He is not diaphoretic.   Vitals reviewed.      Fluids    Intake/Output Summary (Last 24 hours) at 10/2/2019 1051  Last data filed at 10/2/2019 0800  Gross per 24 hour   Intake 600 ml   Output 2525 ml   Net -1925 ml       Laboratory  Recent Labs     10/02/19  0545   WBC 14.0*   RBC 3.02*   HEMOGLOBIN 9.0*   HEMATOCRIT 28.8*   MCV 95.4   MCH 29.8   MCHC 31.3*   RDW 60.6*   PLATELETCT 148*   MPV 8.2*     Recent Labs     10/02/19  0511   SODIUM 135    POTASSIUM 3.6   CHLORIDE 104   CO2 24   GLUCOSE 101*   BUN 10   CREATININE 1.02   CALCIUM 9.2     Recent Labs     09/30/19  0555 10/01/19  0537 10/02/19  0545   INR 3.13* 2.93* 2.90*               Assessment/Plan  * 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)  Assessment & Plan  On ASA and Fenofibrate    Thrombocytopenia (HCC)  Assessment & Plan  Follow Platelet counts on anticoagulation    Leukocytosis  Assessment & Plan  Check UA and CXR  Start empiric Levaquin given persistent symptoms of malaise and lethargy    Vitamin D deficiency  Assessment & Plan  Vit D level 27  On supplementation    Anemia  Assessment & Plan  Follow H/H on anticoagulation    A-fib (HCC)  Assessment & Plan  On Amiodarone  Anticoagulated on Coumadin    Fluid overload  Assessment & Plan  Echo EF 60% and RVSP 45 mmHg  U/S BLE negative for DVT  Edema most likely 2/2 recent cardiac surgery  BNP improving w/ diuresis   Continue Lasix    Urinary retention  Assessment & Plan  Monitor for orthostatic hypotension on Flomax and Proscar    Dyslipidemia  Assessment & Plan  On Fenofibrate    Shoulder lesion, left- (present on admission)  Assessment & Plan  Will need F/U imaging    Lung nodule- (present on admission)  Assessment & Plan  Will need F/U imaging    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 Davis

## 2019-10-02 NOTE — THERAPY
Missed Therapy     Patient Name: Xavier Richardson  Age:  69 y.o., Sex:  male  Medical Record #: [id number]  Today's Date: 10/2/2019    Discussed missed therapy with PT, MD       10/02/19 1001   Therapy Missed   Missed Therapy (Minutes) 60   Reason For Missed Therapy Medical - Patient on Hold from Therapy  (pt has infection, getting catheter replaced, hold per MD rec)

## 2019-10-02 NOTE — REHAB-RESPIRATORY IDT TEAM NOTE
Respiratory Therapy   Respiratory Therapy    Pt does not use oxygen at home; has only required at night since his surgery.  He was diagnosed with JESUS MANUEL at some point but he had nasal septum surgery and he states that corrected the problem.  Section completed by:  Demi Vásquez, RRT      None

## 2019-10-02 NOTE — THERAPY
10/02/19 0929   Precautions   Precautions Fall Risk;Sternal Precautions (See Comments)   Comments Aortic aneurysm SX   Pain 0 - 10 Group   Therapist Pain Assessment 0   Cognition    Level of Consciousness Lethargic   Sitting Lower Body Exercises   Nustep Resistance Level 4  (29 steps per minute, 10 minutes)   Bed Mobility    Sit to Supine Stand by Assist   Sit to Stand Contact Guard Assist   Scooting Supervised   Rolling Supervised   Therapy Missed   Missed Therapy (Minutes) 30   Reason For Missed Therapy Medical - Patient on Hold from Therapy  (Fatigue, unable to tolerate activity, unstable when standing)   PT Total Time Spent   PT Individual Total Time Spent (Mins) 30   PT Charge Group   PT Therapeutic Exercise 1   PT Therapeutic Activities 1   Physical Therapy   Daily Treatment     Patient Name: Xavier Richardson  Age:  69 y.o., Sex:  male  Medical Record #: [id number]  Today's Date: 10/2/2019     Precautions  Precautions: Fall Risk, Sternal Precautions (See Comments)  Comments: Aortic aneurysm SX    Subjective    The patient agreed to PT.  However, he said he was very tired and he did not sleep well last night.     Objective    The patient attempted to participate in PT.  He was very unsteady when standing in the parallel bars and attempting to walk.  He was able to tolerate utilizing the NuStep for 10 minutes as indicated above.  He was put on hold for therapy due to lab reports and his report of fatigue and his unsteadiness.        Assessment    The patient was unsteady in the parallel bars when he attempted to walk.  He utilized the NuStep for 10 minutes he was lethargic and could only tolerate a much slower pace compared to yesterday.  He was put on hold for therapy today.    Plan    Resume therapy 10/3/2019 for therapeutic exercise, transfer training, gait training, 4-6 stairs as tolerated, safety education

## 2019-10-02 NOTE — PROGRESS NOTES
Pharmacy Warfarin Consult   10/2/2019     69 y.o.   male     Indication for anticoagulation: Stroke    Goal INR = 2 - 3    Recent Labs     09/30/19  0555 10/01/19  0537 10/02/19  0545   INR 3.13* 2.93* 2.90*   HEMOGLOBIN  --   --  9.0*   HEMATOCRIT  --   --  28.8*       Pertinent Drug/Drug Interactions:  Amiodarone, ASA, PPI, Statin  Outpatient Warfarin Regimen:  New Start  Recent Warfarin Dosing:    Dose from last 7 days     Date/Time Dose (mg)    10/02/19 0545  4    10/01/19 0537  4    09/30/19 0555  2.5    09/29/19 1700  1    09/28/19 1700  5    09/27/19 0605  5    09/26/19 0556  5          Bridge Therapy: No        1.  Warfarin 4 mg tonight or INR= 2.90        Artem Wiggins [facility]

## 2019-10-02 NOTE — PROGRESS NOTES
Rehab Progress Note     Date of Service: 10/2/2019  Chief Complaint: follow up stroke    Interval Events (Subjective)    Patient seen and examined in his room today.  He is feeling very lethargic and labs show a leukocytosis.  We are working him up for infection.  Therapy is on hold this morning.  Discussed the plan of care with the patient and his sister and if he is not well enough for planned discharge on Friday we will extend his stay.  Required catheterization overnight for 1 L in his bladder.  We will replace the Barnard and he will need outpatient follow-up with urology.    Objective:  VITAL SIGNS: /66   Pulse 95   Temp 37.3 °C (99.1 °F) (Temporal)   Resp 18   Ht 1.829 m (6')   Wt 120.7 kg (266 lb 1.5 oz)   SpO2 93%   BMI 36.09 kg/m²   Gen: somnolent, no distress  CV: regular rate and rhythm, no murmurs, no peripheral edema  Resp: clear to ascultation bilaterally, normal respiratory effort  GI: soft, non-tender abdomen, bowel sounds present  Neuro: notable for mild left UE hemiparesis      Recent Results (from the past 72 hour(s))   Prothrombin Time    Collection Time: 09/30/19  5:55 AM   Result Value Ref Range    PT 33.4 (H) 12.0 - 14.6 sec    INR 3.13 (H) 0.87 - 1.13   Prothrombin Time    Collection Time: 10/01/19  5:37 AM   Result Value Ref Range    PT 31.7 (H) 12.0 - 14.6 sec    INR 2.93 (H) 0.87 - 1.13   Prothrombin Time    Collection Time: 10/02/19  5:45 AM   Result Value Ref Range    PT 31.4 (H) 12.0 - 14.6 sec    INR 2.90 (H) 0.87 - 1.13   CBC WITHOUT DIFFERENTIAL    Collection Time: 10/02/19  5:45 AM   Result Value Ref Range    WBC 14.0 (H) 4.8 - 10.8 K/uL    RBC 3.02 (L) 4.70 - 6.10 M/uL    Hemoglobin 9.0 (L) 14.0 - 18.0 g/dL    Hematocrit 28.8 (L) 42.0 - 52.0 %    MCV 95.4 81.4 - 97.8 fL    MCH 29.8 27.0 - 33.0 pg    MCHC 31.3 (L) 33.7 - 35.3 g/dL    RDW 60.6 (H) 35.9 - 50.0 fL    Platelet Count 148 (L) 164 - 446 K/uL    MPV 8.2 (L) 9.0 - 12.9 fL   Basic Metabolic Panel    Collection  Time: 10/02/19  5:45 AM   Result Value Ref Range    Sodium 135 135 - 145 mmol/L    Potassium 3.6 3.6 - 5.5 mmol/L    Chloride 104 96 - 112 mmol/L    Co2 24 20 - 33 mmol/L    Glucose 101 (H) 65 - 99 mg/dL    Bun 10 8 - 22 mg/dL    Creatinine 1.02 0.50 - 1.40 mg/dL    Calcium 9.2 8.5 - 10.5 mg/dL    Anion Gap 7.0 0.0 - 11.9   ESTIMATED GFR    Collection Time: 10/02/19  5:45 AM   Result Value Ref Range    GFR If African American >60 >60 mL/min/1.73 m 2    GFR If Non African American >60 >60 mL/min/1.73 m 2       Current Facility-Administered Medications   Medication Frequency   • warfarin (COUMADIN) tablet 4 mg ONCE AT 1800   • levoFLOXacin (LEVAQUIN) tablet 500 mg Q24HRS   • senna-docusate (PERICOLACE or SENOKOT S) 8.6-50 MG per tablet 2 Tab BID PRN    And   • polyethylene glycol/lytes (MIRALAX) PACKET 1 Packet QDAY PRN    And   • magnesium hydroxide (MILK OF MAGNESIA) suspension 30 mL QDAY PRN    And   • bisacodyl (DULCOLAX) suppository 10 mg QDAY PRN   • gabapentin (NEURONTIN) capsule 400 mg 4X/DAY   • traZODone (DESYREL) tablet 50 mg QHS   • furosemide (LASIX) tablet 40 mg Q DAY   • potassium chloride SA (Kdur) tablet 20 mEq BID   • melatonin tablet 6 mg QHS   • vitamin D (cholecalciferol) tablet 1,000 Units DAILY   • metoprolol (LOPRESSOR) tablet 12.5 mg TWICE DAILY   • Respiratory Care per Protocol Continuous RT   • Pharmacy Consult Request ...Pain Management Review 1 Each PHARMACY TO DOSE   • acetaminophen (TYLENOL) tablet 650 mg Q4HRS PRN   • hydrALAZINE (APRESOLINE) tablet 10 mg Q8HRS PRN   • artificial tears ophthalmic solution 1 Drop PRN   • benzocaine-menthol (CEPACOL) lozenge 1 Lozenge Q2HRS PRN   • mag hydrox-al hydrox-simeth (MAALOX PLUS ES or MYLANTA DS) suspension 20 mL Q2HRS PRN   • ondansetron (ZOFRAN ODT) dispertab 4 mg 4X/DAY PRN    Or   • ondansetron (ZOFRAN) syringe/vial injection 4 mg 4X/DAY PRN   • sodium chloride (OCEAN) 0.65 % nasal spray 2 Spray PRN   • hydrOXYzine HCl (ATARAX) tablet 50 mg  Q6HRS PRN   • MD Alert...Warfarin per Pharmacy PHARMACY TO DOSE   • amiodarone (CORDARONE) tablet 200 mg Q DAY   • aspirin (ASA) tablet 325 mg DAILY   • fenofibrate micronized (LOFIBRA) capsule 67 mg AFTER BREAKFAST   • finasteride (PROSCAR) tablet 5 mg DAILY   • omeprazole (PRILOSEC) capsule 20 mg QAM AC   • tamsulosin (FLOMAX) capsule 0.8 mg QHS   • simethicone (MYLICON) chewable tab 160 mg 4X/DAY WITH MEALS + NIGHTLY       Orders Placed This Encounter   Procedures   • Diet Order Regular     Standing Status:   Standing     Number of Occurrences:   1     Order Specific Question:   Diet:     Answer:   Regular [1]       Radiology  Transthoracic  Echo Report      Echocardiography Laboratory    CONCLUSIONS  No prior study is available for comparison.   The left ventricle was normal in size.  Mild concentric left ventricular hypertrophy.  Normal left ventricular systolic function and regional wall motion.  Left ventricular ejection fraction is estimated to be 60%.  Aortic sclerosis without stenosis.  Trace mitral regurgitation.  The left atrium is normal in size.  Dilated inferior vena cava with inspiratory collapse.  Estimated right ventricular systolic pressure  is 45 mmHg.  Mobile echodensity noted in the aortic arch/proximal left subclavian   artery.    DONALD KNOTT  Exam Date:         09/24/2019                      11:45  Exam Location:     Inpatient  Priority:          Routine    Assessment:  Active Hospital Problems    Diagnosis   • *Stroke (cerebrum) (HCC)   • Essential hypertension   • JESUS MANUEL (obstructive sleep apnea)   • Peripheral neuropathy   • Other insomnia   • Dissection of thoracoabdominal aorta (HCC)   • Lung nodule   • Shoulder lesion, left   • Dyslipidemia   • Urinary retention   • Fluid overload   • A-fib (HCC)     This patient is a 69 y.o. male admitted for acute inpatient rehabilitation with Stroke (cerebrum) (HCC).    I led and attended the weekly conference and agree with the IDT conference  documentation and plan of care as noted below.    Date of conference: 10/2/2019    Goals and barriers: See IDT note.    Biggest barriers: urinary retention, poor balance, poor endurance    Goals in next week: treat infection    Therapy update 10/2/2019  Supervision eating  Supervision grooming  Supervision bathing  Supervision upper body dressing  Supervision lower body dressing  Modified Independent toileting  Supervisionbladder  Modified Independent bowel  Modified Independent bed/chair transfer  Modified Independent toilet transfer  Supervision tub/shower transfer  Supervision ambulation  Modified Independent wheelchair propulsion  Max assist stairs  Independent comprehension  Independent expression  Independent social interaction  Modified Independent problem solving  Modified Independent memory    Admission FIM 74 --> 95    CM/social support: lives alone, daughter to return from [facility] to assist at discharge    Anticipated DC date: 10/9/2019    Outpatient: PT    Equip: SPC    Follow up: PCP, cardiology, urology, pulmonology     Medical Decision Making and Plan:    Bilateral ischemic strokes  Hemorrhagic transformation in right parietal/occipital lobe  Left hemiparesis, improved  Non-dominant  Visual deficits, continued  PT/OT, 1.5 hr each discipline, 5 days per week  SLP eval, cog within normal  Continue aspirin and coumadin per outside recs  Neuro-ophthalmology referral - Dr. Wilder, made    MRI imaging reviewed with patient/family    Acute urinary retention  Does have a history of BPH  Continue Proscar and Flomax  Voiding after Barnard removal, but still retaining - required cath last night, high volume - 1L  Replace Barnard  Referral to urology - apt already made     Peripheral neuropathy  Increased gabapentin 400 mg TID --> QID per patient request, home dosing    Insomnia  Continue melatonin and trazodone at 50 mg - did not tolerate higher doses    Bowel  Meds as needed  Not constipated    DVT  prophylaxis  Coumadin     Appreciate the assistance of the hospitalist with the patient's medical comorbidities:     Aortic dissection  Hypertension, controlled  Hyperlipidemia  Atrial fibrillation  Pulmonary hypertension, RSVP 45  Sleep apnea, non-compliant with CPAP  Left pleural effusion  Left lung nodule, outpatient follow up  Left shoulder lesion, outpatient follow up  Anemia, stable  Leukopenia, stable  Vit D insufficiency, on supplementation  +Leukocytosis/lethargy, checking UA, wound cultures from groin site and CXR    Total time:  40 minutes.  I spent greater than 50% of the time for patient care, counseling, and coordination on this date, including patient face-to face time, unit/floor time with review of records/pertinent lab data and studies, as well as discussing diagnostic evaluation/work up, planned therapeutic interventions, and future disposition of care, as per the interval events/subjective and the assessment and plan as noted above.    Amira Davis M.D.   Physical Medicine and Rehabilitation

## 2019-10-02 NOTE — PROGRESS NOTES
Pt voided 100 mls of clear yellow urine this morning.  Had PVR results of >999mls.  Straight cathed pt using aseptic technique, #14 Persian catheter, tolerated well.  Had 1000 mls of clear yellow urine for output.  Will continue to monitor patient.

## 2019-10-02 NOTE — DISCHARGE PLANNING
Met with patient and his sister following Team Conference, to discuss change in discharge plan and date to 10/9/2019. Patient has urology appointment on 10/9/2019 so will discharge, go to appointment and drive home to [place], [place]. His sister and/or daughter will be transporting patient. PCP appointment to be changed from Monday, 10/7/2019, but this provider will manage patient's blood draws and coumadin dosage.   Patient states he does not need oxygen at night. We discussed that a formal study would be done closer to discharge to make sure that he doesn't need it.   Patient still doesn't feel good, but he voiced understanding of plans, asked appropriate questions. All questions of patient and sister answered.

## 2019-10-02 NOTE — REHAB-COLLABORATIVE ONGOING IDT TEAM NOTE
Weekly Interdisciplinary Team Conference Note    Weekly Interdisciplinary Team Conference # 2  Date:  10/2/2019    Clinicians present and reporting at team conference include the following:   MD: Amira Davis MD   RN:  Aruna Tejada RN    PT:   Juan Luis Jackson PT, MPT, MEd  OT:  Kalyn Galindo MS, OTR/L    ST:  Not Applicable.   CM:  Sara Anand RN [facility]  REC:  Not Applicable  RT:  Demi Vásquez, RRT  RPh:  Law Wiggins [facility]  Other:   None  All reporting clinicians have a working knowledge of this patient's plan of care.    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   • Lung nodule 09/23/2019   • Shoulder lesion, left 09/23/2019   • Dyslipidemia 09/23/2019   • Urinary retention 09/23/2019   • Fluid overload 09/23/2019   • A-fib ([facility]) 09/23/2019     Results     Procedure Component Value Ref Range Date/Time    URINALYSIS [[id number]] Collected:  10/02/19 1025    Order Status:  Completed Lab Status:  In process Updated:  10/02/19 1248    Specimen:  Urine, Clean Catch     Narrative:       Collected By:[id number] MICHELLE TURNER    ESTIMATED GFR [[id number]] Collected:  10/02/19 0545    Order Status:  Completed Lab Status:  Final result Updated:  10/02/19 0649     GFR If African American >60 >60 mL/min/1.73 m 2      GFR If Non African American >60 >60 mL/min/1.73 m 2     Narrative:       Indicate which anticoagulants the patient is on:->COUMADIN    Basic Metabolic Panel [[id number]]  (Abnormal) Collected:  10/02/19 0545    Order Status:  Completed Lab Status:  Final result  Updated:  10/02/19 0649    Specimen:  Blood      Sodium 135 135 - 145 mmol/L      Potassium 3.6 3.6 - 5.5 mmol/L      Chloride 104 96 - 112 mmol/L      Co2 24 20 - 33 mmol/L      Glucose 101 65 - 99 mg/dL      Bun 10 8 - 22 mg/dL      Creatinine 1.02 0.50 - 1.40 mg/dL      Calcium 9.2 8.5 - 10.5 mg/dL      Anion Gap 7.0 0.0 - 11.9     Narrative:       Indicate which anticoagulants the patient is on:->COUMADIN    Prothrombin Time [[id number]]  (Abnormal) Collected:  10/02/19 0545    Order Status:  Completed Lab Status:  Final result Updated:  10/02/19 0637    Specimen:  Blood      PT 31.4 12.0 - 14.6 sec      INR 2.90 0.87 - 1.13      Comment: INR - Non-therapeutic Reference Range: 0.87-1.13  INR - Therapeutic Reference Range: 2.0-4.0         Narrative:       Indicate which anticoagulants the patient is on:->COUMADIN    CBC WITHOUT DIFFERENTIAL [[id number]]  (Abnormal) Collected:  10/02/19 0545    Order Status:  Completed Lab Status:  Final result Updated:  10/02/19 0624    Specimen:  Blood      WBC 14.0 4.8 - 10.8 K/uL      RBC 3.02 4.70 - 6.10 M/uL      Hemoglobin 9.0 14.0 - 18.0 g/dL      Hematocrit 28.8 42.0 - 52.0 %      MCV 95.4 81.4 - 97.8 fL      MCH 29.8 27.0 - 33.0 pg      MCHC 31.3 33.7 - 35.3 g/dL      RDW 60.6 35.9 - 50.0 fL      Platelet Count 148 164 - 446 K/uL      MPV 8.2 9.0 - 12.9 fL     Narrative:       Indicate which anticoagulants the patient is on:->COUMADIN        Nursing  Diet/Nutrition:  Regular and Thin Liquids  Medication Administration:  Whole with Liquid Wash  % consumed at meals in last 24 hours:  Consumed 50-75% of meals per documentation as of 9/30/19.  No data found.  Snack schedule:  None  Supplement:  Other: N/A  Appetite:  Fair  Fluid Intake/Output in past 24 hours: In: 840 [P.O.:840]  Out: 1425   Admit Weight:  Weight: 123.8 kg (273 lb)  Weight Last 7 Days   [120.7 kg (266 lb 1.5 oz)] 120.7 kg (266 lb 1.5 oz) (09/29 1445)    Pain Issues:    Location:  Generalized (10/01  2000)  Mid;Posterior (10/01 0546)         Severity:  Moderate   Scheduled pain medications:  gabapentin (NEURONTIN)      PRN pain medications used in last 24 hours:  acetaminophen (TYLENOL)    Non Pharmacologic Interventions:  distraction and rest  Effectiveness of pain management plan:  fair=improved comfort with interventions but does not always meet goal    Bowel:    Bowel Assist Initial Score:  5 - Standby Prompting/Supervision or Set-up  Bowel Assist Current Score:  6 - Modified Independent  Bowl Accidents in last 7 days:     Last bowel movement: 09/29/19  Stool Description: Medium, Soft     Usual bowel pattern:  daily  Scheduled bowel medications:  None  PRN bowel medications used in last 24 hours:  None  Nursing Interventions:  Increased time, PRN medication, Positioning on commode/toilet, Supervision, Verbal cueing, Set-up  Effectiveness of bowel program:   good=regular, predictable, controlled emptying of bowel  Bladder:    Bladder Assist Initial Score:  6 - Modified Independent  Bladder Assist Current Score:  5 - Standby Prompting/Supervision or Set-up  Bladder Accidents in last 7 days:     PVR range for past 24-48 hours:  [137-362]  ()  Intermittent Catheterization:  N/A  Medications affecting bladder:  Flomax and Proscar    Time void schedule/voiding pattern:  Voiding every 2-4 hours  Interventions:  Increased time, Emptying of device, Urinal, Time void patient initiated  Effectiveness of bladder training:  Good=regular, predictable, emptying of bladder, patient initiates time voiding Retaining >100 mls per bladder scan, see doc flow sheet.    Wound:         Patient Lines/Drains/Airways Status    Active Current Wounds     9/23/19 S/P Graft Repair Sternum, Chest, Abd  9/23/19 Right Groin                    Interventions:  Sternum,Chest, Abdominal Incisions WING, Right groin incision-open with nonadhesive drsg. ? Wound consult to right groin open incision.  Effectiveness of intervention:  wound is unchanged      Sleep/wake cycle:   Average hours slept:  Sleeps 3-4 hours without waking  Sleep medication usage:  Other Melatonin 6mg nightly and Trazodone 50 mg nightly.    Patient/Family Training/Education:  Fall Prevention, General Self Care, Safe Transfers, Safety, Skin Care and Wound Care  Discharge Supply Recommendations:  Wound Care Supplies and Other: Adaptive equipment.  Strengths: Alert and oriented, Supportive family, Pleasant and cooperative and Effective communication skills   Barriers:   Generalized weakness and Limited mobility       Nursing Problems     Problem: Bowel/Gastric:     Description:     Goal: Normal bowel function is maintained or improved     Description:     Flowsheet:     Taken at 09/26/19 0710    Last BM 09/25/19 by  Princess Dary Novoa R.N.                Goal: Will not experience complications related to bowel motility     Description:                 Problem: Communication     Description:     Goal: The ability to communicate needs accurately and effectively will improve     Description:                 Problem: Discharge Barriers/Planning     Description:     Goal: Patient's continuum of care needs will be met     Description:                 Problem: Infection     Description:     Goal: Will remain free from infection     Description:                 Problem: Knowledge Deficit     Description:     Goal: Knowledge of disease process/condition, treatment plan, diagnostic tests, and medications will improve     Description:           Goal: Knowledge of the prescribed therapeutic regimen will improve     Description:                 Problem: Pain Management     Description:     Goal: Pain level will decrease to patient's comfort goal     Description:                 Problem: Respiratory:     Description:     Goal: Respiratory status will improve     Description:                 Problem: Safety     Description:     Goal: Will remain free from injury     Description:           Goal: Will  remain free from falls     Description:                 Problem: Skin Integrity     Description:     Goal: Risk for impaired skin integrity will decrease     Description:                 Problem: Venous Thromboembolism (VTW)/Deep Vein Thrombosis (DVT) Prevention:     Description:     Goal: Patient will participate in Venous Thrombosis (VTE)/Deep Vein Thrombosis (DVT)Prevention Measures     Description:                        Long Term Goals:   At discharge patient will be able to function safely at home and in the community with support.    Section completed by:  Panda Treviño L.P.N.2        Respiratory Therapy    Pt does not use oxygen at home; has only required at night since his surgery.  He was diagnosed with JESUS MANUEL at some point but he had nasal septum surgery and he states that corrected the problem.  Section completed by:  Demi Vásquez, RRT    Mobility  Bed mobility:   Modified independent supine to/from sit  Bed /Chair/Wheelchair Transfer Initial:  4 - Minimal Assistance  Bed /Chair/Wheelchair Transfer Current:  6 - Modified Independent   Bed/Chair/Wheelchair Transfer Description:  Adaptive equipment(Modified independent)  Walk Initial:  5 - Standby Prompting/Supervision or Set-up  Walk Current:  5 - Standby Prompting/Supervision SBA SPC   Walk Description:  Extra time, Verbal cueing, Supervision for safety, Assist device/equipment(655 FT x2, SBA, SPC)  Wheelchair Initial:  1 - Total Assistance  Wheelchair Current:  6 - Modified Independent   Wheelchair Description:  Extra time(200' LE's only)  Stairs Initial:  0 - Not tested,medical condition  Stairs Current: 2 - Max Assistance   Stairs Description: (up/down 4 6in stairs bilateral rails CGA, using foot-to-foot pattern)  Patient/Family Training/Education: In progress  DME/DC Recommendations: SPC  Strengths:  Independent PLOF, Making steady progress towards goals, Motivated for self care and independence, Pleasant and cooperative, Supportive family  and Willingly participates in therapeutic activities  Barriers:   Other: Sternal precautions, impaired tolerance for activity, gait, fall risk  # of short term goals set= 3  # of short term goals met=3  Physical Therapy Problems     Problem: PT-Long Term Goals     Dates: Start: 09/24/19       Description:     Goal: LTG-By discharge, patient will ambulate     Dates: Start: 09/24/19       Description: 1) Individualized goal: Gait FWW/-400 FT modified independent at discharge  2) Interventions:  PT Gait Training, PT Therapeutic Exercises, PT Neuro Re-Ed/Balance and PT Therapeutic Activity             Goal: LTG-By discharge, patient will transfer one surface to another     Dates: Start: 09/24/19       Description: 1) Individualized goal: Transfer one surface to another FWW/SPC modified independent at discharge  2) Interventions:  PT Gait Training, PT Therapeutic Exercises, PT Neuro Re-Ed/Balance and PT Therapeutic Activity             Goal: LTG-By discharge, patient will ambulate up/down 4-6 stairs     Dates: Start: 09/24/19       Description: 1) Individualized goal: Up/down 4-6 stairs modified independent at discharge  2) Interventions:  PT Gait Training, PT Therapeutic Exercises, PT Neuro Re-Ed/Balance and PT Therapeutic Activity             Goal: LTG-By discharge, patient will transfer in/out of a car     Dates: Start: 09/24/19       Description: 1) Individualized goal: Car transfer FWW/SPC modified independent at discharge  2) Interventions:  PT Gait Training, PT Therapeutic Exercises, PT Neuro Re-Ed/Balance and PT Therapeutic Activity                           Section completed by:  YULIA Toscano    Activities of Daily Living  Eating Initial:  5 - Standby Prompting/Supervision or Set-up  Eating Current:  5 - Standby Prompting/Supervision or Set-up   Eating Description:  (setup for bilateral tasks)  Grooming Initial:  5 - Standby Prompting/Supervision or Set-up  Grooming Current:  5 - Standby  Prompting/Supervision or Set-up   Grooming Description:  (SBA standing at sink to comb hair)  Bathing Initial:  4 - Minimal Assistance  Bathing Current:  5 - Standby Prompting/Supervision or Set-up   Bathing Description:  Set-up of equipment(per patient/CNA)  Upper Body Dressing Initial:  5 - Standby Prompting/Supervision or Set-up  Upper Body Dressing Current:  5 - Standby Prompting/Supervision or Set-up   Upper Body Dressing Description:  Set-up of equipment, Increased time  Lower Body Dressing Initial:  3 - Moderate Assistance  Lower Body Dressing Current:  5 - Standby Prompting/Supervsion or Set-up   Lower Body Dressing Description:  5 - Standby Prompting/Supervsion or Set-up  Toileting Initial:  3 - Moderate Assistance  Toileting Current:  6 - Modified Independent   Toileting Description:  (mod I w/c level)  Toilet Transfer Initial:  6 - Modified Independent  Toilet Transfer Current:  6 - Modified Independent   Toilet Transfer Description:  6 - Modified Independent  Tub / Shower Transfer Initial:  4 - Minimal Assistance  Tub / Shower Transfer Current:  5 - Standby Prompting/Supervision or Set-up   Tub / Shower Transfer Description:  Supervision for safety, Grab bar(per patient and CNA report)  IADL:  SBA for kitchen mobility with use of SPC as needed   Family Training/Education:  Scheduled for Friday prior to d/c   DME/DC Recommendations:  SPC, patient has shower seat and grab bars in shower    Strengths:  Able to follow instructions, Alert and oriented, Effective communication skills, Good carryover of learning, Independent PLOF, Making steady progress towards goals, Manages pain appropriately, Motivated for self care and independence, Pleasant and cooperative, Supportive family and Willingly participates in therapeutic activities  Barriers:  Decreased endurance, Generalized weakness, Poor balance and Other: L side weakness and decreased coordination, has been less stable and with decreased endurance past 24  hrs     # of short term goals set= 4    # of short term goals met= 4     Occupational Therapy Goals     Problem: OT Long Term Goals     Dates: Start: 09/24/19       Description:     Goal: LTG-By discharge, patient will complete basic self care tasks     Dates: Start: 09/24/19       Description: 1) Individualized Goal:  With supervision to modified independence  2) Interventions:  OT E Stim Attended, OT Group Therapy, OT Self Care/ADL, OT Cognitive Skill Dev, OT Community Reintegration, OT Manual Ther Technique, OT Neuro Re-Ed/Balance, OT Sensory Int Techniques, OT Therapeutic Activity, OT Evaluation and OT Therapeutic Exercise             Goal: LTG-By discharge, patient will perform bathroom transfers     Dates: Start: 09/24/19       Description: 1) Individualized Goal:  With supervision to modified independence  2) Interventions:  OT E Stim Attended, OT Group Therapy, OT Self Care/ADL, OT Cognitive Skill Dev, OT Community Reintegration, OT Manual Ther Technique, OT Neuro Re-Ed/Balance, OT Sensory Int Techniques, OT Therapeutic Activity, OT Evaluation and OT Therapeutic Exercise             Goal: LTG-By discharge, patient will complete basic home management     Dates: Start: 09/24/19       Description: 1) Individualized Goal:  With supervision to modified independence  2) Interventions:  OT E Stim Attended, OT Group Therapy, OT Self Care/ADL, OT Cognitive Skill Dev, OT Community Reintegration, OT Manual Ther Technique, OT Neuro Re-Ed/Balance, OT Sensory Int Techniques, OT Therapeutic Activity, OT Evaluation and OT Therapeutic Exercise                         Section completed by:  Kalyn Galindo MS,OTR/L             REHAB-Pharmacy IDT Team Note by Ondina Blackwell RPH at 10/1/2019 11:19 AM  Version 1 of 1    Author:  Ondina Blackwell RPH Service:  -- Author Type:  Pharmacist    Filed:  10/1/2019 11:36 AM Date of Service:  10/1/2019 11:19 AM Status:  Signed    :  Ondina Blackwell RPH (Pharmacist)          Pharmacy   Pharmacy  Antibiotics/Antifungals/Antivirals:  Medication:      Active Orders (From admission, onward)    None        Route:         n/a  Stop Date:  n/a  Reason:   Antihypertensives/Cardiac:  Medication:    Orders (72h ago, onward)     Start     Ordered    09/28/19 0600  amiodarone (CORDARONE) tablet 200 mg  Q DAY      09/23/19 1520    09/28/19 0600  furosemide (LASIX) tablet 40 mg  Q DAY      09/27/19 1339    09/24/19 1800  metoprolol (LOPRESSOR) tablet 12.5 mg  TWICE DAILY      09/24/19 1533    09/24/19 0830  fenofibrate micronized (LOFIBRA) capsule 67 mg  AFTER BREAKFAST      09/23/19 1520    09/23/19 1520  hydrALAZINE (APRESOLINE) tablet 10 mg  EVERY 8 HOURS PRN      09/23/19 1520              Patient Vitals for the past 24 hrs:   BP Pulse   10/01/19 0945 131/71 91   10/01/19 0700 149/77 81   10/01/19 0507 134/78 82   09/30/19 1900 130/79 80   09/30/19 1345 130/77 79     Anticoagulation:  Medication:  Aspirin, Coumadin   INR:    Recent Labs     09/28/19  0610 09/29/19  0547 09/30/19  0555 10/01/19  0537   INR 2.45* 2.96* 3.13* 2.93*     Other key medications:gabapentin, finasteride, tamsulosin    A review of the medication list reveals no issues at this time.  Section completed by:  Ondina Blackwell RPH[TK.1]        Attribution Key     TK.1 - Ondina Blackwell [facility] on 10/1/2019 11:19 AM                  DC Planning  DC destination/dispostion:  Home with support of daughter.    Referrals: [facility] - OP PT; [facility] - F[facility].    DC Needs: MD f/u appointment - PCP who will manage PT/INR draws and Coumadin dosages ; [facility] and Dr. Robles; Urology; Dr. Wilder.     Barriers to discharge:  Functional deficits. Lives in [place], [place] which has limited services - no Home health care.      Strengths: PLOF- Independent. Supportive daughter and sister.     Section completed by:  Sara D. Cheng, R.N.      Physician Summary  Amira Davis MD participated and led team conference  discussion.

## 2019-10-02 NOTE — REHAB-PT IDT TEAM NOTE
Physical Therapy   Mobility  Bed mobility:   Modified independent supine to/from sit  Bed /Chair/Wheelchair Transfer Initial:  4 - Minimal Assistance  Bed /Chair/Wheelchair Transfer Current:  6 - Modified Independent   Bed/Chair/Wheelchair Transfer Description:  Adaptive equipment(Modified independent)  Walk Initial:  5 - Standby Prompting/Supervision or Set-up  Walk Current:  5 - Standby Prompting/Supervision SBA SPC   Walk Description:  Extra time, Verbal cueing, Supervision for safety, Assist device/equipment(655 FT x2, SBA, SPC)  Wheelchair Initial:  1 - Total Assistance  Wheelchair Current:  6 - Modified Independent   Wheelchair Description:  Extra time(200' LE's only)  Stairs Initial:  0 - Not tested,medical condition  Stairs Current: 2 - Max Assistance   Stairs Description: (up/down 4 6in stairs bilateral rails CGA, using foot-to-foot pattern)  Patient/Family Training/Education: In progress  DME/DC Recommendations: SPC  Strengths:  Independent PLOF, Making steady progress towards goals, Motivated for self care and independence, Pleasant and cooperative, Supportive family and Willingly participates in therapeutic activities  Barriers:   Other: Sternal precautions, impaired tolerance for activity, gait, fall risk  # of short term goals set= 3  # of short term goals met=3  Physical Therapy Problems     Problem: PT-Long Term Goals     Dates: Start: 09/24/19       Description:     Goal: LTG-By discharge, patient will ambulate     Dates: Start: 09/24/19       Description: 1) Individualized goal: Gait FWW/-400 FT modified independent at discharge  2) Interventions:  PT Gait Training, PT Therapeutic Exercises, PT Neuro Re-Ed/Balance and PT Therapeutic Activity             Goal: LTG-By discharge, patient will transfer one surface to another     Dates: Start: 09/24/19       Description: 1) Individualized goal: Transfer one surface to another FWW/SPC modified independent at discharge  2) Interventions:  PT Gait  Training, PT Therapeutic Exercises, PT Neuro Re-Ed/Balance and PT Therapeutic Activity             Goal: LTG-By discharge, patient will ambulate up/down 4-6 stairs     Dates: Start: 09/24/19       Description: 1) Individualized goal: Up/down 4-6 stairs modified independent at discharge  2) Interventions:  PT Gait Training, PT Therapeutic Exercises, PT Neuro Re-Ed/Balance and PT Therapeutic Activity             Goal: LTG-By discharge, patient will transfer in/out of a car     Dates: Start: 09/24/19       Description: 1) Individualized goal: Car transfer FWW/SPC modified independent at discharge  2) Interventions:  PT Gait Training, PT Therapeutic Exercises, PT Neuro Re-Ed/Balance and PT Therapeutic Activity                           Section completed by:  YULIA Toscano

## 2019-10-02 NOTE — THERAPY
Missed Therapy     Patient Name: Xavier Richardson  Age:  69 y.o., Sex:  male  Medical Record #: [id number]  Today's Date: 10/2/2019    Discussed missed therapy with MD       10/02/19 1401   Therapy Missed   Missed Therapy (Minutes) 30   Reason For Missed Therapy Medical - Patient on Hold from Therapy  (infection, pt feeling very lethargic and needing rest)

## 2019-10-02 NOTE — REHAB-OT IDT TEAM NOTE
Occupational Therapy   Activities of Daily Living  Eating Initial:  5 - Standby Prompting/Supervision or Set-up  Eating Current:  5 - Standby Prompting/Supervision or Set-up   Eating Description:  (setup for bilateral tasks)  Grooming Initial:  5 - Standby Prompting/Supervision or Set-up  Grooming Current:  5 - Standby Prompting/Supervision or Set-up   Grooming Description:  (SBA standing at sink to comb hair)  Bathing Initial:  4 - Minimal Assistance  Bathing Current:  5 - Standby Prompting/Supervision or Set-up   Bathing Description:  Set-up of equipment(per patient/CNA)  Upper Body Dressing Initial:  5 - Standby Prompting/Supervision or Set-up  Upper Body Dressing Current:  5 - Standby Prompting/Supervision or Set-up   Upper Body Dressing Description:  Set-up of equipment, Increased time  Lower Body Dressing Initial:  3 - Moderate Assistance  Lower Body Dressing Current:  5 - Standby Prompting/Supervsion or Set-up   Lower Body Dressing Description:  5 - Standby Prompting/Supervsion or Set-up  Toileting Initial:  3 - Moderate Assistance  Toileting Current:  6 - Modified Independent   Toileting Description:  (mod I w/c level)  Toilet Transfer Initial:  6 - Modified Independent  Toilet Transfer Current:  6 - Modified Independent   Toilet Transfer Description:  6 - Modified Independent  Tub / Shower Transfer Initial:  4 - Minimal Assistance  Tub / Shower Transfer Current:  5 - Standby Prompting/Supervision or Set-up   Tub / Shower Transfer Description:  Supervision for safety, Grab bar(per patient and CNA report)  IADL:  SBA for kitchen mobility with use of SPC as needed   Family Training/Education:  Scheduled for Friday prior to d/c   DME/DC Recommendations:  SPC, patient has shower seat and grab bars in shower    Strengths:  Able to follow instructions, Alert and oriented, Effective communication skills, Good carryover of learning, Independent PLOF, Making steady progress towards goals, Manages pain appropriately,  Motivated for self care and independence, Pleasant and cooperative, Supportive family and Willingly participates in therapeutic activities  Barriers:  Decreased endurance, Generalized weakness, Poor balance and Other: L side weakness and decreased coordination, has been less stable and with decreased endurance past 24 hrs     # of short term goals set= 4    # of short term goals met= 4     Occupational Therapy Goals     Problem: OT Long Term Goals     Dates: Start: 09/24/19       Description:     Goal: LTG-By discharge, patient will complete basic self care tasks     Dates: Start: 09/24/19       Description: 1) Individualized Goal:  With supervision to modified independence  2) Interventions:  OT E Stim Attended, OT Group Therapy, OT Self Care/ADL, OT Cognitive Skill Dev, OT Community Reintegration, OT Manual Ther Technique, OT Neuro Re-Ed/Balance, OT Sensory Int Techniques, OT Therapeutic Activity, OT Evaluation and OT Therapeutic Exercise             Goal: LTG-By discharge, patient will perform bathroom transfers     Dates: Start: 09/24/19       Description: 1) Individualized Goal:  With supervision to modified independence  2) Interventions:  OT E Stim Attended, OT Group Therapy, OT Self Care/ADL, OT Cognitive Skill Dev, OT Community Reintegration, OT Manual Ther Technique, OT Neuro Re-Ed/Balance, OT Sensory Int Techniques, OT Therapeutic Activity, OT Evaluation and OT Therapeutic Exercise             Goal: LTG-By discharge, patient will complete basic home management     Dates: Start: 09/24/19       Description: 1) Individualized Goal:  With supervision to modified independence  2) Interventions:  OT E Stim Attended, OT Group Therapy, OT Self Care/ADL, OT Cognitive Skill Dev, OT Community Reintegration, OT Manual Ther Technique, OT Neuro Re-Ed/Balance, OT Sensory Int Techniques, OT Therapeutic Activity, OT Evaluation and OT Therapeutic Exercise                         Section completed by:  Kalyn Galindo  MS,OTR/L

## 2019-10-02 NOTE — PROGRESS NOTES
Oral temp of 99.7F and complains of generalized aches, medicated with Tylenol given this morning.  Will continue to monitor patient.

## 2019-10-02 NOTE — DISCHARGE PLANNING
Met with patient to update regarding discharge planning - referral to OP therapy in [place], establishment with primary care provider there, urology f/u and f/u with Dr. Robles still pending. Patient agreed with all. Stated he doesn't feel well today.

## 2019-10-02 NOTE — FLOWSHEET NOTE
10/02/19 0835   Events/Summary/Plan   Events/Summary/Plan 02 spot check.  Plan tonight is to stay on RA prior to discharge.     Interdisciplinary Plan of Care-Goals (Indications)   Hyperinflation Protocol Indications Chest Trauma (Blunt, Penetrative, or Surgical)   Interdisciplinary Plan of Care-Outcomes    Hyperinflation Protocol Goals/Outcome Greater Than 60% of Predicted I.S. Volume x 24 hrs   Education   Education Yes - Pt. / Family has been Instructed in use of Respiratory Equipment   Respiratory WDL   Respiratory (WDL) X   Chest Exam   Respiration 18   Pulse 95   Oximetry   #Pulse Oximetry (Single Determination) Yes   Oxygen   Home O2 Use Prior To Admission? No   Pulse Oximetry 93 %   O2 Daily Delivery Respiratory  Room Air with O2 Available

## 2019-10-02 NOTE — ASSESSMENT & PLAN NOTE
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 colonized  Off Zosyn 2nd to pancytopenia  Off Bactrim -- not sensitive for UTI  On Cipro (thru 10/12)

## 2019-10-02 NOTE — REHAB-CM IDT TEAM NOTE
Case Management    DC Planning  DC destination/dispostion:  Home with support of daughter.    Referrals: [facility] - OP PT; [facility] - [facility].    DC Needs: MD f/u appointment - PCP who will manage PT/INR draws and Coumadin dosages ; [facility] and Dr. Robles; Urology; Dr. Wilder.     Barriers to discharge:  Functional deficits. Lives in [place], [place] which has limited services - no Home health care.      Strengths: PLOF- Independent. Supportive daughter and sister.     Section completed by:  Sara Anand R.N.

## 2019-10-02 NOTE — ASSESSMENT & PLAN NOTE
Monitor for orthostatic hypotension on Flomax and Proscar  Transient gross hematuria likely 2/2 catheter irritation and anticoagulation  Pending Urology eval

## 2019-10-03 ENCOUNTER — TELEPHONE (OUTPATIENT)
Dept: OPHTHALMOLOGY | Facility: MEDICAL CENTER | Age: 69
End: 2019-10-03

## 2019-10-03 LAB
ALBUMIN SERPL BCP-MCNC: 3.3 G/DL (ref 3.2–4.9)
ALBUMIN/GLOB SERPL: 1.3 G/DL
ALP SERPL-CCNC: 60 U/L (ref 30–99)
ALT SERPL-CCNC: 5 U/L (ref 2–50)
ANION GAP SERPL CALC-SCNC: 9 MMOL/L (ref 0–11.9)
AST SERPL-CCNC: 11 U/L (ref 12–45)
BASOPHILS # BLD AUTO: 0.1 % (ref 0–1.8)
BASOPHILS # BLD: 0.02 K/UL (ref 0–0.12)
BILIRUB SERPL-MCNC: 0.8 MG/DL (ref 0.1–1.5)
BUN SERPL-MCNC: 11 MG/DL (ref 8–22)
CALCIUM SERPL-MCNC: 8.7 MG/DL (ref 8.5–10.5)
CHLORIDE SERPL-SCNC: 105 MMOL/L (ref 96–112)
CO2 SERPL-SCNC: 22 MMOL/L (ref 20–33)
CREAT SERPL-MCNC: 1.12 MG/DL (ref 0.5–1.4)
EOSINOPHIL # BLD AUTO: 0.06 K/UL (ref 0–0.51)
EOSINOPHIL NFR BLD: 0.4 % (ref 0–6.9)
ERYTHROCYTE [DISTWIDTH] IN BLOOD BY AUTOMATED COUNT: 60.7 FL (ref 35.9–50)
GLOBULIN SER CALC-MCNC: 2.5 G/DL (ref 1.9–3.5)
GLUCOSE SERPL-MCNC: 92 MG/DL (ref 65–99)
GRAM STN SPEC: NORMAL
GRAM STN SPEC: NORMAL
HCT VFR BLD AUTO: 27 % (ref 42–52)
HGB BLD-MCNC: 8.4 G/DL (ref 14–18)
IMM GRANULOCYTES # BLD AUTO: 0.08 K/UL (ref 0–0.11)
IMM GRANULOCYTES NFR BLD AUTO: 0.6 % (ref 0–0.9)
INR PPP: 3.18 (ref 0.87–1.13)
LYMPHOCYTES # BLD AUTO: 0.7 K/UL (ref 1–4.8)
LYMPHOCYTES NFR BLD: 5.2 % (ref 22–41)
MCH RBC QN AUTO: 30.3 PG (ref 27–33)
MCHC RBC AUTO-ENTMCNC: 31.1 G/DL (ref 33.7–35.3)
MCV RBC AUTO: 97.5 FL (ref 81.4–97.8)
MONOCYTES # BLD AUTO: 1.04 K/UL (ref 0–0.85)
MONOCYTES NFR BLD AUTO: 7.7 % (ref 0–13.4)
NEUTROPHILS # BLD AUTO: 11.63 K/UL (ref 1.82–7.42)
NEUTROPHILS NFR BLD: 86 % (ref 44–72)
NRBC # BLD AUTO: 0 K/UL
NRBC BLD-RTO: 0 /100 WBC
PLATELET # BLD AUTO: 135 K/UL (ref 164–446)
PMV BLD AUTO: 8.7 FL (ref 9–12.9)
POTASSIUM SERPL-SCNC: 3.6 MMOL/L (ref 3.6–5.5)
PROT SERPL-MCNC: 5.8 G/DL (ref 6–8.2)
PROTHROMBIN TIME: 33.8 SEC (ref 12–14.6)
RBC # BLD AUTO: 2.77 M/UL (ref 4.7–6.1)
SIGNIFICANT IND 70042: NORMAL
SIGNIFICANT IND 70042: NORMAL
SITE SITE: NORMAL
SITE SITE: NORMAL
SODIUM SERPL-SCNC: 136 MMOL/L (ref 135–145)
SOURCE SOURCE: NORMAL
SOURCE SOURCE: NORMAL
WBC # BLD AUTO: 13.5 K/UL (ref 4.8–10.8)

## 2019-10-03 PROCEDURE — A9270 NON-COVERED ITEM OR SERVICE: HCPCS | Performed by: HOSPITALIST

## 2019-10-03 PROCEDURE — 770010 HCHG ROOM/CARE - REHAB SEMI PRIVAT*

## 2019-10-03 PROCEDURE — 97535 SELF CARE MNGMENT TRAINING: CPT

## 2019-10-03 PROCEDURE — 85025 COMPLETE CBC W/AUTO DIFF WBC: CPT

## 2019-10-03 PROCEDURE — 700102 HCHG RX REV CODE 250 W/ 637 OVERRIDE(OP): Performed by: HOSPITALIST

## 2019-10-03 PROCEDURE — 700105 HCHG RX REV CODE 258: Performed by: HOSPITALIST

## 2019-10-03 PROCEDURE — 700111 HCHG RX REV CODE 636 W/ 250 OVERRIDE (IP): Performed by: HOSPITALIST

## 2019-10-03 PROCEDURE — 97530 THERAPEUTIC ACTIVITIES: CPT

## 2019-10-03 PROCEDURE — 97116 GAIT TRAINING THERAPY: CPT

## 2019-10-03 PROCEDURE — 94760 N-INVAS EAR/PLS OXIMETRY 1: CPT

## 2019-10-03 PROCEDURE — 97112 NEUROMUSCULAR REEDUCATION: CPT

## 2019-10-03 PROCEDURE — 80053 COMPREHEN METABOLIC PANEL: CPT

## 2019-10-03 PROCEDURE — 85610 PROTHROMBIN TIME: CPT

## 2019-10-03 PROCEDURE — 36415 COLL VENOUS BLD VENIPUNCTURE: CPT

## 2019-10-03 PROCEDURE — 97110 THERAPEUTIC EXERCISES: CPT

## 2019-10-03 PROCEDURE — 99233 SBSQ HOSP IP/OBS HIGH 50: CPT | Performed by: PHYSICAL MEDICINE & REHABILITATION

## 2019-10-03 PROCEDURE — A9270 NON-COVERED ITEM OR SERVICE: HCPCS | Performed by: PHYSICAL MEDICINE & REHABILITATION

## 2019-10-03 PROCEDURE — 99233 SBSQ HOSP IP/OBS HIGH 50: CPT | Performed by: HOSPITALIST

## 2019-10-03 PROCEDURE — 700102 HCHG RX REV CODE 250 W/ 637 OVERRIDE(OP): Performed by: PHYSICAL MEDICINE & REHABILITATION

## 2019-10-03 RX ORDER — WARFARIN SODIUM 1 MG/1
1 TABLET ORAL
Status: COMPLETED | OUTPATIENT
Start: 2019-10-03 | End: 2019-10-03

## 2019-10-03 RX ADMIN — PIPERACILLIN AND TAZOBACTAM 3.38 G: 3; .375 INJECTION, POWDER, LYOPHILIZED, FOR SOLUTION INTRAVENOUS; PARENTERAL at 05:36

## 2019-10-03 RX ADMIN — ACETAMINOPHEN 650 MG: 325 TABLET, FILM COATED ORAL at 20:46

## 2019-10-03 RX ADMIN — AMIODARONE HYDROCHLORIDE 200 MG: 200 TABLET ORAL at 05:34

## 2019-10-03 RX ADMIN — POTASSIUM CHLORIDE 20 MEQ: 1500 TABLET, EXTENDED RELEASE ORAL at 08:36

## 2019-10-03 RX ADMIN — FUROSEMIDE 40 MG: 40 TABLET ORAL at 05:34

## 2019-10-03 RX ADMIN — WARFARIN SODIUM 1 MG: 1 TABLET ORAL at 18:12

## 2019-10-03 RX ADMIN — SIMETHICONE CHEW TAB 80 MG 160 MG: 80 TABLET ORAL at 16:38

## 2019-10-03 RX ADMIN — FINASTERIDE 5 MG: 5 TABLET, FILM COATED ORAL at 08:36

## 2019-10-03 RX ADMIN — GABAPENTIN 400 MG: 400 CAPSULE ORAL at 05:34

## 2019-10-03 RX ADMIN — PIPERACILLIN AND TAZOBACTAM 3.38 G: 3; .375 INJECTION, POWDER, LYOPHILIZED, FOR SOLUTION INTRAVENOUS; PARENTERAL at 12:31

## 2019-10-03 RX ADMIN — SIMETHICONE CHEW TAB 80 MG 160 MG: 80 TABLET ORAL at 08:36

## 2019-10-03 RX ADMIN — SENNOSIDES, DOCUSATE SODIUM 2 TABLET: 50; 8.6 TABLET, FILM COATED ORAL at 08:36

## 2019-10-03 RX ADMIN — GABAPENTIN 400 MG: 400 CAPSULE ORAL at 16:38

## 2019-10-03 RX ADMIN — ACETAMINOPHEN 650 MG: 325 TABLET, FILM COATED ORAL at 17:02

## 2019-10-03 RX ADMIN — OMEPRAZOLE 20 MG: 20 CAPSULE, DELAYED RELEASE ORAL at 08:36

## 2019-10-03 RX ADMIN — ACETAMINOPHEN 650 MG: 325 TABLET, FILM COATED ORAL at 02:56

## 2019-10-03 RX ADMIN — VITAMIN D, TAB 1000IU (100/BT) 1000 UNITS: 25 TAB at 08:36

## 2019-10-03 RX ADMIN — SIMETHICONE CHEW TAB 80 MG 160 MG: 80 TABLET ORAL at 20:54

## 2019-10-03 RX ADMIN — SIMETHICONE CHEW TAB 80 MG 160 MG: 80 TABLET ORAL at 12:30

## 2019-10-03 RX ADMIN — ASPIRIN 325 MG ORAL TABLET 325 MG: 325 PILL ORAL at 08:36

## 2019-10-03 RX ADMIN — GABAPENTIN 400 MG: 400 CAPSULE ORAL at 12:30

## 2019-10-03 RX ADMIN — METOPROLOL TARTRATE 12.5 MG: 25 TABLET ORAL at 18:12

## 2019-10-03 RX ADMIN — POLYETHYLENE GLYCOL 3350 1 PACKET: 17 POWDER, FOR SOLUTION ORAL at 20:56

## 2019-10-03 RX ADMIN — METOPROLOL TARTRATE 12.5 MG: 25 TABLET ORAL at 05:34

## 2019-10-03 RX ADMIN — LINEZOLID 600 MG: 600 TABLET, FILM COATED ORAL at 20:47

## 2019-10-03 RX ADMIN — POTASSIUM CHLORIDE 20 MEQ: 1500 TABLET, EXTENDED RELEASE ORAL at 20:47

## 2019-10-03 RX ADMIN — PIPERACILLIN AND TAZOBACTAM 3.38 G: 3; .375 INJECTION, POWDER, LYOPHILIZED, FOR SOLUTION INTRAVENOUS; PARENTERAL at 18:12

## 2019-10-03 RX ADMIN — TRAZODONE HYDROCHLORIDE 50 MG: 50 TABLET ORAL at 20:51

## 2019-10-03 RX ADMIN — MELATONIN 6 MG: at 20:51

## 2019-10-03 RX ADMIN — TAMSULOSIN HYDROCHLORIDE 0.8 MG: 0.4 CAPSULE ORAL at 20:47

## 2019-10-03 RX ADMIN — ACETAMINOPHEN 650 MG: 325 TABLET, FILM COATED ORAL at 09:42

## 2019-10-03 RX ADMIN — GABAPENTIN 400 MG: 400 CAPSULE ORAL at 20:51

## 2019-10-03 RX ADMIN — FENOFIBRATE 67 MG: 67 CAPSULE ORAL at 08:36

## 2019-10-03 RX ADMIN — LINEZOLID 600 MG: 600 TABLET, FILM COATED ORAL at 08:36

## 2019-10-03 ASSESSMENT — ENCOUNTER SYMPTOMS
VOMITING: 0
PALPITATIONS: 0
ABDOMINAL PAIN: 0
EYES NEGATIVE: 1
SHORTNESS OF BREATH: 0
NAUSEA: 0
FEVER: 0
COUGH: 0
CHILLS: 0

## 2019-10-03 NOTE — DISCHARGE PLANNING
F/u appointments made, printed out and provided to patient and his daughter, Roslyn, so that they could arrange appointments based on travel arrangements to [facility]. Family training set up for 9:30AM Monday, 30 minutes for OT/PT each. Opportunity for questions/ discussion provided.

## 2019-10-03 NOTE — THERAPY
"Physical Therapy   Daily Treatment     Patient Name: Xavier Richardson  Age:  69 y.o., Sex:  male  Medical Record #: [id number]  Today's Date: 10/3/2019     Precautions  Precautions: (P) Fall Risk, Sternal Precautions (See Comments)  Comments: (P) Aortic aneurysm SX    Subjective    Pt was lying in bed upon arrival; still feeling sick but wanting to participate in therapy.  Feels vision is affecting his balance, would like to see a specialist to assess his vision that has seemed \"cloudy\" . Unsure if it is getting better.      Objective       10/03/19 0931   Precautions   Precautions Fall Risk;Sternal Precautions (See Comments)   Comments Aortic aneurysm SX   Interdisciplinary Plan of Care Collaboration   IDT Collaboration with  Nursing   Patient Position at End of Therapy Seated;Call Light within Reach;Tray Table within Reach;Phone within Reach   Collaboration Comments re: CLOF, nursing stated pt was safe for therapy   PT Total Time Spent   PT Individual Total Time Spent (Mins) 30   PT Charge Group   PT Therapeutic Activities 2       FIM Walking Score:  5 - Standby Prompting/Supervision or Set-up  Walking Description:  (360ft x 1 SPC SBA; 60ft x 1 no AD CGA)    FIM Wheelchair Score:  6 - Modified Independent  Wheelchair Description:  (240ft x 1, B UE, mod I)    FIM Bed/Chair/Wheelchair Transfers Score: 5 - Standby Prompting/Supervision or Set-up  Bed/Chair/Wheelchair Transfers Description:  (bed mob: mod I; transfer: SPV (d/t weakness from infection))    LB dressing shoes and pants with Billy at EOB with shoe horn    Assessment    Pt a little weaker than previous d/t recent illness but continues to demo good participation. Limited by balance and vision.     Plan    therapeutic exercise, transfer training, gait training, 4-6 stairs as tolerated, safety education       "

## 2019-10-03 NOTE — THERAPY
Physical Therapy   Daily Treatment     Patient Name: Xavier Richardson  Age:  69 y.o., Sex:  male  Medical Record #: [id number]  Today's Date: 10/3/2019     Precautions  Precautions: Fall Risk, Sternal Precautions (See Comments)  Comments: elidia    Subjective    I am feeling much better today. I would like to go outside.     Objective     10/03/19 1401   Precautions   Precautions Fall Risk;Sternal Precautions (See Comments)   Comments elidia   Pain   Intervention Declines   Non Verbal Descriptors   Non Verbal Scale  Calm   Standing Lower Body Exercises   Other Exercises balloon toss in // bars with single arm support, focus on L UE reaching and coordination   Neuro-Muscular Treatments   Neuro-Muscular Treatments Sequencing;Weight Shift Right;Weight Shift Left;Postural Changes   Comments Curb step training outdoors with spc, gait over gravel and rocks to simulate home environment; outdoor gait over cobble stones, ramps; opening doors, sit to stand from lovwer benches   Interdisciplinary Plan of Care Collaboration   IDT Collaboration with  Family / Caregiver   Patient Position at End of Therapy Seated;Family / Friend in Room   Collaboration Comments CLOF, home environment   PT Total Time Spent   PT Individual Total Time Spent (Mins) 60   PT Charge Group   PT Gait Training 2   PT Neuromuscular Re-Education / Balance 2     Outdoor gait with family present to simulate environment that patient has to walk through at home. Good use of SPC with curb and gravel pathway.     FIM Walking Score:  5 - Standby Prompting/Supervision or Set-up  Walking Description:  Assist device/equipment, Extra time, Supervision for safety(350 x 2, 200 x 2 indoors and outdoors with SPC, SBA, step-through pattern)    FIM Stairs Score:  2 - Max Assistance  Stairs Description:  Extra time, Hand rails, Supervision for safety, Ascends/descends 4 to 11 steps(6 outdoors steps with single HR, step-to pattern, SBA)      Assessment    Patient demonstrated good  endurance with outdoor gait. Able to carryon conversation throughout gait training. Able to open doors with left hand. Andrade required for Barnard catheter. Vitals stable post gait training. Daughter and wife present for treatment and taking notes for activities to continue at home.    Plan    therapeutic exercise, transfer training, gait training, 4-6 stairs as tolerated, safety education

## 2019-10-03 NOTE — CARE PLAN
Problem: Communication  Goal: The ability to communicate needs accurately and effectively will improve  Outcome: PROGRESSING AS EXPECTED  Intervention: [place] patient and significant other/support system to call light to alert staff of needs  Note:   Plan of care for the day discussed this morning with pt. All questions and concerns answered at this time. Pt reports sleeping well last night. Will continue to monitor       Problem: Pain Management  Goal: Pain level will decrease to patient's comfort goal  Outcome: PROGRESSING AS EXPECTED  Intervention: Follow pain managment plan developed in collaboration with patient and Interdisciplinary Team  Note:   Pt requesting tylenol q4hr for HA management. Pt would like tylenol scheduled. Will discuss with MD

## 2019-10-03 NOTE — PROGRESS NOTES
Pharmacy Warfarin Consult   10/3/2019     69 y.o.   male     Indication for anticoagulation: Stroke     Goal INR = 2 - 3    Recent Labs     10/01/19  0537 10/02/19  0545 10/03/19  0530   INR 2.93* 2.90* 3.18*   HEMOGLOBIN  --  9.0* 8.4*   HEMATOCRIT  --  28.8* 27.0*       Pertinent Drug/Drug Interactions:  Amiodarone, ASA, PPI, Statin, ABX  Outpatient Warfarin Regimen:  New Start  Recent Warfarin Dosing:    Dose from last 7 days     Date/Time Dose (mg)    10/03/19 0530  1    10/02/19 0545  4    10/01/19 0537  4    09/30/19 0555  2.5    09/29/19 1700  1    09/28/19 1700  5    09/27/19 0605  5          Bridge Therapy: No           1.  Warfarin 1 mg tonight or INR= 3.18           Catalino Stubbs [facility]

## 2019-10-03 NOTE — FLOWSHEET NOTE
10/03/19 0846   Events/Summary/Plan   Events/Summary/Plan O2 spot check   Respiratory WDL   Respiratory (WDL) X   Chest Exam   Respiration 18   Pulse 84   Oximetry   #Pulse Oximetry (Single Determination) Yes   Oxygen   Home O2 Use Prior To Admission? No   Pulse Oximetry 94 %   O2 Daily Delivery Respiratory  Room Air with O2 Available

## 2019-10-03 NOTE — PROGRESS NOTES
Received patient during shift change, report rec'd from day shift RN. Resting in bed, VS stable on room air. Continent of Bowel, brenner cath in place r/t urinary retention. Mod-I in room for transfers. A&O x 4, able to make needs known. Bed in low position, call light within reach.

## 2019-10-03 NOTE — PROGRESS NOTES
Rehab Progress Note     Date of Service: 10/3/2019  Chief Complaint: follow up stroke    Interval Events (Subjective)    Patient seen and evaluated in the hallway today. He is having chills/rigors. He spiked a temp last night, so his antibiotics were changed to IV. Discussed with Dr. Hart. May need repeat ECHO due to thrombus noted on earlier study. Blood cultures negative thus far.    Explained to patient and sister need to change his discharge date to next week due to his acute illness and need to follow up with urology. They are in agreement.    Objective:  VITAL SIGNS: /65   Pulse 84   Temp 36.6 °C (97.8 °F) (Temporal)   Resp 18   Ht 1.829 m (6')   Wt 120.7 kg (266 lb 1.5 oz)   SpO2 94%   BMI 36.09 kg/m²   Gen: alert, no apparent distress, rigors  CV: regular rate and rhythm, no murmurs, no peripheral edema  Resp: clear to ascultation bilaterally, normal respiratory effort  GI: soft, non-tender abdomen, bowel sounds present      Recent Results (from the past 72 hour(s))   Prothrombin Time    Collection Time: 10/01/19  5:37 AM   Result Value Ref Range    PT 31.7 (H) 12.0 - 14.6 sec    INR 2.93 (H) 0.87 - 1.13   Prothrombin Time    Collection Time: 10/02/19  5:45 AM   Result Value Ref Range    PT 31.4 (H) 12.0 - 14.6 sec    INR 2.90 (H) 0.87 - 1.13   CBC WITHOUT DIFFERENTIAL    Collection Time: 10/02/19  5:45 AM   Result Value Ref Range    WBC 14.0 (H) 4.8 - 10.8 K/uL    RBC 3.02 (L) 4.70 - 6.10 M/uL    Hemoglobin 9.0 (L) 14.0 - 18.0 g/dL    Hematocrit 28.8 (L) 42.0 - 52.0 %    MCV 95.4 81.4 - 97.8 fL    MCH 29.8 27.0 - 33.0 pg    MCHC 31.3 (L) 33.7 - 35.3 g/dL    RDW 60.6 (H) 35.9 - 50.0 fL    Platelet Count 148 (L) 164 - 446 K/uL    MPV 8.2 (L) 9.0 - 12.9 fL   Basic Metabolic Panel    Collection Time: 10/02/19  5:45 AM   Result Value Ref Range    Sodium 135 135 - 145 mmol/L    Potassium 3.6 3.6 - 5.5 mmol/L    Chloride 104 96 - 112 mmol/L    Co2 24 20 - 33 mmol/L    Glucose 101 (H) 65 - 99 mg/dL     Bun 10 8 - 22 mg/dL    Creatinine 1.02 0.50 - 1.40 mg/dL    Calcium 9.2 8.5 - 10.5 mg/dL    Anion Gap 7.0 0.0 - 11.9   ESTIMATED GFR    Collection Time: 10/02/19  5:45 AM   Result Value Ref Range    GFR If African American >60 >60 mL/min/1.73 m 2    GFR If Non African American >60 >60 mL/min/1.73 m 2   URINALYSIS    Collection Time: 10/02/19 10:25 AM   Result Value Ref Range    Color Yellow     Character Clear     Specific Gravity 1.008 <1.035    Ph 6.5 5.0 - 8.0    Glucose Negative Negative mg/dL    Ketones Negative Negative mg/dL    Protein Negative Negative mg/dL    Bilirubin Negative Negative    Urobilinogen, Urine 1.0 Negative    Nitrite Negative Negative    Leukocyte Esterase Large (A) Negative    Occult Blood Small (A) Negative    Micro Urine Req Microscopic    URINE MICROSCOPIC (W/UA)    Collection Time: 10/02/19 10:25 AM   Result Value Ref Range    WBC 20-50 (A) /hpf    RBC 0-2 (A) /hpf    Bacteria Moderate (A) None /hpf    Epithelial Cells Negative /hpf    Hyaline Cast 0-2 /lpf   CULTURE WOUND W/ GRAM STAIN    Collection Time: 10/02/19 11:20 AM   Result Value Ref Range    Significant Indicator POS (POS)     Source WND     Site Right Groin Upper     Culture Result - (A)     Gram Stain Result       Rare WBCs.  Few Gram positive cocci.  Few Gram positive rods.      Culture Result (A)      Staphylococcus aureus  Heavy growth  Methicillin senstivie via screening method      Culture Result (A)      Non-lactose fermenting Gram negative ankush  Light growth     CULTURE WOUND W/ GRAM STAIN    Collection Time: 10/02/19 11:20 AM   Result Value Ref Range    Significant Indicator POS (POS)     Source WND     Site Right Groin Lower     Culture Result - (A)     Gram Stain Result       Rare WBCs.  Rare Gram positive cocci.  Rare Gram positive rods.      Culture Result Staphylococcus aureus  Heavy growth   (A)    GRAM STAIN    Collection Time: 10/02/19 11:20 AM   Result Value Ref Range    Significant Indicator .     Source  WND     Site Right Groin Lower     Gram Stain Result       Rare WBCs.  Rare Gram positive cocci.  Rare Gram positive rods.     GRAM STAIN    Collection Time: 10/02/19 11:20 AM   Result Value Ref Range    Significant Indicator .     Source WND     Site Right Groin Upper     Gram Stain Result       Rare WBCs.  Few Gram positive cocci.  Few Gram positive rods.     BLOOD CULTURE    Collection Time: 10/02/19  5:40 PM   Result Value Ref Range    Significant Indicator NEG     Source BLD     Site PERIPHERAL     Culture Result       No Growth  Note: Blood cultures are incubated for 5 days and  are monitored continuously.Positive blood cultures  are called to the RN and reported as soon as  they are identified.     BLOOD CULTURE    Collection Time: 10/02/19  5:40 PM   Result Value Ref Range    Significant Indicator NEG     Source BLD     Site PERIPHERAL     Culture Result       No Growth  Note: Blood cultures are incubated for 5 days and  are monitored continuously.Positive blood cultures  are called to the RN and reported as soon as  they are identified.     Prothrombin Time    Collection Time: 10/03/19  5:30 AM   Result Value Ref Range    PT 33.8 (H) 12.0 - 14.6 sec    INR 3.18 (H) 0.87 - 1.13   CBC WITH DIFFERENTIAL    Collection Time: 10/03/19  5:30 AM   Result Value Ref Range    WBC 13.5 (H) 4.8 - 10.8 K/uL    RBC 2.77 (L) 4.70 - 6.10 M/uL    Hemoglobin 8.4 (L) 14.0 - 18.0 g/dL    Hematocrit 27.0 (L) 42.0 - 52.0 %    MCV 97.5 81.4 - 97.8 fL    MCH 30.3 27.0 - 33.0 pg    MCHC 31.1 (L) 33.7 - 35.3 g/dL    RDW 60.7 (H) 35.9 - 50.0 fL    Platelet Count 135 (L) 164 - 446 K/uL    MPV 8.7 (L) 9.0 - 12.9 fL    Neutrophils-Polys 86.00 (H) 44.00 - 72.00 %    Lymphocytes 5.20 (L) 22.00 - 41.00 %    Monocytes 7.70 0.00 - 13.40 %    Eosinophils 0.40 0.00 - 6.90 %    Basophils 0.10 0.00 - 1.80 %    Immature Granulocytes 0.60 0.00 - 0.90 %    Nucleated RBC 0.00 /100 WBC    Neutrophils (Absolute) 11.63 (H) 1.82 - 7.42 K/uL    Lymphs  (Absolute) 0.70 (L) 1.00 - 4.80 K/uL    Monos (Absolute) 1.04 (H) 0.00 - 0.85 K/uL    Eos (Absolute) 0.06 0.00 - 0.51 K/uL    Baso (Absolute) 0.02 0.00 - 0.12 K/uL    Immature Granulocytes (abs) 0.08 0.00 - 0.11 K/uL    NRBC (Absolute) 0.00 K/uL   Comp Metabolic Panel    Collection Time: 10/03/19  5:30 AM   Result Value Ref Range    Sodium 136 135 - 145 mmol/L    Potassium 3.6 3.6 - 5.5 mmol/L    Chloride 105 96 - 112 mmol/L    Co2 22 20 - 33 mmol/L    Anion Gap 9.0 0.0 - 11.9    Glucose 92 65 - 99 mg/dL    Bun 11 8 - 22 mg/dL    Creatinine 1.12 0.50 - 1.40 mg/dL    Calcium 8.7 8.5 - 10.5 mg/dL    AST(SGOT) 11 (L) 12 - 45 U/L    ALT(SGPT) 5 2 - 50 U/L    Alkaline Phosphatase 60 30 - 99 U/L    Total Bilirubin 0.8 0.1 - 1.5 mg/dL    Albumin 3.3 3.2 - 4.9 g/dL    Total Protein 5.8 (L) 6.0 - 8.2 g/dL    Globulin 2.5 1.9 - 3.5 g/dL    A-G Ratio 1.3 g/dL   ESTIMATED GFR    Collection Time: 10/03/19  5:30 AM   Result Value Ref Range    GFR If African American >60 >60 mL/min/1.73 m 2    GFR If Non African American >60 >60 mL/min/1.73 m 2       Current Facility-Administered Medications   Medication Frequency   • warfarin (COUMADIN) tablet 1 mg ONCE AT 1800   • senna-docusate (PERICOLACE or SENOKOT S) 8.6-50 MG per tablet 2 Tab DAILY    And   • polyethylene glycol/lytes (MIRALAX) PACKET 1 Packet QDAY PRN    And   • magnesium hydroxide (MILK OF MAGNESIA) suspension 30 mL QDAY PRN    And   • bisacodyl (DULCOLAX) suppository 10 mg QDAY PRN   • piperacillin-tazobactam (ZOSYN) 3.375 g in  mL IVPB Q6HRS   • linezolid (ZYVOX) tablet 600 mg Q12HRS   • gabapentin (NEURONTIN) capsule 400 mg 4X/DAY   • traZODone (DESYREL) tablet 50 mg QHS   • furosemide (LASIX) tablet 40 mg Q DAY   • potassium chloride SA (Kdur) tablet 20 mEq BID   • melatonin tablet 6 mg QHS   • vitamin D (cholecalciferol) tablet 1,000 Units DAILY   • metoprolol (LOPRESSOR) tablet 12.5 mg TWICE DAILY   • Respiratory Care per Protocol Continuous RT   • Pharmacy  Consult Request ...Pain Management Review 1 Each PHARMACY TO DOSE   • acetaminophen (TYLENOL) tablet 650 mg Q4HRS PRN   • hydrALAZINE (APRESOLINE) tablet 10 mg Q8HRS PRN   • artificial tears ophthalmic solution 1 Drop PRN   • benzocaine-menthol (CEPACOL) lozenge 1 Lozenge Q2HRS PRN   • mag hydrox-al hydrox-simeth (MAALOX PLUS ES or MYLANTA DS) suspension 20 mL Q2HRS PRN   • ondansetron (ZOFRAN ODT) dispertab 4 mg 4X/DAY PRN    Or   • ondansetron (ZOFRAN) syringe/vial injection 4 mg 4X/DAY PRN   • sodium chloride (OCEAN) 0.65 % nasal spray 2 Spray PRN   • hydrOXYzine HCl (ATARAX) tablet 50 mg Q6HRS PRN   • MD Alert...Warfarin per Pharmacy PHARMACY TO DOSE   • amiodarone (CORDARONE) tablet 200 mg Q DAY   • aspirin (ASA) tablet 325 mg DAILY   • fenofibrate micronized (LOFIBRA) capsule 67 mg AFTER BREAKFAST   • finasteride (PROSCAR) tablet 5 mg DAILY   • omeprazole (PRILOSEC) capsule 20 mg QAM AC   • tamsulosin (FLOMAX) capsule 0.8 mg QHS   • simethicone (MYLICON) chewable tab 160 mg 4X/DAY WITH MEALS + NIGHTLY       Orders Placed This Encounter   Procedures   • Diet Order Regular     Standing Status:   Standing     Number of Occurrences:   1     Order Specific Question:   Diet:     Answer:   Regular [1]       Radiology  Transthoracic  Echo Report      Echocardiography Laboratory    CONCLUSIONS  No prior study is available for comparison.   The left ventricle was normal in size.  Mild concentric left ventricular hypertrophy.  Normal left ventricular systolic function and regional wall motion.  Left ventricular ejection fraction is estimated to be 60%.  Aortic sclerosis without stenosis.  Trace mitral regurgitation.  The left atrium is normal in size.  Dilated inferior vena cava with inspiratory collapse.  Estimated right ventricular systolic pressure  is 45 mmHg.  Mobile echodensity noted in the aortic arch/proximal left subclavian   artery.    DONALD KNOTT  Exam Date:         09/24/2019                       11:45  Exam Location:     Inpatient  Priority:          Routine    Assessment:  Active Hospital Problems    Diagnosis   • *Stroke (cerebrum) (HCC)   • Essential hypertension   • JESUS MANUEL (obstructive sleep apnea)   • Peripheral neuropathy   • Other insomnia   • Dissection of thoracoabdominal aorta (HCC)   • Lung nodule   • Shoulder lesion, left   • Dyslipidemia   • Urinary retention   • Fluid overload   • A-fib (HCC)     This patient is a 69 y.o. male admitted for acute inpatient rehabilitation with Stroke (cerebrum) (HCC).    I led and attended the weekly conference and agree with the IDT conference documentation and plan of care as noted below.    Date of conference: 10/2/2019    Goals and barriers: See IDT note.    Biggest barriers: urinary retention, poor balance, poor endurance    Goals in next week: treat infection    Admission FIM 74 --> 95    CM/social support: lives alone, daughter to return from [facility] to assist at discharge    Anticipated DC date: 10/9/2019    Outpatient: PT    Equip: SPC    Follow up: PCP, cardiology, urology, pulmonology     Medical Decision Making and Plan:    Bilateral ischemic strokes  Hemorrhagic transformation in right parietal/occipital lobe  Left hemiparesis, improved  Non-dominant  Visual deficits, continued  PT/OT, 1.5 hr each discipline, 5 days per week  SLP eval, cog within normal  Continue aspirin and coumadin per outside recs  Neuro-ophthalmology referral - Dr. Wilder, made    MRI imaging reviewed with patient/family    Acute urinary retention  Does have a history of BPH  Continue Proscar and Flomax  Voiding after Barnard removal, but still retaining  Replaced Barnard  Referral to urology - apt already made - for day of discharge 10/9     Peripheral neuropathy  Increased gabapentin 400 mg TID --> QID per patient request, home dosing    Insomnia  Continue melatonin and trazodone at 50 mg - did not tolerate higher doses    Bowel  Meds as needed  Last BM 10/3    DVT  prophylaxis  Coumadin     Appreciate the assistance of the hospitalist with the patient's medical comorbidities:     Aortic dissection  Hypertension, controlled  Hyperlipidemia  Atrial fibrillation  Pulmonary hypertension, RSVP 45  Sleep apnea, non-compliant with CPAP  Left pleural effusion  Left lung nodule, outpatient follow up  Left shoulder lesion, outpatient follow up  Anemia, stable  Leukopenia, stable  Vit D insufficiency, on supplementation    +Leukocytosis/lethargy, +UA - culture pending, blood cultures NGTD, wound cultures from groin site pending and CXR negative - now with fevers/rigors - antibiotics changed to IV    Total time:  38 minutes.  I spent greater than 50% of the time for patient care, counseling, and coordination on this date, including patient face-to face time, unit/floor time with review of records/pertinent lab data and studies, as well as discussing diagnostic evaluation/work up, planned therapeutic interventions, and future disposition of care, as per the interval events/subjective and the assessment and plan as noted above.    I have performed a physical exam, reviewed and updated ROS, as well as the assessment and plan today 10/3/2019. In review of note from 10/2/2019 there are no new changes except as documented above.          Amira Davis M.D.   Physical Medicine and Rehabilitation

## 2019-10-03 NOTE — PROGRESS NOTES
Hospital Medicine Daily Progress Note        Chief Complaint  S/P Aortic Dissection, AFib, HTN    Interval Problem Update  Tmax 103.6 overnight, Levaquin changed to Zosyn and Zyvox added.  Has been afebrile since and less lethargic this morning.    Review of Systems  Review of Systems   Constitutional: Positive for malaise/fatigue. Negative for chills and fever.   HENT: Negative.    Eyes: Negative.    Respiratory: Negative for cough and shortness of breath.    Cardiovascular: Negative for chest pain and palpitations.   Gastrointestinal: Negative for abdominal pain, nausea and vomiting.   Skin: Negative for itching and rash.        Physical Exam  Temp:  [36.6 °C (97.8 °F)-39.8 °C (103.6 °F)] 36.6 °C (97.8 °F)  Pulse:  [] 84  Resp:  [18-28] 18  BP: (106-138)/(63-79) 106/65  SpO2:  [88 %-98 %] 94 %    Physical Exam   Constitutional: He is oriented to person, place, and time. No distress.   HENT:   Head: Normocephalic and atraumatic.   Right Ear: External ear normal.   Left Ear: External ear normal.   Eyes: Conjunctivae and EOM are normal. Right eye exhibits no discharge. Left eye exhibits no discharge.   Neck: Normal range of motion. Neck supple. No tracheal deviation present.   Cardiovascular:   irreg irreg   Pulmonary/Chest: No stridor. No respiratory distress. He has no wheezes.   Decreased BS   Abdominal: Soft. Bowel sounds are normal. He exhibits no distension. There is no tenderness.   Musculoskeletal: He exhibits edema.   Neurological: He is alert and oriented to person, place, and time.   Skin: Skin is warm and dry. He is not diaphoretic.   Right groin wound w/o flutuance or induration but has small opening in proximal aspect and larger opening in distal aspect w/ mild serous drainage   Vitals reviewed.      Fluids    Intake/Output Summary (Last 24 hours) at 10/3/2019 1129  Last data filed at 10/3/2019 0846  Gross per 24 hour   Intake 360 ml   Output 3400 ml   Net -3040 ml       Laboratory  Recent Labs      10/02/19  0545 10/03/19  0530   WBC 14.0* 13.5*   RBC 3.02* 2.77*   HEMOGLOBIN 9.0* 8.4*   HEMATOCRIT 28.8* 27.0*   MCV 95.4 97.5   MCH 29.8 30.3   MCHC 31.3* 31.1*   RDW 60.6* 60.7*   PLATELETCT 148* 135*   MPV 8.2* 8.7*     Recent Labs     10/02/19  0545 10/03/19  0530   SODIUM 135 136   POTASSIUM 3.6 3.6   CHLORIDE 104 105   CO2 24 22   GLUCOSE 101* 92   BUN 10 11   CREATININE 1.02 1.12   CALCIUM 9.2 8.7     Recent Labs     10/01/19  0537 10/02/19  0545 10/03/19  0530   INR 2.93* 2.90* 3.18*               Assessment/Plan  * 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)  Assessment & Plan  On ASA and Fenofibrate    Thrombocytopenia (HCC)  Assessment & Plan  Follow Platelet counts on anticoagulation    Leukocytosis  Assessment & Plan  UA 20-50 WBC w/ moderate bacteria, UCx pending  CXR w/ improved inflation and improved left lung base consolidation  BCx x 2 in progress  Right groin wound cx +staph  Continue Zosyn and Zyvox pending final C+S    Vitamin D deficiency  Assessment & Plan  Vit D level 27  On supplementation    Anemia  Assessment & Plan  Follow H/H on anticoagulation    A-fib (HCC)  Assessment & Plan  On Amiodarone  Anticoagulated on Coumadin    Fluid overload  Assessment & Plan  Echo EF 60% and RVSP 45 mmHg  U/S BLE negative for DVT  Edema most likely 2/2 recent cardiac surgery  BNP improving w/ diuresis   Continue Lasix    Urinary retention  Assessment & Plan  Monitor for orthostatic hypotension on Flomax and Proscar    Dyslipidemia  Assessment & Plan  On Fenofibrate    Shoulder lesion, left- (present on admission)  Assessment & Plan  Will need F/U imaging    Lung nodule- (present on admission)  Assessment & Plan  Will need F/U imaging    Dissection of thoracoabdominal aorta (HCC)- (present on admission)  Assessment & Plan  S/P hypothermic protocal and repair at [facility]  Echo EF 60% and RVSP 45 mmHg, suspect mobile echodensity in aortic arch represents post-op changes    Essential hypertension-  (present on admission)  Assessment & Plan  Observe blood pressure trends on Metoprolol    Full Code    Reviewed w/ pt, RN, and Dr. Davis

## 2019-10-03 NOTE — CARE PLAN
Problem: Safety  Goal: Will remain free from injury  Intervention: Educate patient and significant other/support system about adaptive mobility strategies and safe transfers  Note:   Using call light for assist as needed. Bed in low position, call light within reach.     Problem: Infection  Goal: Will remain free from infection  Intervention: Assess signs and symptoms of infection  Note:   VS stable. Afebrile. IV ABT administered as ordered, tolerating well.     Problem: Respiratory:  Goal: Respiratory status will improve  Intervention: Administer and titrate oxygen therapy  Note:   Spot check of O2 on room air 88%. Placed O2 via nc at 1/lpm, improved to 92%. No s/s of distress.

## 2019-10-03 NOTE — THERAPY
"Occupational Therapy  Daily Treatment     Patient Name: Xavier Richardson  Age:  69 y.o., Sex:  male  Medical Record #: [id number]  Today's Date: 10/3/2019     Precautions  Precautions: Fall Risk, Sternal Precautions (See Comments)  Comments: Aortic aneurysm SX    Safety   ADL Safety : (P) Requires Supervision for Safety  Bathroom Safety: (P) Requires Supervision for Safety  Comments: (P) Completed ADL routine at min to supervised level this session, slightly unsteady on his feet requiring CGA for mobility with SPC and close SBA for standing components    Subjective    \"I'm feeling better today, a little\"     Objective       10/03/19 0715   Precautions   Precautions Fall Risk;Sternal Precautions (See Comments)   Comments brenner   Safety    ADL Safety  Requires Supervision for Safety   Bathroom Safety Requires Supervision for Safety   Comments Completed ADL routine at min to supervised level this session, slightly unsteady on his feet requiring CGA for mobility with SPC and close SBA for standing components   OT Total Time Spent   OT Individual Total Time Spent (Mins) 60   OT Charge Group   OT Self Care / ADL 4       FIM Grooming Score:  5 - Standby Prompting/Supervision or Set-up  Grooming Description:  (SBA standing at sink for oral care, hair combing)    FIM Bathing Score:  5 - Standby Prompting/Supervision or Set-up  Bathing Description:       FIM Upper Body Dressin - Standby Prompting/Supervision or Set-up  Upper Body Dressing Description:  Set-up of equipment    FIM Lower Body Dressing Score:  4 - Minimal Assistance  Lower Body Dressing Description:  (min A for catheter management and LLE threading, verbal cues for sequencing to manage catheter)    FIM Toiletin - Standby Prompting/Supervision or Set-up  Toileting Description:  (sup for safety during standing components)    FIM Toilet Transfer Score:  4 - Minimal Assistance  Toilet Transfer Description:  (CGA ambulation to/from bathroom with SPC and on/off " regular toilet)    FIM Tub/Shower Transfers Score:  4 - Minimal Assistance  Tub/Shower Transfers Description:  Grab bar(SPC from toilet to shower, CGA)      Assessment    Pt tolerated session well, is feeling better than yesterday but is notably less steady and with decreased endurance from status 2 days ago. He requires CGA for mobility with SPC and close SBA for standing tasks w/o UE support.     Plan    Continue to progress LUE GM/FM coordination and strength, functional standing balance/tolerance, endurance, continue to assess  deficits,  with tech as adjunct

## 2019-10-03 NOTE — THERAPY
Occupational Therapy  Daily Treatment     Patient Name: Xavier Richardson  Age:  69 y.o., Sex:  male  Medical Record #: [id number]  Today's Date: 10/3/2019     Precautions  Precautions: (P) Fall Risk, Sternal Precautions (See Comments)  Comments: (P) brenner    Safety   ADL Safety : Requires Supervision for Safety  Bathroom Safety: Requires Supervision for Safety  Comments: Completed ADL routine at min to supervised level this session, slightly unsteady on his feet requiring CGA for mobility with SPC and close SBA for standing components    Subjective    Pt agreeable to OT     Objective       10/03/19 1031   Precautions   Precautions Fall Risk;Sternal Precautions (See Comments)   Comments brenner   Supine Upper Body Exercises   Chest Press 2 sets of 15  (3# dowel, 5 sec holds in extension)   Front Arm Raise 2 sets of 15  (3# dowel)   Sitting Upper Body Exercises   Other Exercise tabletop horizontal adduction/abduction with washcloth for decreased resistance 1x20 clockwise/counterclockwise, sliderbox 1x20 level surface, 1x20 level 1 incline, 1x20 level 2, 1x20 level 3   Comments improving tolerance and quality of movement during LUE therex   Sitting Lower Body Exercises   Nustep Resistance Level 3  (13 min)   OT Total Time Spent   OT Individual Total Time Spent (Mins) 60   OT Charge Group   OT Therapy Activity 2   OT Therapeutic Exercise  2     Assessment    Pt tolerated session well with focus on progression of LUE function; demos improved tolerance and quality of movement during therex. Continues with decreased endurance from earlier in the week but participating well with frequent rest breaks.     Plan    Continue to progress LUE GM/FM coordination and strength, functional standing balance/tolerance, endurance, continue to assess  deficits,  with tech as adjunct

## 2019-10-03 NOTE — DISCHARGE PLANNING
Case Management Discharge Instructions        Discharge Location: Home with Outpatient Services     Agency Name / Address / Phone: [facility], [address], [phone]      Outpatient Services: Physical Therapy     DME Provider / Phone: [phone]     Medical Equipment Ordered: Cane         NOTE: This information can be found in your final discharge packet that your nurse will give you.         Follow-up Information:  [facility]  [address] [facility] [address]  [phone]   On 10/9/2019  Urology - Wednesday, Oct. 9, 2019 @ 10:30AM, 10 AM check in    David Robles M.D.  [address] [facility] [id number] [phone]  On 10/15/2019  @ 11:15AM. Need CXR, lab work prior to appointment. These may be done on a walk in basis at [facility] or take results with you.  CXR must be provided on a CD if done anywhere besides [facility].      MITCHELL Crabtree.  [address] [facility] [address]  [phone]   On 10/17/2019  Thursday @ 10AM. She will manage INR draws and Coumadin dosage.    Valeria Yancey M.D.  [address]  [address]  [facility] [address]  [phone]   Pulmonary - On 10/29/2019  @9:45AM     Caesar Wilder M.D.  [address]  [address]  [facility] [id number] [phone]    Office to contact patient directly to schedule appointment

## 2019-10-04 ENCOUNTER — APPOINTMENT (OUTPATIENT)
Dept: RADIOLOGY | Facility: REHABILITATION | Age: 69
DRG: 057 | End: 2019-10-04
Attending: HOSPITALIST
Payer: MEDICARE

## 2019-10-04 PROBLEM — N39.0 UTI (URINARY TRACT INFECTION): Status: ACTIVE | Noted: 2019-10-02

## 2019-10-04 LAB
BACTERIA WND AEROBE CULT: ABNORMAL
GRAM STN SPEC: ABNORMAL
GRAM STN SPEC: ABNORMAL
INR PPP: 3.14 (ref 0.87–1.13)
PROTHROMBIN TIME: 33.5 SEC (ref 12–14.6)
SIGNIFICANT IND 70042: ABNORMAL
SIGNIFICANT IND 70042: ABNORMAL
SITE SITE: ABNORMAL
SITE SITE: ABNORMAL
SOURCE SOURCE: ABNORMAL
SOURCE SOURCE: ABNORMAL

## 2019-10-04 PROCEDURE — 97110 THERAPEUTIC EXERCISES: CPT

## 2019-10-04 PROCEDURE — 770010 HCHG ROOM/CARE - REHAB SEMI PRIVAT*

## 2019-10-04 PROCEDURE — 36415 COLL VENOUS BLD VENIPUNCTURE: CPT

## 2019-10-04 PROCEDURE — 97116 GAIT TRAINING THERAPY: CPT

## 2019-10-04 PROCEDURE — 700102 HCHG RX REV CODE 250 W/ 637 OVERRIDE(OP): Performed by: HOSPITALIST

## 2019-10-04 PROCEDURE — A9270 NON-COVERED ITEM OR SERVICE: HCPCS | Performed by: PHYSICAL MEDICINE & REHABILITATION

## 2019-10-04 PROCEDURE — 97112 NEUROMUSCULAR REEDUCATION: CPT

## 2019-10-04 PROCEDURE — 76775 US EXAM ABDO BACK WALL LIM: CPT

## 2019-10-04 PROCEDURE — A9270 NON-COVERED ITEM OR SERVICE: HCPCS | Performed by: HOSPITALIST

## 2019-10-04 PROCEDURE — 700102 HCHG RX REV CODE 250 W/ 637 OVERRIDE(OP): Performed by: PHYSICAL MEDICINE & REHABILITATION

## 2019-10-04 PROCEDURE — 94760 N-INVAS EAR/PLS OXIMETRY 1: CPT

## 2019-10-04 PROCEDURE — 99232 SBSQ HOSP IP/OBS MODERATE 35: CPT | Performed by: HOSPITALIST

## 2019-10-04 PROCEDURE — 99232 SBSQ HOSP IP/OBS MODERATE 35: CPT | Performed by: PHYSICAL MEDICINE & REHABILITATION

## 2019-10-04 PROCEDURE — 85610 PROTHROMBIN TIME: CPT

## 2019-10-04 PROCEDURE — 97530 THERAPEUTIC ACTIVITIES: CPT

## 2019-10-04 PROCEDURE — 700105 HCHG RX REV CODE 258: Performed by: HOSPITALIST

## 2019-10-04 PROCEDURE — 700111 HCHG RX REV CODE 636 W/ 250 OVERRIDE (IP): Performed by: HOSPITALIST

## 2019-10-04 RX ORDER — WARFARIN SODIUM 2.5 MG/1
2.5 TABLET ORAL
Status: COMPLETED | OUTPATIENT
Start: 2019-10-04 | End: 2019-10-04

## 2019-10-04 RX ADMIN — SENNOSIDES, DOCUSATE SODIUM 2 TABLET: 50; 8.6 TABLET, FILM COATED ORAL at 08:07

## 2019-10-04 RX ADMIN — ACETAMINOPHEN 650 MG: 325 TABLET, FILM COATED ORAL at 01:17

## 2019-10-04 RX ADMIN — FENOFIBRATE 67 MG: 67 CAPSULE ORAL at 08:07

## 2019-10-04 RX ADMIN — GABAPENTIN 400 MG: 400 CAPSULE ORAL at 21:05

## 2019-10-04 RX ADMIN — POTASSIUM CHLORIDE 20 MEQ: 1500 TABLET, EXTENDED RELEASE ORAL at 08:06

## 2019-10-04 RX ADMIN — ACETAMINOPHEN 650 MG: 325 TABLET, FILM COATED ORAL at 17:57

## 2019-10-04 RX ADMIN — SIMETHICONE CHEW TAB 80 MG 160 MG: 80 TABLET ORAL at 08:06

## 2019-10-04 RX ADMIN — ASPIRIN 325 MG ORAL TABLET 325 MG: 325 PILL ORAL at 08:06

## 2019-10-04 RX ADMIN — PIPERACILLIN AND TAZOBACTAM 3.38 G: 3; .375 INJECTION, POWDER, LYOPHILIZED, FOR SOLUTION INTRAVENOUS; PARENTERAL at 17:55

## 2019-10-04 RX ADMIN — GABAPENTIN 400 MG: 400 CAPSULE ORAL at 17:55

## 2019-10-04 RX ADMIN — SIMETHICONE CHEW TAB 80 MG 160 MG: 80 TABLET ORAL at 17:55

## 2019-10-04 RX ADMIN — FUROSEMIDE 40 MG: 40 TABLET ORAL at 05:48

## 2019-10-04 RX ADMIN — ACETAMINOPHEN 650 MG: 325 TABLET, FILM COATED ORAL at 11:54

## 2019-10-04 RX ADMIN — GABAPENTIN 400 MG: 400 CAPSULE ORAL at 11:54

## 2019-10-04 RX ADMIN — VITAMIN D, TAB 1000IU (100/BT) 1000 UNITS: 25 TAB at 08:06

## 2019-10-04 RX ADMIN — METOPROLOL TARTRATE 12.5 MG: 25 TABLET ORAL at 17:55

## 2019-10-04 RX ADMIN — WARFARIN SODIUM 2.5 MG: 2.5 TABLET ORAL at 17:55

## 2019-10-04 RX ADMIN — MELATONIN 6 MG: at 21:05

## 2019-10-04 RX ADMIN — OMEPRAZOLE 20 MG: 20 CAPSULE, DELAYED RELEASE ORAL at 08:07

## 2019-10-04 RX ADMIN — GABAPENTIN 400 MG: 400 CAPSULE ORAL at 05:47

## 2019-10-04 RX ADMIN — TRAZODONE HYDROCHLORIDE 50 MG: 50 TABLET ORAL at 21:02

## 2019-10-04 RX ADMIN — SIMETHICONE CHEW TAB 80 MG 160 MG: 80 TABLET ORAL at 11:54

## 2019-10-04 RX ADMIN — PIPERACILLIN AND TAZOBACTAM 3.38 G: 3; .375 INJECTION, POWDER, LYOPHILIZED, FOR SOLUTION INTRAVENOUS; PARENTERAL at 00:07

## 2019-10-04 RX ADMIN — SIMETHICONE CHEW TAB 80 MG 160 MG: 80 TABLET ORAL at 21:02

## 2019-10-04 RX ADMIN — ACETAMINOPHEN 650 MG: 325 TABLET, FILM COATED ORAL at 05:48

## 2019-10-04 RX ADMIN — TAMSULOSIN HYDROCHLORIDE 0.8 MG: 0.4 CAPSULE ORAL at 21:05

## 2019-10-04 RX ADMIN — PIPERACILLIN AND TAZOBACTAM 3.38 G: 3; .375 INJECTION, POWDER, LYOPHILIZED, FOR SOLUTION INTRAVENOUS; PARENTERAL at 11:51

## 2019-10-04 RX ADMIN — POTASSIUM CHLORIDE 20 MEQ: 1500 TABLET, EXTENDED RELEASE ORAL at 21:06

## 2019-10-04 RX ADMIN — METOPROLOL TARTRATE 12.5 MG: 25 TABLET ORAL at 05:48

## 2019-10-04 RX ADMIN — AMIODARONE HYDROCHLORIDE 200 MG: 200 TABLET ORAL at 05:46

## 2019-10-04 RX ADMIN — FINASTERIDE 5 MG: 5 TABLET, FILM COATED ORAL at 08:06

## 2019-10-04 RX ADMIN — LINEZOLID 600 MG: 600 TABLET, FILM COATED ORAL at 08:07

## 2019-10-04 RX ADMIN — PIPERACILLIN AND TAZOBACTAM 3.38 G: 3; .375 INJECTION, POWDER, LYOPHILIZED, FOR SOLUTION INTRAVENOUS; PARENTERAL at 05:55

## 2019-10-04 ASSESSMENT — ENCOUNTER SYMPTOMS
VOMITING: 0
SHORTNESS OF BREATH: 0
COUGH: 0
EYES NEGATIVE: 1
ABDOMINAL PAIN: 0
NAUSEA: 0
PALPITATIONS: 0
FEVER: 0
CHILLS: 0

## 2019-10-04 NOTE — FLOWSHEET NOTE
10/04/19 0945   Events/Summary/Plan   Events/Summary/Plan O2 spot check   Chest Exam   Respiration 18   Pulse 80   Oximetry   #Pulse Oximetry (Single Determination) Yes   Oxygen   Home O2 Use Prior To Admission? No   Pulse Oximetry 95 %   O2 Daily Delivery Respiratory  Room Air with O2 Available

## 2019-10-04 NOTE — CARE PLAN
Problem: Communication  Goal: The ability to communicate needs accurately and effectively will improve  Outcome: PROGRESSING AS EXPECTED  Intervention: Sylvester patient and significant other/support system to call light to alert staff of needs  Note:   Plan of care for the day discussed this morning with pt. All questions and concerns answered at this time. Pt reports sleeping well last night. Pt reports feeling better than the past few days. Will continue to monitor      Problem: Urinary Elimination:  Goal: Ability to reestablish a normal urinary elimination pattern will improve  Outcome: PROGRESSING AS EXPECTED  Intervention: Evaluate need to continue indwelling urinary catheter  Note:   Pt able to clean cath self. Pt was cleaning with soap and water this morning. Urine still having clots and bloody. Pt on abx for UTI. Pt denies bladder or kidney pain. MD aware of clots in urine. Urine still draining well.

## 2019-10-04 NOTE — THERAPY
10/04/19 0929   Precautions   Precautions Fall Risk;Sternal Precautions (See Comments);Other (See Comments)   Comments Barnard catheter   Cognition    Level of Consciousness Alert   Standing Lower Body Exercises   Hip Flexion 1 set of 15;Bilateral   Hip Extension 1 set of 15;Bilateral    Hip Abduction 1 set of 15;Bilateral   Heel Rise 1 set of 15;Bilateral   Mini Lunge 1 set of 15;Partial   Mini Squat 1 set of 15;Partial   Bed Mobility    Supine to Sit Modified Independent   Sit to Supine Modified Independent   Sit to Stand Stand by Assist   Scooting Modified Independent   Rolling Modified Independent   Neuro-Muscular Treatments   Neuro-Muscular Treatments Sequencing;Verbal Cuing;Weight Shift Right;Weight Shift Left   Comments Sequencing sit to/from stand with sternal precautions, prolonged right leaning parallel bars in order to correct standing left leaning, standing lower extremity hip flexion diagonals   PT Total Time Spent   PT Individual Total Time Spent (Mins) 60   PT Charge Group   PT Gait Training 2   PT Therapeutic Exercise 1   PT Neuromuscular Re-Education / Balance 1        10/04/19 0929   Precautions   Precautions Fall Risk;Sternal Precautions (See Comments);Other (See Comments)   Comments Barnard catheter   Cognition    Level of Consciousness Alert   Standing Lower Body Exercises   Hip Flexion 1 set of 15;Bilateral   Hip Extension 1 set of 15;Bilateral    Hip Abduction 1 set of 15;Bilateral   Heel Rise 1 set of 15;Bilateral   Mini Lunge 1 set of 15;Partial   Mini Squat 1 set of 15;Partial   Bed Mobility    Supine to Sit Modified Independent   Sit to Supine Modified Independent   Sit to Stand Stand by Assist   Scooting Modified Independent   Rolling Modified Independent   Neuro-Muscular Treatments   Neuro-Muscular Treatments Sequencing;Verbal Cuing;Weight Shift Right;Weight Shift Left   Comments Sequencing sit to/from stand with sternal precautions, prolonged right leaning parallel bars in order to  correct standing left leaning, standing lower extremity hip flexion diagonals   PT Total Time Spent   PT Individual Total Time Spent (Mins) 60   PT Charge Group   PT Gait Training 2   PT Therapeutic Exercise 1   PT Neuromuscular Re-Education / Balance 1   Physical Therapy   Daily Treatment     Patient Name: Xavier Richardson  Age:  69 y.o., Sex:  male  Medical Record #: [id number]  Today's Date: 10/4/2019     Precautions  Precautions: Fall Risk, Sternal Precautions (See Comments), Other (See Comments)  Comments: Barnard catheter    Subjective    The patient agreed to PT.  He said he was feeling much better this morning as compared to the other day.     Objective    The patient participated in gait training using a SPC with SBA.  He tolerated 225 FT x3 without any loss of balance.  He also participated in activities in the parallel bars that included standing lower extremity therapeutic exercise, alternating partial lunges and alternating hip flexion diagonals for pelvic activation and weight shifting.  The patient tends to lean to the left so he did prolonged right leaning against the parallel bars with the right hip to facilitate midline orientation.    FIM Bed/Chair/Wheelchair Transfers Score: 6 - Modified Independent  Bed/Chair/Wheelchair Transfers Description:       FIM Walking Score:  5 - Standby Prompting/Supervision or Set-up  Walking Description:  Extra time, Supervision for safety, Assist device/equipment(225 FT x3, SPC, SBA)    FIM Wheelchair Score:     Wheelchair Description:       FIM Stairs Score:  0 - Not tested,medical condition  Stairs Description:         Assessment    The patient is much improved since Wednesday when he was not feeling well and had fever.  He is making significant progress on quality of gait and improving his dynamic standing weight shift.  Fatigue and impaired endurance are barriers.    Plan    Continue therapeutic exercise, bed mobility and transfers, dynamic standing balance and weight  shifting, continue midline orientation when standing, safety education, gait training

## 2019-10-04 NOTE — THERAPY
"Occupational Therapy  Daily Treatment     Patient Name: Xavier Richardson  Age:  69 y.o., Sex:  male  Medical Record #: [id number]  Today's Date: 10/4/2019     Precautions  Precautions: Fall Risk, Sternal Precautions (See Comments), Other (See Comments)  Comments: Barnard catheter    Safety   ADL Safety : (P) Requires Supervision for Safety  Bathroom Safety: (P) Requires Supervision for Safety  Comments: Completed ADL routine at min to supervised level this session, slightly unsteady on his feet requiring CGA for mobility with SPC and close SBA for standing components    Subjective    \"I'm just getting back to treatment after an infection. I'm really bummed that I have this catheter again.\"     \"I am so excited to get out of here Wednesday. My daughter is in town to help me. She came in today because she thought I was d/c today.\"     Objective       10/04/19 1031   Safety    ADL Safety  Requires Supervision for Safety   Bathroom Safety Requires Supervision for Safety   Pain 0 - 10 Group   Location Back  (\"I just stretch occasionally to make it feel better.\")   Strength Upper Body   Lt Shoulder Flexion Strength 3+ (F+)   Lt Shoulder Extension Strength 3+ (F+)   Lt Shoulder Abduction Strength 3+ (F+)   Lt Shoulder Ext Rotation Strength 3 (F)   Lt Shoulder Int Rotation Strength 3 (F)   Lt Elbow Flexion Strength 4- (G-)   Lt Elbow Extension Strength 4- (G-)   Left  Impaired   Sitting Upper Body Exercises   Front Arm Raise Weight (See Comments for lbs);2 sets of 10  (1 lb weight for half and no weight for remainder)   Side Arm Raise 1 set of 10  (unweighted)   Bicep Curls 3 sets of 10;Weight (See Comments for lbs)  (3 lb weight)   Upper Extremity Bike Level 3 Resistance  (15 minutes)   Balance   Sitting Balance (Static) Good   Sitting Balance (Dynamic) Good   Standing Balance (Static) Good   Standing Balance (Dynamic) Good   Interdisciplinary Plan of Care Collaboration   IDT Collaboration with  Physician   Patient Position " at End of Therapy Seated;Bed Alarm On;Self Releasing Lap Belt Applied  (waiting for breakfast)   Collaboration Comments Physician talked with pt regarding weekend care, brenner (blood in urine), and imaging to monitor kidneys   OT Total Time Spent   OT Individual Total Time Spent (Mins) 60   OT Charge Group   OT Therapeutic Exercise  4       Assessment    Pt had calm affect this date. Pt expressed gratitude for his health, despite difficulties. Pt improved this date in endurance on NuStep. Pt is regaining strength since returning to therapy. Pt would benefit from outpatient occupational therapy after discharge to continue strengthening, endurance, and other functional impairments impacting occupational performance.    Plan    Continue to progress LUE GM/FM coordination and strength, functional standing balance/tolerance, endurance, continue to assess  deficits,  with tech as adjunct

## 2019-10-04 NOTE — PROGRESS NOTES
Rehab Progress Note     Date of Service: 10/4/2019  Chief Complaint: follow up stroke    Interval Events (Subjective)    Patient seen and examined in the therapy gym today.  He is feeling much better today compared to yesterday.  He has questions about follow-up with urology which will happen next week.  His urine is pink-tinged today.  Patient has no new complaints he is really just concerned mostly about his bladder function.  Ultrasound of his kidneys which was ordered by the hospitalist is currently pending.    Objective:  VITAL SIGNS: /68   Pulse 80   Temp 36.7 °C (98.1 °F) (Temporal)   Resp 18   Ht 1.829 m (6')   Wt 120.7 kg (266 lb 1.5 oz)   SpO2 95%   BMI 36.09 kg/m²   Gen: alert, no apparent distress  CV: regular rate and rhythm, no murmurs, no peripheral edema  Resp: clear to ascultation bilaterally, normal respiratory effort  GI: soft, non-tender abdomen, bowel sounds present  Neuro: notable for mild left hand incoordination/left arm weakness      Recent Results (from the past 72 hour(s))   Prothrombin Time    Collection Time: 10/02/19  5:45 AM   Result Value Ref Range    PT 31.4 (H) 12.0 - 14.6 sec    INR 2.90 (H) 0.87 - 1.13   CBC WITHOUT DIFFERENTIAL    Collection Time: 10/02/19  5:45 AM   Result Value Ref Range    WBC 14.0 (H) 4.8 - 10.8 K/uL    RBC 3.02 (L) 4.70 - 6.10 M/uL    Hemoglobin 9.0 (L) 14.0 - 18.0 g/dL    Hematocrit 28.8 (L) 42.0 - 52.0 %    MCV 95.4 81.4 - 97.8 fL    MCH 29.8 27.0 - 33.0 pg    MCHC 31.3 (L) 33.7 - 35.3 g/dL    RDW 60.6 (H) 35.9 - 50.0 fL    Platelet Count 148 (L) 164 - 446 K/uL    MPV 8.2 (L) 9.0 - 12.9 fL   Basic Metabolic Panel    Collection Time: 10/02/19  5:45 AM   Result Value Ref Range    Sodium 135 135 - 145 mmol/L    Potassium 3.6 3.6 - 5.5 mmol/L    Chloride 104 96 - 112 mmol/L    Co2 24 20 - 33 mmol/L    Glucose 101 (H) 65 - 99 mg/dL    Bun 10 8 - 22 mg/dL    Creatinine 1.02 0.50 - 1.40 mg/dL    Calcium 9.2 8.5 - 10.5 mg/dL    Anion Gap 7.0 0.0 -  11.9   ESTIMATED GFR    Collection Time: 10/02/19  5:45 AM   Result Value Ref Range    GFR If African American >60 >60 mL/min/1.73 m 2    GFR If Non African American >60 >60 mL/min/1.73 m 2   URINALYSIS    Collection Time: 10/02/19 10:25 AM   Result Value Ref Range    Color Yellow     Character Clear     Specific Gravity 1.008 <1.035    Ph 6.5 5.0 - 8.0    Glucose Negative Negative mg/dL    Ketones Negative Negative mg/dL    Protein Negative Negative mg/dL    Bilirubin Negative Negative    Urobilinogen, Urine 1.0 Negative    Nitrite Negative Negative    Leukocyte Esterase Large (A) Negative    Occult Blood Small (A) Negative    Micro Urine Req Microscopic    URINE MICROSCOPIC (W/UA)    Collection Time: 10/02/19 10:25 AM   Result Value Ref Range    WBC 20-50 (A) /hpf    RBC 0-2 (A) /hpf    Bacteria Moderate (A) None /hpf    Epithelial Cells Negative /hpf    Hyaline Cast 0-2 /lpf   URINE CULTURE-EXISTING-LESS THAN 48 HOURS    Collection Time: 10/02/19 10:25 AM   Result Value Ref Range    Significant Indicator POS (POS)     Source UR     Site URINE, SMITH CATH     Culture Result - (A)     Culture Result Staphylococcus aureus  ,000 cfu/mL   (A)    CULTURE WOUND W/ GRAM STAIN    Collection Time: 10/02/19 11:20 AM   Result Value Ref Range    Significant Indicator POS (POS)     Source WND     Site Right Groin Upper     Culture Result Moderate growth mixed skin cedric. (A)     Gram Stain Result       Rare WBCs.  Few Gram positive cocci.  Few Gram positive rods.      Culture Result Staphylococcus aureus  Heavy growth   (A)     Culture Result Enterobacter cloacae  Light growth   (A)        Susceptibility    Enterobacter cloacae - ANTON     Trimeth/Sulfa <=2/38 Sensitive mcg/mL     Ampicillin >16 Resistant mcg/mL     Ampicillin/sulbactam >16/8 Resistant mcg/mL     Ceftriaxone <=1 Sensitive mcg/mL     Tobramycin <=4 Sensitive mcg/mL     Ceftazidime <=1 Sensitive mcg/mL     Cefotaxime <=2 Sensitive mcg/mL     Cefazolin >16  Resistant mcg/mL     Ciprofloxacin <=1 Sensitive mcg/mL     Cefepime <=2 Sensitive mcg/mL     Cefotetan <=16 Sensitive mcg/mL     Ertapenem <=0.5 Sensitive mcg/mL     Gentamicin <=4 Sensitive mcg/mL     Pip/Tazobactam <=16 Sensitive mcg/mL    Staphylococcus aureus - ANTON     Clindamycin <=0.5 Sensitive mcg/mL     Daptomycin <=0.5 Sensitive mcg/mL     Erythromycin <=0.5 Sensitive mcg/mL     Moxifloxacin <=0.5 Sensitive mcg/mL     Oxacillin <=0.25 Sensitive mcg/mL     Penicillin 8 Resistant mcg/mL     Trimeth/Sulfa <=0.5/9.5 Sensitive mcg/mL     Tetracycline <=4 Sensitive mcg/mL     Vancomycin 1 Sensitive mcg/mL     Ampicillin/sulbactam <=8/4 Sensitive mcg/mL   CULTURE WOUND W/ GRAM STAIN    Collection Time: 10/02/19 11:20 AM   Result Value Ref Range    Significant Indicator POS (POS)     Source WND     Site Right Groin Lower     Culture Result Light growth mixed skin cedric. (A)     Gram Stain Result       Rare WBCs.  Rare Gram positive cocci.  Rare Gram positive rods.      Culture Result (A)      Staphylococcus aureus  Heavy growth  See previous culture for sensitivity report.     GRAM STAIN    Collection Time: 10/02/19 11:20 AM   Result Value Ref Range    Significant Indicator .     Source WND     Site Right Groin Lower     Gram Stain Result       Rare WBCs.  Rare Gram positive cocci.  Rare Gram positive rods.     GRAM STAIN    Collection Time: 10/02/19 11:20 AM   Result Value Ref Range    Significant Indicator .     Source WND     Site Right Groin Upper     Gram Stain Result       Rare WBCs.  Few Gram positive cocci.  Few Gram positive rods.     BLOOD CULTURE    Collection Time: 10/02/19  5:40 PM   Result Value Ref Range    Significant Indicator NEG     Source BLD     Site PERIPHERAL     Culture Result       No Growth  Note: Blood cultures are incubated for 5 days and  are monitored continuously.Positive blood cultures  are called to the RN and reported as soon as  they are identified.     BLOOD CULTURE     Collection Time: 10/02/19  5:40 PM   Result Value Ref Range    Significant Indicator NEG     Source BLD     Site PERIPHERAL     Culture Result       No Growth  Note: Blood cultures are incubated for 5 days and  are monitored continuously.Positive blood cultures  are called to the RN and reported as soon as  they are identified.     Prothrombin Time    Collection Time: 10/03/19  5:30 AM   Result Value Ref Range    PT 33.8 (H) 12.0 - 14.6 sec    INR 3.18 (H) 0.87 - 1.13   CBC WITH DIFFERENTIAL    Collection Time: 10/03/19  5:30 AM   Result Value Ref Range    WBC 13.5 (H) 4.8 - 10.8 K/uL    RBC 2.77 (L) 4.70 - 6.10 M/uL    Hemoglobin 8.4 (L) 14.0 - 18.0 g/dL    Hematocrit 27.0 (L) 42.0 - 52.0 %    MCV 97.5 81.4 - 97.8 fL    MCH 30.3 27.0 - 33.0 pg    MCHC 31.1 (L) 33.7 - 35.3 g/dL    RDW 60.7 (H) 35.9 - 50.0 fL    Platelet Count 135 (L) 164 - 446 K/uL    MPV 8.7 (L) 9.0 - 12.9 fL    Neutrophils-Polys 86.00 (H) 44.00 - 72.00 %    Lymphocytes 5.20 (L) 22.00 - 41.00 %    Monocytes 7.70 0.00 - 13.40 %    Eosinophils 0.40 0.00 - 6.90 %    Basophils 0.10 0.00 - 1.80 %    Immature Granulocytes 0.60 0.00 - 0.90 %    Nucleated RBC 0.00 /100 WBC    Neutrophils (Absolute) 11.63 (H) 1.82 - 7.42 K/uL    Lymphs (Absolute) 0.70 (L) 1.00 - 4.80 K/uL    Monos (Absolute) 1.04 (H) 0.00 - 0.85 K/uL    Eos (Absolute) 0.06 0.00 - 0.51 K/uL    Baso (Absolute) 0.02 0.00 - 0.12 K/uL    Immature Granulocytes (abs) 0.08 0.00 - 0.11 K/uL    NRBC (Absolute) 0.00 K/uL   Comp Metabolic Panel    Collection Time: 10/03/19  5:30 AM   Result Value Ref Range    Sodium 136 135 - 145 mmol/L    Potassium 3.6 3.6 - 5.5 mmol/L    Chloride 105 96 - 112 mmol/L    Co2 22 20 - 33 mmol/L    Anion Gap 9.0 0.0 - 11.9    Glucose 92 65 - 99 mg/dL    Bun 11 8 - 22 mg/dL    Creatinine 1.12 0.50 - 1.40 mg/dL    Calcium 8.7 8.5 - 10.5 mg/dL    AST(SGOT) 11 (L) 12 - 45 U/L    ALT(SGPT) 5 2 - 50 U/L    Alkaline Phosphatase 60 30 - 99 U/L    Total Bilirubin 0.8 0.1 - 1.5  mg/dL    Albumin 3.3 3.2 - 4.9 g/dL    Total Protein 5.8 (L) 6.0 - 8.2 g/dL    Globulin 2.5 1.9 - 3.5 g/dL    A-G Ratio 1.3 g/dL   ESTIMATED GFR    Collection Time: 10/03/19  5:30 AM   Result Value Ref Range    GFR If African American >60 >60 mL/min/1.73 m 2    GFR If Non African American >60 >60 mL/min/1.73 m 2   Prothrombin Time    Collection Time: 10/04/19  5:53 AM   Result Value Ref Range    PT 33.5 (H) 12.0 - 14.6 sec    INR 3.14 (H) 0.87 - 1.13       Current Facility-Administered Medications   Medication Frequency   • warfarin (COUMADIN) tablet 2.5 mg ONCE AT 1800   • senna-docusate (PERICOLACE or SENOKOT S) 8.6-50 MG per tablet 2 Tab DAILY    And   • polyethylene glycol/lytes (MIRALAX) PACKET 1 Packet QDAY PRN    And   • magnesium hydroxide (MILK OF MAGNESIA) suspension 30 mL QDAY PRN    And   • bisacodyl (DULCOLAX) suppository 10 mg QDAY PRN   • piperacillin-tazobactam (ZOSYN) 3.375 g in  mL IVPB Q6HRS   • gabapentin (NEURONTIN) capsule 400 mg 4X/DAY   • traZODone (DESYREL) tablet 50 mg QHS   • furosemide (LASIX) tablet 40 mg Q DAY   • potassium chloride SA (Kdur) tablet 20 mEq BID   • melatonin tablet 6 mg QHS   • vitamin D (cholecalciferol) tablet 1,000 Units DAILY   • metoprolol (LOPRESSOR) tablet 12.5 mg TWICE DAILY   • Respiratory Care per Protocol Continuous RT   • Pharmacy Consult Request ...Pain Management Review 1 Each PHARMACY TO DOSE   • acetaminophen (TYLENOL) tablet 650 mg Q4HRS PRN   • hydrALAZINE (APRESOLINE) tablet 10 mg Q8HRS PRN   • artificial tears ophthalmic solution 1 Drop PRN   • benzocaine-menthol (CEPACOL) lozenge 1 Lozenge Q2HRS PRN   • mag hydrox-al hydrox-simeth (MAALOX PLUS ES or MYLANTA DS) suspension 20 mL Q2HRS PRN   • ondansetron (ZOFRAN ODT) dispertab 4 mg 4X/DAY PRN    Or   • ondansetron (ZOFRAN) syringe/vial injection 4 mg 4X/DAY PRN   • sodium chloride (OCEAN) 0.65 % nasal spray 2 Spray PRN   • hydrOXYzine HCl (ATARAX) tablet 50 mg Q6HRS PRN   • MD Alert...Warfarin  per Pharmacy PHARMACY TO DOSE   • amiodarone (CORDARONE) tablet 200 mg Q DAY   • aspirin (ASA) tablet 325 mg DAILY   • fenofibrate micronized (LOFIBRA) capsule 67 mg AFTER BREAKFAST   • finasteride (PROSCAR) tablet 5 mg DAILY   • omeprazole (PRILOSEC) capsule 20 mg QAM AC   • tamsulosin (FLOMAX) capsule 0.8 mg QHS   • simethicone (MYLICON) chewable tab 160 mg 4X/DAY WITH MEALS + NIGHTLY       Orders Placed This Encounter   Procedures   • Diet Order Regular     Standing Status:   Standing     Number of Occurrences:   1     Order Specific Question:   Diet:     Answer:   Regular [1]       Radiology  Transthoracic  Echo Report      Echocardiography Laboratory    CONCLUSIONS  No prior study is available for comparison.   The left ventricle was normal in size.  Mild concentric left ventricular hypertrophy.  Normal left ventricular systolic function and regional wall motion.  Left ventricular ejection fraction is estimated to be 60%.  Aortic sclerosis without stenosis.  Trace mitral regurgitation.  The left atrium is normal in size.  Dilated inferior vena cava with inspiratory collapse.  Estimated right ventricular systolic pressure  is 45 mmHg.  Mobile echodensity noted in the aortic arch/proximal left subclavian   artery.    DONALD KNOTT  Exam Date:         09/24/2019                      11:45  Exam Location:     Inpatient  Priority:          Routine    Assessment:  Active Hospital Problems    Diagnosis   • *Stroke (cerebrum) (HCC)   • Essential hypertension   • JESUS MANUEL (obstructive sleep apnea)   • Peripheral neuropathy   • Other insomnia   • Dissection of thoracoabdominal aorta (HCC)   • Lung nodule   • Shoulder lesion, left   • Dyslipidemia   • Urinary retention   • Fluid overload   • A-fib (HCC)     This patient is a 69 y.o. male admitted for acute inpatient rehabilitation with Stroke (cerebrum) (HCC).    I led and attended the weekly conference and agree with the IDT conference documentation and plan of care as  noted below.    Date of conference: 10/2/2019    Goals and barriers: See IDT note.    Biggest barriers: urinary retention, poor balance, poor endurance    Goals in next week: treat infection    Admission FIM 74 --> 95    CM/social support: lives alone, daughter to return from [place] to assist at discharge    Anticipated DC date: 10/9/2019    Outpatient: PT    Equip: SPC    Follow up: PCP, cardiology, urology, pulmonology     Medical Decision Making and Plan:    Bilateral ischemic strokes  Hemorrhagic transformation in right parietal/occipital lobe  Left hemiparesis, improved  Non-dominant  Visual deficits, continued  PT/OT, 1.5 hr each discipline, 5 days per week  SLP eval, cog within normal  Continue aspirin and coumadin per outside recs  Neuro-ophthalmology referral - Dr. Wilder, made    MRI imaging reviewed with patient/family    Acute urinary retention  Does have a history of BPH  Continue Proscar and Flomax  Voiding after Barnard removal, but still retaining  Replaced Barnard  Referral to urology - apt already made - for day of discharge 10/9     Peripheral neuropathy  Increased gabapentin 400 mg TID --> QID per patient request, home dosing    Insomnia  Continue melatonin and trazodone at 50 mg - did not tolerate higher doses    Bowel  Meds as needed  Last BM 10/4    DVT prophylaxis  Coumadin     Appreciate the assistance of the hospitalist with the patient's medical comorbidities:     Aortic dissection  Hypertension, controlled  Hyperlipidemia  Atrial fibrillation  Pulmonary hypertension, RSVP 45  Sleep apnea, non-compliant with CPAP  Left pleural effusion  Left lung nodule, outpatient follow up  Left shoulder lesion, outpatient follow up  Anemia, stable  Leukopenia, stable  Vit D insufficiency, on supplementation    +Leukocytosis/lethargy, +UA - culture pending, blood cultures NGTD, wound cultures from groin site pending and CXR negative - now with fevers/rigors - antibiotics changed to IV, improved mental  status, renal US pending to check for pyelonephritis    Total time:  25 minutes.  I spent greater than 50% of the time for patient care, counseling, and coordination on this date, including patient face-to face time, unit/floor time with review of records/pertinent lab data and studies, as well as discussing diagnostic evaluation/work up, planned therapeutic interventions, and future disposition of care, as per the interval events/subjective and the assessment and plan as noted above.    I have performed a physical exam, reviewed and updated ROS, as well as the assessment and plan today 10/4/2019. In review of note from 10/3/2019 there are no new changes except as documented above.            Amira Davis M.D.   Physical Medicine and Rehabilitation

## 2019-10-04 NOTE — PROGRESS NOTES
Pharmacy Warfarin Consult   10/4/2019     69 y.o.   male     Indication for anticoagulation: Stroke     Goal INR = 2 - 3    Recent Labs     10/02/19  0545 10/03/19  0530 10/04/19  0553   INR 2.90* 3.18* 3.14*   HEMOGLOBIN 9.0* 8.4*  --    HEMATOCRIT 28.8* 27.0*  --        Pertinent Drug/Drug Interactions:  Amiodarone, ASA, PPI, Statin, ABX  Outpatient Warfarin Regimen:  New Start  Recent Warfarin Dosing:    Dose from last 7 days     Date/Time Dose (mg)    10/04/19 0553  2.5    10/03/19 0530  1    10/02/19 0545  4    10/01/19 0537  4    09/30/19 0555  2.5    09/29/19 1700  1    09/28/19 1700  5          Bridge Therapy: No           1.  Warfarin 2.5 mg tonight or INR= 3.14           Catalino Stubbs [facility]

## 2019-10-04 NOTE — PROGRESS NOTES
Received patient during shift change, report rec'd from day shift RN. Resting in bed, VS stable on room air. Continent of Bowel, brenner cath in place. A&O x 4, able to make needs known. Bed in low position, call light within reach.

## 2019-10-04 NOTE — PROGRESS NOTES
[facility] Medicine Daily Progress Note        Chief Complaint  S/P Aortic Dissection, AFib, HTN    Interval Problem Update  Pt seen and examined in dining room.  No recurrent fever on IV abx.    Review of Systems  Review of Systems   Constitutional: Positive for malaise/fatigue. Negative for chills and fever.   HENT: Negative.    Eyes: Negative.    Respiratory: Negative for cough and shortness of breath.    Cardiovascular: Negative for chest pain and palpitations.   Gastrointestinal: Negative for abdominal pain, nausea and vomiting.   Genitourinary:        Urinary retention   Skin: Negative for itching and rash.        Physical Exam  Temp:  [36.7 °C (98.1 °F)-37.3 °C (99.2 °F)] 36.7 °C (98.1 °F)  Pulse:  [80-92] 80  Resp:  [18] 18  BP: (113-126)/(67-72) 126/68  SpO2:  [95 %-96 %] 95 %    Physical Exam   Constitutional: He is oriented to person, place, and time. No distress.   HENT:   Head: Normocephalic and atraumatic.   Right Ear: External ear normal.   Left Ear: External ear normal.   Eyes: Conjunctivae and EOM are normal. Right eye exhibits no discharge. Left eye exhibits no discharge.   Neck: Normal range of motion. Neck supple. No tracheal deviation present.   Cardiovascular:   irreg irreg   Pulmonary/Chest: No stridor. No respiratory distress. He has no wheezes.   Decreased BS   Abdominal: Soft. Bowel sounds are normal. He exhibits no distension. There is no tenderness.   Musculoskeletal: He exhibits edema.   Neurological: He is alert and oriented to person, place, and time.   Skin: Skin is warm and dry. He is not diaphoretic.   Right groin wound dressed   Vitals reviewed.      Fluids    Intake/Output Summary (Last 24 hours) at 10/4/2019 1159  Last data filed at 10/4/2019 0525  Gross per 24 hour   Intake 240 ml   Output 2700 ml   Net -2460 ml       Laboratory  Recent Labs     10/02/19  0545 10/03/19  0530   WBC 14.0* 13.5*   RBC 3.02* 2.77*   HEMOGLOBIN 9.0* 8.4*   HEMATOCRIT 28.8* 27.0*   MCV 95.4 97.5   MCH  29.8 30.3   MCHC 31.3* 31.1*   RDW 60.6* 60.7*   PLATELETCT 148* 135*   MPV 8.2* 8.7*     Recent Labs     10/02/19  0545 10/03/19  0530   SODIUM 135 136   POTASSIUM 3.6 3.6   CHLORIDE 104 105   CO2 24 22   GLUCOSE 101* 92   BUN 10 11   CREATININE 1.02 1.12   CALCIUM 9.2 8.7     Recent Labs     10/02/19  0545 10/03/19  0530 10/04/19  0553   INR 2.90* 3.18* 3.14*               Assessment/Plan  * 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)  Assessment & Plan  On ASA and Fenofibrate    Thrombocytopenia (HCC)  Assessment & Plan  Follow Platelet counts on anticoagulation    UTI (urinary tract infection)  Assessment & Plan  UA 20-50 WBC w/ moderate bacteria, UCx still pending  Await Renal U/S R/O Pyelonephritis  CXR w/ improved inflation and improved left lung base consolidation  BCx x 2 NGTD  Right groin wound cx +MSSA and Enterobacter  Wounds likely colonized, doubt infection, but cultures sensitive to Zosyn regardless  Discontinue Zyvox as no MRSA recovered  Continue Zosyn    Vitamin D deficiency  Assessment & Plan  Vit D level 27  On supplementation    Anemia  Assessment & Plan  Follow H/H on anticoagulation    A-fib (HCC)  Assessment & Plan  On Amiodarone  Anticoagulated on Coumadin    Fluid overload  Assessment & Plan  Echo EF 60% and RVSP 45 mmHg  U/S BLE negative for DVT  Edema most likely 2/2 recent cardiac surgery  BNP improving w/ diuresis   Continue Lasix    Urinary retention  Assessment & Plan  Monitor for orthostatic hypotension on Flomax and Proscar    Dyslipidemia  Assessment & Plan  On Fenofibrate    Shoulder lesion, left- (present on admission)  Assessment & Plan  Will need F/U imaging    Lung nodule- (present on admission)  Assessment & Plan  Will need F/U imaging    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

## 2019-10-04 NOTE — THERAPY
Occupational Therapy  Daily Treatment     Patient Name: Xavier iRchardson  Age:  69 y.o., Sex:  male  Medical Record #: [id number]  Today's Date: 10/4/2019     Precautions  Precautions: Fall Risk, Sternal Precautions (See Comments), Other (See Comments)  Comments: Barnard catheter    Safety   ADL Safety : Requires Supervision for Safety  Bathroom Safety: Requires Supervision for Safety  Comments: Completed ADL routine at min to supervised level this session, slightly unsteady on his feet requiring CGA for mobility with SPC and close SBA for standing components    Subjective       Objective       10/04/19 1401   Precautions   Precautions Fall Risk;Sternal Precautions (See Comments);Other (See Comments)   Comments Barnard catheter   Vitals   O2 Delivery None (Room Air)   Pain   Intervention Declines   Pain 0 - 10 Group   Therapist Pain Assessment 0   Non Verbal Descriptors   Non Verbal Scale  Calm   Sitting Upper Body Exercises   Upper Extremity Bike Level 2 Resistance  (FluidoBike, 20 mins, 1.832km)   Bed Mobility    Supine to Sit Modified Independent   Sit to Supine Modified Independent   Interdisciplinary Plan of Care Collaboration   IDT Collaboration with  Family / Caregiver   Patient Position at End of Therapy Seated;Self Releasing Lap Belt Applied;Call Light within Reach;Tray Table within Reach;Phone within Reach;Family / Friend in Room   OT Total Time Spent   OT Individual Total Time Spent (Mins) 30   OT Charge Group   OT Therapeutic Exercise  2       Assessment    Pt was alert and cooperative w/ tx.  Limiters include sternal precautions, generalized weakness, limited endurance and impaired standing balance    Plan    Continue to progress LUE GM/FM coordination and strength, functional standing balance/tolerance, endurance, continue to assess  deficits,  with tech as adjunct

## 2019-10-04 NOTE — CARE PLAN
Problem: Infection  Goal: Will remain free from infection  Intervention: Assess signs and symptoms of infection  Note:   VS stable, afebrile. IV & PO ABT administered. No s/s of distress.     Problem: Pain Management  Goal: Pain level will decrease to patient's comfort goal  Intervention: Follow pain managment plan developed in collaboration with patient and Interdisciplinary Team  Note:   PRN tylenol administered per request for c/o mild general pain/headache. Good effect, resting in bed, eyes closed, resp even, unlabored.

## 2019-10-05 PROBLEM — D61.818 PANCYTOPENIA (HCC): Status: ACTIVE | Noted: 2019-10-05

## 2019-10-05 LAB
ANION GAP SERPL CALC-SCNC: 6 MMOL/L (ref 0–11.9)
BUN SERPL-MCNC: 8 MG/DL (ref 8–22)
CALCIUM SERPL-MCNC: 8.9 MG/DL (ref 8.5–10.5)
CHLORIDE SERPL-SCNC: 108 MMOL/L (ref 96–112)
CO2 SERPL-SCNC: 26 MMOL/L (ref 20–33)
CREAT SERPL-MCNC: 1.08 MG/DL (ref 0.5–1.4)
ERYTHROCYTE [DISTWIDTH] IN BLOOD BY AUTOMATED COUNT: 57.9 FL (ref 35.9–50)
GLUCOSE SERPL-MCNC: 94 MG/DL (ref 65–99)
HCT VFR BLD AUTO: 25.7 % (ref 42–52)
HGB BLD-MCNC: 7.8 G/DL (ref 14–18)
INR PPP: 3.4 (ref 0.87–1.13)
MCH RBC QN AUTO: 29 PG (ref 27–33)
MCHC RBC AUTO-ENTMCNC: 30.4 G/DL (ref 33.7–35.3)
MCV RBC AUTO: 95.5 FL (ref 81.4–97.8)
PLATELET # BLD AUTO: 124 K/UL (ref 164–446)
PMV BLD AUTO: 8.7 FL (ref 9–12.9)
POTASSIUM SERPL-SCNC: 3.8 MMOL/L (ref 3.6–5.5)
PROTHROMBIN TIME: 35.7 SEC (ref 12–14.6)
RBC # BLD AUTO: 2.69 M/UL (ref 4.7–6.1)
SODIUM SERPL-SCNC: 140 MMOL/L (ref 135–145)
WBC # BLD AUTO: 3 K/UL (ref 4.8–10.8)

## 2019-10-05 PROCEDURE — 85610 PROTHROMBIN TIME: CPT

## 2019-10-05 PROCEDURE — 700105 HCHG RX REV CODE 258: Performed by: HOSPITALIST

## 2019-10-05 PROCEDURE — 700111 HCHG RX REV CODE 636 W/ 250 OVERRIDE (IP): Performed by: HOSPITALIST

## 2019-10-05 PROCEDURE — A9270 NON-COVERED ITEM OR SERVICE: HCPCS | Performed by: HOSPITALIST

## 2019-10-05 PROCEDURE — 36415 COLL VENOUS BLD VENIPUNCTURE: CPT

## 2019-10-05 PROCEDURE — 85027 COMPLETE CBC AUTOMATED: CPT

## 2019-10-05 PROCEDURE — A9270 NON-COVERED ITEM OR SERVICE: HCPCS | Performed by: PHYSICAL MEDICINE & REHABILITATION

## 2019-10-05 PROCEDURE — 97530 THERAPEUTIC ACTIVITIES: CPT

## 2019-10-05 PROCEDURE — 99232 SBSQ HOSP IP/OBS MODERATE 35: CPT | Performed by: HOSPITALIST

## 2019-10-05 PROCEDURE — 94760 N-INVAS EAR/PLS OXIMETRY 1: CPT

## 2019-10-05 PROCEDURE — 80048 BASIC METABOLIC PNL TOTAL CA: CPT

## 2019-10-05 PROCEDURE — 770010 HCHG ROOM/CARE - REHAB SEMI PRIVAT*

## 2019-10-05 PROCEDURE — 700102 HCHG RX REV CODE 250 W/ 637 OVERRIDE(OP): Performed by: PHYSICAL MEDICINE & REHABILITATION

## 2019-10-05 PROCEDURE — 97116 GAIT TRAINING THERAPY: CPT

## 2019-10-05 PROCEDURE — 700102 HCHG RX REV CODE 250 W/ 637 OVERRIDE(OP): Performed by: HOSPITALIST

## 2019-10-05 RX ADMIN — PIPERACILLIN AND TAZOBACTAM 3.38 G: 3; .375 INJECTION, POWDER, LYOPHILIZED, FOR SOLUTION INTRAVENOUS; PARENTERAL at 13:10

## 2019-10-05 RX ADMIN — SIMETHICONE CHEW TAB 80 MG 160 MG: 80 TABLET ORAL at 18:23

## 2019-10-05 RX ADMIN — METOPROLOL TARTRATE 12.5 MG: 25 TABLET ORAL at 18:22

## 2019-10-05 RX ADMIN — ASPIRIN 325 MG ORAL TABLET 325 MG: 325 PILL ORAL at 08:06

## 2019-10-05 RX ADMIN — MELATONIN 6 MG: at 21:38

## 2019-10-05 RX ADMIN — SIMETHICONE CHEW TAB 80 MG 160 MG: 80 TABLET ORAL at 21:37

## 2019-10-05 RX ADMIN — ACETAMINOPHEN 650 MG: 325 TABLET, FILM COATED ORAL at 05:05

## 2019-10-05 RX ADMIN — GABAPENTIN 400 MG: 400 CAPSULE ORAL at 15:45

## 2019-10-05 RX ADMIN — SIMETHICONE CHEW TAB 80 MG 160 MG: 80 TABLET ORAL at 11:52

## 2019-10-05 RX ADMIN — POTASSIUM CHLORIDE 20 MEQ: 1500 TABLET, EXTENDED RELEASE ORAL at 21:37

## 2019-10-05 RX ADMIN — GABAPENTIN 400 MG: 400 CAPSULE ORAL at 11:52

## 2019-10-05 RX ADMIN — FUROSEMIDE 40 MG: 40 TABLET ORAL at 05:06

## 2019-10-05 RX ADMIN — FENOFIBRATE 67 MG: 67 CAPSULE ORAL at 08:08

## 2019-10-05 RX ADMIN — GABAPENTIN 400 MG: 400 CAPSULE ORAL at 21:37

## 2019-10-05 RX ADMIN — OMEPRAZOLE 20 MG: 20 CAPSULE, DELAYED RELEASE ORAL at 08:06

## 2019-10-05 RX ADMIN — POTASSIUM CHLORIDE 20 MEQ: 1500 TABLET, EXTENDED RELEASE ORAL at 08:07

## 2019-10-05 RX ADMIN — SIMETHICONE CHEW TAB 80 MG 160 MG: 80 TABLET ORAL at 08:07

## 2019-10-05 RX ADMIN — GABAPENTIN 400 MG: 400 CAPSULE ORAL at 05:05

## 2019-10-05 RX ADMIN — SENNOSIDES, DOCUSATE SODIUM 2 TABLET: 50; 8.6 TABLET, FILM COATED ORAL at 08:07

## 2019-10-05 RX ADMIN — PIPERACILLIN AND TAZOBACTAM 3.38 G: 3; .375 INJECTION, POWDER, LYOPHILIZED, FOR SOLUTION INTRAVENOUS; PARENTERAL at 18:35

## 2019-10-05 RX ADMIN — VITAMIN D, TAB 1000IU (100/BT) 1000 UNITS: 25 TAB at 08:07

## 2019-10-05 RX ADMIN — PIPERACILLIN AND TAZOBACTAM 3.38 G: 3; .375 INJECTION, POWDER, LYOPHILIZED, FOR SOLUTION INTRAVENOUS; PARENTERAL at 00:17

## 2019-10-05 RX ADMIN — METOPROLOL TARTRATE 12.5 MG: 25 TABLET ORAL at 05:06

## 2019-10-05 RX ADMIN — ACETAMINOPHEN 650 MG: 325 TABLET, FILM COATED ORAL at 21:37

## 2019-10-05 RX ADMIN — TAMSULOSIN HYDROCHLORIDE 0.8 MG: 0.4 CAPSULE ORAL at 21:38

## 2019-10-05 RX ADMIN — AMIODARONE HYDROCHLORIDE 200 MG: 200 TABLET ORAL at 05:05

## 2019-10-05 RX ADMIN — ACETAMINOPHEN 650 MG: 325 TABLET, FILM COATED ORAL at 00:20

## 2019-10-05 RX ADMIN — FINASTERIDE 5 MG: 5 TABLET, FILM COATED ORAL at 08:07

## 2019-10-05 RX ADMIN — PIPERACILLIN AND TAZOBACTAM 3.38 G: 3; .375 INJECTION, POWDER, LYOPHILIZED, FOR SOLUTION INTRAVENOUS; PARENTERAL at 06:10

## 2019-10-05 RX ADMIN — TRAZODONE HYDROCHLORIDE 50 MG: 50 TABLET ORAL at 21:38

## 2019-10-05 ASSESSMENT — ENCOUNTER SYMPTOMS
EYES NEGATIVE: 1
SHORTNESS OF BREATH: 0
FEVER: 0
NAUSEA: 0
ABDOMINAL PAIN: 0
COUGH: 0
CHILLS: 0
PALPITATIONS: 0
VOMITING: 0

## 2019-10-05 NOTE — PROGRESS NOTES
Patient's urine output from FC bloody with old blood clots.  Previous documentation sinc 1400 yesterday was bloody.  Patient was concerned if it could be related to Coumadin use.  Notified Dr. Acosta and advised to irrigate FC, done will continue to monitor for any changes in urine output.  Patient remains on IV ABT for UTI>

## 2019-10-05 NOTE — PROGRESS NOTES
Inpatient Anticoagulation Service Note    Date: 10/5/2019    Reason for Anticoagulation: Stroke   Target INR: 2.0 to 3.0         Hemoglobin Value: (!) 7.8  Hematocrit Value: (!) 25.7    INR from last 7 days     Date/Time INR Value    10/05/19 0528  (!) 3.4    10/04/19 0553  (!) 3.14    10/03/19 0530  (!) 3.18    10/02/19 0545  (!) 2.9    10/01/19 0537  (!) 2.93    09/30/19 0555  (!) 3.13    09/29/19 0547  (!) 2.96        Dose from last 7 days     Date/Time Dose (mg)    10/05/19 0800  0    10/04/19 0553  2.5    10/03/19 0530  1    10/02/19 0545  4    10/01/19 0537  4    09/30/19 0555  2.5    09/29/19 1700  1    09/28/19 1700  5        Average Dose (mg): 5  Significant Interactions: Amiodarone, Aspirin, Proton Pump Inhibitor, Statin (If less than 5 days and overlap therapy discontinued -- document reason (i.e. Bleed Risk))    (If still on overlap therapy, if No -- document reason (i.e. Bleed Risk))         Plan:  WIll hold warfarin tonight for INR of 3.4     Pharmacist suggested discharge dosing: To be determined.     Austen Kyle [facility]

## 2019-10-05 NOTE — THERAPY
10/05/19 1029   Precautions   Precautions Sternal Precautions (See Comments);Fall Risk;Other (See Comments)   Comments Barnard catheter   Pain 0 - 10 Group   Therapist Pain Assessment 0   Cognition    Level of Consciousness Alert   Bed Mobility    Supine to Sit Modified Independent   Sit to Supine Modified Independent   Sit to Stand Supervised   Scooting Modified Independent   Rolling Modified Independent   Neuro-Muscular Treatments   Neuro-Muscular Treatments Sequencing;Verbal Cuing   Comments Sequencing gait SPC, SPV, sequencing change of direction gait, gait with head turns, sequencing placing/retrieving cones on the floor   PT Total Time Spent   PT Individual Total Time Spent (Mins) 60   PT Charge Group   PT Gait Training 3   PT Therapeutic Activities 1   Physical Therapy   Daily Treatment     Patient Name: Xavier Richardson  Age:  69 y.o., Sex:  male  Medical Record #: [id number]  Today's Date: 10/5/2019     Precautions  Precautions: Sternal Precautions (See Comments), Fall Risk, Other (See Comments)  Comments: Barnard catheter    Subjective    The patient was up in his chair ready for PT.  He had no complaints of pain or lightheadedness.   Objective    The patient participated in gait training with a SPC and with supervision he tolerated 375 FT x2.  He also placed and retrieved cones on the floor followed by change of direction gait around the cones 240 FT x4.  He also participated in gait training and during head turns while walking 280 FT x1    FIM Bed/Chair/Wheelchair Transfers Score: 6 - Modified Independent  Bed/Chair/Wheelchair Transfers Description:  Increased time, Adaptive equipment(SPC)    FIM Walking Score:  5 - Standby Prompting/Supervision or Set-up  Walking Description:  Extra time, Supervision for safety, Assist device/equipment(SPC, SPV, 375 FT x2, 240 FT x4, 280 FT x1)    FIM Wheelchair Score:     Wheelchair Description:       FIM Stairs Score:  5 - Standby Prompting/Supervision or Set-up  Stairs  Description:         Assessment    The patient continues to demonstrate improvement in balance, endurance/tolerance for activity, strength and alertness.    Plan    Continue therapeutic exercise, gait training change of direction/head turns/forward and backward, stairs, car transfer, safety education, home exercise program

## 2019-10-05 NOTE — FLOWSHEET NOTE
10/05/19 0828   Events/Summary/Plan   Events/Summary/Plan O2 spot check   Respiratory WDL   Respiratory (WDL) X   Chest Exam   Respiration 16   Pulse 76   Oximetry   #Pulse Oximetry (Single Determination) Yes   Oxygen   Home O2 Use Prior To Admission? No   Pulse Oximetry 95 %   O2 Daily Delivery Respiratory  Room Air with O2 Available  (Continues to require noc O2)

## 2019-10-05 NOTE — PROGRESS NOTES
"Hospital Medicine Daily Progress Note        Chief Complaint  S/P Aortic Dissection, AFib, HTN    Interval Problem Update  \"I feel good.\"  Pt appears clinically back to baseline.    Review of Systems  Review of Systems   Constitutional: Negative for chills, fever and malaise/fatigue.   HENT: Negative.    Eyes: Negative.    Respiratory: Negative for cough and shortness of breath.    Cardiovascular: Negative for chest pain and palpitations.   Gastrointestinal: Negative for abdominal pain, nausea and vomiting.   Genitourinary:        Urinary retention   Skin: Negative for itching and rash.        Physical Exam  Temp:  [36.4 °C (97.5 °F)-37.3 °C (99.1 °F)] 36.4 °C (97.5 °F)  Pulse:  [68-77] 76  Resp:  [14-18] 16  BP: (106-128)/(63-69) 106/66  SpO2:  [95 %-96 %] 95 %    Physical Exam   Constitutional: He is oriented to person, place, and time. No distress.   HENT:   Head: Normocephalic and atraumatic.   Right Ear: External ear normal.   Left Ear: External ear normal.   Eyes: Conjunctivae and EOM are normal. Right eye exhibits no discharge. Left eye exhibits no discharge.   Neck: Normal range of motion. Neck supple. No tracheal deviation present.   Cardiovascular:   irreg irreg   Pulmonary/Chest: No stridor. No respiratory distress. He has no wheezes.   Decreased BS   Abdominal: Soft. Bowel sounds are normal. He exhibits no distension. There is no tenderness.   Musculoskeletal: He exhibits edema.   Neurological: He is alert and oriented to person, place, and time.   Skin: Skin is warm and dry. He is not diaphoretic.   Right groin wound dressed   Vitals reviewed.      Fluids    Intake/Output Summary (Last 24 hours) at 10/5/2019 1416  Last data filed at 10/5/2019 0828  Gross per 24 hour   Intake 100 ml   Output 3800 ml   Net -3700 ml       Laboratory  Recent Labs     10/03/19  0530 10/05/19  0528   WBC 13.5* 3.0*   RBC 2.77* 2.69*   HEMOGLOBIN 8.4* 7.8*   HEMATOCRIT 27.0* 25.7*   MCV 97.5 95.5   MCH 30.3 29.0   MCHC 31.1* " 30.4*   RDW 60.7* 57.9*   PLATELETCT 135* 124*   MPV 8.7* 8.7*     Recent Labs     10/03/19  0530 10/05/19  0528   SODIUM 136 140   POTASSIUM 3.6 3.8   CHLORIDE 105 108   CO2 22 26   GLUCOSE 92 94   BUN 11 8   CREATININE 1.12 1.08   CALCIUM 8.7 8.9     Recent Labs     10/03/19  0530 10/04/19  0553 10/05/19  0528   INR 3.18* 3.14* 3.40*               Assessment/Plan  * 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)  Assessment & Plan  On ASA and Fenofibrate    Pancytopenia (HCC)  Assessment & Plan  Suspect 2/2 Zosyn  Check CBC w/ differential  Follow Hb and Plts on anticoagulation    UTI (urinary tract infection)  Assessment & Plan  UA 20-50 WBC w/ moderate bacteria, UCx +staph w/ pending susceptibilities  Renal U/S negative for hydronephrosis  BCx x 2 NGTD  Right groin wound cx +MSSA and Enterobacter  Wounds likely colonized, doubt infection, but cultures sensitive to Zosyn regardless  May need to changed Zosyn to Bactrim if pancytopenia persists    Vitamin D deficiency  Assessment & Plan  Vit D level 27  On supplementation    A-fib (HCC)  Assessment & Plan  On Amiodarone  Anticoagulated on Coumadin    Fluid overload  Assessment & Plan  Echo EF 60% and RVSP 45 mmHg  U/S BLE negative for DVT  Edema most likely 2/2 recent cardiac surgery  BNP improving w/ diuresis   Continue Lasix as tolerated by blood pressure and renal function    Urinary retention  Assessment & Plan  Monitor for orthostatic hypotension on Flomax and Proscar    Dyslipidemia  Assessment & Plan  On Fenofibrate    Shoulder lesion, left- (present on admission)  Assessment & Plan  Will need F/U imaging    Lung nodule- (present on admission)  Assessment & Plan  Will need F/U imaging    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

## 2019-10-05 NOTE — PROGRESS NOTES
Received patient during shift change, report rec'd from day shift RN. Sitting up in w/c, VS stable on room air. Continent of Bowel, brenner cath in place. Mod-I in room for transfers. A&O x 4, able to make needs known. Bed in low position, call light within reach.

## 2019-10-06 LAB
BASOPHILS # BLD AUTO: 0.8 % (ref 0–1.8)
BASOPHILS # BLD: 0.02 K/UL (ref 0–0.12)
EOSINOPHIL # BLD AUTO: 0.22 K/UL (ref 0–0.51)
EOSINOPHIL NFR BLD: 8.5 % (ref 0–6.9)
ERYTHROCYTE [DISTWIDTH] IN BLOOD BY AUTOMATED COUNT: 57 FL (ref 35.9–50)
HCT VFR BLD AUTO: 27.2 % (ref 42–52)
HGB BLD-MCNC: 8 G/DL (ref 14–18)
IMM GRANULOCYTES # BLD AUTO: 0.02 K/UL (ref 0–0.11)
IMM GRANULOCYTES NFR BLD AUTO: 0.8 % (ref 0–0.9)
INR PPP: 2.95 (ref 0.87–1.13)
LYMPHOCYTES # BLD AUTO: 0.58 K/UL (ref 1–4.8)
LYMPHOCYTES NFR BLD: 22.5 % (ref 22–41)
MCH RBC QN AUTO: 28 PG (ref 27–33)
MCHC RBC AUTO-ENTMCNC: 29.4 G/DL (ref 33.7–35.3)
MCV RBC AUTO: 95.1 FL (ref 81.4–97.8)
MONOCYTES # BLD AUTO: 0.33 K/UL (ref 0–0.85)
MONOCYTES NFR BLD AUTO: 12.8 % (ref 0–13.4)
NEUTROPHILS # BLD AUTO: 1.41 K/UL (ref 1.82–7.42)
NEUTROPHILS NFR BLD: 54.6 % (ref 44–72)
NRBC # BLD AUTO: 0 K/UL
NRBC BLD-RTO: 0 /100 WBC
NT-PROBNP SERPL IA-MCNC: 1406 PG/ML (ref 0–125)
PLATELET # BLD AUTO: 150 K/UL (ref 164–446)
PMV BLD AUTO: 8.4 FL (ref 9–12.9)
PROTHROMBIN TIME: 31.8 SEC (ref 12–14.6)
RBC # BLD AUTO: 2.86 M/UL (ref 4.7–6.1)
WBC # BLD AUTO: 2.6 K/UL (ref 4.8–10.8)

## 2019-10-06 PROCEDURE — 97537 COMMUNITY/WORK REINTEGRATION: CPT

## 2019-10-06 PROCEDURE — 83880 ASSAY OF NATRIURETIC PEPTIDE: CPT

## 2019-10-06 PROCEDURE — 97112 NEUROMUSCULAR REEDUCATION: CPT

## 2019-10-06 PROCEDURE — A9270 NON-COVERED ITEM OR SERVICE: HCPCS | Performed by: HOSPITALIST

## 2019-10-06 PROCEDURE — 770010 HCHG ROOM/CARE - REHAB SEMI PRIVAT*

## 2019-10-06 PROCEDURE — 97530 THERAPEUTIC ACTIVITIES: CPT

## 2019-10-06 PROCEDURE — 97110 THERAPEUTIC EXERCISES: CPT

## 2019-10-06 PROCEDURE — A9270 NON-COVERED ITEM OR SERVICE: HCPCS | Performed by: PHYSICAL MEDICINE & REHABILITATION

## 2019-10-06 PROCEDURE — 700105 HCHG RX REV CODE 258: Performed by: HOSPITALIST

## 2019-10-06 PROCEDURE — 700102 HCHG RX REV CODE 250 W/ 637 OVERRIDE(OP): Performed by: HOSPITALIST

## 2019-10-06 PROCEDURE — 700102 HCHG RX REV CODE 250 W/ 637 OVERRIDE(OP): Performed by: PHYSICAL MEDICINE & REHABILITATION

## 2019-10-06 PROCEDURE — 700111 HCHG RX REV CODE 636 W/ 250 OVERRIDE (IP): Performed by: HOSPITALIST

## 2019-10-06 PROCEDURE — 85025 COMPLETE CBC W/AUTO DIFF WBC: CPT

## 2019-10-06 PROCEDURE — 85610 PROTHROMBIN TIME: CPT

## 2019-10-06 PROCEDURE — 99232 SBSQ HOSP IP/OBS MODERATE 35: CPT | Performed by: HOSPITALIST

## 2019-10-06 PROCEDURE — 97116 GAIT TRAINING THERAPY: CPT

## 2019-10-06 RX ORDER — WARFARIN SODIUM 1 MG/1
1 TABLET ORAL
Status: COMPLETED | OUTPATIENT
Start: 2019-10-06 | End: 2019-10-06

## 2019-10-06 RX ORDER — SULFAMETHOXAZOLE AND TRIMETHOPRIM 800; 160 MG/1; MG/1
1 TABLET ORAL EVERY 12 HOURS
Status: DISCONTINUED | OUTPATIENT
Start: 2019-10-06 | End: 2019-10-08

## 2019-10-06 RX ADMIN — SIMETHICONE CHEW TAB 80 MG 160 MG: 80 TABLET ORAL at 08:17

## 2019-10-06 RX ADMIN — METOPROLOL TARTRATE 12.5 MG: 25 TABLET ORAL at 17:45

## 2019-10-06 RX ADMIN — VITAMIN D, TAB 1000IU (100/BT) 1000 UNITS: 25 TAB at 08:21

## 2019-10-06 RX ADMIN — ACETAMINOPHEN 650 MG: 325 TABLET, FILM COATED ORAL at 21:00

## 2019-10-06 RX ADMIN — TAMSULOSIN HYDROCHLORIDE 0.8 MG: 0.4 CAPSULE ORAL at 21:01

## 2019-10-06 RX ADMIN — AMIODARONE HYDROCHLORIDE 200 MG: 200 TABLET ORAL at 06:04

## 2019-10-06 RX ADMIN — GABAPENTIN 400 MG: 400 CAPSULE ORAL at 15:00

## 2019-10-06 RX ADMIN — SIMETHICONE CHEW TAB 80 MG 160 MG: 80 TABLET ORAL at 21:01

## 2019-10-06 RX ADMIN — SIMETHICONE CHEW TAB 80 MG 160 MG: 80 TABLET ORAL at 17:44

## 2019-10-06 RX ADMIN — WARFARIN SODIUM 1 MG: 1 TABLET ORAL at 17:45

## 2019-10-06 RX ADMIN — MELATONIN 6 MG: at 21:01

## 2019-10-06 RX ADMIN — GABAPENTIN 400 MG: 400 CAPSULE ORAL at 21:01

## 2019-10-06 RX ADMIN — POTASSIUM CHLORIDE 20 MEQ: 1500 TABLET, EXTENDED RELEASE ORAL at 08:22

## 2019-10-06 RX ADMIN — GABAPENTIN 400 MG: 400 CAPSULE ORAL at 11:52

## 2019-10-06 RX ADMIN — PIPERACILLIN AND TAZOBACTAM 3.38 G: 3; .375 INJECTION, POWDER, LYOPHILIZED, FOR SOLUTION INTRAVENOUS; PARENTERAL at 00:08

## 2019-10-06 RX ADMIN — PIPERACILLIN AND TAZOBACTAM 3.38 G: 3; .375 INJECTION, POWDER, LYOPHILIZED, FOR SOLUTION INTRAVENOUS; PARENTERAL at 06:04

## 2019-10-06 RX ADMIN — SULFAMETHOXAZOLE AND TRIMETHOPRIM 1 TABLET: 800; 160 TABLET ORAL at 21:01

## 2019-10-06 RX ADMIN — FINASTERIDE 5 MG: 5 TABLET, FILM COATED ORAL at 08:24

## 2019-10-06 RX ADMIN — SIMETHICONE CHEW TAB 80 MG 160 MG: 80 TABLET ORAL at 11:52

## 2019-10-06 RX ADMIN — POTASSIUM CHLORIDE 20 MEQ: 1500 TABLET, EXTENDED RELEASE ORAL at 21:01

## 2019-10-06 RX ADMIN — ASPIRIN 325 MG ORAL TABLET 325 MG: 325 PILL ORAL at 08:17

## 2019-10-06 RX ADMIN — OMEPRAZOLE 20 MG: 20 CAPSULE, DELAYED RELEASE ORAL at 08:17

## 2019-10-06 RX ADMIN — FENOFIBRATE 67 MG: 67 CAPSULE ORAL at 08:18

## 2019-10-06 RX ADMIN — FUROSEMIDE 40 MG: 40 TABLET ORAL at 06:04

## 2019-10-06 RX ADMIN — GABAPENTIN 400 MG: 400 CAPSULE ORAL at 04:29

## 2019-10-06 RX ADMIN — TRAZODONE HYDROCHLORIDE 50 MG: 50 TABLET ORAL at 21:00

## 2019-10-06 RX ADMIN — METOPROLOL TARTRATE 12.5 MG: 25 TABLET ORAL at 06:04

## 2019-10-06 ASSESSMENT — ENCOUNTER SYMPTOMS
SHORTNESS OF BREATH: 0
COUGH: 0
EYES NEGATIVE: 1
PALPITATIONS: 0
CHILLS: 0
VOMITING: 0
NAUSEA: 0
FEVER: 0
ABDOMINAL PAIN: 0

## 2019-10-06 NOTE — CARE PLAN
Problem: Communication  Goal: The ability to communicate needs accurately and effectively will improve  Outcome: PROGRESSING AS EXPECTED  Note:   Patient able to verbalize needs.  Will continue to monitor.      Problem: Safety  Goal: Will remain free from injury  Outcome: PROGRESSING AS EXPECTED  Note:   Pt uses call light consistently and appropriately. Waits for assistance does not attempt self transfer this shift. Able to verbalize needs.      Problem: Bowel/Gastric:  Goal: Normal bowel function is maintained or improved  Outcome: PROGRESSING AS EXPECTED  Note:   Patient having regular bowel movements; last BM 10/4.  Denies s/s constipation; bowel meds available if needed.  Will continue to monitor.      Problem: Pain Management  Goal: Pain level will decrease to patient's comfort goal  Outcome: PROGRESSING AS EXPECTED  Note:   Patient able to verbalize pain level and verbalize an acceptable level of pain.      Problem: Urinary Elimination:  Goal: Ability to reestablish a normal urinary elimination pattern will improve  Outcome: PROGRESSING AS EXPECTED  Note:   Patient voiding adequate amounts of red tinted urine. On IV antibiotics for UTI.  Denies flank pain or dysuria; afebrile.  Will continue to monitor.

## 2019-10-06 NOTE — PROGRESS NOTES
Inpatient Anticoagulation Service Note    Date: 10/6/2019    Reason for Anticoagulation: Stroke   Target INR: 2.0 to 3.0         Hemoglobin Value: (!) 8  Hematocrit Value: (!) 27.2    INR from last 7 days     Date/Time INR Value    10/06/19 0642  (!) 2.95    10/05/19 0528  (!) 3.4    10/04/19 0553  (!) 3.14    10/03/19 0530  (!) 3.18    10/02/19 0545  (!) 2.9    10/01/19 0537  (!) 2.93    09/30/19 0555  (!) 3.13        Dose from last 7 days     Date/Time Dose (mg)    10/06/19 1200  1    10/05/19 0800  0    10/04/19 0553  2.5    10/03/19 0530  1    10/02/19 0545  4    10/01/19 0537  4    09/30/19 0555  2.5    09/29/19 1700  1        Average Dose (mg): 5  Significant Interactions: Amiodarone, Aspirin, Proton Pump Inhibitor, Statin (If less than 5 days and overlap therapy discontinued -- document reason (i.e. Bleed Risk))    (If still on overlap therapy, if No -- document reason (i.e. Bleed Risk))         Plan:  Will give warfarin 1 mg po tonight for INR of 2.95     Pharmacist suggested discharge dosing: To be determined.     Austen Kyle [facility]

## 2019-10-06 NOTE — THERAPY
"Occupational Therapy  Daily Treatment     Patient Name: Xavier Richardson  Age:  69 y.o., Sex:  male  Medical Record #: [id number]  Today's Date: 10/6/2019     Precautions  Precautions: (P) Sternal Precautions (See Comments), Fall Risk  Comments: (P) brenner    Safety   ADL Safety : Requires Supervision for Safety  Bathroom Safety: Requires Supervision for Safety  Comments: Completed ADL routine at min to supervised level this session, slightly unsteady on his feet requiring CGA for mobility with SPC and close SBA for standing components    Subjective    \"It feels so good to be out here. I like practicing on the different terrains, this is a good challenge\"     Objective       10/06/19 0931   Precautions   Precautions Sternal Precautions (See Comments);Fall Risk   Comments elidia   OT Total Time Spent   OT Individual Total Time Spent (Mins) 90   OT Charge Group   OT Community Reintegration 6     [facility], tolerated mobility over varying terrain for 3,000 feet, up/down inclines, total of ~60 minutes on his feet with rest breaks x2.     Assessment    Pt SBA for outdoor mobility over varying terrain, demos good pacing and safety awareness, min cues for attention to stay on path and monitor changes in terrain as needed, min cues for self-monitoring to self-initiate rest breaks as needed.     Plan    Continue to progress LUE GM/FM coordination and strength, functional standing balance/tolerance, endurance, continue to assess  deficits,  with tech as adjunct     "

## 2019-10-06 NOTE — THERAPY
Physical Therapy   Daily Treatment     Patient Name: Xavier Richardson  Age:  69 y.o., Sex:  male  Medical Record #: [id number]  Today's Date: 10/6/2019     Precautions  Precautions: Sternal Precautions (See Comments), Fall Risk  Comments: elidia Mathis    Pt was seated in w/c upon arrival and agreeable to treatment.      Objective       10/06/19 0831   Precautions   Precautions Sternal Precautions (See Comments);Fall Risk;Other (See Comments)   Interdisciplinary Plan of Care Collaboration   Patient Position at End of Therapy In Bed;Tray Table within Reach;Call Light within Reach;Phone within Reach   PT Total Time Spent   PT Individual Total Time Spent (Mins) 30   PT Charge Group   PT Neuromuscular Re-Education / Balance 2       Static balance activities in // bars with focus on 4 point foot and increased body awareness: FA, FT x 1 min each, FT with vertical, horizontal, and diagonal head turns and EO/EC.  Dynamic balance activities: AMB with SPC with horizontal HT, 3 step and stop, 3 step and stop with horizontal HT, AMB without AD all x 60 feet each.     Assessment    Pt demonstrated mild sway with dynamic balance activities but with improvement with repetition and education.     Plan    Continue therapeutic exercise, gait training change of direction/head turns/forward and backward, stairs, car transfer, safety education, home exercise program

## 2019-10-06 NOTE — THERAPY
"Physical Therapy   Daily Treatment     Patient Name: Xavier Richardson  Age:  69 y.o., Sex:  male  Medical Record #: [id number]  Today's Date: 10/6/2019     Precautions  Precautions: Sternal Precautions (See Comments), Fall Risk, Other (See Comments)  Comments: Evon    Subjective    Pt found seated with sister socializing. Pt was enthusiastic about outing he went on this morning stating \"it was perfect to be in the trees and fresh air\".     Objective       10/06/19 1501   Precautions   Precautions Sternal Precautions (See Comments);Fall Risk;Other (See Comments)   Comments Evon   Vitals   Vitals Comments PT monitored for signs and symptoms of dizziness, nausea, and dyspnea throughout therapy session. None noted.   Pain 0 - 10 Group   Therapist Pain Assessment 0;Post Activity Pain Same as Prior to Activity   Non Verbal Descriptors   Non Verbal Scale  Calm   Cognition    Cognition / Consciousness WDL   Level of Consciousness Alert   Sitting Lower Body Exercises   Sit to Stand 3 sets of 10  (on blue foam no UE)   Bed Mobility    Scooting Modified Independent   Rolling Modified Independent   Interdisciplinary Plan of Care Collaboration   IDT Collaboration with  Certified Nursing Assistant   Patient Position at End of Therapy Seated;Family / Friend in Room   Collaboration Comments CNA for brenner bag empty   PT Total Time Spent   PT Individual Total Time Spent (Mins) 60   PT Charge Group   PT Gait Training 2   PT Therapeutic Exercise 1   PT Therapeutic Activities 1     Dynamic gait training:  - Pt completed 2x 60' walking on grass with head turns in all directions SBA with SPC  -Pt completed 2x 60' walking on grass with changes of gait speeds SBA with SPC    ** HEP handout was distributed as well as purple theraband to be reviewed next session    FIM Bed/Chair/Wheelchair Transfers Score: 6 - Modified Independent  Bed/Chair/Wheelchair Transfers Description:       FIM Walking Score:  5 - Standby Prompting/Supervision or " Set-up  Walking Description:  (Pt completed x214' outside on uneven surfaces SBA with SPC.)    FIM Stairs Score:  5 - Standby Prompting/Supervision or Set-up  Stairs Description:  (Pt ascended/descended 2x 20 standard steps with use of right handrailing and step over step gait pattern.)      Assessment    Pt demonstrated appropriate safety with gait on uneven surfaces as well as safety with single handrailing during stair training. Patient required some cuing for rest breaks throughout.    Plan    Family training for car transfer & stairs, review sternal precautions, review HEP handout, dynamic gait

## 2019-10-06 NOTE — PROGRESS NOTES
Hospital Medicine Daily Progress Note        Chief Complaint  S/P Aortic Dissection, AFib, HTN    Interval Problem Update  Pt and Staff report he had blood tinged yesterday, clear today.  No other symptoms.    Review of Systems  Review of Systems   Constitutional: Negative for chills, fever and malaise/fatigue.   HENT: Negative.    Eyes: Negative.    Respiratory: Negative for cough and shortness of breath.    Cardiovascular: Negative for chest pain and palpitations.   Gastrointestinal: Negative for abdominal pain, nausea and vomiting.   Genitourinary: Positive for hematuria.        Urinary retention   Skin: Negative for itching and rash.        Physical Exam  Temp:  [36.4 °C (97.6 °F)-36.9 °C (98.4 °F)] 36.9 °C (98.4 °F)  Pulse:  [63-75] 63  Resp:  [15-20] 18  BP: (124-139)/(70-76) 139/70  SpO2:  [91 %-98 %] 98 %    Physical Exam   Constitutional: He is oriented to person, place, and time. No distress.   HENT:   Head: Normocephalic and atraumatic.   Right Ear: External ear normal.   Left Ear: External ear normal.   Eyes: Conjunctivae and EOM are normal. Right eye exhibits no discharge. Left eye exhibits no discharge.   Neck: Normal range of motion. Neck supple. No tracheal deviation present.   Cardiovascular:   irreg irreg   Pulmonary/Chest: No stridor. No respiratory distress. He has no wheezes.   Decreased BS   Abdominal: Soft. Bowel sounds are normal. He exhibits no distension. There is no tenderness.   Musculoskeletal: He exhibits edema.   Neurological: He is alert and oriented to person, place, and time.   Skin: Skin is warm and dry. He is not diaphoretic.   Right groin wound   Vitals reviewed.      Fluids    Intake/Output Summary (Last 24 hours) at 10/6/2019 1222  Last data filed at 10/6/2019 0800  Gross per 24 hour   Intake 620 ml   Output 2200 ml   Net -1580 ml       Laboratory  Recent Labs     10/05/19  0528 10/06/19  0642   WBC 3.0* 2.6*   RBC 2.69* 2.86*   HEMOGLOBIN 7.8* 8.0*   HEMATOCRIT 25.7* 27.2*    MCV 95.5 95.1   MCH 29.0 28.0   MCHC 30.4* 29.4*   RDW 57.9* 57.0*   PLATELETCT 124* 150*   MPV 8.7* 8.4*     Recent Labs     10/05/19  0528   SODIUM 140   POTASSIUM 3.8   CHLORIDE 108   CO2 26   GLUCOSE 94   BUN 8   CREATININE 1.08   CALCIUM 8.9     Recent Labs     10/04/19  0553 10/05/19  0528 10/06/19  0642   INR 3.14* 3.40* 2.95*               Assessment/Plan  * 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)  Assessment & Plan  On ASA and Fenofibrate    Pancytopenia (HCC)  Assessment & Plan  Suspect 2/2 Zosyn, will change abx  Follow ANC and monitor need for G-CSF  Follow Hb and Plts on anticoagulation    UTI (urinary tract infection)  Assessment & Plan  UA 20-50 WBC w/ moderate bacteria, UCx +staph w/ pending susceptibilities  Renal U/S negative for hydronephrosis  BCx x 2 NGTD  Right groin wound cx +MSSA and Enterobacter  Wounds likely colonized, doubt infection, but cultures sensitive to abx regardless  Zosyn change to Bactrim due to pancytopenia  Complete total 7 days abx    Vitamin D deficiency  Assessment & Plan  Vit D level 27  On supplementation    A-fib (HCC)  Assessment & Plan  On Amiodarone  Anticoagulated on Coumadin    Fluid overload  Assessment & Plan  Echo EF 60% and RVSP 45 mmHg  U/S BLE negative for DVT  Edema most likely 2/2 recent cardiac surgery  BNP improving w/ diuresis   Continue Lasix as tolerated by blood pressure and renal function    Urinary retention  Assessment & Plan  Monitor for orthostatic hypotension on Flomax and Proscar  Transient gross hematuria likely 2/2 catheter irritation and anticoagulation  Pending Urology eval    Dyslipidemia  Assessment & Plan  On Fenofibrate    Shoulder lesion, left- (present on admission)  Assessment & Plan  Will need F/U imaging    Lung nodule- (present on admission)  Assessment & Plan  Will need F/U imaging    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

## 2019-10-07 ENCOUNTER — HOSPITAL ENCOUNTER (OUTPATIENT)
Dept: RADIOLOGY | Facility: MEDICAL CENTER | Age: 69
End: 2019-10-07

## 2019-10-07 DIAGNOSIS — R91.1 LUNG NODULE: ICD-10-CM

## 2019-10-07 LAB
ANION GAP SERPL CALC-SCNC: 5 MMOL/L (ref 0–11.9)
BACTERIA BLD CULT: NORMAL
BACTERIA BLD CULT: NORMAL
BACTERIA UR CULT: ABNORMAL
BACTERIA UR CULT: ABNORMAL
BASOPHILS # BLD AUTO: 0.4 % (ref 0–1.8)
BASOPHILS # BLD: 0.01 K/UL (ref 0–0.12)
BUN SERPL-MCNC: 8 MG/DL (ref 8–22)
CALCIUM SERPL-MCNC: 9.3 MG/DL (ref 8.5–10.5)
CHLORIDE SERPL-SCNC: 107 MMOL/L (ref 96–112)
CO2 SERPL-SCNC: 28 MMOL/L (ref 20–33)
CREAT SERPL-MCNC: 0.93 MG/DL (ref 0.5–1.4)
EOSINOPHIL # BLD AUTO: 0.15 K/UL (ref 0–0.51)
EOSINOPHIL NFR BLD: 5.7 % (ref 0–6.9)
ERYTHROCYTE [DISTWIDTH] IN BLOOD BY AUTOMATED COUNT: 56.4 FL (ref 35.9–50)
GLUCOSE SERPL-MCNC: 84 MG/DL (ref 65–99)
HCT VFR BLD AUTO: 27.2 % (ref 42–52)
HGB BLD-MCNC: 8.3 G/DL (ref 14–18)
IMM GRANULOCYTES # BLD AUTO: 0.02 K/UL (ref 0–0.11)
IMM GRANULOCYTES NFR BLD AUTO: 0.8 % (ref 0–0.9)
INR PPP: 2.87 (ref 0.87–1.13)
LYMPHOCYTES # BLD AUTO: 0.69 K/UL (ref 1–4.8)
LYMPHOCYTES NFR BLD: 26 % (ref 22–41)
MCH RBC QN AUTO: 28.8 PG (ref 27–33)
MCHC RBC AUTO-ENTMCNC: 30.5 G/DL (ref 33.7–35.3)
MCV RBC AUTO: 94.4 FL (ref 81.4–97.8)
MONOCYTES # BLD AUTO: 0.31 K/UL (ref 0–0.85)
MONOCYTES NFR BLD AUTO: 11.7 % (ref 0–13.4)
NEUTROPHILS # BLD AUTO: 1.47 K/UL (ref 1.82–7.42)
NEUTROPHILS NFR BLD: 55.4 % (ref 44–72)
NRBC # BLD AUTO: 0 K/UL
NRBC BLD-RTO: 0 /100 WBC
PLATELET # BLD AUTO: 150 K/UL (ref 164–446)
PMV BLD AUTO: 8.5 FL (ref 9–12.9)
POTASSIUM SERPL-SCNC: 3.8 MMOL/L (ref 3.6–5.5)
PROTHROMBIN TIME: 31.2 SEC (ref 12–14.6)
RBC # BLD AUTO: 2.88 M/UL (ref 4.7–6.1)
SIGNIFICANT IND 70042: ABNORMAL
SIGNIFICANT IND 70042: NORMAL
SIGNIFICANT IND 70042: NORMAL
SITE SITE: ABNORMAL
SITE SITE: NORMAL
SITE SITE: NORMAL
SODIUM SERPL-SCNC: 140 MMOL/L (ref 135–145)
SOURCE SOURCE: ABNORMAL
SOURCE SOURCE: NORMAL
SOURCE SOURCE: NORMAL
WBC # BLD AUTO: 2.7 K/UL (ref 4.8–10.8)

## 2019-10-07 PROCEDURE — A9270 NON-COVERED ITEM OR SERVICE: HCPCS | Performed by: HOSPITALIST

## 2019-10-07 PROCEDURE — 97530 THERAPEUTIC ACTIVITIES: CPT

## 2019-10-07 PROCEDURE — A9270 NON-COVERED ITEM OR SERVICE: HCPCS | Performed by: PHYSICAL MEDICINE & REHABILITATION

## 2019-10-07 PROCEDURE — 97110 THERAPEUTIC EXERCISES: CPT

## 2019-10-07 PROCEDURE — 80048 BASIC METABOLIC PNL TOTAL CA: CPT

## 2019-10-07 PROCEDURE — 700102 HCHG RX REV CODE 250 W/ 637 OVERRIDE(OP): Performed by: HOSPITALIST

## 2019-10-07 PROCEDURE — 85610 PROTHROMBIN TIME: CPT

## 2019-10-07 PROCEDURE — 36415 COLL VENOUS BLD VENIPUNCTURE: CPT

## 2019-10-07 PROCEDURE — 99233 SBSQ HOSP IP/OBS HIGH 50: CPT | Performed by: PHYSICAL MEDICINE & REHABILITATION

## 2019-10-07 PROCEDURE — 97116 GAIT TRAINING THERAPY: CPT

## 2019-10-07 PROCEDURE — 85025 COMPLETE CBC W/AUTO DIFF WBC: CPT

## 2019-10-07 PROCEDURE — 99232 SBSQ HOSP IP/OBS MODERATE 35: CPT | Performed by: HOSPITALIST

## 2019-10-07 PROCEDURE — 770010 HCHG ROOM/CARE - REHAB SEMI PRIVAT*

## 2019-10-07 PROCEDURE — 700102 HCHG RX REV CODE 250 W/ 637 OVERRIDE(OP): Performed by: PHYSICAL MEDICINE & REHABILITATION

## 2019-10-07 PROCEDURE — 94760 N-INVAS EAR/PLS OXIMETRY 1: CPT

## 2019-10-07 RX ORDER — WARFARIN SODIUM 1 MG/1
1 TABLET ORAL
Status: COMPLETED | OUTPATIENT
Start: 2019-10-07 | End: 2019-10-07

## 2019-10-07 RX ADMIN — ASPIRIN 325 MG ORAL TABLET 325 MG: 325 PILL ORAL at 08:06

## 2019-10-07 RX ADMIN — FUROSEMIDE 40 MG: 40 TABLET ORAL at 05:19

## 2019-10-07 RX ADMIN — TAMSULOSIN HYDROCHLORIDE 0.8 MG: 0.4 CAPSULE ORAL at 20:44

## 2019-10-07 RX ADMIN — MELATONIN 6 MG: at 20:44

## 2019-10-07 RX ADMIN — METOPROLOL TARTRATE 12.5 MG: 25 TABLET ORAL at 17:56

## 2019-10-07 RX ADMIN — POTASSIUM CHLORIDE 20 MEQ: 1500 TABLET, EXTENDED RELEASE ORAL at 08:07

## 2019-10-07 RX ADMIN — TRAZODONE HYDROCHLORIDE 50 MG: 50 TABLET ORAL at 20:44

## 2019-10-07 RX ADMIN — OMEPRAZOLE 20 MG: 20 CAPSULE, DELAYED RELEASE ORAL at 08:06

## 2019-10-07 RX ADMIN — GABAPENTIN 400 MG: 400 CAPSULE ORAL at 05:19

## 2019-10-07 RX ADMIN — ACETAMINOPHEN 650 MG: 325 TABLET, FILM COATED ORAL at 20:43

## 2019-10-07 RX ADMIN — GABAPENTIN 400 MG: 400 CAPSULE ORAL at 20:44

## 2019-10-07 RX ADMIN — SULFAMETHOXAZOLE AND TRIMETHOPRIM 1 TABLET: 800; 160 TABLET ORAL at 08:06

## 2019-10-07 RX ADMIN — POTASSIUM CHLORIDE 20 MEQ: 1500 TABLET, EXTENDED RELEASE ORAL at 20:44

## 2019-10-07 RX ADMIN — GABAPENTIN 400 MG: 400 CAPSULE ORAL at 11:48

## 2019-10-07 RX ADMIN — SULFAMETHOXAZOLE AND TRIMETHOPRIM 1 TABLET: 800; 160 TABLET ORAL at 20:44

## 2019-10-07 RX ADMIN — VITAMIN D, TAB 1000IU (100/BT) 1000 UNITS: 25 TAB at 08:07

## 2019-10-07 RX ADMIN — FENOFIBRATE 67 MG: 67 CAPSULE ORAL at 08:06

## 2019-10-07 RX ADMIN — SIMETHICONE CHEW TAB 80 MG 160 MG: 80 TABLET ORAL at 20:43

## 2019-10-07 RX ADMIN — SIMETHICONE CHEW TAB 80 MG 160 MG: 80 TABLET ORAL at 11:48

## 2019-10-07 RX ADMIN — METOPROLOL TARTRATE 12.5 MG: 25 TABLET ORAL at 05:19

## 2019-10-07 RX ADMIN — GABAPENTIN 400 MG: 400 CAPSULE ORAL at 15:09

## 2019-10-07 RX ADMIN — FINASTERIDE 5 MG: 5 TABLET, FILM COATED ORAL at 08:07

## 2019-10-07 RX ADMIN — AMIODARONE HYDROCHLORIDE 200 MG: 200 TABLET ORAL at 05:19

## 2019-10-07 RX ADMIN — WARFARIN SODIUM 1 MG: 1 TABLET ORAL at 17:56

## 2019-10-07 RX ADMIN — SIMETHICONE CHEW TAB 80 MG 160 MG: 80 TABLET ORAL at 08:06

## 2019-10-07 RX ADMIN — SIMETHICONE CHEW TAB 80 MG 160 MG: 80 TABLET ORAL at 17:56

## 2019-10-07 ASSESSMENT — ENCOUNTER SYMPTOMS
NAUSEA: 0
CHILLS: 0
VOMITING: 0
COUGH: 0
FEVER: 0
ABDOMINAL PAIN: 0
SHORTNESS OF BREATH: 0
PALPITATIONS: 0
EYES NEGATIVE: 1

## 2019-10-07 NOTE — PROGRESS NOTES
DATE OF SERVICE:  10/02/2019    The patient was seen for a followup visit.  The patient said he has not felt   well all day.  He reported feeling rundown.  He said the staff thinks he might   have a UTI.  He said the culture is still not back.  The patient said his day   has been extended over the weekend.  The patient reported he is somewhat   disappointed, but he hopes that the problem can be resolved soon.  The patient   was spoken to about his concerns and maybe some questions he might want to   raise with his physician.       ____________________________________     SAUL SANDERSON, PHD    PAVEL / REMI    DD:  10/06/2019 16:17:31  DT:  10/07/2019 13:05:29    D#:  [id number]  Job#:  [id number]

## 2019-10-07 NOTE — PROGRESS NOTES
Rehab Progress Note     Date of Service: 10/7/2019  Chief Complaint: follow up stroke    Interval Events (Subjective)    Patient seen and examined in his room today.  His daughter is present and has lots of questions.  Patient feels much better and feels ready for discharge home on Wednesday.  We have referred him to pulmonology for follow-up of his lung nodule.  Daughter is also asking about his disc he had on admission with all of his outside hospital imaging.  He has questions about how long he will need to be on Lasix for his mild edema, as well as how long he will need to be on coumadin.  Advised this will be per his CT surgeon as well as cardiologist. Patient has no other new complaints.    Objective:  VITAL SIGNS: /79   Pulse 74   Temp 36.6 °C (97.8 °F) (Oral)   Resp 20   Ht 1.829 m (6')   Wt 119.6 kg (263 lb 9.6 oz)   SpO2 95%   BMI 35.75 kg/m²   Gen: alert, no apparent distress  CV: regular rate and rhythm, no murmurs, mild peripheral edema  Resp: clear to ascultation bilaterally, normal respiratory effort  GI: soft, non-tender abdomen, bowel sounds present  Neuro: notable for mild left-sided incoordination      Recent Results (from the past 72 hour(s))   Prothrombin Time    Collection Time: 10/05/19  5:28 AM   Result Value Ref Range    PT 35.7 (H) 12.0 - 14.6 sec    INR 3.40 (H) 0.87 - 1.13   CBC WITHOUT DIFFERENTIAL    Collection Time: 10/05/19  5:28 AM   Result Value Ref Range    WBC 3.0 (L) 4.8 - 10.8 K/uL    RBC 2.69 (L) 4.70 - 6.10 M/uL    Hemoglobin 7.8 (L) 14.0 - 18.0 g/dL    Hematocrit 25.7 (L) 42.0 - 52.0 %    MCV 95.5 81.4 - 97.8 fL    MCH 29.0 27.0 - 33.0 pg    MCHC 30.4 (L) 33.7 - 35.3 g/dL    RDW 57.9 (H) 35.9 - 50.0 fL    Platelet Count 124 (L) 164 - 446 K/uL    MPV 8.7 (L) 9.0 - 12.9 fL   Basic Metabolic Panel    Collection Time: 10/05/19  5:28 AM   Result Value Ref Range    Sodium 140 135 - 145 mmol/L    Potassium 3.8 3.6 - 5.5 mmol/L    Chloride 108 96 - 112 mmol/L    Co2 26  20 - 33 mmol/L    Glucose 94 65 - 99 mg/dL    Bun 8 8 - 22 mg/dL    Creatinine 1.08 0.50 - 1.40 mg/dL    Calcium 8.9 8.5 - 10.5 mg/dL    Anion Gap 6.0 0.0 - 11.9   ESTIMATED GFR    Collection Time: 10/05/19  5:28 AM   Result Value Ref Range    GFR If African American >60 >60 mL/min/1.73 m 2    GFR If Non African American >60 >60 mL/min/1.73 m 2   Prothrombin Time    Collection Time: 10/06/19  6:42 AM   Result Value Ref Range    PT 31.8 (H) 12.0 - 14.6 sec    INR 2.95 (H) 0.87 - 1.13   CBC WITH DIFFERENTIAL    Collection Time: 10/06/19  6:42 AM   Result Value Ref Range    WBC 2.6 (L) 4.8 - 10.8 K/uL    RBC 2.86 (L) 4.70 - 6.10 M/uL    Hemoglobin 8.0 (L) 14.0 - 18.0 g/dL    Hematocrit 27.2 (L) 42.0 - 52.0 %    MCV 95.1 81.4 - 97.8 fL    MCH 28.0 27.0 - 33.0 pg    MCHC 29.4 (L) 33.7 - 35.3 g/dL    RDW 57.0 (H) 35.9 - 50.0 fL    Platelet Count 150 (L) 164 - 446 K/uL    MPV 8.4 (L) 9.0 - 12.9 fL    Neutrophils-Polys 54.60 44.00 - 72.00 %    Lymphocytes 22.50 22.00 - 41.00 %    Monocytes 12.80 0.00 - 13.40 %    Eosinophils 8.50 (H) 0.00 - 6.90 %    Basophils 0.80 0.00 - 1.80 %    Immature Granulocytes 0.80 0.00 - 0.90 %    Nucleated RBC 0.00 /100 WBC    Neutrophils (Absolute) 1.41 (L) 1.82 - 7.42 K/uL    Lymphs (Absolute) 0.58 (L) 1.00 - 4.80 K/uL    Monos (Absolute) 0.33 0.00 - 0.85 K/uL    Eos (Absolute) 0.22 0.00 - 0.51 K/uL    Baso (Absolute) 0.02 0.00 - 0.12 K/uL    Immature Granulocytes (abs) 0.02 0.00 - 0.11 K/uL    NRBC (Absolute) 0.00 K/uL   proBrain Natriuretic Peptide, NT    Collection Time: 10/06/19  6:42 AM   Result Value Ref Range    NT-proBNP 1406 (H) 0 - 125 pg/mL   Prothrombin Time    Collection Time: 10/07/19  6:00 AM   Result Value Ref Range    PT 31.2 (H) 12.0 - 14.6 sec    INR 2.87 (H) 0.87 - 1.13   CBC WITH DIFFERENTIAL    Collection Time: 10/07/19  6:00 AM   Result Value Ref Range    WBC 2.7 (L) 4.8 - 10.8 K/uL    RBC 2.88 (L) 4.70 - 6.10 M/uL    Hemoglobin 8.3 (L) 14.0 - 18.0 g/dL    Hematocrit  27.2 (L) 42.0 - 52.0 %    MCV 94.4 81.4 - 97.8 fL    MCH 28.8 27.0 - 33.0 pg    MCHC 30.5 (L) 33.7 - 35.3 g/dL    RDW 56.4 (H) 35.9 - 50.0 fL    Platelet Count 150 (L) 164 - 446 K/uL    MPV 8.5 (L) 9.0 - 12.9 fL    Neutrophils-Polys 55.40 44.00 - 72.00 %    Lymphocytes 26.00 22.00 - 41.00 %    Monocytes 11.70 0.00 - 13.40 %    Eosinophils 5.70 0.00 - 6.90 %    Basophils 0.40 0.00 - 1.80 %    Immature Granulocytes 0.80 0.00 - 0.90 %    Nucleated RBC 0.00 /100 WBC    Neutrophils (Absolute) 1.47 (L) 1.82 - 7.42 K/uL    Lymphs (Absolute) 0.69 (L) 1.00 - 4.80 K/uL    Monos (Absolute) 0.31 0.00 - 0.85 K/uL    Eos (Absolute) 0.15 0.00 - 0.51 K/uL    Baso (Absolute) 0.01 0.00 - 0.12 K/uL    Immature Granulocytes (abs) 0.02 0.00 - 0.11 K/uL    NRBC (Absolute) 0.00 K/uL   Basic Metabolic Panel    Collection Time: 10/07/19  6:00 AM   Result Value Ref Range    Sodium 140 135 - 145 mmol/L    Potassium 3.8 3.6 - 5.5 mmol/L    Chloride 107 96 - 112 mmol/L    Co2 28 20 - 33 mmol/L    Glucose 84 65 - 99 mg/dL    Bun 8 8 - 22 mg/dL    Creatinine 0.93 0.50 - 1.40 mg/dL    Calcium 9.3 8.5 - 10.5 mg/dL    Anion Gap 5.0 0.0 - 11.9   ESTIMATED GFR    Collection Time: 10/07/19  6:00 AM   Result Value Ref Range    GFR If African American >60 >60 mL/min/1.73 m 2    GFR If Non African American >60 >60 mL/min/1.73 m 2       Current Facility-Administered Medications   Medication Frequency   • warfarin (COUMADIN) tablet 1 mg ONCE AT 1800   • sulfamethoxazole-trimethoprim (BACTRIM DS) 800-160 MG tablet 1 Tab Q12HRS   • senna-docusate (PERICOLACE or SENOKOT S) 8.6-50 MG per tablet 2 Tab DAILY    And   • polyethylene glycol/lytes (MIRALAX) PACKET 1 Packet QDAY PRN    And   • magnesium hydroxide (MILK OF MAGNESIA) suspension 30 mL QDAY PRN    And   • bisacodyl (DULCOLAX) suppository 10 mg QDAY PRN   • gabapentin (NEURONTIN) capsule 400 mg 4X/DAY   • traZODone (DESYREL) tablet 50 mg QHS   • furosemide (LASIX) tablet 40 mg Q DAY   • potassium chloride  SA (Kdur) tablet 20 mEq BID   • melatonin tablet 6 mg QHS   • vitamin D (cholecalciferol) tablet 1,000 Units DAILY   • metoprolol (LOPRESSOR) tablet 12.5 mg TWICE DAILY   • Respiratory Care per Protocol Continuous RT   • Pharmacy Consult Request ...Pain Management Review 1 Each PHARMACY TO DOSE   • acetaminophen (TYLENOL) tablet 650 mg Q4HRS PRN   • hydrALAZINE (APRESOLINE) tablet 10 mg Q8HRS PRN   • artificial tears ophthalmic solution 1 Drop PRN   • benzocaine-menthol (CEPACOL) lozenge 1 Lozenge Q2HRS PRN   • mag hydrox-al hydrox-simeth (MAALOX PLUS ES or MYLANTA DS) suspension 20 mL Q2HRS PRN   • ondansetron (ZOFRAN ODT) dispertab 4 mg 4X/DAY PRN    Or   • ondansetron (ZOFRAN) syringe/vial injection 4 mg 4X/DAY PRN   • sodium chloride (OCEAN) 0.65 % nasal spray 2 Spray PRN   • hydrOXYzine HCl (ATARAX) tablet 50 mg Q6HRS PRN   • MD Alert...Warfarin per Pharmacy PHARMACY TO DOSE   • amiodarone (CORDARONE) tablet 200 mg Q DAY   • aspirin (ASA) tablet 325 mg DAILY   • fenofibrate micronized (LOFIBRA) capsule 67 mg AFTER BREAKFAST   • finasteride (PROSCAR) tablet 5 mg DAILY   • omeprazole (PRILOSEC) capsule 20 mg QAM AC   • tamsulosin (FLOMAX) capsule 0.8 mg QHS   • simethicone (MYLICON) chewable tab 160 mg 4X/DAY WITH MEALS + NIGHTLY       Orders Placed This Encounter   Procedures   • Diet Order Regular     Standing Status:   Standing     Number of Occurrences:   1     Order Specific Question:   Diet:     Answer:   Regular [1]       Radiology  Transthoracic  Echo Report      Echocardiography Laboratory    CONCLUSIONS  No prior study is available for comparison.   The left ventricle was normal in size.  Mild concentric left ventricular hypertrophy.  Normal left ventricular systolic function and regional wall motion.  Left ventricular ejection fraction is estimated to be 60%.  Aortic sclerosis without stenosis.  Trace mitral regurgitation.  The left atrium is normal in size.  Dilated inferior vena cava with inspiratory  collapse.  Estimated right ventricular systolic pressure  is 45 mmHg.  Mobile echodensity noted in the aortic arch/proximal left subclavian   artery.    DONALD KNOTT  Exam Date:         09/24/2019                      11:45  Exam Location:     Inpatient  Priority:          Routine    Assessment:  Active Hospital Problems    Diagnosis   • *Stroke (cerebrum) (HCC)   • Essential hypertension   • JESUS MANUEL (obstructive sleep apnea)   • Peripheral neuropathy   • Other insomnia   • Dissection of thoracoabdominal aorta (HCC)   • Lung nodule   • Shoulder lesion, left   • Dyslipidemia   • Urinary retention   • Fluid overload   • A-fib (HCC)     This patient is a 69 y.o. male admitted for acute inpatient rehabilitation with Stroke (cerebrum) (HCC).    I led and attended the weekly conference and agree with the IDT conference documentation and plan of care as noted below.    Date of conference: 10/2/2019    Goals and barriers: See IDT note.    Biggest barriers: urinary retention, poor balance, poor endurance    Goals in next week: treat infection    Admission FIM 74 --> 95    CM/social support: lives alone, daughter to return from [facility] to assist at discharge    Anticipated DC date: 10/9/2019    Outpatient: PT    Equip: SPC    Follow up: PCP, cardiology, urology, pulmonology     Medical Decision Making and Plan:    Bilateral ischemic strokes  Hemorrhagic transformation in right parietal/occipital lobe  Left hemiparesis, improved  Non-dominant  Visual deficits, continued  PT/OT, 1.5 hr each discipline, 5 days per week  SLP eval, cog within normal  Continue aspirin and coumadin per outside recs  Neuro-ophthalmology referral - Dr. Wilder, made    MRI imaging reviewed with patient/family    Acute urinary retention  Does have a history of BPH  Continue Proscar and Flomax  Voiding after Barnard removal, but still retaining  Replaced Barnard  Referral to urology - apt already made - for day of discharge 10/9     Peripheral  neuropathy  Increased gabapentin 400 mg TID --> QID per patient request, home dosing    Insomnia, resolved  Continue melatonin and trazodone at 50 mg - did not tolerate higher doses    Bowel  Meds as needed  Last BM 10/6    DVT prophylaxis  Coumadin     Appreciate the assistance of the hospitalist with the patient's medical comorbidities:     Aortic dissection  Hypertension, controlled  Hyperlipidemia  Atrial fibrillation  Pulmonary hypertension, RSVP 45  Sleep apnea, non-compliant with CPAP  Left pleural effusion  Left lung nodule, outpatient follow up - referral made to pulmonology   Left shoulder lesion, outpatient follow up  Anemia, stable  Leukopenia, stable - maybe due to antibiotics per Dr. Hailey Mcdaniel D insufficiency, on supplementation  UTI - antibiotics per hospitalist    Total time:  35 minutes.  I spent greater than 50% of the time for patient care, counseling, and coordination on this date, including patient face-to face time, unit/floor time with review of records/pertinent lab data and studies, as well as discussing diagnostic evaluation/work up, planned therapeutic interventions, and future disposition of care, as per the interval events/subjective and the assessment and plan as noted above.    I have performed a physical exam, reviewed and updated ROS, as well as the assessment and plan today 10/7/2019. In review of note from 10/6/2019 there are no new changes except as documented above.    Amira Davis M.D.   Physical Medicine and Rehabilitation

## 2019-10-07 NOTE — PROGRESS NOTES
DATE OF SERVICE:  10/01/2019    The patient said he is doing very well in rehab.  He said he expects to be   discharged tomorrow.  He said he has not felt all that well, however.  The   patient said that the staff are trying to understand if he is experiencing   some type of infection.  Patient talked at length about some of his   home-related goals.       ____________________________________     SAUL SANDERSON, PHD    PAVEL / REMI    DD:  10/06/2019 16:08:58  DT:  10/07/2019 13:31:24    D#:  [id number]  Job#:  [id number]

## 2019-10-07 NOTE — PROGRESS NOTES
Received bedside shift report from Silva VÁZQUEZ RN regarding patient and assumed care. Patient awake, calm and stable, currently positioned in bed for comfort and safety; call light within reach. Denies pain or discomfort at this time. Will continue to monitor.

## 2019-10-07 NOTE — THERAPY
"Occupational Therapy  Daily Treatment     Patient Name: Xavier Richardson  Age:  69 y.o., Sex:  male  Medical Record #: [id number]  Today's Date: 10/7/2019     Precautions  Precautions: (P) Sternal Precautions (See Comments)  Comments: (P) brenner    Safety   ADL Safety : Requires Supervision for Safety  Bathroom Safety: Requires Supervision for Safety  Comments: Completed ADL routine at min to supervised level this session, slightly unsteady on his feet requiring CGA for mobility with SPC and close SBA for standing components    Subjective    \"This will keep me busy at home. We are thinking we will do an hour and a half in the morning and in the evening, so it will be similar to here.\"     Objective       10/07/19 1401   Precautions   Precautions Sternal Precautions (See Comments)   Comments brenner   Supine Upper Body Exercises   Chest Press 2 sets of 15  (3# dowel, 5 sec holds in extension)   Front Arm Raise 2 sets of 15  (3# dowel, 5 sec holds in extension)   Sitting Upper Body Exercises   Front Arm Raise 1 set of 15  (no weight)   Side Arm Raise 1 set of 15  (no weight)   Bicep Curls 1 set of 15  (no weight)   Other Exercise tabletop horizontal adduction/abduction with washcloth for decreased resistance 1x20 clockwise/counterclockwise, sliderbox 1x20 level surface, 1x20 level 1 incline, 1x20 level 2, 1x20 level 3, 1x20 level 4   Supine Lower Body Exercise   Supine Lower Body Exercises Yes   Bridges 2 sets of 15  (staggered stance on 2nd set for increased LLE input)   Sitting Lower Body Exercises   Sitting Lower Body Exercises Yes   Hip Flexion 2 sets of 15   Sit to Stand 2 sets of 10  (no UE support)   Other Exercises core therex seated edge of mat; 2x15 modified sit ups, side to side sit ups   OT Total Time Spent   OT Individual Total Time Spent (Mins) 60   OT Charge Group   OT Therapeutic Exercise  4     Assessment    Pt seen for continued education on HEP for continued progression of LUE function and core stability. " Issued written instructions and pt able to return demo with no verbal cues. Demos improving LUE strength and coordination from initial admit, LUE fatigues quickly with repetition requiring consistent rest breaks.     Plan    Prep for d/c Wed

## 2019-10-07 NOTE — PROGRESS NOTES
Pharmacy Warfarin Consult   10/7/2019     69 y.o.   male     Indication for anticoagulation: Stroke     Goal INR = 2 - 3    Recent Labs     10/05/19  0528 10/06/19  0642 10/07/19  0600   INR 3.40* 2.95* 2.87*   HEMOGLOBIN 7.8* 8.0* 8.3*   HEMATOCRIT 25.7* 27.2* 27.2*       Pertinent Drug/Drug Interactions:  Amiodarone, ASA, PPI, Statin, Septra  Outpatient Warfarin Regimen:  New Start  Recent Warfarin Dosing:    Dose from last 7 days     Date/Time Dose (mg)    10/07/19 0600  1    10/06/19 1200  1    10/05/19 0800  0    10/04/19 0553  2.5    10/03/19 0530  1    10/02/19 0545  4    10/01/19 0537  4            Bridge Therapy: No           1.  Warfarin 1 mg tonight or INR= 2.87           Catalino Stubbs [facility]

## 2019-10-07 NOTE — THERAPY
10/07/19 1559   Precautions   Precautions Sternal Precautions (See Comments);Other (See Comments)   Comments Barnard catheter   Pain 0 - 10 Group   Therapist Pain Assessment 0   Cognition    Cognition / Consciousness WDL   Level of Consciousness Alert   Standing Lower Body Exercises   Hip Flexion 2 sets of 15;Bilateral   Hip Extension 2 sets of 15;Bilateral    Hip Abduction 2 sets of 15;Bilateral   Heel Rise 2 sets of 15;Bilateral   Mini Squat 2 sets of 15;Partial   PT Total Time Spent   PT Individual Total Time Spent (Mins) 30   PT Charge Group   PT Gait Training 1   PT Therapeutic Exercise 1   Physical Therapy   Daily Treatment     Patient Name: Xavier Richardson  Age:  69 y.o., Sex:  male  Medical Record #: [id number]  Today's Date: 10/7/2019     Precautions  Precautions: Sternal Precautions (See Comments), Other (See Comments)  Comments: Barnard catheter    Subjective    The patient was up in his chair and he agreed to PT.     Objective    The patient participated in gait training with a single-point cane and tolerated 275 FT x2.  He also participated in standing bilateral lower extremity therapeutic exercises indicated above.    FIM Bed/Chair/Wheelchair Transfers Score: 6 - Modified Independent  Bed/Chair/Wheelchair Transfers Description:  Adaptive equipment, Increased time(Single-point cane modified independent)    FIM Walking Score:  6 - Modified Independent  Walking Description:  Assist device/equipment, Extra time(Modified independent single-point cane, intermittent supervision outside)    FIM Wheelchair Score:     Wheelchair Description:       FIM Stairs Score:  5 - Standby Prompting/Supervision or Set-up  Stairs Description:  Extra time, Supervision for safety, Hand rails      Assessment    The patient continues to improve in strength and endurance, dynamic standing balance and gait.  He will discharge to home 10/9/2019 with assistance from his daughter.    Plan    Safety education, gait training inside and  outside, therapeutic exercise, dynamic standing balance

## 2019-10-07 NOTE — DISCHARGE PLANNING
Received message late Friday afternoon that patient may want to consider pulmonary work up in [place] rather than at [facility]. Informed Dr. Davis of request.

## 2019-10-07 NOTE — THERAPY
"Occupational Therapy  Daily Treatment     Patient Name: Xavier Richardson  Age:  69 y.o., Sex:  male  Medical Record #: [id number]  Today's Date: 10/7/2019     Precautions  Precautions: Sternal Precautions (See Comments), Fall Risk, Other (See Comments)  Comments: Barnard    Safety   ADL Safety : Requires Supervision for Safety  Bathroom Safety: Requires Supervision for Safety  Comments: Completed ADL routine at min to supervised level this session, slightly unsteady on his feet requiring CGA for mobility with SPC and close SBA for standing components    Subjective    \"Can we practice the car?\"     Objective       10/07/19 1001   Interdisciplinary Plan of Care Collaboration   IDT Collaboration with  Family / Caregiver   Patient Position at End of Therapy Seated;Family / Friend in Room   Collaboration Comments direct hand off from PT for continued FT   OT Total Time Spent   OT Individual Total Time Spent (Mins) 30   OT Charge Group   OT Therapy Activity 2       Assessment    Pt's daughter present for family training; discussed safety recommendations for ADLs - pt is modified independent ADLs and has demonstrated ability to safely enter/exit shower with simulation of home environment, has all DME needs and grab bars in place. Pt practice in/out of his car with use of SPC and step-stool x3 trials initially requiring min A but progressed to SBA, daughter able to return demo appropriate positioning for support as needed. Daughter and patient educated on importance of continued UE therex to progress function in LUE, revisited example therex including scapular retraction, shoulder shrugs, chest press and front arm raise in supine vs seated. Plan to follow up in afternoon session for continued education    Plan    Finish reviewing UE HEP, prep for d/c Wed    "

## 2019-10-07 NOTE — THERAPY
10/07/19 1129   Precautions   Precautions Sternal Precautions (See Comments);Other (See Comments)   Comments Barnard catheter   Pain 0 - 10 Group   Therapist Pain Assessment 0   Cognition    Cognition / Consciousness WDL   Level of Consciousness Alert   Standing Lower Body Exercises   Hip Flexion 1 set of 15   Hip Extension 1 set of 15   Hip Abduction 1 set of 15   Heel Rise 1 set of 15   Mini Squat 1 set of 15   Bed Mobility    Supine to Sit Modified Independent   Sit to Supine Modified Independent   Sit to Stand Modified Independent   Scooting Modified Independent   Rolling Modified Independent   PT Total Time Spent   PT Individual Total Time Spent (Mins) 60   PT Charge Group   PT Gait Training 2   PT Therapeutic Exercise 1   PT Therapeutic Activities 1   Physical Therapy   Daily Treatment     Patient Name: Xavier Richardson  Age:  69 y.o., Sex:  male  Medical Record #: [id number]  Today's Date: 10/7/2019     Precautions  Precautions: Sternal Precautions (See Comments), Other (See Comments)  Comments: Barnard catheter    Subjective    The patient was up and ready for PT.     Objective    He participated in gait training with a single-point cane inside on level surfaces and outside on uneven surfaces.  He tolerated 250 FT x2 inside and 950 FT outside.  Home exercise program was reviewed and safety precautions at home were reviewed with the patient and his daughter.    FIM Bed/Chair/Wheelchair Transfers Score: 6 - Modified Independent  Bed/Chair/Wheelchair Transfers Description:  Adaptive equipment, Increased time(Single-point cane modified independent)    FIM Walking Score:  6 - Modified Independent  Walking Description:  Assist device/equipment, Extra time(Modified independent single-point cane, intermittent supervision outside)    FIM Wheelchair Score:     Wheelchair Description:       FIM Stairs Score:  5 - Standby Prompting/Supervision or Set-up  Stairs Description:  Extra time, Supervision for safety, Hand  rails      Assessment    The patient continues to improve on alertness, decision-making and safety.  He is also demonstrating consistent improvement on strength and endurance.  The patient is now modified independent using a single-point cane for gait and transfers.    Plan    Continue therapeutic exercise, safety education, endurance and strength training gait training

## 2019-10-07 NOTE — CARE PLAN
Problem: Communication  Goal: The ability to communicate needs accurately and effectively will improve  Note:   Makes needs known to staff.  Encouraged to use call light for staff assist.      Problem: Safety  Goal: Will remain free from falls  Note:   Call light kept within reach and encouraged to use for assistance and with toileting needs. Encouraged to call staff and to wait for staff before transfers as needed.  Pt is Mod-I in the room.     Problem: Pain Management  Goal: Pain level will decrease to patient's comfort goal  Note:   Complains of headache and generalized pain.  Medicated with scheduled Gabapentin and prn Tylenol.  Has + relief.  See MAR and doc flow sheet.  Will continue to monitor patient.

## 2019-10-07 NOTE — FLOWSHEET NOTE
10/07/19 1644   Events/Summary/Plan   Events/Summary/Plan 02 spot check   Interdisciplinary Plan of Care-Goals (Indications)   Hyperinflation Protocol Indications Chest Trauma (Blunt, Penetrative, or Surgical)   Interdisciplinary Plan of Care-Outcomes    Hyperinflation Protocol Goals/Outcome Greater Than 60% of Predicted I.S. Volume x 24 hrs   Education   Education Yes - Pt. / Family has been Instructed in use of Respiratory Equipment   Respiratory WDL   Respiratory (WDL) X   Chest Exam   Respiration 18   Pulse 77   Oximetry   #Pulse Oximetry (Single Determination) Yes   Oxygen   Home O2 Use Prior To Admission? No   Pulse Oximetry 95 %   O2 Daily Delivery Respiratory  Room Air with O2 Available

## 2019-10-08 ENCOUNTER — HOSPITAL ENCOUNTER (OUTPATIENT)
Dept: RADIOLOGY | Facility: MEDICAL CENTER | Age: 69
End: 2019-10-08

## 2019-10-08 ENCOUNTER — TELEPHONE (OUTPATIENT)
Dept: PULMONOLOGY | Facility: HOSPICE | Age: 69
End: 2019-10-08

## 2019-10-08 LAB
BASOPHILS # BLD AUTO: 0.8 % (ref 0–1.8)
BASOPHILS # BLD: 0.03 K/UL (ref 0–0.12)
EOSINOPHIL # BLD AUTO: 0.18 K/UL (ref 0–0.51)
EOSINOPHIL NFR BLD: 4.7 % (ref 0–6.9)
ERYTHROCYTE [DISTWIDTH] IN BLOOD BY AUTOMATED COUNT: 56 FL (ref 35.9–50)
HCT VFR BLD AUTO: 26.9 % (ref 42–52)
HGB BLD-MCNC: 8.1 G/DL (ref 14–18)
IMM GRANULOCYTES # BLD AUTO: 0.04 K/UL (ref 0–0.11)
IMM GRANULOCYTES NFR BLD AUTO: 1.1 % (ref 0–0.9)
INR PPP: 2.63 (ref 0.87–1.13)
LYMPHOCYTES # BLD AUTO: 0.9 K/UL (ref 1–4.8)
LYMPHOCYTES NFR BLD: 23.7 % (ref 22–41)
MCH RBC QN AUTO: 28.6 PG (ref 27–33)
MCHC RBC AUTO-ENTMCNC: 30.1 G/DL (ref 33.7–35.3)
MCV RBC AUTO: 95.1 FL (ref 81.4–97.8)
MONOCYTES # BLD AUTO: 0.4 K/UL (ref 0–0.85)
MONOCYTES NFR BLD AUTO: 10.5 % (ref 0–13.4)
NEUTROPHILS # BLD AUTO: 2.25 K/UL (ref 1.82–7.42)
NEUTROPHILS NFR BLD: 59.2 % (ref 44–72)
NRBC # BLD AUTO: 0 K/UL
NRBC BLD-RTO: 0 /100 WBC
PLATELET # BLD AUTO: 148 K/UL (ref 164–446)
PMV BLD AUTO: 8.3 FL (ref 9–12.9)
PROTHROMBIN TIME: 29 SEC (ref 12–14.6)
RBC # BLD AUTO: 2.83 M/UL (ref 4.7–6.1)
WBC # BLD AUTO: 3.8 K/UL (ref 4.8–10.8)

## 2019-10-08 PROCEDURE — 36415 COLL VENOUS BLD VENIPUNCTURE: CPT

## 2019-10-08 PROCEDURE — 770010 HCHG ROOM/CARE - REHAB SEMI PRIVAT*

## 2019-10-08 PROCEDURE — A9270 NON-COVERED ITEM OR SERVICE: HCPCS | Performed by: HOSPITALIST

## 2019-10-08 PROCEDURE — 99232 SBSQ HOSP IP/OBS MODERATE 35: CPT | Performed by: HOSPITALIST

## 2019-10-08 PROCEDURE — 97116 GAIT TRAINING THERAPY: CPT

## 2019-10-08 PROCEDURE — 700102 HCHG RX REV CODE 250 W/ 637 OVERRIDE(OP): Performed by: HOSPITALIST

## 2019-10-08 PROCEDURE — 97110 THERAPEUTIC EXERCISES: CPT

## 2019-10-08 PROCEDURE — 99231 SBSQ HOSP IP/OBS SF/LOW 25: CPT | Performed by: PHYSICAL MEDICINE & REHABILITATION

## 2019-10-08 PROCEDURE — 85025 COMPLETE CBC W/AUTO DIFF WBC: CPT

## 2019-10-08 PROCEDURE — 700102 HCHG RX REV CODE 250 W/ 637 OVERRIDE(OP): Performed by: PHYSICAL MEDICINE & REHABILITATION

## 2019-10-08 PROCEDURE — 97530 THERAPEUTIC ACTIVITIES: CPT

## 2019-10-08 PROCEDURE — 94760 N-INVAS EAR/PLS OXIMETRY 1: CPT

## 2019-10-08 PROCEDURE — 97032 APPL MODALITY 1+ESTIM EA 15: CPT

## 2019-10-08 PROCEDURE — 85610 PROTHROMBIN TIME: CPT

## 2019-10-08 PROCEDURE — A9270 NON-COVERED ITEM OR SERVICE: HCPCS | Performed by: PHYSICAL MEDICINE & REHABILITATION

## 2019-10-08 RX ORDER — OMEPRAZOLE 20 MG/1
20 CAPSULE, DELAYED RELEASE ORAL
Qty: 30 CAP | Refills: 0 | Status: SHIPPED | OUTPATIENT
Start: 2019-10-09 | End: 2023-08-12

## 2019-10-08 RX ORDER — WARFARIN SODIUM 3 MG/1
3 TABLET ORAL EVERY EVENING
Qty: 30 TAB | Refills: 0 | Status: SHIPPED | OUTPATIENT
Start: 2019-10-08 | End: 2023-08-12

## 2019-10-08 RX ORDER — SIMETHICONE 80 MG
160 TABLET,CHEWABLE ORAL 4 TIMES DAILY
Qty: 30 TAB | Refills: 3 | COMMUNITY
Start: 2019-10-08 | End: 2023-08-12

## 2019-10-08 RX ORDER — LANOLIN ALCOHOL/MO/W.PET/CERES
6 CREAM (GRAM) TOPICAL
COMMUNITY
Start: 2019-10-08 | End: 2023-08-12

## 2019-10-08 RX ORDER — FUROSEMIDE 40 MG/1
40 TABLET ORAL DAILY
Qty: 30 TAB | Refills: 0 | Status: SHIPPED | OUTPATIENT
Start: 2019-10-09

## 2019-10-08 RX ORDER — FENOFIBRATE 67 MG/1
67 CAPSULE ORAL
Qty: 30 CAP | Refills: 0 | Status: SHIPPED | OUTPATIENT
Start: 2019-10-09 | End: 2023-08-12

## 2019-10-08 RX ORDER — TAMSULOSIN HYDROCHLORIDE 0.4 MG/1
0.8 CAPSULE ORAL
Qty: 60 CAP | Refills: 0 | Status: SHIPPED | OUTPATIENT
Start: 2019-10-08 | End: 2023-08-12

## 2019-10-08 RX ORDER — FINASTERIDE 5 MG/1
5 TABLET, FILM COATED ORAL DAILY
Qty: 30 TAB | Refills: 0 | Status: SHIPPED | OUTPATIENT
Start: 2019-10-09

## 2019-10-08 RX ORDER — CIPROFLOXACIN 500 MG/1
500 TABLET, FILM COATED ORAL EVERY 12 HOURS
Qty: 10 TAB | Refills: 0 | Status: SHIPPED | OUTPATIENT
Start: 2019-10-08 | End: 2019-10-13

## 2019-10-08 RX ORDER — ASPIRIN 325 MG
325 TABLET ORAL DAILY
Qty: 100 TAB | COMMUNITY
Start: 2019-10-09 | End: 2023-08-12

## 2019-10-08 RX ORDER — TRAZODONE HYDROCHLORIDE 50 MG/1
50 TABLET ORAL
Qty: 30 TAB | Refills: 0 | Status: SHIPPED | OUTPATIENT
Start: 2019-10-08 | End: 2023-08-12

## 2019-10-08 RX ORDER — AMIODARONE HYDROCHLORIDE 200 MG/1
200 TABLET ORAL DAILY
Qty: 30 TAB | Refills: 0 | Status: SHIPPED | OUTPATIENT
Start: 2019-10-09 | End: 2023-08-12

## 2019-10-08 RX ORDER — GABAPENTIN 400 MG/1
400 CAPSULE ORAL 4 TIMES DAILY
Qty: 120 CAP | Refills: 0 | Status: SHIPPED | OUTPATIENT
Start: 2019-10-08

## 2019-10-08 RX ORDER — POTASSIUM CHLORIDE 20 MEQ/1
20 TABLET, EXTENDED RELEASE ORAL 2 TIMES DAILY
Qty: 60 TAB | Refills: 0 | Status: SHIPPED | OUTPATIENT
Start: 2019-10-08 | End: 2023-08-12

## 2019-10-08 RX ORDER — CIPROFLOXACIN 500 MG/1
500 TABLET, FILM COATED ORAL EVERY 12 HOURS
Status: DISCONTINUED | OUTPATIENT
Start: 2019-10-08 | End: 2019-10-09 | Stop reason: HOSPADM

## 2019-10-08 RX ORDER — WARFARIN SODIUM 2 MG/1
2 TABLET ORAL
Status: COMPLETED | OUTPATIENT
Start: 2019-10-08 | End: 2019-10-08

## 2019-10-08 RX ORDER — ACETAMINOPHEN 325 MG/1
650 TABLET ORAL EVERY 4 HOURS PRN
Qty: 30 TAB | Refills: 0 | COMMUNITY
Start: 2019-10-08 | End: 2023-08-12

## 2019-10-08 RX ADMIN — ASPIRIN 325 MG ORAL TABLET 325 MG: 325 PILL ORAL at 08:44

## 2019-10-08 RX ADMIN — MELATONIN 6 MG: at 20:38

## 2019-10-08 RX ADMIN — METOPROLOL TARTRATE 12.5 MG: 25 TABLET ORAL at 05:00

## 2019-10-08 RX ADMIN — POTASSIUM CHLORIDE 20 MEQ: 1500 TABLET, EXTENDED RELEASE ORAL at 20:38

## 2019-10-08 RX ADMIN — GABAPENTIN 400 MG: 400 CAPSULE ORAL at 11:12

## 2019-10-08 RX ADMIN — SIMETHICONE CHEW TAB 80 MG 160 MG: 80 TABLET ORAL at 17:21

## 2019-10-08 RX ADMIN — VITAMIN D, TAB 1000IU (100/BT) 1000 UNITS: 25 TAB at 08:44

## 2019-10-08 RX ADMIN — ACETAMINOPHEN 650 MG: 325 TABLET, FILM COATED ORAL at 20:38

## 2019-10-08 RX ADMIN — GABAPENTIN 400 MG: 400 CAPSULE ORAL at 05:04

## 2019-10-08 RX ADMIN — METOPROLOL TARTRATE 12.5 MG: 25 TABLET ORAL at 17:19

## 2019-10-08 RX ADMIN — SIMETHICONE CHEW TAB 80 MG 160 MG: 80 TABLET ORAL at 11:12

## 2019-10-08 RX ADMIN — CIPROFLOXACIN HYDROCHLORIDE 500 MG: 500 TABLET, FILM COATED ORAL at 20:39

## 2019-10-08 RX ADMIN — CIPROFLOXACIN HYDROCHLORIDE 500 MG: 500 TABLET, FILM COATED ORAL at 15:13

## 2019-10-08 RX ADMIN — AMIODARONE HYDROCHLORIDE 200 MG: 200 TABLET ORAL at 05:00

## 2019-10-08 RX ADMIN — FUROSEMIDE 40 MG: 40 TABLET ORAL at 05:04

## 2019-10-08 RX ADMIN — FENOFIBRATE 67 MG: 67 CAPSULE ORAL at 08:43

## 2019-10-08 RX ADMIN — SIMETHICONE CHEW TAB 80 MG 160 MG: 80 TABLET ORAL at 08:43

## 2019-10-08 RX ADMIN — GABAPENTIN 400 MG: 400 CAPSULE ORAL at 15:13

## 2019-10-08 RX ADMIN — POTASSIUM CHLORIDE 20 MEQ: 1500 TABLET, EXTENDED RELEASE ORAL at 08:43

## 2019-10-08 RX ADMIN — FINASTERIDE 5 MG: 5 TABLET, FILM COATED ORAL at 08:44

## 2019-10-08 RX ADMIN — TAMSULOSIN HYDROCHLORIDE 0.8 MG: 0.4 CAPSULE ORAL at 20:39

## 2019-10-08 RX ADMIN — GABAPENTIN 400 MG: 400 CAPSULE ORAL at 20:39

## 2019-10-08 RX ADMIN — WARFARIN SODIUM 2 MG: 2 TABLET ORAL at 17:22

## 2019-10-08 RX ADMIN — TRAZODONE HYDROCHLORIDE 50 MG: 50 TABLET ORAL at 20:38

## 2019-10-08 RX ADMIN — SULFAMETHOXAZOLE AND TRIMETHOPRIM 1 TABLET: 800; 160 TABLET ORAL at 08:44

## 2019-10-08 RX ADMIN — SIMETHICONE CHEW TAB 80 MG 160 MG: 80 TABLET ORAL at 20:39

## 2019-10-08 RX ADMIN — OMEPRAZOLE 20 MG: 20 CAPSULE, DELAYED RELEASE ORAL at 08:43

## 2019-10-08 ASSESSMENT — ENCOUNTER SYMPTOMS
VOMITING: 0
SHORTNESS OF BREATH: 0
ABDOMINAL PAIN: 0
NERVOUS/ANXIOUS: 0
DIARRHEA: 0
CHILLS: 0
NAUSEA: 0
FEVER: 0

## 2019-10-08 NOTE — DISCHARGE PLANNING
Case management Summary:   Met with patient, his sister and daughter prior to discharge.   Reviewed all follow up appointments. Provided lab orders for PT/INR to be drawn on 10/11/2019 - this will be done in [place] and results with go to new PCP, TONY Crabtree.   Provided lab and CXR orders sent by Dr. Robles's office for patient to have completed prior to appointment on 10/15/2019.   Referral made to [facility]. They have accepted referral and are ready to follow.    [facility] has delivered a SPC to patient.    A DMV placard application, with physician portion completed, was given to patient to complete his portion and mail to [place] DMV.   During hospitalization, I have provided support and education and have been available for questions and information during hours of operation, communicated with therapy team and MD along with providing links/resources  to outside services.    Patient verbalizes agreement with all plans and has an understanding of the next steps within the post acute services.     Individualized Goals:   1. To return home with [place] with assistance of daughter.        Outcome:   1. Met

## 2019-10-08 NOTE — FLOWSHEET NOTE
10/08/19 1244   Events/Summary/Plan   Events/Summary/Plan 02 spot check   Interdisciplinary Plan of Care-Goals (Indications)   Hyperinflation Protocol Indications Chest Trauma (Blunt, Penetrative, or Surgical)   Interdisciplinary Plan of Care-Outcomes    Hyperinflation Protocol Goals/Outcome Greater Than 60% of Predicted I.S. Volume x 24 hrs   Education   Education Yes - Pt. / Family has been Instructed in use of Respiratory Equipment   Incentive Spirometry Group   Breathing Exercises Yes   Respiratory WDL   Respiratory (WDL) X   Chest Exam   Respiration 18   Pulse 69   Oximetry   #Pulse Oximetry (Single Determination) Yes   Oxygen   Home O2 Use Prior To Admission? No   Pulse Oximetry 94 %   O2 Daily Delivery Respiratory  Room Air with O2 Available

## 2019-10-08 NOTE — DISCHARGE PLANNING
Late entry for 10/7/2019 - met with patient and his daughter. I scheduled a pulmonary appointment with Dr. Yancey per patient and daughter request. Daughter had canceled pulmonary appointment at [facility].

## 2019-10-08 NOTE — THERAPY
"Occupational Therapy  Daily Treatment     Patient Name: Xavier Richardson  Age:  69 y.o., Sex:  male  Medical Record #: [id number]  Today's Date: 10/8/2019     Precautions  Precautions: (P) Sternal Precautions (See Comments)  Comments: (P) Barnard    Safety   ADL Safety : Requires Supervision for Safety  Bathroom Safety: Requires Supervision for Safety  Comments: Completed ADL routine at min to supervised level this session, slightly unsteady on his feet requiring CGA for mobility with SPC and close SBA for standing components    Subjective    \"I think I'm ready to go home, I'm looking forward to it\"     Objective       10/08/19 1031   Precautions   Precautions Sternal Precautions (See Comments)   Comments Evon   Neuro-Muscular Treatments   Neuro-Muscular Treatments Electrical Stimulation;Facilitation;Verbal Cuing   Comments  for FES to promote functional use of LUE; distance travelled 8.11 miles, 1.5 kCal, 3.1 Dillon/hour, Left 1% asymmetry. Pt completed 34:30 minutes - active 33:27, passive 1:03   OT Total Time Spent   OT Individual Total Time Spent (Mins) 60   OT Charge Group   Charges Yes   OT Electrical Stimulation Attended 4     Assessment    Pt tolerated FES to LUE for continued progression of function, overall demos increased strength and motor control from initial admit, able to utilize functionally for ADLs/IADLs though continues with proximal weakness. Pt's SPC delivered prior to session, adjusted to proper height, pt utilized from mobility from room to gym with no issues.     Plan    D/C home tomorrow with support from daughter as needed    "

## 2019-10-08 NOTE — PROGRESS NOTES
[facility] Medicine Daily Progress Note    Date of Service  10/8/2019    Chief Complaint:  S/P aortic dissection  Hypertension  Afib  UTI    Interval History:  No significant events or changes since last visit    Review of Systems  Review of Systems   Constitutional: Negative for chills and fever.   Respiratory: Negative for shortness of breath.    Cardiovascular: Negative for chest pain.   Gastrointestinal: Negative for abdominal pain, diarrhea, nausea and vomiting.   Psychiatric/Behavioral: The patient is not nervous/anxious.         Physical Exam  Temp:  [36.4 °C (97.6 °F)-37.4 °C (99.3 °F)] 37.4 °C (99.3 °F)  Pulse:  [67-77] 67  Resp:  [18-20] 18  BP: (110-120)/(64-70) 120/70  SpO2:  [94 %-99 %] 99 %    Physical Exam   Constitutional: He is oriented to person, place, and time. He appears well-nourished.   HENT:   Head: Atraumatic.   Eyes: Pupils are equal, round, and reactive to light. Conjunctivae are normal.   Neck: Normal range of motion. Neck supple.   Cardiovascular: Normal rate, regular rhythm, S1 normal and S2 normal.   No murmur heard.  Pulmonary/Chest: Effort normal. He has no wheezes. He has no rhonchi. He has no rales.   Abdominal: Soft. He exhibits no distension. There is no tenderness.   Musculoskeletal: He exhibits edema.   Neurological: He is alert and oriented to person, place, and time. No sensory deficit.   Skin: Skin is warm and dry. No rash noted. No cyanosis.   Psychiatric: He has a normal mood and affect. His behavior is normal.   Nursing note and vitals reviewed.      Fluids    Intake/Output Summary (Last 24 hours) at 10/8/2019 1305  Last data filed at 10/8/2019 0834  Gross per 24 hour   Intake 1020 ml   Output 3100 ml   Net -2080 ml       Laboratory  Recent Labs     10/06/19  0642 10/07/19  0600 10/08/19  0608   WBC 2.6* 2.7* 3.8*   RBC 2.86* 2.88* 2.83*   HEMOGLOBIN 8.0* 8.3* 8.1*   HEMATOCRIT 27.2* 27.2* 26.9*   MCV 95.1 94.4 95.1   MCH 28.0 28.8 28.6   MCHC 29.4* 30.5* 30.1*   RDW 57.0*  56.4* 56.0*   PLATELETCT 150* 150* 148*   MPV 8.4* 8.5* 8.3*     Recent Labs     10/07/19  0600   SODIUM 140   POTASSIUM 3.8   CHLORIDE 107   CO2 28   GLUCOSE 84   BUN 8   CREATININE 0.93   CALCIUM 9.3     Recent Labs     10/06/19  0642 10/07/19  0600 10/08/19  0608   INR 2.95* 2.87* 2.63*               Imaging    Assessment/Plan  * 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 colonized  Off Zosyn 2nd to pancytopenia  On Bactrim --> will d/c 2nd to not being sensitive for UTI  Will start Cipro x 5 days    Vitamin D deficiency  Assessment & Plan  Vit D: 27  On supplements    A-fib (HCC)  Assessment & Plan  HR ok  Echo (9/24): EF 60%, RVSP 45; mobile echodensity noted in the aortic arch/proximal left subclavian artery  On Lopressor: 12.5 mg bid   On Amiodarone  On Coumadin  On Lasix    Fluid overload  Assessment & Plan  Likely 2nd to recent heart surgery  Has edema -- has hx but much more recently  O2 sats ok on RA  BNP: 2189 (9/24) --> 1011 (9/29) --> 1406 (10/6)  Echo (9/24): EF 60%, RVSP 45  On Lasix: 40 mg daily   On KCL: 20 meq bid  US LE: no DVT  Monitor K+: 3.8 (10/7)    Dyslipidemia  Assessment & Plan  On Fenofibrate    Shoulder lesion, left- (present on admission)  Assessment & Plan  Will need F/U imaging    Lung nodule- (present on admission)  Assessment & Plan  Will need F/U imaging    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

## 2019-10-08 NOTE — THERAPY
10/08/19 0929   Precautions   Precautions Sternal Precautions (See Comments);Other (See Comments)   Comments Barnard catheter   Pain 0 - 10 Group   Therapist Pain Assessment 0   Cognition    Cognition / Consciousness WDL   Level of Consciousness Alert   Standing Lower Body Exercises   Hip Flexion 2 sets of 15;Bilateral   Hip Extension 2 sets of 15;Bilateral    Hip Abduction 2 sets of 15;Bilateral   Heel Rise 2 sets of 15;Bilateral   Mini Squat 2 sets of 15;Partial   Step Up 2 sets of 15;Bilateral   Step Down 2 sets of 15;Bilateral   Bed Mobility    Supine to Sit Modified Independent   Sit to Supine Modified Independent   Sit to Stand Modified Independent   Scooting Modified Independent   Rolling Modified Independent   PT Total Time Spent   PT Individual Total Time Spent (Mins) 60   PT Charge Group   PT Gait Training 2   PT Therapeutic Exercise 2   Physical Therapy   Daily Treatment     Patient Name: Xavier Richardson  Age:  69 y.o., Sex:  male  Medical Record #: [id number]  Today's Date: 10/8/2019     Precautions  Precautions: Sternal Precautions (See Comments)  Comments:  Barnard Catheter     Subjective    The patient agreed to PT.  He had no complaints of pain or lightheadedness.     Objective    The patient participated in standing lower extremity therapeutic exercise indicated above, NuStep, gait training and stairs.  Gait was with a SPC and he tolerated 325 FT, 575 FT and 400 FT.  He utilized the NuStep level 4, 63 steps per minute, 18 minutes.    FIM Bed/Chair/Wheelchair Transfers Score: 6 - Modified Independent  Bed/Chair/Wheelchair Transfers Description:  Increased time, Adaptive equipment    FIM Walking Score:  6 - Modified Independent  Walking Description:  Extra time, Assist device/equipment(325 FT, 575 FT, 400 FT, SPC)    FIM Wheelchair Score:     Wheelchair Description:       FIM Stairs Score:  6 - Modified Independent  Stairs Description:  Hand rails, Extra time, Ascends/descends 12 to 14  steps      Assessment    The patient has made very good progress in improving his strength, endurance, gait, balance.  He will discharge to home tomorrow 10/9/2019    Plan    Discharge to home 10/9/2019

## 2019-10-08 NOTE — THERAPY
10/08/19 1429   Precautions   Precautions Sternal Precautions (See Comments);Other (See Comments)   Comments Barnard catheter   Pain 0 - 10 Group   Therapist Pain Assessment 0   Cognition    Cognition / Consciousness WDL   Level of Consciousness Alert   PT Total Time Spent   PT Individual Total Time Spent (Mins) 30   PT Charge Group   PT Gait Training 2   Physical Therapy   Daily Treatment     Patient Name: Xavier Richardson  Age:  69 y.o., Sex:  male  Medical Record #: [id number]  Today's Date: 10/8/2019     Precautions  Precautions: Sternal Precautions (See Comments), Other (See Comments)  Comments: Barnard catheter    Subjective    The patient was up in his chair and he agreed to PT.  He had no complaints of pain or lightheadedness.     Objective    The patient participated in gait training with a SPC.  He tolerated 275 FT and 200 FT inside and 340 FT x2 outside including uneven surface and up and down a curb.    FIM Bed/Chair/Wheelchair Transfers Score: 6 - Modified Independent  Bed/Chair/Wheelchair Transfers Description:  Increased time, Adaptive equipment    FIM Walking Score:  6 - Modified Independent  Walking Description:  Extra time(SPC, 275 FT and 200 FT inside, 340 FT x2 outside)    FIM Wheelchair Score:     Wheelchair Description:       FIM Stairs Score:  6 - Modified Independent  Stairs Description:  Extra time, Hand rails, Ascends/descends 12 to 14 steps      Assessment    The patient is ready to discharge to home tomorrow 10/9/2019 with assistance from his daughter, family and friends.    Plan    Discharge to home 10/9/2019   Bravo!

## 2019-10-08 NOTE — TELEPHONE ENCOUNTER
Pts daughter called and left  to see if there was any testing needed prior to her fathers 10/29/19 appt with Dr. Yancey. She states he lives far away and wanted to come to his appt prepared. Please advise.

## 2019-10-08 NOTE — THERAPY
"Occupational Therapy  Daily Treatment     Patient Name: Xavier Richardson  Age:  69 y.o., Sex:  male  Medical Record #: [id number]  Today's Date: 10/8/2019     Precautions  Precautions: (P) Sternal Precautions (See Comments)  Comments: (P)  Barnard Catheter     Safety   ADL Safety : Requires Supervision for Safety  Bathroom Safety: Requires Supervision for Safety  Comments: Completed ADL routine at min to supervised level this session, slightly unsteady on his feet requiring CGA for mobility with SPC and close SBA for standing components    Subjective    \" I'm nervous about this. I didn't do very good the last time.\" referring to  Attempted Driving performance assessment battery  Using  Dynavision      Objective       10/08/19 0931   Precautions   Precautions Sternal Precautions (See Comments)   Comments  Barnard Catheter    Balance   Standing Balance (Dynamic) Good   Comments  Reciprocal step and toss  x 17 reps x 3  no LOB  no hand on solid surface for support.       DPAB (Driving Performance Assessment Battery)   Simple Dynavision Task (SDT) # of Hits 46  (trial 2 avg time 1.4 seconds trial 1: 41 hits  1.46 second)   Level of Severity   (Scored below norms x 2 trials mode did not continue)   OT Total Time Spent   OT Individual Total Time Spent (Mins) 30   OT Charge Group   OT Therapy Activity 2         Assessment     demonstrated good balance and endurance.   Anxious with attempting albert vision Driving battery.     Plan     d/c home 10-9-19    "

## 2019-10-08 NOTE — PROGRESS NOTES
Pharmacy Warfarin Consult   10/8/2019     69 y.o.   male     Indication for anticoagulation: Stroke     Goal INR = 2 to 3     Recent Labs     10/06/19  0642 10/07/19  0600 10/08/19  0608   INR 2.95* 2.87* 2.63*   HEMOGLOBIN 8.0* 8.3* 8.1*   HEMATOCRIT 27.2* 27.2* 26.9*       Pertinent Drug/Drug Interactions:  Amiodarone, ASA, PPI, Statin, Septra  Outpatient Warfarin Regimen:  New Start           Hemoglobin Value: (!) 8.1  Hematocrit Value: (!) 26.9  Lab Platelet Value: (!) 148  Target INR: 2.0 to 3.0    INR from last 7 days     Date/Time INR Value    10/08/19 0608  (!) 2.63    10/07/19 0600  (!) 2.87    10/06/19 0642  (!) 2.95    10/05/19 0528  (!) 3.4    10/04/19 0553  (!) 3.14    10/03/19 0530  (!) 3.18    10/02/19 0545  (!) 2.9        Dose from last 7 days     Date/Time Dose (mg)    10/08/19 0608  2    10/07/19 0600  1    10/06/19 1200  1    10/05/19 0800  0    10/04/19 0553  2.5    10/03/19 0530  1    10/02/19 0545  4                Bridge Therapy: No        1.  Coumadin 2mg tonight for INR = 2.63        Brianne [facility]

## 2019-10-08 NOTE — PROGRESS NOTES
Rehab Progress Note     Date of Service: 10/8/2019  Chief Complaint: follow up stroke    Interval Events (Subjective)    Patient is seen and examined in his room today.  He has no new complaints.  He feels ready for discharge home tomorrow.  Discussed his case with hospitalist.  His UTI culture and sensitivities came back resistant to Bactrim.  Patient and daughter would like prescriptions done today to plan on early discharge tomorrow at 9.    Objective:  VITAL SIGNS: /70   Pulse 69   Temp 37.4 °C (99.3 °F) (Temporal)   Resp 18   Ht 1.829 m (6')   Wt 119.6 kg (263 lb 9.6 oz)   SpO2 94%   BMI 35.75 kg/m²   Gen: alert, no apparent distress  CV: regular rate and rhythm, no murmurs, no peripheral edema  Resp: clear to ascultation bilaterally, normal respiratory effort  GI: soft, non-tender abdomen, bowel sounds present  Neuro: notable for mild left-sided incoordination      Recent Results (from the past 72 hour(s))   Prothrombin Time    Collection Time: 10/06/19  6:42 AM   Result Value Ref Range    PT 31.8 (H) 12.0 - 14.6 sec    INR 2.95 (H) 0.87 - 1.13   CBC WITH DIFFERENTIAL    Collection Time: 10/06/19  6:42 AM   Result Value Ref Range    WBC 2.6 (L) 4.8 - 10.8 K/uL    RBC 2.86 (L) 4.70 - 6.10 M/uL    Hemoglobin 8.0 (L) 14.0 - 18.0 g/dL    Hematocrit 27.2 (L) 42.0 - 52.0 %    MCV 95.1 81.4 - 97.8 fL    MCH 28.0 27.0 - 33.0 pg    MCHC 29.4 (L) 33.7 - 35.3 g/dL    RDW 57.0 (H) 35.9 - 50.0 fL    Platelet Count 150 (L) 164 - 446 K/uL    MPV 8.4 (L) 9.0 - 12.9 fL    Neutrophils-Polys 54.60 44.00 - 72.00 %    Lymphocytes 22.50 22.00 - 41.00 %    Monocytes 12.80 0.00 - 13.40 %    Eosinophils 8.50 (H) 0.00 - 6.90 %    Basophils 0.80 0.00 - 1.80 %    Immature Granulocytes 0.80 0.00 - 0.90 %    Nucleated RBC 0.00 /100 WBC    Neutrophils (Absolute) 1.41 (L) 1.82 - 7.42 K/uL    Lymphs (Absolute) 0.58 (L) 1.00 - 4.80 K/uL    Monos (Absolute) 0.33 0.00 - 0.85 K/uL    Eos (Absolute) 0.22 0.00 - 0.51 K/uL    Baso  (Absolute) 0.02 0.00 - 0.12 K/uL    Immature Granulocytes (abs) 0.02 0.00 - 0.11 K/uL    NRBC (Absolute) 0.00 K/uL   proBrain Natriuretic Peptide, NT    Collection Time: 10/06/19  6:42 AM   Result Value Ref Range    NT-proBNP 1406 (H) 0 - 125 pg/mL   Prothrombin Time    Collection Time: 10/07/19  6:00 AM   Result Value Ref Range    PT 31.2 (H) 12.0 - 14.6 sec    INR 2.87 (H) 0.87 - 1.13   CBC WITH DIFFERENTIAL    Collection Time: 10/07/19  6:00 AM   Result Value Ref Range    WBC 2.7 (L) 4.8 - 10.8 K/uL    RBC 2.88 (L) 4.70 - 6.10 M/uL    Hemoglobin 8.3 (L) 14.0 - 18.0 g/dL    Hematocrit 27.2 (L) 42.0 - 52.0 %    MCV 94.4 81.4 - 97.8 fL    MCH 28.8 27.0 - 33.0 pg    MCHC 30.5 (L) 33.7 - 35.3 g/dL    RDW 56.4 (H) 35.9 - 50.0 fL    Platelet Count 150 (L) 164 - 446 K/uL    MPV 8.5 (L) 9.0 - 12.9 fL    Neutrophils-Polys 55.40 44.00 - 72.00 %    Lymphocytes 26.00 22.00 - 41.00 %    Monocytes 11.70 0.00 - 13.40 %    Eosinophils 5.70 0.00 - 6.90 %    Basophils 0.40 0.00 - 1.80 %    Immature Granulocytes 0.80 0.00 - 0.90 %    Nucleated RBC 0.00 /100 WBC    Neutrophils (Absolute) 1.47 (L) 1.82 - 7.42 K/uL    Lymphs (Absolute) 0.69 (L) 1.00 - 4.80 K/uL    Monos (Absolute) 0.31 0.00 - 0.85 K/uL    Eos (Absolute) 0.15 0.00 - 0.51 K/uL    Baso (Absolute) 0.01 0.00 - 0.12 K/uL    Immature Granulocytes (abs) 0.02 0.00 - 0.11 K/uL    NRBC (Absolute) 0.00 K/uL   Basic Metabolic Panel    Collection Time: 10/07/19  6:00 AM   Result Value Ref Range    Sodium 140 135 - 145 mmol/L    Potassium 3.8 3.6 - 5.5 mmol/L    Chloride 107 96 - 112 mmol/L    Co2 28 20 - 33 mmol/L    Glucose 84 65 - 99 mg/dL    Bun 8 8 - 22 mg/dL    Creatinine 0.93 0.50 - 1.40 mg/dL    Calcium 9.3 8.5 - 10.5 mg/dL    Anion Gap 5.0 0.0 - 11.9   ESTIMATED GFR    Collection Time: 10/07/19  6:00 AM   Result Value Ref Range    GFR If African American >60 >60 mL/min/1.73 m 2    GFR If Non African American >60 >60 mL/min/1.73 m 2   Prothrombin Time    Collection Time:  10/08/19  6:08 AM   Result Value Ref Range    PT 29.0 (H) 12.0 - 14.6 sec    INR 2.63 (H) 0.87 - 1.13   CBC WITH DIFFERENTIAL    Collection Time: 10/08/19  6:08 AM   Result Value Ref Range    WBC 3.8 (L) 4.8 - 10.8 K/uL    RBC 2.83 (L) 4.70 - 6.10 M/uL    Hemoglobin 8.1 (L) 14.0 - 18.0 g/dL    Hematocrit 26.9 (L) 42.0 - 52.0 %    MCV 95.1 81.4 - 97.8 fL    MCH 28.6 27.0 - 33.0 pg    MCHC 30.1 (L) 33.7 - 35.3 g/dL    RDW 56.0 (H) 35.9 - 50.0 fL    Platelet Count 148 (L) 164 - 446 K/uL    MPV 8.3 (L) 9.0 - 12.9 fL    Neutrophils-Polys 59.20 44.00 - 72.00 %    Lymphocytes 23.70 22.00 - 41.00 %    Monocytes 10.50 0.00 - 13.40 %    Eosinophils 4.70 0.00 - 6.90 %    Basophils 0.80 0.00 - 1.80 %    Immature Granulocytes 1.10 (H) 0.00 - 0.90 %    Nucleated RBC 0.00 /100 WBC    Neutrophils (Absolute) 2.25 1.82 - 7.42 K/uL    Lymphs (Absolute) 0.90 (L) 1.00 - 4.80 K/uL    Monos (Absolute) 0.40 0.00 - 0.85 K/uL    Eos (Absolute) 0.18 0.00 - 0.51 K/uL    Baso (Absolute) 0.03 0.00 - 0.12 K/uL    Immature Granulocytes (abs) 0.04 0.00 - 0.11 K/uL    NRBC (Absolute) 0.00 K/uL       Current Facility-Administered Medications   Medication Frequency   • warfarin (COUMADIN) tablet 2 mg ONCE AT 1800   • ciprofloxacin (CIPRO) tablet 500 mg Q12HRS   • senna-docusate (PERICOLACE or SENOKOT S) 8.6-50 MG per tablet 2 Tab DAILY    And   • polyethylene glycol/lytes (MIRALAX) PACKET 1 Packet QDAY PRN    And   • magnesium hydroxide (MILK OF MAGNESIA) suspension 30 mL QDAY PRN    And   • bisacodyl (DULCOLAX) suppository 10 mg QDAY PRN   • gabapentin (NEURONTIN) capsule 400 mg 4X/DAY   • traZODone (DESYREL) tablet 50 mg QHS   • furosemide (LASIX) tablet 40 mg Q DAY   • potassium chloride SA (Kdur) tablet 20 mEq BID   • melatonin tablet 6 mg QHS   • vitamin D (cholecalciferol) tablet 1,000 Units DAILY   • metoprolol (LOPRESSOR) tablet 12.5 mg TWICE DAILY   • Respiratory Care per Protocol Continuous RT   • Pharmacy Consult Request ...Pain Management  Review 1 Each PHARMACY TO DOSE   • acetaminophen (TYLENOL) tablet 650 mg Q4HRS PRN   • hydrALAZINE (APRESOLINE) tablet 10 mg Q8HRS PRN   • artificial tears ophthalmic solution 1 Drop PRN   • benzocaine-menthol (CEPACOL) lozenge 1 Lozenge Q2HRS PRN   • mag hydrox-al hydrox-simeth (MAALOX PLUS ES or MYLANTA DS) suspension 20 mL Q2HRS PRN   • ondansetron (ZOFRAN ODT) dispertab 4 mg 4X/DAY PRN    Or   • ondansetron (ZOFRAN) syringe/vial injection 4 mg 4X/DAY PRN   • sodium chloride (OCEAN) 0.65 % nasal spray 2 Spray PRN   • hydrOXYzine HCl (ATARAX) tablet 50 mg Q6HRS PRN   • MD Alert...Warfarin per Pharmacy PHARMACY TO DOSE   • amiodarone (CORDARONE) tablet 200 mg Q DAY   • aspirin (ASA) tablet 325 mg DAILY   • fenofibrate micronized (LOFIBRA) capsule 67 mg AFTER BREAKFAST   • finasteride (PROSCAR) tablet 5 mg DAILY   • omeprazole (PRILOSEC) capsule 20 mg QAM AC   • tamsulosin (FLOMAX) capsule 0.8 mg QHS   • simethicone (MYLICON) chewable tab 160 mg 4X/DAY WITH MEALS + NIGHTLY       Orders Placed This Encounter   Procedures   • Diet Order Regular     Standing Status:   Standing     Number of Occurrences:   1     Order Specific Question:   Diet:     Answer:   Regular [1]       Radiology  Transthoracic  Echo Report      Echocardiography Laboratory    CONCLUSIONS  No prior study is available for comparison.   The left ventricle was normal in size.  Mild concentric left ventricular hypertrophy.  Normal left ventricular systolic function and regional wall motion.  Left ventricular ejection fraction is estimated to be 60%.  Aortic sclerosis without stenosis.  Trace mitral regurgitation.  The left atrium is normal in size.  Dilated inferior vena cava with inspiratory collapse.  Estimated right ventricular systolic pressure  is 45 mmHg.  Mobile echodensity noted in the aortic arch/proximal left subclavian   artery.    DONALD KNOTT  Exam Date:         09/24/2019                      11:45  Exam Location:      Inpatient  Priority:          Routine    Assessment:  Active Hospital Problems    Diagnosis   • *Stroke (cerebrum) (HCC)   • Essential hypertension   • JESUS MANUEL (obstructive sleep apnea)   • Peripheral neuropathy   • Other insomnia   • Dissection of thoracoabdominal aorta (HCC)   • Lung nodule   • Shoulder lesion, left   • Dyslipidemia   • Urinary retention   • Fluid overload   • A-fib (HCC)     This patient is a 69 y.o. male admitted for acute inpatient rehabilitation with Stroke (cerebrum) (HCC).    I led and attended the weekly conference and agree with the IDT conference documentation and plan of care as noted below.    Date of conference: 10/2/2019    Goals and barriers: See IDT note.    Biggest barriers: urinary retention, poor balance, poor endurance    Goals in next week: treat infection    Admission FIM 74 --> 95    CM/social support: lives alone, daughter to return from [place] to assist at discharge    Anticipated DC date: 10/9/2019    Outpatient: PT    Equip: SPC    Follow up: PCP, cardiology, urology, pulmonology     Medical Decision Making and Plan:    Bilateral ischemic strokes  Hemorrhagic transformation in right parietal/occipital lobe  Left hemiparesis, improved  Non-dominant  Visual deficits, continued  PT/OT, 1.5 hr each discipline, 5 days per week  SLP eval, cog within normal  Continue aspirin and coumadin per outside recs  Neuro-ophthalmology referral - Dr. Wilder, made    MRI imaging reviewed with patient/family    Acute urinary retention  Does have a history of BPH  Continue Proscar and Flomax  Voiding after Barnard removal, but still retaining  Replaced Barnard  Referral to urology - apt already made - for day of discharge 10/9     Peripheral neuropathy  Increased gabapentin 400 mg TID --> QID per patient request, home dosing    Insomnia, resolved  Continue melatonin and trazodone at 50 mg - did not tolerate higher doses    Bowel  Meds as needed  Last BM 10/6    DVT  prophylaxis  Coumadin     Appreciate the assistance of the hospitalist with the patient's medical comorbidities:     Aortic dissection  Hypertension, controlled  Hyperlipidemia  Atrial fibrillation  Pulmonary hypertension, RSVP 45  Sleep apnea, non-compliant with CPAP  Left pleural effusion  Left lung nodule, outpatient follow up - referral made to pulmonology   Left shoulder lesion, outpatient follow up  Anemia, stable  Leukopenia, stable - maybe due to antibiotics per Dr. Hailey Mcdaniel D insufficiency, on supplementation  UTI - antibiotics per hospitalist    Total time:  16 minutes.  I spent greater than 50% of the time for patient care, counseling, and coordination on this date, including patient face-to face time, unit/floor time with review of records/pertinent lab data and studies, as well as discussing diagnostic evaluation/work up, planned therapeutic interventions, and future disposition of care, as per the interval events/subjective and the assessment and plan as noted above.    I have performed a physical exam, reviewed and updated ROS, as well as the assessment and plan today 10/8/2019. In review of note from 10/7/2019 there are no new changes except as documented above.        Amira Davis M.D.   Physical Medicine and Rehabilitation

## 2019-10-09 VITALS
TEMPERATURE: 98.1 F | RESPIRATION RATE: 18 BRPM | WEIGHT: 263.6 LBS | SYSTOLIC BLOOD PRESSURE: 121 MMHG | HEART RATE: 80 BPM | BODY MASS INDEX: 35.7 KG/M2 | HEIGHT: 72 IN | OXYGEN SATURATION: 94 % | DIASTOLIC BLOOD PRESSURE: 68 MMHG

## 2019-10-09 LAB
INR PPP: 2.14 (ref 0.87–1.13)
PROTHROMBIN TIME: 24.6 SEC (ref 12–14.6)

## 2019-10-09 PROCEDURE — 700102 HCHG RX REV CODE 250 W/ 637 OVERRIDE(OP): Performed by: PHYSICAL MEDICINE & REHABILITATION

## 2019-10-09 PROCEDURE — A9270 NON-COVERED ITEM OR SERVICE: HCPCS | Performed by: HOSPITALIST

## 2019-10-09 PROCEDURE — 85610 PROTHROMBIN TIME: CPT

## 2019-10-09 PROCEDURE — 99239 HOSP IP/OBS DSCHRG MGMT >30: CPT | Performed by: PHYSICAL MEDICINE & REHABILITATION

## 2019-10-09 PROCEDURE — A9270 NON-COVERED ITEM OR SERVICE: HCPCS | Performed by: PHYSICAL MEDICINE & REHABILITATION

## 2019-10-09 PROCEDURE — 700102 HCHG RX REV CODE 250 W/ 637 OVERRIDE(OP): Performed by: HOSPITALIST

## 2019-10-09 PROCEDURE — 36415 COLL VENOUS BLD VENIPUNCTURE: CPT

## 2019-10-09 PROCEDURE — 99232 SBSQ HOSP IP/OBS MODERATE 35: CPT | Performed by: HOSPITALIST

## 2019-10-09 RX ORDER — WARFARIN SODIUM 3 MG/1
3 TABLET ORAL DAILY
Status: DISCONTINUED | OUTPATIENT
Start: 2019-10-09 | End: 2019-10-09 | Stop reason: HOSPADM

## 2019-10-09 RX ADMIN — GABAPENTIN 400 MG: 400 CAPSULE ORAL at 05:06

## 2019-10-09 RX ADMIN — OMEPRAZOLE 20 MG: 20 CAPSULE, DELAYED RELEASE ORAL at 07:29

## 2019-10-09 RX ADMIN — POTASSIUM CHLORIDE 20 MEQ: 1500 TABLET, EXTENDED RELEASE ORAL at 07:29

## 2019-10-09 RX ADMIN — FINASTERIDE 5 MG: 5 TABLET, FILM COATED ORAL at 07:29

## 2019-10-09 RX ADMIN — FENOFIBRATE 67 MG: 67 CAPSULE ORAL at 07:29

## 2019-10-09 RX ADMIN — SIMETHICONE CHEW TAB 80 MG 160 MG: 80 TABLET ORAL at 07:29

## 2019-10-09 RX ADMIN — METOPROLOL TARTRATE 12.5 MG: 25 TABLET ORAL at 05:06

## 2019-10-09 RX ADMIN — VITAMIN D, TAB 1000IU (100/BT) 1000 UNITS: 25 TAB at 07:29

## 2019-10-09 RX ADMIN — ASPIRIN 325 MG ORAL TABLET 325 MG: 325 PILL ORAL at 07:29

## 2019-10-09 RX ADMIN — AMIODARONE HYDROCHLORIDE 200 MG: 200 TABLET ORAL at 05:06

## 2019-10-09 RX ADMIN — CIPROFLOXACIN HYDROCHLORIDE 500 MG: 500 TABLET, FILM COATED ORAL at 07:29

## 2019-10-09 RX ADMIN — FUROSEMIDE 40 MG: 40 TABLET ORAL at 05:06

## 2019-10-09 ASSESSMENT — ENCOUNTER SYMPTOMS
NAUSEA: 0
SHORTNESS OF BREATH: 0
PALPITATIONS: 0
HALLUCINATIONS: 0
DIZZINESS: 0
VOMITING: 0
FEVER: 0
BLURRED VISION: 0
HEADACHES: 0

## 2019-10-09 ASSESSMENT — ACTIVITIES OF DAILY LIVING (ADL)
SHOWER_TRANSFER_LEVEL_OF_ASSIST: ABLE TO COMPLETE SHOWER TRANSFER WITHOUT ASSIST
TOILETING_LEVEL_OF_ASSIST: ABLE TO COMPLETE TOILETING WITHOUT ASSIST

## 2019-10-09 NOTE — PROGRESS NOTES
0715: Bedside report received, assumed care for this patient.  Patient is A&O x 4.  VSS.  Communication board updated, call light and belongings are within reach.  Bed is in low position. Patient appears in no distress, and has no complaints of SOB and 0/10 of pain.  Will be medicated per MAR.  Plan of care discussed and agreed upon with patient.  D/C today at 9 or 930 AM for urology appt.

## 2019-10-09 NOTE — PROGRESS NOTES
Received bedside shift report from Didi PASTOR RN regarding patient and assumed care. Patient awake, calm and stable, currently positioned in bed for comfort and safety; call light within reach. Denies pain or discomfort at this time. Will continue to monitor.

## 2019-10-09 NOTE — TELEPHONE ENCOUNTER
Valeria Yancey M.D.  You 1 hour ago (9:56 AM)      Agree.    Left vm to notify pt. Advised to call back with any questions.

## 2019-10-09 NOTE — DISCHARGE SUMMARY
Rehab Discharge Note    Admission: 9/23/2019    Discharge: 10/9/2019    Admission Diagnosis:   Active Hospital Problems    Diagnosis   • *Stroke (cerebrum) (HCC)   • Pancytopenia (HCC)   • Vitamin D deficiency   • UTI (urinary tract infection)   • Thrombocytopenia (HCC)   • Anemia   • Essential hypertension   • JESUS MANUEL (obstructive sleep apnea)   • Peripheral neuropathy   • Other insomnia   • Dissection of thoracoabdominal aorta (HCC)   • Lung nodule   • Shoulder lesion, left   • Dyslipidemia   • Urinary retention   • Fluid overload   • A-fib (HCC)       Discharge Diagnosis:  Active Hospital Problems    Diagnosis   • *Stroke (cerebrum) (HCC)   • Pancytopenia (HCC)   • Vitamin D deficiency   • UTI (urinary tract infection)   • Thrombocytopenia (HCC)   • Anemia   • Essential hypertension   • JESUS MANUEL (obstructive sleep apnea)   • Peripheral neuropathy   • Other insomnia   • Dissection of thoracoabdominal aorta (HCC)   • Lung nodule   • Shoulder lesion, left   • Dyslipidemia   • Urinary retention   • Fluid overload   • A-fib (HCC)       HPI prior to admission  The patient is a 69 y.o. male with a past medical history of hypertension off his medications for 3 weeks, sleep apnea, BPH; now admitted for acute inpatient rehabilitation with severe functional debility after an acute aortic dissection.       On admission the patient and medical record report he developed acute onset of chest pain for which she went to his local hospital in 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  He is now status post graft repair on September 4.  Postoperatively patient had atrial fibrillation, acute urinary retention, and a left pleural effusion.  Prior to his surgery he was noted to have left-sided weakness and MRI showed watershed infarcts in the bilateral brain as well as left embolic strokes.  Angiogram showed severe right ICA stenosis.  MRI also showed petechial  "hemorrhage in the right parietal occipital lobe.  He was seen by ophthalmology with complaints of visual changes thought secondary to hypertension and cardiovascular disease.  Incidental finding on imaging of a left lung nodule as well as left shoulder bone lesion, both of which needs outpatient follow-up.  Needs CT of the chest in 3 months.  Recommended follow-up with Dr. Robles in the aortic clinic in 1 month, [phone].  We will also need to follow-up with urology for the acute urinary retention.     Patient current reports left-sided weakness.  He is right-hand dominant.  He denies any numbness or tingling but just reports his left arm feels heavy.  His family at bedside feels that his cognition is off since his strokes.   He also continues to complain of abnormal vision including seeing floaters.  His daughter reports he had 1.5 L of urine in his bladder several days ago prior to the Barnard being replaced.  Patient does report history of enlarged prostate for which he took Flomax. He also reports a history of peripheral neuropathy in his feet as well as \"pain all over\".  He was taking gabapentin 400 mg 3 times a day at home but this was held on his acute admission.     Patient was evaluated by Rehab Medicine physician and Physical Therapy, Occupational Therapy and Speech Therapy and determined to be appropriate for acute inpatient rehab and was transferred to [facility] on 9/23/2019.     Rehab Hospital Course    Bilateral ischemic strokes  Hemorrhagic transformation in right parietal/occipital lobe  Left hemiparesis, improved  Non-dominant  Visual deficits, continued  PT/OT, 1.5 hr each discipline, 5 days per week  SLP eval, cog within normal  Continue aspirin and coumadin per outside recs  Neuro-ophthalmology referral - Dr. Wilder, made     MRI imaging reviewed with patient/family     Acute urinary retention  Does have a history of BPH  Continue Proscar and Flomax  Voiding after " Barnard removal, but still retaining  Replaced Barnard  Referral to urology - apt already made - for day of discharge 10/9     Peripheral neuropathy  Increased gabapentin 400 mg TID --> QID per patient request, home dosing     Insomnia, resolved  Continue melatonin and trazodone at 50 mg - did not tolerate higher doses     DVT prophylaxis  Coumadin     Appreciated the assistance of the hospitalist with the patient's medical comorbidities:     Aortic dissection, s/p graft repair at [facility], ECHO with intact EF, mobile density in aortic arch, on coumadin  Hypertension, controlled  Hyperlipidemia  Atrial fibrillation, paroxysmal  Pulmonary hypertension, RSVP 45  Sleep apnea, non-compliant with CPAP  Left pleural effusion  Left lung nodule, outpatient follow up - referral made to pulmonology   Left shoulder lesion, outpatient follow up  Anemia, stable  Leukopenia, stable - maybe due to antibiotics per Dr. Hart  Vit D insufficiency, on supplementation  UTI/wouind cultures + - antibiotics per hospitalist - changed from Zosyn to Cipro    Lab Results   Component Value Date/Time    SODIUM 140 10/07/2019 06:00 AM    POTASSIUM 3.8 10/07/2019 06:00 AM    CHLORIDE 107 10/07/2019 06:00 AM    CO2 28 10/07/2019 06:00 AM    GLUCOSE 84 10/07/2019 06:00 AM    BUN 8 10/07/2019 06:00 AM    CREATININE 0.93 10/07/2019 06:00 AM      Lab Results   Component Value Date/Time    WBC 3.8 (L) 10/08/2019 06:08 AM    RBC 2.83 (L) 10/08/2019 06:08 AM    HEMOGLOBIN 8.1 (L) 10/08/2019 06:08 AM    HEMATOCRIT 26.9 (L) 10/08/2019 06:08 AM    MCV 95.1 10/08/2019 06:08 AM    MCH 28.6 10/08/2019 06:08 AM    MCHC 30.1 (L) 10/08/2019 06:08 AM    MPV 8.3 (L) 10/08/2019 06:08 AM    NEUTSPOLYS 59.20 10/08/2019 06:08 AM    LYMPHOCYTES 23.70 10/08/2019 06:08 AM    MONOCYTES 10.50 10/08/2019 06:08 AM    EOSINOPHILS 4.70 10/08/2019 06:08 AM    BASOPHILS 0.80 10/08/2019 06:08 AM    ANISOCYTOSIS 1+ 09/24/2019 05:58 AM      Results     Procedure Component Value Units  "Date/Time    BLOOD CULTURE [[id number]] Collected:  10/02/19 1740    Order Status:  Completed Specimen:  Blood from Peripheral Updated:  10/07/19 2100     Significant Indicator NEG     Source BLD     Site PERIPHERAL     Culture Result No growth after 5 days of incubation.    Narrative:       Per Hospital Policy: Only change Specimen Src: to \"Line\" if  specified by physician order.  Right Forearm/Arm    BLOOD CULTURE [[id number]] Collected:  10/02/19 1740    Order Status:  Completed Specimen:  Blood from Peripheral Updated:  10/07/19 2100     Significant Indicator NEG     Source BLD     Site PERIPHERAL     Culture Result No growth after 5 days of incubation.    Narrative:       Per Hospital Policy: Only change Specimen Src: to \"Line\" if  specified by physician order.  Left Forearm/Arm    URINE CULTURE-EXISTING-LESS THAN 48 HOURS [[id number]]  (Abnormal)  (Susceptibility) Collected:  10/02/19 1025    Order Status:  Completed Specimen:  Urine, Smith Cath Updated:  10/07/19 1603     Significant Indicator POS     Source UR     Site URINE, SMITH CATH     Culture Result -      Acinetobacter baumannii/haemolyticus  ,000 cfu/mL      Narrative:       CALL  [facility] tel. [phone],  Collected By:[id number] MICHELLE TURNER  Indication for culture:->Patient WITHOUT an indwelling Smith  catheter in place with new onset of Dysuria, Frequency,  Urgency, and/or Suprapubic pain  Collected By:[id number] MICHELLE TURNER    Susceptibility     Acinetobacter baumannii/haemolyticus (1)     Antibiotic Interpretation Microscan Method Status    Amikacin Sensitive <=16 mcg/mL ANTON Final    Ceftazidime Sensitive 8 mcg/mL ANTON Final    Cefotaxime Intermediate 16 mcg/mL ANTON Final    Ciprofloxacin Sensitive <=1 mcg/mL ANTON Final    Ampicillin/sulbactam Sensitive <=8/4 mcg/mL ANTON Final    Cefepime Sensitive 8 mcg/mL ANTON Final    Tobramycin Sensitive <=4 mcg/mL ANTON Final    Gentamicin Sensitive <=4 mcg/mL ANTON Final    Levofloxacin Sensitive <=2 " mcg/mL ANTON Final    Trimeth/Sulfa Resistant >2/38 mcg/mL ANTON Final                   CULTURE WOUND W/ GRAM STAIN [[id number]]  (Abnormal) Collected:  10/02/19 1120    Order Status:  Completed Specimen:  Wound from Incision Updated:  10/04/19 0833     Significant Indicator POS     Source WND     Site Right Groin Lower     Culture Result Light growth mixed skin cedric.     Gram Stain Result Rare WBCs.  Rare Gram positive cocci.  Rare Gram positive rods.       Culture Result Staphylococcus aureus  Heavy growth  See previous culture for sensitivity report.      Narrative:       Collected By:[id number] MICHELLE TURNER  R groin lower wound  Collected By:[id number] MICHELLE CORBIN.    CULTURE WOUND W/ GRAM STAIN [[id number]]  (Abnormal)  (Susceptibility) Collected:  10/02/19 1120    Order Status:  Completed Specimen:  Wound from Incision Updated:  10/04/19 0832     Significant Indicator POS     Source WND     Site Right Groin Upper     Culture Result Moderate growth mixed skin cedirc.     Gram Stain Result Rare WBCs.  Few Gram positive cocci.  Few Gram positive rods.       Culture Result Staphylococcus aureus  Heavy growth        Enterobacter cloacae  Light growth      Narrative:       Collected By:[id number] MICHELLE CORBIN.  R groin upper wound  Collected By:[id number] MICHELLE CORBIN.    Susceptibility     Staphylococcus aureus (1)     Antibiotic Interpretation Microscan Method Status    Clindamycin Sensitive <=0.5 mcg/mL ANTON Final    Daptomycin Sensitive <=0.5 mcg/mL ANTON Final    Erythromycin Sensitive <=0.5 mcg/mL ANTON Final    Moxifloxacin Sensitive <=0.5 mcg/mL ANTON Final    Oxacillin Sensitive <=0.25 mcg/mL ANTON Final    Penicillin Resistant 8 mcg/mL NATON Final    Trimeth/Sulfa Sensitive <=0.5/9.5 mcg/mL ANTON Final    Tetracycline Sensitive <=4 mcg/mL ANTON Final    Vancomycin Sensitive 1 mcg/mL ANTON Final    Ampicillin/sulbactam Sensitive <=8/4 mcg/mL ANTON Final          Enterobacter cloacae (2)     Antibiotic Interpretation  Microscan Method Status    Trimeth/Sulfa Sensitive <=2/38 mcg/mL ANTON Final    Ampicillin Resistant >16 mcg/mL ANTON Final    Ampicillin/sulbactam Resistant >16/8 mcg/mL ANTON Final    Ceftriaxone Sensitive <=1 mcg/mL ANTON Final    Tobramycin Sensitive <=4 mcg/mL ANTON Final    Ceftazidime Sensitive <=1 mcg/mL ANTON Final    Cefotaxime Sensitive <=2 mcg/mL ANTON Final    Cefazolin Resistant >16 mcg/mL ANTON Final    Ciprofloxacin Sensitive <=1 mcg/mL ANTON Final    Cefepime Sensitive <=2 mcg/mL ANTON Final    Cefotetan Sensitive <=16 mcg/mL ANTON Final    Ertapenem Sensitive <=0.5 mcg/mL ANTON Final    Gentamicin Sensitive <=4 mcg/mL ANTON Final    Pip/Tazobactam Sensitive <=16 mcg/mL ANTON Final                   GRAM STAIN [[id number]] Collected:  10/02/19 1120    Order Status:  Completed Specimen:  Wound Updated:  10/03/19 0055     Significant Indicator .     Source WND     Site Right Groin Lower     Gram Stain Result Rare WBCs.  Rare Gram positive cocci.  Rare Gram positive rods.      Narrative:       Collected By:[id number] MICHELLE RAZO groin lower wound  Collected By:[id number] MICHELLE TURNER    GRAM STAIN [[id number]] Collected:  10/02/19 1120    Order Status:  Completed Specimen:  Wound Updated:  10/03/19 0054     Significant Indicator .     Source WND     Site Right Groin Upper     Gram Stain Result Rare WBCs.  Few Gram positive cocci.  Few Gram positive rods.      Narrative:       Collected By:[id number] MICHELLE RAZO groin upper wound  Collected By:[id number] MICHELLE TURNER    URINALYSIS [[id number]]  (Abnormal) Collected:  10/02/19 1025    Order Status:  Completed Specimen:  Urine, Clean Catch Updated:  10/02/19 1437     Color Yellow     Character Clear     Specific Gravity 1.008     Ph 6.5     Glucose Negative mg/dL      Ketones Negative mg/dL      Protein Negative mg/dL      Bilirubin Negative     Urobilinogen, Urine 1.0     Nitrite Negative     Leukocyte Esterase Large     Occult Blood Small     Micro Urine Req  Microscopic    Narrative:       Collected By:[id number] MICHELLE TURNER           Transthoracic  Echo Report      Echocardiography Laboratory    CONCLUSIONS  No prior study is available for comparison.   The left ventricle was normal in size.  Mild concentric left ventricular hypertrophy.  Normal left ventricular systolic function and regional wall motion.  Left ventricular ejection fraction is estimated to be 60%.  Aortic sclerosis without stenosis.  Trace mitral regurgitation.  The left atrium is normal in size.  Dilated inferior vena cava with inspiratory collapse.  Estimated right ventricular systolic pressure  is 45 mmHg.  Mobile echodensity noted in the aortic arch/proximal left subclavian   artery.    DONALD KNOTT  Exam Date:         2019                      11:45    Functional Status at Discharge  Eatin - Independent  Eating Description:  Increased time  Groomin - Independent  Grooming Description:  (SBA standing at sink for oral care, hair combing)  Bathin - Modified Independent  Bathing Description:  Grab bar, Tub bench, Hand rails  Upper Body Dressin - Independent  Upper Body Dressing Description:  Set-up of equipment  Lower Body Dressin - Modified Independent  Lower Body Dressing Description:  6 - Modified Independent  Discharge Location : Home  Patient Discharging with Assist of: Family   Level of Supervision Required: Intermittent Supervision  Recommended Equipment for Discharge: Single Point Cane;Grab Bars in Tub / Shower;Shower Chair  Recommended Services Upon Discharge: Outpatient Occupational Therapy  Long Term Goals Met: 3  Long Term Goals Not Met: 0  Criteria for Termination of Services: Maximum Function Achieved for Inpatient Rehabilitation  Comments: Pt has made steady progress during rehab stay, is now completing ADLs and mobility at independent to modified independent with use of SPC. He continues to be limited by higher level balance impairments and LUE  weakness. HEP issued for continued progression of UE and core strength and coordination.   Walk:  6 - Modified Independent  Distance Walked:  Walks a minimum of 150 feet  Walk Description:  Extra time(SPC, 275 FT and 200 FT inside, 340 FT x2 outside)  Wheelchair:  6 - Modified Independent  Distance Propelled:  Propels a minimum of 150 feet   Wheelchair Description:  (240ft x 1, B UE, mod I)  Stairs 6 - Modified Independent  Stairs DescriptionExtra time, Hand rails, Ascends/descends 12 to 14 steps  Discharge Location: Home  Patient Discharging with Assist of: Family;Friend  Level of Supervision Required Upon Discharge: No Supervision  Recommended Equipment for Discharge: Single Point Cane  Recommeded Services Upon Discharge: Home Health Physical Therapy;Outpatient Physical Therapy  Long Term Goals Met: The patient met all long-term goals for bed mobility, transfers, stairs, gait, safety awareness  Long Term Goals Not Met: Patient met all long-term goals  Criteria for Termination of Services: Maximum Function Achieved for Inpatient Rehabilitation  Comprehension Mode:  Both  Comprehension:  7 - Independent  Comprehension Description:     Expression Mode:  Both  Expression:  7 - Independent  Expression Description:     Social Interaction:  7 - Independent  Social Interaction Description:     Problem Solvin - Independent  Problem Solving Description:     Memory:  7 - Independent  Memory Description:          Discharge Medication:     Medication List      START taking these medications      Instructions   acetaminophen 325 MG Tabs  Commonly known as:  TYLENOL  Notes to patient:  For pain   Take 2 Tabs by mouth every four hours as needed.  Dose:  650 mg     amiodarone 200 MG Tabs  Commonly known as:  CORDARONE  Notes to patient:  To regulate heart rate   Take 1 Tab by mouth every day.  Dose:  200 mg     aspirin 325 MG Tabs  Commonly known as:  ASA  Notes to patient:  To prevent strokes   Take 1 Tab by mouth every  day.  Dose:  325 mg     Cholecalciferol 1000 UNIT Tabs  Commonly known as:  VITAMIND D3   Take 1 Tab by mouth every day.  Dose:  1,000 Units     ciprofloxacin 500 MG Tabs  Commonly known as:  CIPRO  Notes to patient:  Wound infection   Take 1 Tab by mouth every 12 hours for 5 days.  Dose:  500 mg     fenofibrate micronized 67 MG capsule  Commonly known as:  LOFIBRA  Notes to patient:  Lowers cholesterol   Take 1 Cap by mouth ONE-HALF HOUR AFTER BREAKFAST.  Dose:  67 mg     finasteride 5 MG Tabs  Commonly known as:  PROSCAR  Notes to patient:  Helps you to urinate   Take 1 Tab by mouth every day.  Dose:  5 mg     furosemide 40 MG Tabs  Commonly known as:  LASIX  Notes to patient:  diuretic   Take 1 Tab by mouth every day.  Dose:  40 mg     gabapentin 400 MG Caps  Commonly known as:  NEURONTIN  Notes to patient:  Peripheral neuropathy   Take 1 Cap by mouth 4 times a day.  Dose:  400 mg     melatonin 3 MG Tabs  Notes to patient:  For insomnia   Take 2 Tabs by mouth every bedtime.  Dose:  6 mg     metoprolol 25 MG Tabs  Commonly known as:  LOPRESSOR  Notes to patient:  Lowers blood pressure and heart rate  Do not take if top blood pressure number <100  Do not take if heart rate <60   Take 0.5 Tabs by mouth 2 Times a Day.  Dose:  12.5 mg     omeprazole 20 MG delayed-release capsule  Commonly known as:  PRILOSEC  Notes to patient:  Lowers stomach acid, prevents ulcers   Take 1 Cap by mouth every morning before breakfast.  Dose:  20 mg     potassium chloride SA 20 MEQ Tbcr  Commonly known as:  Kdur  Notes to patient:  supplement   Take 1 Tab by mouth 2 Times a Day.  Dose:  20 mEq     simethicone 80 MG Chew  Commonly known as:  MYLICON  Notes to patient:  Prevents gas   Take 2 Tabs by mouth 4 times a day.  Dose:  160 mg     tamsulosin 0.4 MG capsule  Commonly known as:  FLOMAX  Notes to patient:  Helps you to urinate   Take 2 Caps by mouth every bedtime.  Dose:  0.8 mg     traZODone 50 MG Tabs  Commonly known as:   ISA  Notes to patient:  For insomnia   Take 1 Tab by mouth every bedtime.  Dose:  50 mg     warfarin 3 MG Tabs  Commonly known as:  COUMADIN  Notes to patient:  Prevents strokes   Take 1 Tab by mouth every evening.  Dose:  3 mg            Discharge Location: Home with Outpatient Services     Agency Name / Address / Phone: [facility], [address], [phone]      Outpatient Services: Physical Therapy     DME Provider / Phone: [phone]     Medical Equipment Ordered: Cane         NOTE: This information can be found in your final discharge packet that your nurse will give you.         Follow-up Information:  Eduard Angel, P.A.  [address] [facility]  [place] [address]  [phone]   On 10/9/2019  Urology - Wednesday, Oct. 9, 2019 @ 10:30AM, 10 AM check in     David Robles M.D.  [address] [facility] [id number] [phone]  On 10/15/2019  @ 11:15AM. Need CXR, lab work prior to appointment. These may be done on a walk in basis at [facility] or take results with you.  CXR must be provided on a CD if done anywhere besides [facility].      MITCHELL Crabtree.  [address] [facility] [address]  [phone]   On 10/17/2019  Thursday @ 10AM. She will manage INR draws and Coumadin dosage.     Valeria Yancey M.D.  [address]  [address]  [place] [address]  [phone]   Pulmonary - On 10/29/2019  @9:45AM     Caesar Wilder M.D.  [address]  [address]  [place] [address]  [phone]    Office to contact patient directly to schedule appointment     Condition on Discharge:  Good.    More than 34 minutes was spent on discharging this patient, including face-to-face time, prescription management, and the dictation of this note.

## 2019-10-09 NOTE — DISCHARGE INSTRUCTIONS
[facility] NURSING DISCHARGE INSTRUCTIONS    Blood Pressure : 121/72  Weight: 119.6 kg (263 lb 9.6 oz)  Nursing recommendations for Xavier Richardson at time of discharge are as follows:  Family Member verbalized understanding of all discharge instructions and prescriptions.     Review all your home medications and newly ordered medications with your doctor and/or pharmacist. Follow medication instructions as directed by your doctor and/or pharmacist.    Pain Management:   Discharge Pain Medication Instructions:  Comfort Goal: 2  Notify your primary care provider if pain is unrelieved with these measures, if the pain is new, or increased in intensity.    Discharge Skin Characteristics: Warm, Dry  Discharge Skin Exam: Other (Comments)(Post Op Wounds.)     Skin / Wound Care Instructions: Please contact your primary care physician for any change in skin integrity.     If You Have Surgical Incisions / Wounds:  Monitor surgical site(s) for signs of increased swelling, redness or symptoms of drainage from the site or fever as this could indicate signs and symptoms of infection. If these symptoms are noted, notifiy your primary care provider.      Discharge Safety Instructions: Should Have ADULT SUPERVISION     Discharge Safety Concerns: Weakness  The interdisciplinary team has made recommendation that you should have adult supervision in the house due to weakness  Anti-embolic stockings are not required to increase circulation to the lower extremities.    Discharge Diet: Regular Diet     Discharge Liquids: Thin Liquids  Discharge Bowel Function: Continent  Please contact your primary care physician for any changes in bowel habits.  Discharge Bowel Program: Per Doctor's Orders  Discharge Bladder Function: (Indwelling Barnard Catheter.)  Discharge Urinary Devices: Indwelling Catheter (Document Size / Last Changed in Comment)      Nursing Discharge Plan:   Influenza Vaccine Indication: Not indicated:  "Previously immunized this influenza season and > 8 years of age      Aortic Dissection  An aortic dissection is a tear in your aorta. The aorta is the main blood vessel that carries blood out of your heart to supply the rest of your body. It comes out of your heart and curves around, then goes down through your chest (thoracic aorta) and into your belly (abdominal aorta). The wall of the aorta has inner and outer layers.  Aortic dissection occurs most often in the thoracic aorta. This is more likely to happen if the inner layer of the aorta has a weak spot or gets injured. As the dissection widens and blood flows through it, the aorta becomes \"double-barreled.\" This means that one part of the aorta continues to carry blood. However, the inner wall begins to separate from the rest of the aorta as blood flows through the tear. The torn part of the aorta fills with blood. It swells up like a balloon. This can reduce blood flow through the part of the aorta that is still working. Aortic dissection is a medical emergency.  CAUSES  Aortic dissection happens when there is a tear in the inner wall of the aorta. An injury or weakness can cause this tear. Sometimes the exact cause of the tear is not known.  RISK FACTORS  You may be at greater risk for aortic dissection if you:  · Have certain medical conditions, such as uncontrolled high blood pressure or atherosclerosis.  · Have a blunt injury to your chest.  · Have a genetic disorder that affects the connective tissue, such as Marfan syndrome, Mcmahon syndrome, and Miriam-Danlos syndrome.  · Are born with a problem that affects either your aorta or your heart valve.  · Have a condition that causes inflammation of blood vessels, such as giant cell arteritis.  · Are male.  · Are older than 60 years of age.  · Use cocaine.  · Smoke.  · Lift very heavy weights or do other types of high-intensity resistance training.  SIGNS AND SYMPTOMS   Signs and symptoms of aortic dissection " may start suddenly. Changes in position may make symptoms worse. The most common symptoms are:  · Severe chest pain that may feel like a tearing, stabbing, or sharp pain.  · Pain that shifts to the shoulder, arm, neck, jaw, abdomen, or hips.  Other symptoms may include:  · Severe abdominal pain.  · Trouble breathing.  · Dizziness or fainting.  · Nausea or vomiting.  · Trouble swallowing.  · Sweating a lot.  · Feeling confused, dazed, anxious, or fearful.  DIAGNOSIS  Your health care provider may suspect aortic dissection based on your signs and symptoms and will perform a physical exam. During the physical exam, your health care provider may listen for abnormal blood flow sounds (murmurs) in your chest or your belly. You may also have your blood pressure checked to see whether it is low or whether there is a difference between the measurements in your arms and your legs. You may also have tests such as:  · Electrocardiogram (ECG). This is a test that measures the electrical activity in your heart.  · Chest X-ray.  · CT scan or MRI.  · Aortic angiogram. This test uses the injection of a dye to make it easier to see your blood vessels clearly.  · Echocardiogram to study your heart using sound waves.  TREATMENT  It is important to treat an aortic dissection as quickly as possible. Your treatment may start as soon as your health care provider suspects aortic dissection. Treatment will depend on how severe your dissection is, where it is located, and your overall health. Treatment options include:  · Medicines to lower your blood pressure.  · Surgery to remove the dissected part of your aorta and replace it with a graft.  · Medical procedures to thread long, thin tubes (catheters) into the aorta (endovascular procedures). This may be done to place a graft or a balloon in the blood vessel to improve blood flow or prevent further dissection.  HOME CARE INSTRUCTIONS  · Work with your health care provider to keep your blood  "pressure under control.  · Avoid activities that could cause an injury to your chest or your abdomen.  · Do not smoke. If you need help quitting, ask your health care provider.  · Do not participate in sports or exercises that involve lifting weights.  · Keep all follow-up visits as directed by your health care provider. This is important.  SEEK MEDICAL CARE IF:  · You develop any new symptoms of aortic dissection after treatment.  SEEK IMMEDIATE MEDICAL CARE IF:  · You have severe pain in your chest or your abdomen.  · You have trouble breathing.  These symptoms may represent a serious problem that is an emergency. Do not wait to see if the symptoms will go away. Get medical help right away. Call your local emergency services (911 in the [place] Do not drive yourself to the hospital.   This information is not intended to replace advice given to you by your health care provider. Make sure you discuss any questions you have with your health care provider.  Document Released: 03/26/2009 Document Revised: 01/08/2016 Document Reviewed: 07/29/2015  [employer] Interactive Patient Education © 2017 [employer]    STROKE POCKET CARD    Stroke Recommendations    Action Details      Neuro checks  Per physician order set or \"Assessment Frequency Guidelines\".     DVT/VTE prophylaxis by 2nd day Stroke pts are at increased risk of developing a DVT. Consider both pharmacological (Lovenox & Heparin SQ) and mechanical (SCD's).   Anticoagulation therapy for Pts with A-fib/flutter Atrial fibrillation/flutter is the most common form of cardiac arrhythmia. Blood pools in the atria of the heart, which can result in the formation of clots. Anticoagulation therapy can prevent initial stroke and prevent recurrent ischemic stroke. Pt's with a-fib/flutter should be discharged on anticoagulation unless contraindicated.    Antithrombotic medication by end of 2nd day ASA, Plavix, Aggrenox, Coumadin. Antithrombotic medication should be given by the " end of the second day unless contraindicated. If NPO, consider alternate routes.   Discharge on Statin Medication  Obtain a lipid profile within 48 hours of admission. Patients with high cholesterol (LDL >100), without a current lipid panel, or who were on lipid lowering meds prior to admit, may need a discharge Rx for a statin medication. If contraindicated, MD must document reason.      Dysphagia screen  Aspiration is a concern for pts with neurological deficits. Swallow screen or swallowing eval by Speech Therapy to assess for dysphagia must be performed prior to any oral intake, including meds.    Discharged on antithrombotic Reduce stroke mortality and morbidity; prevent recurrence.  Consider ASA, Plavix, Coumadin or Aggrenox.        Action  Details   Smoking cessation Cigarettes and other tobacco products can cause damage to blood vessels and increase the risk of stroke. (Provide Smoking Cessation counseling. Emphasize quitting if smoked in last 12 months).   Rehab assessment All patients suspected of having a stroke should be assessed for rehabilitation needs. This includes PT, OT, SLP referrals/interventions. If the symptoms resolve, MD needs to document that no rehab was needed.   Stroke Education should be given to patient or caregivers use-Stroke Education Guide. Document in EPIC using the Stroke/CVA/TIA/  Hemorrahagic Ischemia education template. In addition, use the discharge navigator with the appropriate stroke diagnosis. 1 Warning signs & symptoms of stroke: Sudden numbness or weakness of face, arm, leg; sudden confusion, trouble speaking or understanding; sudden trouble seeing in one or both eyes; sudden trouble walking, dizziness, loss of balance or coordination; sudden severe headache with no known cause.    2 Activation of emergency medical system.    3 Patient medications prescribed at discharge. The discharge summary must match the patients' written discharge instructions.    4 Patient risk  "factors for stroke: HTN, smoking, high cholesterol, diabetes, obesity, poor nutrition, atrial fibrillation, carotid stenosis, illicit drug use, etc.    5 Follow up with physician post discharge.        Remember the acronym \"F.A.S.T.\"   F=Face (Ask person to smile-Does one side of the face droop?)  A=Arm (Ask person to raise both arms-Does one arm drift down?)   S=Speech (Ask person to repeat a phrase-Is their speech slurred?)  T=Time (If you observe any of these signs, get help Immediately!)       Stroke Prevention  Some health problems and behaviors may make it more likely for you to have a stroke. Below are ways to lessen your risk of having a stroke.  · Be active for at least 30 minutes on most or all days.  · Do not smoke. Try not to be around others who smoke.  · Do not drink too much alcohol.  ¨ Do not have more than 2 drinks a day if you are a man.  ¨ Do not have more than 1 drink a day if you are a woman and are not pregnant.  · Eat healthy foods, such as fruits and vegetables. If you were put on a specific diet, follow the diet as told.  · Keep your cholesterol levels under control through diet and medicines. Look for foods that are low in saturated fat, trans fat, cholesterol, and are high in fiber.  · If you have diabetes, follow all diet plans and take your medicine as told.  · Ask your doctor if you need treatment to lower your blood pressure. If you have high blood pressure (hypertension), follow all diet plans and take your medicine as told by your doctor.  · If you are 18-39 years old, have your blood pressure checked every 3-5 years. If you are age 40 or older, have your blood pressure checked every year.  · Keep a healthy weight. Eat foods that are low in calories, salt, saturated fat, trans fat, and cholesterol.  · Do not take drugs.  · Avoid birth control pills, if this applies. Talk to your doctor about the risks of taking birth control pills.  · Talk to your doctor if you have sleep problems " (sleep apnea).  · Take all medicine as told by your doctor.  ¨ You may be told to take aspirin or blood thinner medicine. Take this medicine as told by your doctor.  ¨ Understand your medicine instructions.  · Make sure any other conditions you have are being taken care of.  Get help right away if:  · You suddenly lose feeling (you feel numb) or have weakness in your face, arm, or leg.  · Your face or eyelid hangs down to one side.  · You suddenly feel confused.  · You have trouble talking (aphasia) or understanding what people are saying.  · You suddenly have trouble seeing in one or both eyes.  · You suddenly have trouble walking.  · You are dizzy.  · You lose your balance or your movements are clumsy (uncoordinated).  · You suddenly have a very bad headache and you do not know the cause.  · You have new chest pain.  · Your heart feels like it is fluttering or skipping a beat (irregular heartbeat).  Do not wait to see if the symptoms above go away. Get help right away. Call your local emergency services (911 in [place] Do not drive yourself to the hospital.   This information is not intended to replace advice given to you by your health care provider. Make sure you discuss any questions you have with your health care provider.  Document Released: 06/18/2013 Document Revised: 05/25/2017 Document Reviewed: 06/20/2014  [employer] Interactive Patient Education © 2017 [employer].    Rehabilitation After a Stroke, Adult  Introduction  A stroke can cause many types of problems. The treatment of stroke involves three stages:  · Prevention.  · Treatment immediately following a stroke.  · Rehabilitation after a stroke.  How is my rehabilitation plan developed?  A detailed exam by your health care provider helps outline what problems were caused by the stroke. Your health care provider may consult specialists. The specialists may include doctors, occupational and physical therapists, and speech therapists. It is then possible  to make a plan that best fits your needs.  Your evaluation might include the following:  · Evaluation of your ability to do daily activities that require using muscles, coordination, vision, reasoning, memory, and problem solving. Interviews with you and your health care provider will help determine what you could do and could not do before the stroke.  · Evaluation of your ability to do personal self-care tasks, such as dressing, grooming, and eating.  · Tests to see if there are sensory and motor changes due to the stroke, especially in the hands and legs.  What are the types of rehabilitation?  Your health care provider may have you start rehabilitation right away depending on the type and severity of your stroke. Rehabilitation after a stroke is focused on getting function back and preventing another stroke. Rehabilitation might include:  · Physical therapy. This can include help with walking, sitting, lying down, and balance. It may also be designed to help prevent shortening of the muscles (contractures) and swelling (edema).  · Occupational therapy. This therapy helps you to relearn skills needed for leading a normal life. These could include eating, using the restroom, dressing, and taking care of yourself. It helps to make you more independent.  · Vision therapy. This can help you to retrain, strengthen, and improve your vision after a stroke.  · Speech therapy. This can help to improve your speech and communication skills. It also teaches you and your family members to cope with problems of being unable to communicate.  · Cognitive therapy. This therapy can help with problems caused by lack of memory, attention, or concentration.  · Psychological or psychiatric therapy. This can help you cope with problems of frustration and emotional problems that may develop after a stroke.  This information is not intended to replace advice given to you by your health care provider. Make sure you discuss any questions  you have with your health care provider.  Document Released: 01/06/2009 Document Revised: 05/25/2017 Document Reviewed: 05/22/2014  [employer] Interactive Patient Education © 2017 [employer]    Case Management Discharge Instructions:   Discharge Location: Home with Outpatient Services  Agency Name/Address/Phone: [facility], [address], [phone]  Home Health:    Outpatient Services: Physical Therapy  DME Provider/Phone: [facility] [phone]  Medical Equipment Ordered: Cane  Prescription Faxed to:        Discharge Medication Instructions:  Below are the medications your physician expects you to take upon discharge:      RENCase Management Discharge Instructions:   Discharge Location: Home with Outpatient Services  Agency Name/Address/Phone: [facility], [address], [phone]  Home Health:    Outpatient Services: Physical Therapy  DME Provider/Phone: [facility] [phone]  Medical Equipment Ordered: Cane  Prescription Faxed to:        Discharge Medication Instructions:  Below are the medications your physician expects you to take upon discharge:  Physical Therapy Discharge Instructions for Xavier Richardson    10/8/2019    Weight Bearing Status - Patient Should: Bear Weight as Tolerated Right Leg, Bear Weight as Tolerated Left Leg  Level of Assist Required for Ambulation: No Assist on Flat Surfaces, No Assist on Curbs, No Assist on Stairs  Distance Patient May Ambulate: 300-900 FT or more as tolerated  Device Recommended for Ambulation: Single Point Cane  Level of Assist Required to Propel Wheelchair: (Activity is not applicable)  Level of Assist Required for Transfers: Requires No Assist  Device Recommended for Transfers: Single Point Cane  Home Exercise Program: Refer to Home Exercise Program Handout for Details  Prosthesis / Orthosis Recommendation / Location: No Prosthesis  or Orthosis Recommended             Phuc Osbornees in your continued  Errol cali [facility], Med    Occupational Therapy Discharge Instructions for Xavier Richardson    10/9/2019    Level of Assist Required for Eating: Able to Complete Eating without Assist  Level of Assist Required for Grooming: Able to Complete Grooming without Assist  Level of Assist Required for Dressing: Able to Complete Dressing without Assist  Level of Assist Required for Toileting: Able to Complete Toileting without Assist  Level of Assist Required for Bathing: Able to Complete Bathing without Assist  Level of Assist Required for Shower Transfer: Able to Complete Shower Transfer without Assist  Equipment for Shower Transfer: Grab Bars in Tub / Shower, Shower Chair  Level of Assist Required for Home Mgmt: Able to Complete Home Management without Assist  Level of Assist Required for Meal Prep: Able to Complete Meal Preparation without Assist  Driving: Please Contact Physician Prior to Driving  Home Exercise Program: Refer to Home Exercise Program Handout for Details  Comments: It was so great working with you Dylon! I will miss your work ethic and positive energy. Keep up the good work at home - I know you will get back to doing all of the things you want to do in no time. -JOSE LUIS Mccain/L

## 2019-10-09 NOTE — REHAB-NURSING IDT TEAM NOTE
Nursing   Nursing  Diet/Nutrition:  Regular and Thin Liquids  Medication Administration:  Whole with Liquid Wash  % consumed at meals in last 24 hours:  Consumed % of meals per documentation.  Meal/Snack Consumption for the past 24 hrs:   Oral Nutrition   10/08/19 0834 Between % Consumed     Snack schedule:  None  Supplement:  Other: N/A  Appetite:  Good  Fluid Intake/Output in past 24 hours: In: 1020 [P.O.:1020]  Out: 3100   Admit Weight:  Weight: 123.8 kg (273 lb)  Weight Last 7 Days   [119.6 kg (263 lb 9.6 oz)] 119.6 kg (263 lb 9.6 oz) (10/06 1500)    Pain Issues:    Location:  Generalized;Head (10/08 2138)  Mid (10/08 2138)         Severity:  Moderate   Scheduled pain medications:  gabapentin (NEURONTIN)      PRN pain medications used in last 24 hours:  acetaminophen (TYLENOL)    Non Pharmacologic Interventions:  rest  Effectiveness of pain management plan:  good=patient states acceptable comfort after interventions    Bowel:    Bowel Assist Initial Score:  5 - Standby Prompting/Supervision or Set-up  Bowel Assist Current Score:  6 - Modified Independent  Bowl Accidents in last 7 days:     Last bowel movement: 10/08/19(Per pt.)  Stool Description: Medium, Formed     Usual bowel pattern:  daily  Scheduled bowel medications:  senna-docusate (PERICOLACE or SENOKOT S)   PRN bowel medications used in last 24 hours:  None  Nursing Interventions:  Increased time  Effectiveness of bowel program:   fair=sometimes needs prn bowel meds for constipation  Bladder:    Bladder Assist Initial Score:  6 - Modified Independent  Bladder Assist Current Score:  1 - Total Assistance  Bladder Accidents in last 7 days:     PVR range for past 24-48 hours: -- ()  Intermittent Catheterization:  N/A  Medications affecting bladder:  Flomax and Proscar    Time void schedule/voiding pattern:  Barnard Catheter in place.  Interventions:  Increased time, Indwelling catheter, Emptying of device  Effectiveness of bladder training:  Barnard  Catheter in place.    Barnard:    Type:     Patient Lines/Drains/Airways Status    Active Catheter     Name: Placement date: Placement time: Site: Days:    Urethral Catheter  10/02/19   1030   6              Date placed:        10/02/19  Justification:  Urinary Retention    Wound:         Patient Lines/Drains/Airways Status    Active Current Wounds     9/4/19 S/P Graft Repair Sternum,Chest  Right groin                   Interventions:  Sternum and Chest incisions WING.  Right Groin with adhesive bandage.  Effectiveness of intervention:  wound is improving     Sleep/wake cycle:   Average hours slept:  Sleeps 4-6 hours without waking  Sleep medication usage:  Other Melatonin 6 mg and Trazodone nightly.    Patient/Family Training/Education:  Bladder Management/Training, Fall Prevention, General Self Care, Medication Management, Pain Management, Safe Transfers, Safety, Skin Care and Wound Care  Discharge Supply Recommendations:  Wound Care Supplies and Other: Adaptive equipment.  Strengths: Alert and oriented, Able to follow instructions, Pleasant and cooperative and Effective communication skills   Barriers:   Bladder retention, Generalized weakness and Limited mobility       Nursing Problems     Problem: Bowel/Gastric:     Description:     Goal: Normal bowel function is maintained or improved     Description:     Flowsheet:     Taken at 09/26/19 0710    Last BM 09/25/19 by  Princess Dary Novoa R.N.                Goal: Will not experience complications related to bowel motility     Description:                 Problem: Communication     Description:     Goal: The ability to communicate needs accurately and effectively will improve     Description:                 Problem: Discharge Barriers/Planning     Description:     Goal: Patient's continuum of care needs will be met     Description:                 Problem: Infection     Description:     Goal: Will remain free from infection     Description:                  Problem: Knowledge Deficit     Description:     Goal: Knowledge of disease process/condition, treatment plan, diagnostic tests, and medications will improve     Description:           Goal: Knowledge of the prescribed therapeutic regimen will improve     Description:                 Problem: Pain Management     Description:     Goal: Pain level will decrease to patient's comfort goal     Description:                 Problem: Respiratory:     Description:     Goal: Respiratory status will improve     Description:                 Problem: Safety     Description:     Goal: Will remain free from injury     Description:           Goal: Will remain free from falls     Description:                 Problem: Skin Integrity     Description:     Goal: Risk for impaired skin integrity will decrease     Description:                 Problem: Urinary Elimination:     Description:     Goal: Ability to reestablish a normal urinary elimination pattern will improve     Description:                 Problem: Venous Thromboembolism (VTW)/Deep Vein Thrombosis (DVT) Prevention:     Description:     Goal: Patient will participate in Venous Thrombosis (VTE)/Deep Vein Thrombosis (DVT)Prevention Measures     Description:                        Long Term Goals:   At discharge patient will be able to function safely at home and in the community with support.    Section completed by:  Panda Treviño L.P.N.2

## 2019-10-09 NOTE — PROGRESS NOTES
Pharmacy Warfarin Consult   10/9/2019     69 y.o.   male     Indication for anticoagulation: Stroke    Goal INR = 2 - 3    Recent Labs     10/07/19  0600 10/08/19  0608 10/09/19  0532   INR 2.87* 2.63* 2.14*   HEMOGLOBIN 8.3* 8.1*  --    HEMATOCRIT 27.2* 26.9*  --        Pertinent Drug/Drug Interactions:  Amiodarone, ASA, PPI, Statin , Septra  Outpatient Warfarin Regimen:  New start  Recent Warfarin Dosing:    Dose from last 7 days     Date/Time                               INR Dose (mg)    10/09/19 0532                         2.14  3    10/08/19 0608                         2.63  2    10/07/19 0600                         2.87  1    10/06/19 1200                         2.95  1    10/05/19 0800                         3.40  0    10/04/19 0553                         3.14  2.5    10/03/19 0530                         3.18  1          Bridge Therapy: No        1.  Warfarin 3 mg tonight for INR = 2.14        Artem Wiggins [facility]

## 2019-10-09 NOTE — PROGRESS NOTES
[facility] Medicine Daily Progress Note    Date of Service  10/9/2019    Chief Complaint:  S/P aortic dissection  Hypertension  Afib  UTI    Interval History:  No significant events or changes since last visit    Review of Systems  Review of Systems   Constitutional: Negative for fever.   Eyes: Negative for blurred vision.   Respiratory: Negative for shortness of breath.    Cardiovascular: Negative for palpitations.   Gastrointestinal: Negative for nausea and vomiting.   Neurological: Negative for dizziness and headaches.   Psychiatric/Behavioral: Negative for hallucinations.        Physical Exam  Temp:  [37 °C (98.6 °F)] 37 °C (98.6 °F)  Pulse:  [69-74] 74  Resp:  [18-20] 18  BP: (116-121)/() 116/70  SpO2:  [94 %-95 %] 95 %    Physical Exam   HENT:   Mouth/Throat: Oropharynx is clear and moist.   Eyes: No scleral icterus.   Cardiovascular: Normal rate and regular rhythm.   No murmur heard.  Pulmonary/Chest: Effort normal and breath sounds normal. No stridor.   Abdominal: Soft. He exhibits no distension. There is no tenderness.   Musculoskeletal: He exhibits edema.   Skin: Skin is warm. No rash noted. He is not diaphoretic.   Psychiatric: His behavior is normal.   Nursing note and vitals reviewed.      Fluids    Intake/Output Summary (Last 24 hours) at 10/9/2019 0745  Last data filed at 10/9/2019 0533  Gross per 24 hour   Intake 1260 ml   Output 4050 ml   Net -2790 ml       Laboratory  Recent Labs     10/07/19  0600 10/08/19  0608   WBC 2.7* 3.8*   RBC 2.88* 2.83*   HEMOGLOBIN 8.3* 8.1*   HEMATOCRIT 27.2* 26.9*   MCV 94.4 95.1   MCH 28.8 28.6   MCHC 30.5* 30.1*   RDW 56.4* 56.0*   PLATELETCT 150* 148*   MPV 8.5* 8.3*     Recent Labs     10/07/19  0600   SODIUM 140   POTASSIUM 3.8   CHLORIDE 107   CO2 28   GLUCOSE 84   BUN 8   CREATININE 0.93   CALCIUM 9.3     Recent Labs     10/07/19  0600 10/08/19  0608 10/09/19  0532   INR 2.87* 2.63* 2.14*               Imaging    Assessment/Plan  * Stroke (cerebrum) ([facility])-  (present on admission)  Assessment & Plan  On ASA and Fenofibrate    Pancytopenia (HCC)- (present on admission)  Assessment & Plan  Appears to be improving  Suspect 2nd to Zosyn -- d/c'd  Monitor    UTI (urinary tract infection)  Assessment & Plan   --> Acinetobacter baumannii/haemolyticus  Renal U/S negative for hydronephrosis  BC x 2: negative  Right groin wound cx: MSSA and Enterobacter -- pansensitive -- ? colonized  Off Zosyn 2nd to pancytopenia  Off Bactrim -- not sensitive for UTI  On Cipro (thru 10/12)    Vitamin D deficiency- (present on admission)  Assessment & Plan  Vit D: 27  On supplements    A-fib (HCC)- (present on admission)  Assessment & Plan  HR ok  Echo (9/24): EF 60%, RVSP 45; mobile echodensity noted in the aortic arch/proximal left subclavian artery  On Lopressor: 12.5 mg bid   On Amiodarone  On Coumadin  On Lasix    Fluid overload- (present on admission)  Assessment & Plan  Likely 2nd to recent heart surgery  Has edema -- has hx but much more recently  O2 sats ok on RA  BNP: 2189 (9/24) --> 1011 (9/29) --> 1406 (10/6)  Echo (9/24): EF 60%, RVSP 45  On Lasix: 40 mg daily   On KCL: 20 meq bid  US LE: no DVT  Monitor K+: 3.8 (10/7)    Dyslipidemia- (present on admission)  Assessment & Plan  On Fenofibrate    Shoulder lesion, left- (present on admission)  Assessment & Plan  Will need F/U imaging    Lung nodule- (present on admission)  Assessment & Plan  Will need F/U imaging    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

## 2019-10-09 NOTE — TELEPHONE ENCOUNTER
Provider Comments 10/07/2019  9:34 AM Amira Davis M.D. Provider Comments -   Note    Prefer Dr. Yancey, unless no availability for months, otherwise first opening, thanks.              Valeria Yancey M.D.  You 15 hours ago (5:04 PM)      I would need to see the referral- it is difficult to say ahead of being seen.      I understand. The referring physician was someone who saw the pt while in the hospital. The only thing in the actual referral is listed above. I can advise pts daughter it is hard to tell whether or not he will need testing prior to you evaluating him if that's ok with you.

## 2019-10-10 ENCOUNTER — HOSPITAL ENCOUNTER (OUTPATIENT)
Dept: RADIOLOGY | Facility: MEDICAL CENTER | Age: 69
End: 2019-10-10

## 2019-10-14 NOTE — TELEPHONE ENCOUNTER
VOICEMAIL:  1. Caller Name: Roslyn-pt's dtr                      Call Back Number: [phone] (home)       2. Message: Pt dtr called and l/m.  Pt has received a call this morning but she mentioned pt might of not really understand what he was told because he had various strokes recently per pt's dtr.  She would like a call back regarding this.     Called and spoke with pt's dtr, Roslyn.  She wanted to see if there is any testing pt needs to do prior to his appt.  I tried to explained, pt would need to be seen and consulted for the physician to decide to do.   She was a little upset regarding this because they have to drive 6 hours to get here.  She said her father had a CXR done today at [facility].  Told her I will let Dr Yancey'tono ESTRADA know this.

## 2019-10-14 NOTE — TELEPHONE ENCOUNTER
Pt called back and was notified of messages below. He wasn't very please that we weren't able to give him orders for any testing but I did advise him that if there was any testing ordered at the time of his appt he could complete those after his appt while he's still in town. Pt relayed understanding and advised he would be here for his appt.

## 2019-10-29 ENCOUNTER — APPOINTMENT (OUTPATIENT)
Dept: PULMONOLOGY | Facility: HOSPICE | Age: 69
End: 2019-10-29
Payer: MEDICARE

## 2020-03-26 ENCOUNTER — APPOINTMENT (RX ONLY)
Dept: URBAN - METROPOLITAN AREA CLINIC 36 | Facility: CLINIC | Age: 70
Setting detail: DERMATOLOGY
End: 2020-03-26

## 2020-03-26 PROBLEM — C44.92 SQUAMOUS CELL CARCINOMA OF SKIN, UNSPECIFIED: Status: ACTIVE | Noted: 2020-03-26

## 2020-03-26 PROCEDURE — ? MOHS SURGERY PHONE CONSULTATION

## 2020-03-26 NOTE — PROCEDURE: MOHS SURGERY PHONE CONSULTATION
Does The Patient Have A Pacemaker Or Defibrillator?: No
Has The Patient Ever Had A Heart Attack Or Stroke?: Yes
Patient Preferred Phone Number: [phone]
Office Location Of Mohs Surgery: [facility]
Which Antibiotic Do They Take For Surgical Prophylaxis?: Amoxicillin (2 grams)
Pathology Accession #: Outside Path Number
Date Of Mohs Surgery: 03/30/2020
Patient's Insurance: [employer]
Additional Information?: Medications updated
Patient Reported Location: [facility]
Referring Provider: Mary JULES
If Yes- What Blood Thinners Do They Take?: Warfarin  3mg
Time Of Mohs Surgery: 12:00
Detail Level: Simple
If Yes- Details?: 3-4 small strokes 9/2019

## 2020-03-30 ENCOUNTER — APPOINTMENT (RX ONLY)
Dept: URBAN - METROPOLITAN AREA CLINIC 36 | Facility: CLINIC | Age: 70
Setting detail: DERMATOLOGY
End: 2020-03-30

## 2020-03-30 PROBLEM — C44.222 SQUAMOUS CELL CARCINOMA OF SKIN OF RIGHT EAR AND EXTERNAL AURICULAR CANAL: Status: ACTIVE | Noted: 2020-03-30

## 2020-03-30 PROCEDURE — 17312 MOHS ADDL STAGE: CPT

## 2020-03-30 PROCEDURE — 17311 MOHS 1 STAGE H/N/HF/G: CPT

## 2020-03-30 PROCEDURE — ? MOHS SURGERY

## 2020-03-30 PROCEDURE — ? PRESCRIPTION

## 2020-03-30 PROCEDURE — 14061 TIS TRNFR E/N/E/L10.1-30SQCM: CPT

## 2020-03-30 NOTE — PROCEDURE: MOHS SURGERY
Body Location Override (Optional - Billing Will Still Be Based On Selected Body Map Location If Applicable): right ear
Mohs Case Number: 
Biopsy Photograph Reviewed: Yes
Consent Type: Consent 1 (Standard)
Eye Shield Used: No
Surgeon Performing Repair (Optional): Vanessa
Initial Size Of Lesion: 1
Number Of Stages: 6
Primary Defect Length In Cm (Final Defect Size - Required For Flaps/Grafts): 2.8
Primary Defect Width In Cm (Final Defect Size - Required For Flaps/Grafts): 1.8
Repair Type: Flap
Oculoplastic Surgeon (A): Javi
Oculoplastic Surgeon Procedure Text (A): After obtaining clear surgical margins the patient was sent to oculoplastics for surgical repair.  The patient understands they will receive post-surgical care and follow-up from the referring physician's office.
Otolaryngologist Procedure Text (A): After obtaining clear surgical margins the patient was sent to otolaryngology for surgical repair.  The patient understands they will receive post-surgical care and follow-up from the referring physician's office.
Plastic Surgeon Procedure Text (A): After obtaining clear surgical margins the patient was sent to plastics for surgical repair.  The patient understands they will receive post-surgical care and follow-up from the referring physician's office.
Mid-Level Procedure Text (A): After obtaining clear surgical margins the patient was sent to a mid-level provider for surgical repair.  The patient understands they will receive post-surgical care and follow-up from the mid-level provider.
Provider Procedure Text (A): After obtaining clear surgical margins the defect was repaired by another provider.
Asc Procedure Text (A): After obtaining clear surgical margins the patient was sent to an ASC for surgical repair.  The patient understands they will receive post-surgical care and follow-up from the ASC physician.
Suturegard Retention Suture: 2-0 Nylon
Retention Suture Bite Size: 3 mm
Length To Time In Minutes Device Was In Place: 10
Simple / Intermediate / Complex Repair - Final Wound Length In Cm: 0
Undermining Type: Entire Wound
Debridement Text: The wound edges were debrided prior to proceeding with the closure to facilitate wound healing.
Helical Rim Text: The closure involved the helical rim.
Vermilion Border Text: The closure involved the vermilion border.
Nostril Rim Text: The closure involved the nostril rim.
Retention Suture Text: Retention sutures were placed to support the closure and prevent dehiscence.
Flap Type: Chondrocutaneous Helical Advancement Flap
Secondary Defect Length In Cm (Required For Flaps): 6.1
Secondary Defect Width In Cm (Required For Flaps): 2.4
Location Indication Override (Is Already Calculated Based On Selected Body Location): Area H
Area H Indication Text: Tumors in this location are included in Area H (eyelids, eyebrows, nose, lips, chin, ear, pre-auricular, post-auricular, temple, genitalia, hands, feet, ankles and areola).  Tissue conservation is critical in these anatomic locations.
Area M Indication Text: Tumors in this location are included in Area M (cheek, forehead, scalp, neck, jawline and pretibial skin).  Mohs surgery is indicated for tumors in these anatomic locations.
Area L Indication Text: Tumors in this location are included in Area L (trunk and extremities).  Mohs surgery is indicated for larger tumors, or tumors with aggressive histologic features, in these anatomic locations.
Surgical Defect Length In Cm (Optional): 1.5
Surgical Defect Width In Cm (Optional): 1.3
Special Stains Stage 1 - Results: Base On Clearance Noted Above
Stage 2: Additional Anesthesia Type: 1% lidocaine with 1:100,000 epinephrine and 408mcg clindamycin/ml and a 1:10 solution of 8.4% sodium bicarbonate
Surgical Defect Length In Cm (Optional): 2
Surgical Defect Length In Cm (Optional): 2.3
Stage 4: Additional Anesthesia Type: 1% lidocaine with epinephrine
Surgical Defect Length In Cm (Optional): 2.5
Medical Necessity Statement: Based on my medical judgement, Mohs surgery is the most appropriate treatment for this cancer compared to other treatments.
Alternatives Discussed Intro (Do Not Add Period): I discussed alternative treatments to Mohs surgery and specifically discussed the risks and benefits of
Consent 1/Introductory Paragraph: The rationale for Mohs was explained to the patient and consent was obtained. The risks, benefits and alternatives to therapy were discussed in detail. Specifically, the risks of infection, scarring, bleeding, prolonged wound healing, incomplete removal, allergy to anesthesia, nerve injury and recurrence were addressed. Prior to the procedure, the treatment site was clearly identified and confirmed by the patient. All components of Universal Protocol/PAUSE Rule completed.
Consent 2/Introductory Paragraph: Mohs surgery was explained to the patient and consent was obtained. The risks, benefits and alternatives to therapy were discussed in detail. Specifically, the risks of infection, scarring, bleeding, prolonged wound healing, incomplete removal, allergy to anesthesia, nerve injury and recurrence were addressed. Prior to the procedure, the treatment site was clearly identified and confirmed by the patient. All components of Universal Protocol/PAUSE Rule completed.
Consent 3/Introductory Paragraph: I gave the patient a chance to ask questions they had about the procedure.  Following this I explained the Mohs procedure and consent was obtained. The risks, benefits and alternatives to therapy were discussed in detail. Specifically, the risks of infection, scarring, bleeding, prolonged wound healing, incomplete removal, allergy to anesthesia, nerve injury and recurrence were addressed. Prior to the procedure, the treatment site was clearly identified and confirmed by the patient. All components of Universal Protocol/PAUSE Rule completed.
Consent (Temporal Branch)/Introductory Paragraph: The rationale for Mohs was explained to the patient and consent was obtained. The risks, benefits and alternatives to therapy were discussed in detail. Specifically, the risks of damage to the temporal branch of the facial nerve, infection, scarring, bleeding, prolonged wound healing, incomplete removal, allergy to anesthesia, and recurrence were addressed. Prior to the procedure, the treatment site was clearly identified and confirmed by the patient. All components of Universal Protocol/PAUSE Rule completed.
Consent (Marginal Mandibular)/Introductory Paragraph: The rationale for Mohs was explained to the patient and consent was obtained. The risks, benefits and alternatives to therapy were discussed in detail. Specifically, the risks of damage to the marginal mandibular branch of the facial nerve, infection, scarring, bleeding, prolonged wound healing, incomplete removal, allergy to anesthesia, and recurrence were addressed. Prior to the procedure, the treatment site was clearly identified and confirmed by the patient. All components of Universal Protocol/PAUSE Rule completed.
Consent (Spinal Accessory)/Introductory Paragraph: The rationale for Mohs was explained to the patient and consent was obtained. The risks, benefits and alternatives to therapy were discussed in detail. Specifically, the risks of damage to the spinal accessory nerve, infection, scarring, bleeding, prolonged wound healing, incomplete removal, allergy to anesthesia, and recurrence were addressed. Prior to the procedure, the treatment site was clearly identified and confirmed by the patient. All components of Universal Protocol/PAUSE Rule completed.
Consent (Near Eyelid Margin)/Introductory Paragraph: The rationale for Mohs was explained to the patient and consent was obtained. The risks, benefits and alternatives to therapy were discussed in detail. Specifically, the risks of ectropion or eyelid deformity, infection, scarring, bleeding, prolonged wound healing, incomplete removal, allergy to anesthesia, nerve injury and recurrence were addressed. Prior to the procedure, the treatment site was clearly identified and confirmed by the patient. All components of Universal Protocol/PAUSE Rule completed.
Consent (Ear)/Introductory Paragraph: The rationale for Mohs was explained to the patient and consent was obtained. The risks, benefits and alternatives to therapy were discussed in detail. Specifically, the risks of ear deformity, infection, scarring, bleeding, prolonged wound healing, incomplete removal, allergy to anesthesia, nerve injury and recurrence were addressed. Prior to the procedure, the treatment site was clearly identified and confirmed by the patient. All components of Universal Protocol/PAUSE Rule completed.
Consent (Nose)/Introductory Paragraph: The rationale for Mohs was explained to the patient and consent was obtained. The risks, benefits and alternatives to therapy were discussed in detail. Specifically, the risks of nasal deformity, changes in the flow of air through the nose, infection, scarring, bleeding, prolonged wound healing, incomplete removal, allergy to anesthesia, nerve injury and recurrence were addressed. Prior to the procedure, the treatment site was clearly identified and confirmed by the patient. All components of Universal Protocol/PAUSE Rule completed.
Consent (Lip)/Introductory Paragraph: The rationale for Mohs was explained to the patient and consent was obtained. The risks, benefits and alternatives to therapy were discussed in detail. Specifically, the risks of lip deformity, changes in the oral aperture, infection, scarring, bleeding, prolonged wound healing, incomplete removal, allergy to anesthesia, nerve injury and recurrence were addressed. Prior to the procedure, the treatment site was clearly identified and confirmed by the patient. All components of Universal Protocol/PAUSE Rule completed.
Consent (Scalp)/Introductory Paragraph: The rationale for Mohs was explained to the patient and consent was obtained. The risks, benefits and alternatives to therapy were discussed in detail. Specifically, the risks of changes in hair growth pattern secondary to repair, infection, scarring, bleeding, prolonged wound healing, incomplete removal, allergy to anesthesia, nerve injury and recurrence were addressed. Prior to the procedure, the treatment site was clearly identified and confirmed by the patient. All components of Universal Protocol/PAUSE Rule completed.
Detail Level: Detailed
Postop Diagnosis: same
Anesthesia Type: 0.5% lidocaine with 1:200,000 epinephrine and a 1:10 solution of 8.4% sodium bicarbonate and 408mcg clindamycin/ml
Hemostasis: Electrocautery
Estimated Blood Loss (Cc): less than 5 cc
Surgical Defect Length In Cm (Optional): 2.7
Repair Anesthesia Method: local infiltration
Deep Sutures: 5-0 Vicryl
Epidermal Sutures: 5-0 Ethilon
Epidermal Closure: running cuticular
Suturegard Intro: Intraoperative tissue expansion was performed, utilizing the SUTUREGARD device, in order to reduce wound tension.
Suturegard Body: The suture ends were repeatedly re-tightened and re-clamped to achieve the desired tissue expansion.
Donor Site Anesthesia Type: same as repair anesthesia
Graft Basting Suture (Optional): 5-0 Fast Absorbing Gut
Graft Donor Site Epidermal Sutures (Optional): 5-0 Ethibond
Epidermal Closure Graft Donor Site (Optional): simple interrupted
Graft Donor Site Bandage (Optional-Leave Blank If You Don't Want In Note): Aquaphor and telefa placed on wound. Pressure dressing applied to donor site
Closure 2 Information: This tab is for additional flaps and grafts, including complex repair and grafts and complex repair and flaps. You can also specify a different location for the additional defect, if the location is the same you do not need to select a new one. We will insert the automated text for the repair you select below just as we do for solitary flaps and grafts. Please note that at this time if you select a location with a different insurance zone you will need to override the ICD10 and CPT if appropriate.
Closure 3 Information: This tab is for additional flaps and grafts above and beyond our usual structured repairs.  Please note if you enter information here it will not currently bill and you will need to add the billing information manually.
Wound Care: Aquaphor
Dressing: dry sterile dressing
Wound Care (No Sutures): Petrolatum
Suture Removal: 7 days
Unna Boot Text: An Unna boot was placed to help immobilize the limb and facilitate more rapid healing.
Home Suture Removal Text: Patient was provided instructions on removing sutures and will remove their sutures at home.  If they have any questions or difficulties they will call the office.
Post-Care Instructions: I reviewed with the patient in detail post-care instructions. Patient is not to engage in any heavy lifting, exercise, or swimming for the next 14 days. Should the patient develop any fevers, chills, bleeding, severe pain patient will contact the office immediately.
Pain Refusal Text: I offered to prescribe pain medication but the patient refused to take this medication.
Mauc Instructions: By selecting yes to the question below the MAUC number will be added into the note.  This will be calculated automatically based on the diagnosis chosen, the size entered, the body zone selected (H,M,L) and the specific indications you chose. You will also have the option to override the Mohs AUC if you disagree with the automatically calculated number and this option is found in the Case Summary tab.
Where Do You Want The Question To Include Opioid Counseling Located?: Case Summary Tab
Eye Protection Verbiage: Before proceeding with the stage, a plastic scleral shield was inserted. The globe was anesthetized with a few drops of 1% lidocaine with 1:100,000 epinephrine. Then, an appropriate sized scleral shield was chosen and coated with lacrilube ointment. The shield was gently inserted and left in place for the duration of each stage. After the stage was completed, the shield was gently removed.
Mohs Method Verbiage: An incision at a 45 degree angle following the standard Mohs approach was done and the specimen was harvested as a microscopic controlled layer.
Surgeon/Pathologist Verbiage (Will Incorporate Name Of Surgeon From Intro If Not Blank): operated in two distinct and integrated capacities as the surgeon and pathologist.
Mohs Histo Method Verbiage: Each section was then chromacoded and processed in the Mohs lab using the Mohs protocol and submitted for frozen section.
Subsequent Stages Histo Method Verbiage: Using a similar technique to that described above, a thin layer of tissue was removed from all areas where tumor was visible on the previous stage.  The tissue was again oriented, mapped, dyed, and processed as above.
Mohs Rapid Report Verbiage: The area of clinically evident tumor was marked with skin marking ink and appropriately hatched.  The initial incision was made following the Mohs approach through the skin.  The specimen was taken to the lab, divided into the necessary number of pieces, chromacoded and processed according to the Mohs protocol.  This was repeated in successive stages until a tumor free defect was achieved.
Complex Repair Preamble Text (Leave Blank If You Do Not Want): Extensive wide undermining was performed at least 2 cm in all directions.
Intermediate Repair Preamble Text (Leave Blank If You Do Not Want): Undermining was performed with blunt dissection.
M-Plasty Complex Repair Preamble Text (Leave Blank If You Do Not Want): Extensive wide undermining was performed.
Non-Graft Cartilage Fenestration Text: The cartilage was fenestrated with a 2mm punch biopsy to help facilitate healing.
Graft Cartilage Fenestration Text: The cartilage was fenestrated with a 2mm punch biopsy to help facilitate graft survival and healing.
Secondary Intention Text (Leave Blank If You Do Not Want): The defect will heal with secondary intention.
No Repair - Repaired With Adjacent Surgical Defect Text (Leave Blank If You Do Not Want): After obtaining clear surgical margins the defect was repaired concurrently with another surgical defect which was in close approximation.
Advancement Flap (Single) Text: The defect edges were debeveled with a #15 scalpel blade.  Given the location of the defect and the proximity to free margins a single advancement flap was deemed most appropriate.  Using a sterile surgical marker, an appropriate advancement flap was drawn incorporating the defect and placing the expected incisions within the relaxed skin tension lines where possible.    The area thus outlined was incised deep to adipose tissue with a #15 scalpel blade.  The skin margins were undermined to an appropriate distance in all directions utilizing iris scissors.
Advancement Flap (Double) Text: The defect edges were debeveled with a #15 scalpel blade.  Given the location of the defect and the proximity to free margins a double advancement flap was deemed most appropriate.  Using a sterile surgical marker, the appropriate advancement flaps were drawn incorporating the defect and placing the expected incisions within the relaxed skin tension lines where possible.    The area thus outlined was incised deep to adipose tissue with a #15 scalpel blade.  The skin margins were undermined to an appropriate distance in all directions utilizing iris scissors.
Burow's Advancement Flap Text: The defect edges were debeveled with a #15 scalpel blade.  Given the location of the defect and the proximity to free margins a Burow's advancement flap was deemed most appropriate.  Using a sterile surgical marker, the appropriate advancement flap was drawn incorporating the defect and placing the expected incisions within the relaxed skin tension lines where possible.    The area thus outlined was incised deep to adipose tissue with a #15 scalpel blade.  The skin margins were undermined to an appropriate distance in all directions utilizing iris scissors.
Chonodrocutaneous Helical Advancement Flap Text: The defect edges were debeveled with a #15 scalpel blade.  Given the location of the defect and the proximity to free margins a chondrocutaneous helical advancement flap was deemed most appropriate.  Using a sterile surgical marker, the appropriate advancement flap was drawn incorporating the defect and placing the expected incisions within the relaxed skin tension lines where possible.    The area thus outlined was incised deep to adipose tissue with a #15 scalpel blade.  The skin margins were undermined to an appropriate distance in all directions utilizing iris scissors.
Crescentic Advancement Flap Text: The defect edges were debeveled with a #15 scalpel blade.  Given the location of the defect and the proximity to free margins a crescentic advancement flap was deemed most appropriate.  Using a sterile surgical marker, the appropriate advancement flap was drawn incorporating the defect and placing the expected incisions within the relaxed skin tension lines where possible.    The area thus outlined was incised deep to adipose tissue with a #15 scalpel blade.  The skin margins were undermined to an appropriate distance in all directions utilizing iris scissors.
A-T Advancement Flap Text: The defect edges were debeveled with a #15 scalpel blade.  Given the location of the defect, shape of the defect and the proximity to free margins an A-T advancement flap was deemed most appropriate.  Using a sterile surgical marker, an appropriate advancement flap was drawn incorporating the defect and placing the expected incisions within the relaxed skin tension lines where possible.    The area thus outlined was incised deep to adipose tissue with a #15 scalpel blade.  The skin margins were undermined to an appropriate distance in all directions utilizing iris scissors.
O-T Advancement Flap Text: The defect edges were debeveled with a #15 scalpel blade.  Given the location of the defect, shape of the defect and the proximity to free margins an O-T advancement flap was deemed most appropriate.  Using a sterile surgical marker, an appropriate advancement flap was drawn incorporating the defect and placing the expected incisions within the relaxed skin tension lines where possible.    The area thus outlined was incised deep to adipose tissue with a #15 scalpel blade.  The skin margins were undermined to an appropriate distance in all directions utilizing iris scissors.
O-L Flap Text: The defect edges were debeveled with a #15 scalpel blade.  Given the location of the defect, shape of the defect and the proximity to free margins an O-L flap was deemed most appropriate.  Using a sterile surgical marker, an appropriate advancement flap was drawn incorporating the defect and placing the expected incisions within the relaxed skin tension lines where possible.    The area thus outlined was incised deep to adipose tissue with a #15 scalpel blade.  The skin margins were undermined to an appropriate distance in all directions utilizing iris scissors.
O-Z Flap Text: The defect edges were debeveled with a #15 scalpel blade.  Given the location of the defect, shape of the defect and the proximity to free margins an O-Z flap was deemed most appropriate.  Using a sterile surgical marker, an appropriate transposition flap was drawn incorporating the defect and placing the expected incisions within the relaxed skin tension lines where possible. The area thus outlined was incised deep to adipose tissue with a #15 scalpel blade.  The skin margins were undermined to an appropriate distance in all directions utilizing iris scissors.
Double O-Z Flap Text: The defect edges were debeveled with a #15 scalpel blade.  Given the location of the defect, shape of the defect and the proximity to free margins a Double O-Z flap was deemed most appropriate.  Using a sterile surgical marker, an appropriate transposition flap was drawn incorporating the defect and placing the expected incisions within the relaxed skin tension lines where possible. The area thus outlined was incised deep to adipose tissue with a #15 scalpel blade.  The skin margins were undermined to an appropriate distance in all directions utilizing iris scissors.
V-Y Flap Text: The defect edges were debeveled with a #15 scalpel blade.  Given the location of the defect, shape of the defect and the proximity to free margins a V-Y flap was deemed most appropriate.  Using a sterile surgical marker, an appropriate advancement flap was drawn incorporating the defect and placing the expected incisions within the relaxed skin tension lines where possible.    The area thus outlined was incised deep to adipose tissue with a #15 scalpel blade.  The skin margins were undermined to an appropriate distance in all directions utilizing iris scissors.
Advancement-Rotation Flap Text: The defect edges were debeveled with a #15 scalpel blade.  Given the location of the defect, shape of the defect and the proximity to free margins an advancement-rotation flap was deemed most appropriate.  Using a sterile surgical marker, an appropriate flap was drawn incorporating the defect and placing the expected incisions within the relaxed skin tension lines where possible. The area thus outlined was incised deep to adipose tissue with a #15 scalpel blade.  The skin margins were undermined to an appropriate distance in all directions utilizing iris scissors.
Mercedes Flap Text: The defect edges were debeveled with a #15 scalpel blade.  Given the location of the defect, shape of the defect and the proximity to free margins a Mercedes flap was deemed most appropriate.  Using a sterile surgical marker, an appropriate advancement flap was drawn incorporating the defect and placing the expected incisions within the relaxed skin tension lines where possible. The area thus outlined was incised deep to adipose tissue with a #15 scalpel blade.  The skin margins were undermined to an appropriate distance in all directions utilizing iris scissors.
Modified Advancement Flap Text: The defect edges were debeveled with a #15 scalpel blade.  Given the location of the defect, shape of the defect and the proximity to free margins a modified advancement flap was deemed most appropriate.  Using a sterile surgical marker, an appropriate advancement flap was drawn incorporating the defect and placing the expected incisions within the relaxed skin tension lines where possible.    The area thus outlined was incised deep to adipose tissue with a #15 scalpel blade.  The skin margins were undermined to an appropriate distance in all directions utilizing iris scissors.
Mucosal Advancement Flap Text: Given the location of the defect, shape of the defect and the proximity to free margins a mucosal advancement flap was deemed most appropriate. Incisions were made with a 15 blade scalpel in the appropriate fashion along the cutaneous vermilion border and the mucosal lip. The remaining actinically damaged mucosal tissue was excised.  The mucosal advancement flap was then elevated to the gingival sulcus with care taken to preserve the neurovascular structures and advanced into the primary defect. Care was taken to ensure that precise realignment of the vermilion border was achieved.
Hatchet Flap Text: The defect edges were debeveled with a #15 scalpel blade.  Given the location of the defect, shape of the defect and the proximity to free margins a hatchet flap based from the glabella was deemed most appropriate.  Using a sterile surgical marker, an appropriate glabellar hatchet flap was drawn incorporating the defect and placing the expected incisions within the relaxed skin tension lines where possible.    The area thus outlined was incised deep to adipose tissue with a #15 scalpel blade.  The skin margins were undermined to an appropriate distance in all directions utilizing iris scissors.
Rotation Flap Text: The defect edges were debeveled with a #15 scalpel blade.  Given the location of the defect, shape of the defect and the proximity to free margins a rotation flap was deemed most appropriate.  Using a sterile surgical marker, an appropriate rotation flap was drawn incorporating the defect and placing the expected incisions within the relaxed skin tension lines where possible.    The area thus outlined was incised deep to adipose tissue with a #15 scalpel blade.  The skin margins were undermined to an appropriate distance in all directions utilizing iris scissors.
Spiral Flap Text: The defect edges were debeveled with a #15 scalpel blade.  Given the location of the defect, shape of the defect and the proximity to free margins a spiral flap was deemed most appropriate.  Using a sterile surgical marker, an appropriate rotation flap was drawn incorporating the defect and placing the expected incisions within the relaxed skin tension lines where possible. The area thus outlined was incised deep to adipose tissue with a #15 scalpel blade.  The skin margins were undermined to an appropriate distance in all directions utilizing iris scissors.
Star Wedge Flap Text: The defect edges were debeveled with a #15 scalpel blade.  Given the location of the defect, shape of the defect and the proximity to free margins a star wedge flap was deemed most appropriate.  Using a sterile surgical marker, an appropriate rotation flap was drawn incorporating the defect and placing the expected incisions within the relaxed skin tension lines where possible. The area thus outlined was incised deep to adipose tissue with a #15 scalpel blade.  The skin margins were undermined to an appropriate distance in all directions utilizing iris scissors.
Transposition Flap Text: The defect edges were debeveled with a #15 scalpel blade.  Given the location of the defect and the proximity to free margins a transposition flap was deemed most appropriate.  Using a sterile surgical marker, an appropriate transposition flap was drawn incorporating the defect.    The area thus outlined was incised deep to adipose tissue with a #15 scalpel blade.  The skin margins were undermined to an appropriate distance in all directions utilizing iris scissors.
Muscle Hinge Flap Text: The defect edges were debeveled with a #15 scalpel blade.  Given the size, depth and location of the defect and the proximity to free margins a muscle hinge flap was deemed most appropriate.  Using a sterile surgical marker, an appropriate hinge flap was drawn incorporating the defect. The area thus outlined was incised with a #15 scalpel blade.  The skin margins were undermined to an appropriate distance in all directions utilizing iris scissors.
Melolabial Transposition Flap Text: The defect edges were debeveled with a #15 scalpel blade.  Given the location of the defect and the proximity to free margins a melolabial flap was deemed most appropriate.  Using a sterile surgical marker, an appropriate melolabial transposition flap was drawn incorporating the defect.    The area thus outlined was incised deep to adipose tissue with a #15 scalpel blade.  The skin margins were undermined to an appropriate distance in all directions utilizing iris scissors.
Rhombic Flap Text: The defect edges were debeveled with a #15 scalpel blade.  Given the location of the defect and the proximity to free margins a rhombic flap was deemed most appropriate.  Using a sterile surgical marker, an appropriate rhombic flap was drawn incorporating the defect.    The area thus outlined was incised deep to adipose tissue with a #15 scalpel blade.  The skin margins were undermined to an appropriate distance in all directions utilizing iris scissors.
Rhomboid Transposition Flap Text: The defect edges were debeveled with a #15 scalpel blade.  Given the location of the defect and the proximity to free margins a rhomboid transposition flap was deemed most appropriate.  Using a sterile surgical marker, an appropriate rhomboid flap was drawn incorporating the defect.    The area thus outlined was incised deep to adipose tissue with a #15 scalpel blade.  The skin margins were undermined to an appropriate distance in all directions utilizing iris scissors.
Bi-Rhombic Flap Text: The defect edges were debeveled with a #15 scalpel blade.  Given the location of the defect and the proximity to free margins a bi-rhombic flap was deemed most appropriate.  Using a sterile surgical marker, an appropriate rhombic flap was drawn incorporating the defect. The area thus outlined was incised deep to adipose tissue with a #15 scalpel blade.  The skin margins were undermined to an appropriate distance in all directions utilizing iris scissors.
Helical Rim Advancement Flap Text: The defect edges were debeveled with a #15 blade scalpel.  Given the location of the defect and the proximity to free margins (helical rim) a double helical rim advancement flap was deemed most appropriate.  Using a sterile surgical marker, the appropriate advancement flaps were drawn incorporating the defect and placing the expected incisions between the helical rim and antihelix where possible.  The area thus outlined was incised through and through with a #15 scalpel blade.  With a skin hook and iris scissors, the flaps were gently and sharply undermined and freed up.
Bilateral Helical Rim Advancement Flap Text: The defect edges were debeveled with a #15 blade scalpel.  Given the location of the defect and the proximity to free margins (helical rim) a bilateral helical rim advancement flap was deemed most appropriate.  Using a sterile surgical marker, the appropriate advancement flaps were drawn incorporating the defect and placing the expected incisions between the helical rim and antihelix where possible.  The area thus outlined was incised through and through with a #15 scalpel blade.  With a skin hook and iris scissors, the flaps were gently and sharply undermined and freed up.
Ear Star Wedge Flap Text: The defect edges were debeveled with a #15 blade scalpel.  Given the location of the defect and the proximity to free margins (helical rim) an ear star wedge flap was deemed most appropriate.  Using a sterile surgical marker, the appropriate flap was drawn incorporating the defect and placing the expected incisions between the helical rim and antihelix where possible.  The area thus outlined was incised through and through with a #15 scalpel blade.
Banner Transposition Flap Text: The defect edges were debeveled with a #15 scalpel blade.  Given the location of the defect and the proximity to free margins a Banner transposition flap was deemed most appropriate.  Using a sterile surgical marker, an appropriate flap drawn around the defect. The area thus outlined was incised deep to adipose tissue with a #15 scalpel blade.  The skin margins were undermined to an appropriate distance in all directions utilizing iris scissors.
Bilobed Flap Text: The defect edges were debeveled with a #15 scalpel blade.  Given the location of the defect and the proximity to free margins a bilobe flap was deemed most appropriate.  Using a sterile surgical marker, an appropriate bilobe flap drawn around the defect.    The area thus outlined was incised deep to adipose tissue with a #15 scalpel blade.  The skin margins were undermined to an appropriate distance in all directions utilizing iris scissors.
Bilobed Transposition Flap Text: The defect edges were debeveled with a #15 scalpel blade.  Given the location of the defect and the proximity to free margins a bilobed transposition flap was deemed most appropriate.  Using a sterile surgical marker, an appropriate bilobe flap drawn around the defect.    The area thus outlined was incised deep to adipose tissue with a #15 scalpel blade.  The skin margins were undermined to an appropriate distance in all directions utilizing iris scissors.
Trilobed Flap Text: The defect edges were debeveled with a #15 scalpel blade.  Given the location of the defect and the proximity to free margins a trilobed flap was deemed most appropriate.  Using a sterile surgical marker, an appropriate trilobed flap drawn around the defect.    The area thus outlined was incised deep to adipose tissue with a #15 scalpel blade.  The skin margins were undermined to an appropriate distance in all directions utilizing iris scissors.
Dorsal Nasal Flap Text: The defect edges were debeveled with a #15 scalpel blade.  Given the location of the defect and the proximity to free margins a dorsal nasal flap,based upon the glabellar folds, was deemed most appropriate.  Using a sterile surgical marker, an appropriate dorsal nasal flap was drawn around the defect.    The area thus outlined was incised deep to adipose tissue with a #15 scalpel blade.  The skin margins were undermined to an appropriate distance in all directions utilizing iris scissors.
Island Pedicle Flap Text: The defect edges were debeveled with a #15 scalpel blade.  Given the location of the defect, shape of the defect and the proximity to free margins an island pedicle advancement flap was deemed most appropriate.  Using a sterile surgical marker, an appropriate advancement flap was drawn incorporating the defect, outlining the appropriate donor tissue and placing the expected incisions within the relaxed skin tension lines where possible.    The area thus outlined was incised deep to adipose tissue with a #15 scalpel blade.  The skin margins were undermined to an appropriate distance in all directions around the primary defect and laterally outward around the island pedicle utilizing iris scissors.  There was minimal undermining beneath the pedicle flap.
Island Pedicle Flap With Canthal Suspension Text: The defect edges were debeveled with a #15 scalpel blade.  Given the location of the defect, shape of the defect and the proximity to free margins an island pedicle advancement flap was deemed most appropriate.  Using a sterile surgical marker, an appropriate advancement flap was drawn incorporating the defect, outlining the appropriate donor tissue and placing the expected incisions within the relaxed skin tension lines where possible. The area thus outlined was incised deep to adipose tissue with a #15 scalpel blade.  The skin margins were undermined to an appropriate distance in all directions around the primary defect and laterally outward around the island pedicle utilizing iris scissors.  There was minimal undermining beneath the pedicle flap. A suspension suture was placed in the canthal tendon to prevent tension and prevent ectropion.
Alar Island Pedicle Flap Text: The defect edges were debeveled with a #15 scalpel blade.  Given the location of the defect, shape of the defect and the proximity to the alar rim an island pedicle advancement flap was deemed most appropriate.  Using a sterile surgical marker, an appropriate advancement flap was drawn incorporating the defect, outlining the appropriate donor tissue and placing the expected incisions within the nasal ala running parallel to the alar rim. The area thus outlined was incised with a #15 scalpel blade.  The skin margins were undermined minimally to an appropriate distance in all directions around the primary defect and laterally outward around the island pedicle utilizing iris scissors.  There was minimal undermining beneath the pedicle flap.
Double Island Pedicle Flap Text: The defect edges were debeveled with a #15 scalpel blade.  Given the location of the defect, shape of the defect and the proximity to free margins a double island pedicle advancement flap was deemed most appropriate.  Using a sterile surgical marker, an appropriate advancement flap was drawn incorporating the defect, outlining the appropriate donor tissue and placing the expected incisions within the relaxed skin tension lines where possible.    The area thus outlined was incised deep to adipose tissue with a #15 scalpel blade.  The skin margins were undermined to an appropriate distance in all directions around the primary defect and laterally outward around the island pedicle utilizing iris scissors.  There was minimal undermining beneath the pedicle flap.
Island Pedicle Flap-Requiring Vessel Identification Text: The defect edges were debeveled with a #15 scalpel blade.  Given the location of the defect, shape of the defect and the proximity to free margins an island pedicle advancement flap was deemed most appropriate.  Using a sterile surgical marker, an appropriate advancement flap was drawn, based on the axial vessel mentioned above, incorporating the defect, outlining the appropriate donor tissue and placing the expected incisions within the relaxed skin tension lines where possible.    The area thus outlined was incised deep to adipose tissue with a #15 scalpel blade.  The skin margins were undermined to an appropriate distance in all directions around the primary defect and laterally outward around the island pedicle utilizing iris scissors.  There was minimal undermining beneath the pedicle flap.
Keystone Flap Text: The defect edges were debeveled with a #15 scalpel blade.  Given the location of the defect, shape of the defect a keystone flap was deemed most appropriate.  Using a sterile surgical marker, an appropriate keystone flap was drawn incorporating the defect, outlining the appropriate donor tissue and placing the expected incisions within the relaxed skin tension lines where possible. The area thus outlined was incised deep to adipose tissue with a #15 scalpel blade.  The skin margins were undermined to an appropriate distance in all directions around the primary defect and laterally outward around the flap utilizing iris scissors.
O-T Plasty Text: The defect edges were debeveled with a #15 scalpel blade.  Given the location of the defect, shape of the defect and the proximity to free margins an O-T plasty was deemed most appropriate.  Using a sterile surgical marker, an appropriate O-T plasty was drawn incorporating the defect and placing the expected incisions within the relaxed skin tension lines where possible.    The area thus outlined was incised deep to adipose tissue with a #15 scalpel blade.  The skin margins were undermined to an appropriate distance in all directions utilizing iris scissors.
O-Z Plasty Text: The defect edges were debeveled with a #15 scalpel blade.  Given the location of the defect, shape of the defect and the proximity to free margins an O-Z plasty (double transposition flap) was deemed most appropriate.  Using a sterile surgical marker, the appropriate transposition flaps were drawn incorporating the defect and placing the expected incisions within the relaxed skin tension lines where possible.    The area thus outlined was incised deep to adipose tissue with a #15 scalpel blade.  The skin margins were undermined to an appropriate distance in all directions utilizing iris scissors.  Hemostasis was achieved with electrocautery.  The flaps were then transposed into place, one clockwise and the other counterclockwise, and anchored with interrupted buried subcutaneous sutures.
Double O-Z Plasty Text: The defect edges were debeveled with a #15 scalpel blade.  Given the location of the defect, shape of the defect and the proximity to free margins a Double O-Z plasty (double transposition flap) was deemed most appropriate.  Using a sterile surgical marker, the appropriate transposition flaps were drawn incorporating the defect and placing the expected incisions within the relaxed skin tension lines where possible. The area thus outlined was incised deep to adipose tissue with a #15 scalpel blade.  The skin margins were undermined to an appropriate distance in all directions utilizing iris scissors.  Hemostasis was achieved with electrocautery.  The flaps were then transposed into place, one clockwise and the other counterclockwise, and anchored with interrupted buried subcutaneous sutures.
S Plasty Text: Given the location and shape of the defect, and the orientation of relaxed skin tension lines, an S-plasty was deemed most appropriate for repair.  Using a sterile surgical marker, the appropriate outline of the S-plasty was drawn, incorporating the defect and placing the expected incisions within the relaxed skin tension lines where possible.  The area thus outlined was incised deep to adipose tissue with a #15 scalpel blade.  The skin margins were undermined to an appropriate distance in all directions utilizing iris scissors. The skin flaps were advanced over the defect.  The opposing margins were then approximated with interrupted buried subcutaneous sutures.
V-Y Plasty Text: The defect edges were debeveled with a #15 scalpel blade.  Given the location of the defect, shape of the defect and the proximity to free margins an V-Y advancement flap was deemed most appropriate.  Using a sterile surgical marker, an appropriate advancement flap was drawn incorporating the defect and placing the expected incisions within the relaxed skin tension lines where possible.    The area thus outlined was incised deep to adipose tissue with a #15 scalpel blade.  The skin margins were undermined to an appropriate distance in all directions utilizing iris scissors.
H Plasty Text: Given the location of the defect, shape of the defect and the proximity to free margins a H-plasty was deemed most appropriate for repair.  Using a sterile surgical marker, the appropriate advancement arms of the H-plasty were drawn incorporating the defect and placing the expected incisions within the relaxed skin tension lines where possible. The area thus outlined was incised deep to adipose tissue with a #15 scalpel blade. The skin margins were undermined to an appropriate distance in all directions utilizing iris scissors.  The opposing advancement arms were then advanced into place in opposite direction and anchored with interrupted buried subcutaneous sutures.
W Plasty Text: The lesion was extirpated to the level of the fat with a #15 scalpel blade.  Given the location of the defect, shape of the defect and the proximity to free margins a W-plasty was deemed most appropriate for repair.  Using a sterile surgical marker, the appropriate transposition arms of the W-plasty were drawn incorporating the defect and placing the expected incisions within the relaxed skin tension lines where possible.    The area thus outlined was incised deep to adipose tissue with a #15 scalpel blade.  The skin margins were undermined to an appropriate distance in all directions utilizing iris scissors.  The opposing transposition arms were then transposed into place in opposite direction and anchored with interrupted buried subcutaneous sutures.
Z Plasty Text: The lesion was extirpated to the level of the fat with a #15 scalpel blade.  Given the location of the defect, shape of the defect and the proximity to free margins a Z-plasty was deemed most appropriate for repair.  Using a sterile surgical marker, the appropriate transposition arms of the Z-plasty were drawn incorporating the defect and placing the expected incisions within the relaxed skin tension lines where possible.    The area thus outlined was incised deep to adipose tissue with a #15 scalpel blade.  The skin margins were undermined to an appropriate distance in all directions utilizing iris scissors.  The opposing transposition arms were then transposed into place in opposite direction and anchored with interrupted buried subcutaneous sutures.
Cheek Interpolation Flap Text: A decision was made to reconstruct the defect utilizing an interpolation axial flap and a staged reconstruction.  A telfa template was made of the defect.  This telfa template was then used to outline the Cheek Interpolation flap.  The donor area for the pedicle flap was then injected with anesthesia.  The flap was excised through the skin and subcutaneous tissue down to the layer of the underlying musculature.  The interpolation flap was carefully excised within this deep plane to maintain its blood supply.  The edges of the donor site were undermined.   The donor site was closed in a primary fashion.  The pedicle was then rotated into position and sutured.  Once the tube was sutured into place, adequate blood supply was confirmed with blanching and refill.  The pedicle was then wrapped with xeroform gauze and dressed appropriately with a telfa and gauze bandage to ensure continued blood supply and protect the attached pedicle.
Cheek-To-Nose Interpolation Flap Text: A decision was made to reconstruct the defect utilizing an interpolation axial flap and a staged reconstruction.  A telfa template was made of the defect.  This telfa template was then used to outline the Cheek-To-Nose Interpolation flap.  The donor area for the pedicle flap was then injected with anesthesia.  The flap was excised through the skin and subcutaneous tissue down to the layer of the underlying musculature.  The interpolation flap was carefully excised within this deep plane to maintain its blood supply.  The edges of the donor site were undermined.   The donor site was closed in a primary fashion.  The pedicle was then rotated into position and sutured.  Once the tube was sutured into place, adequate blood supply was confirmed with blanching and refill.  The pedicle was then wrapped with xeroform gauze and dressed appropriately with a telfa and gauze bandage to ensure continued blood supply and protect the attached pedicle.
Interpolation Flap Text: A decision was made to reconstruct the defect utilizing an interpolation axial flap and a staged reconstruction.  A telfa template was made of the defect.  This telfa template was then used to outline the interpolation flap.  The donor area for the pedicle flap was then injected with anesthesia.  The flap was excised through the skin and subcutaneous tissue down to the layer of the underlying musculature.  The interpolation flap was carefully excised within this deep plane to maintain its blood supply.  The edges of the donor site were undermined.   The donor site was closed in a primary fashion.  The pedicle was then rotated into position and sutured.  Once the tube was sutured into place, adequate blood supply was confirmed with blanching and refill.  The pedicle was then wrapped with xeroform gauze and dressed appropriately with a telfa and gauze bandage to ensure continued blood supply and protect the attached pedicle.
Melolabial Interpolation Flap Text: A decision was made to reconstruct the defect utilizing an interpolation axial flap and a staged reconstruction.  A telfa template was made of the defect.  This telfa template was then used to outline the melolabial interpolation flap.  The donor area for the pedicle flap was then injected with anesthesia.  The flap was excised through the skin and subcutaneous tissue down to the layer of the underlying musculature.  The pedicle flap was carefully excised within this deep plane to maintain its blood supply.  The edges of the donor site were undermined.   The donor site was closed in a primary fashion.  The pedicle was then rotated into position and sutured.  Once the tube was sutured into place, adequate blood supply was confirmed with blanching and refill.  The pedicle was then wrapped with xeroform gauze and dressed appropriately with a telfa and gauze bandage to ensure continued blood supply and protect the attached pedicle.
Mastoid Interpolation Flap Text: A decision was made to reconstruct the defect utilizing an interpolation axial flap and a staged reconstruction.  A telfa template was made of the defect.  This telfa template was then used to outline the mastoid interpolation flap.  The donor area for the pedicle flap was then injected with anesthesia.  The flap was excised through the skin and subcutaneous tissue down to the layer of the underlying musculature.  The pedicle flap was carefully excised within this deep plane to maintain its blood supply.  The edges of the donor site were undermined.   The donor site was closed in a primary fashion.  The pedicle was then rotated into position and sutured.  Once the tube was sutured into place, adequate blood supply was confirmed with blanching and refill.  The pedicle was then wrapped with xeroform gauze and dressed appropriately with a telfa and gauze bandage to ensure continued blood supply and protect the attached pedicle.
Posterior Auricular Interpolation Flap Text: A decision was made to reconstruct the defect utilizing an interpolation axial flap and a staged reconstruction.  A telfa template was made of the defect.  This telfa template was then used to outline the posterior auricular interpolation flap.  The donor area for the pedicle flap was then injected with anesthesia.  The flap was excised through the skin and subcutaneous tissue down to the layer of the underlying musculature.  The pedicle flap was carefully excised within this deep plane to maintain its blood supply.  The edges of the donor site were undermined.   The donor site was closed in a primary fashion.  The pedicle was then rotated into position and sutured.  Once the tube was sutured into place, adequate blood supply was confirmed with blanching and refill.  The pedicle was then wrapped with xeroform gauze and dressed appropriately with a telfa and gauze bandage to ensure continued blood supply and protect the attached pedicle.
Paramedian Forehead Flap Text: A decision was made to reconstruct the defect utilizing an interpolation axial flap and a staged reconstruction.  A telfa template was made of the defect.  This telfa template was then used to outline the paramedian forehead pedicle flap.  The donor area for the pedicle flap was then injected with anesthesia.  The flap was excised through the skin and subcutaneous tissue down to the layer of the underlying musculature.  The pedicle flap was carefully excised within this deep plane to maintain its blood supply.  The edges of the donor site were undermined.   The donor site was closed in a primary fashion.  The pedicle was then rotated into position and sutured.  Once the tube was sutured into place, adequate blood supply was confirmed with blanching and refill.  The pedicle was then wrapped with xeroform gauze and dressed appropriately with a telfa and gauze bandage to ensure continued blood supply and protect the attached pedicle.
Cheiloplasty (Less Than 50%) Text: A decision was made to reconstruct the defect with a  cheiloplasty.  The defect was undermined extensively.  Additional obicularis oris muscle was excised with a 15 blade scalpel.  The defect was converted into a full thickness wedge, of less than 50% of the vertical height of the lip, to facilite a better cosmetic result.  Small vessels were then tied off with 5-0 monocyrl. The obicularis oris, superficial fascia, adipose and dermis were then reapproximated.  After the deeper layers were approximated the epidermis was reapproximated with particular care given to realign the vermilion border.
Cheiloplasty (Complex) Text: A decision was made to reconstruct the defect with a  cheiloplasty.  The defect was undermined extensively.  Additional obicularis oris muscle was excised with a 15 blade scalpel.  The defect was converted into a full thickness wedge to facilite a better cosmetic result.  Small vessels were then tied off with 5-0 monocyrl. The obicularis oris, superficial fascia, adipose and dermis were then reapproximated.  After the deeper layers were approximated the epidermis was reapproximated with particular care given to realign the vermilion border.
Ear Wedge Repair Text: A wedge excision was completed by carrying down an excision through the full thickness of the ear and cartilage with an inward facing Burow's triangle. The wound was then closed in a layered fashion.
Full Thickness Lip Wedge Repair (Flap) Text: Given the location of the defect and the proximity to free margins a full thickness wedge repair was deemed most appropriate.  Using a sterile surgical marker, the appropriate repair was drawn incorporating the defect and placing the expected incisions perpendicular to the vermilion border.  The vermilion border was also meticulously outlined to ensure appropriate reapproximation during the repair.  The area thus outlined was incised through and through with a #15 scalpel blade.  The muscularis and dermis were reaproximated with deep sutures following hemostasis. Care was taken to realign the vermilion border before proceeding with the superficial closure.  Once the vermilion was realigned the superfical and mucosal closure was finished.
Ftsg Text: The defect edges were debeveled with a #15 scalpel blade.  Given the location of the defect, shape of the defect and the proximity to free margins a full thickness skin graft was deemed most appropriate.  Using a sterile surgical marker, the primary defect shape was transferred to the donor site. The area thus outlined was incised deep to adipose tissue with a #15 scalpel blade.  The harvested graft was then trimmed of adipose tissue until only dermis and epidermis was left.  The skin margins of the secondary defect were undermined to an appropriate distance in all directions utilizing iris scissors.  The secondary defect was closed with interrupted buried subcutaneous sutures.  The skin edges were then re-apposed with running  sutures.  The skin graft was then placed in the primary defect and oriented appropriately.
Split-Thickness Skin Graft Text: The defect edges were debeveled with a #15 scalpel blade.  Given the location of the defect, shape of the defect and the proximity to free margins a split thickness skin graft was deemed most appropriate.  Using a sterile surgical marker, the primary defect shape was transferred to the donor site. The split thickness graft was then harvested.  The skin graft was then placed in the primary defect and oriented appropriately.
Cartilage Graft Text: The defect edges were debeveled with a #15 scalpel blade.  Given the location of the defect, shape of the defect, the fact the defect involved a full thickness cartilage defect a cartilage graft was deemed most appropriate.  An appropriate donor site was identified, cleansed, and anesthetized. The cartilage graft was then harvested and transferred to the recipient site, oriented appropriately and then sutured into place.  The secondary defect was then repaired using a primary closure.
Composite Graft Text: The defect edges were debeveled with a #15 scalpel blade.  Given the location of the defect, shape of the defect, the proximity to free margins and the fact the defect was full thickness a composite graft was deemed most appropriate.  The defect was outline and then transferred to the donor site.  A full thickness graft was then excised from the donor site. The graft was then placed in the primary defect, oriented appropriately and then sutured into place.  The secondary defect was then repaired using a primary closure.
Epidermal Autograft Text: The defect edges were debeveled with a #15 scalpel blade.  Given the location of the defect, shape of the defect and the proximity to free margins an epidermal autograft was deemed most appropriate.  Using a sterile surgical marker, the primary defect shape was transferred to the donor site. The epidermal graft was then harvested.  The skin graft was then placed in the primary defect and oriented appropriately.
Dermal Autograft Text: The defect edges were debeveled with a #15 scalpel blade.  Given the location of the defect, shape of the defect and the proximity to free margins a dermal autograft was deemed most appropriate.  Using a sterile surgical marker, the primary defect shape was transferred to the donor site. The area thus outlined was incised deep to adipose tissue with a #15 scalpel blade.  The harvested graft was then trimmed of adipose and epidermal tissue until only dermis was left.  The skin graft was then placed in the primary defect and oriented appropriately.
Skin Substitute Text: The defect edges were debeveled with a #15 scalpel blade.  Given the location of the defect, shape of the defect and the proximity to free margins a skin substitute graft was deemed most appropriate.  The graft material was trimmed to fit the size of the defect. The graft was then placed in the primary defect and oriented appropriately.
Tissue Cultured Epidermal Autograft Text: The defect edges were debeveled with a #15 scalpel blade.  Given the location of the defect, shape of the defect and the proximity to free margins a tissue cultured epidermal autograft was deemed most appropriate.  The graft was then trimmed to fit the size of the defect.  The graft was then placed in the primary defect and oriented appropriately.
Xenograft Text: The defect edges were debeveled with a #15 scalpel blade.  Given the location of the defect, shape of the defect and the proximity to free margins a xenograft was deemed most appropriate.  The graft was then trimmed to fit the size of the defect.  The graft was then placed in the primary defect and oriented appropriately.
Purse String (Simple) Text: Given the location of the defect and the characteristics of the surrounding skin a purse string closure was deemed most appropriate.  Undermining was performed circumfirentially around the surgical defect.  A purse string suture was then placed and tightened.
Purse String (Intermediate) Text: Given the location of the defect and the characteristics of the surrounding skin a purse string intermediate closure was deemed most appropriate.  Undermining was performed circumfirentially around the surgical defect.  A purse string suture was then placed and tightened.
Partial Purse String (Simple) Text: Given the location of the defect and the characteristics of the surrounding skin a simple purse string closure was deemed most appropriate.  Undermining was performed circumfirentially around the surgical defect.  A purse string suture was then placed and tightened. Wound tension only allowed a partial closure of the circular defect.
Partial Purse String (Intermediate) Text: Given the location of the defect and the characteristics of the surrounding skin an intermediate purse string closure was deemed most appropriate.  Undermining was performed circumfirentially around the surgical defect.  A purse string suture was then placed and tightened. Wound tension only allowed a partial closure of the circular defect.
Localized Dermabrasion With Wire Brush Text: The patient was draped in routine manner.  Localized dermabrasion using 3 x 17 mm wire brush was performed in routine manner to papillary dermis. This spot dermabrasion is being performed to complete skin cancer reconstruction. It also will eliminate the other sun damaged precancerous cells that are known to be part of the regional effect of a lifetime's worth of sun exposure. This localized dermabrasion is therapeutic and should not be considered cosmetic in any regard.
Tarsorrhaphy Text: A tarsorrhaphy was performed using Frost sutures.
Complex Repair And Flap Additional Text (Will Appearing After The Standard Complex Repair Text): The complex repair was not sufficient to completely close the primary defect. The remaining additional defect was repaired with the flap mentioned below.
Complex Repair And Graft Additional Text (Will Appearing After The Standard Complex Repair Text): The complex repair was not sufficient to completely close the primary defect. The remaining additional defect was repaired with the graft mentioned below.
Unique Flap 1 Name: Myocutaneous Island pedicle Flap
Unique Flap 2 Name: Peng Flap
Unique Flap 3 Name: Mercedes Flap
Unique Flap 4 Name: Banner Flap
Unique Flap 1 Text: A decision was made to reconstruct the defect utilizing a myocutaneous Island pedicle Flap based on the levator labii superioris muscle.  A telfa template was made of the defect.  This telfa template was then used to outline the myocutaneous flap, based along the meilolabial fold.  The donor area for the pedicle flap was then injected with anesthesia.  The flap was excised through the skin and subcutaneous tissue down to the layer of the underlying musculature.  The myocutaneous flap was carefully excised within this deep plane to maintain its blood supply. Based on the muscle. The edges of the donor site were undermined.   The donor site was closed in a primary fashion to the point of transposition.  The pedicle was then transposed into position and sutured.  Once the flap was sutured into place, adequate blood supply was confirmed with blanching and refill.
Unique Flap 2 Text: A decision was made to reconstruct the defect utilizing a Peng Flap (Bilateral Advancement Rotation Flap). Given the location of the defect and the proximity to free margins, this flap was deemed most appropriate.  Using a sterile surgical marker, the appropriate rotation flaps were drawn incorporating the defect and placing the expected incisions within the relaxed skin tension lines where possible.    The area thus outlined was incised deep to adipose tissue with a #15 scalpel blade.  The skin margins were undermined to an appropriate distance in all directions utilizing iris scissors.
Unique Flap 3 Text: The defect edges were debeveled with a #15 scalpel blade.  Given the location of the defect, shape of the defect and the proximity to free margins a Mercedes (double advancement flap) was deemed most appropriate.  Using a sterile surgical marker, the appropriate transposition flaps were drawn incorporating the defect and placing the expected incisions within the relaxed skin tension lines where possible.    The area thus outlined was incised deep to adipose tissue with a #15 scalpel blade.  The skin margins were undermined to an appropriate distance in all directions utilizing iris scissors.  Hemostasis was achieved with electrocautery.  The flaps were then advanced into the defect and anchored with interrupted buried subcutaneous sutures.
Unique Flap 4 Text: The defect edges were debeveled with a #15 scalpel blade.  Given the location of the defect and the proximity to free margins a Banner transposition flap was deemed most appropriate.  Using a sterile surgical marker, an appropriate Banner transposition flap was drawn incorporating the defect.    The area thus outlined was incised deep to adipose tissue with a #15 scalpel blade.  The skin margins were undermined to an appropriate distance in all directions utilizing iris scissors.
Manual Repair Warning Statement: We plan on removing the manually selected variable below in favor of our much easier automatic structured text blocks found in the previous tab. We decided to do this to help make the flow better and give you the full power of structured data. Manual selection is never going to be ideal in our platform and I would encourage you to avoid using manual selection from this point on, especially since I will be sunsetting this feature. It is important that you do one of two things with the customized text below. First, you can save all of the text in a word file so you can have it for future reference. Second, transfer the text to the appropriate area in the Library tab. Lastly, if there is a flap or graft type which we do not have you need to let us know right away so I can add it in before the variable is hidden. No need to panic, we plan to give you roughly 6 months to make the change.
Same Histology In Subsequent Stages Text: The pattern and morphology of the tumor is as described in the first stage.
No Residual Tumor Seen Histology Text: There were no malignant cells seen in the sections examined.
Inflammation Suggestive Of Cancer Camouflage Histology Text: There was a dense lymphocytic infiltrate which prevented adequate histologic evaluation of adjacent structures.
Bcc Histology Text: There were numerous aggregates of basaloid cells.
Bcc Infiltrative Histology Text: There were numerous aggregates of basaloid cells demonstrating an infiltrative pattern.
Mart-1 - Positive Histology Text: MART-1 staining demonstrates areas of higher density and clustering of melanocytes with Pagetoid spread upwards within the epidermis. The surgical margins are positive for tumor cells.
Mart-1 - Negative Histology Text: MART-1 staining demonstrates a normal density and pattern of melanocytes along the dermal-epidermal junction. The surgical margins are negative for tumor cells.
Information: Selecting Yes will display possible errors in your note based on the variables you have selected. This validation is only offered as a suggestion for you. PLEASE NOTE THAT THE VALIDATION TEXT WILL BE REMOVED WHEN YOU FINALIZE YOUR NOTE. IF YOU WANT TO FAX A PRELIMINARY NOTE YOU WILL NEED TO TOGGLE THIS TO 'NO' IF YOU DO NOT WANT IT IN YOUR FAXED NOTE.

## 2020-08-19 ENCOUNTER — APPOINTMENT (RX ONLY)
Dept: URBAN - METROPOLITAN AREA CLINIC 36 | Facility: CLINIC | Age: 70
Setting detail: DERMATOLOGY
End: 2020-08-19

## 2020-08-19 DIAGNOSIS — Z48.817 ENCOUNTER FOR SURGICAL AFTERCARE FOLLOWING SURGERY ON THE SKIN AND SUBCUTANEOUS TISSUE: ICD-10-CM

## 2020-08-19 PROCEDURE — ? POST-OP WOUND CHECK

## 2020-08-19 PROCEDURE — 99024 POSTOP FOLLOW-UP VISIT: CPT

## 2020-08-19 ASSESSMENT — LOCATION DETAILED DESCRIPTION DERM: LOCATION DETAILED: RIGHT ANTIHELIX

## 2020-08-19 ASSESSMENT — LOCATION SIMPLE DESCRIPTION DERM: LOCATION SIMPLE: RIGHT EAR

## 2020-08-19 ASSESSMENT — LOCATION ZONE DERM: LOCATION ZONE: EAR

## 2020-08-19 NOTE — PROCEDURE: POST-OP WOUND CHECK
Body Location Override (Optional - Billing Will Still Be Based On Selected Body Map Location If Applicable): right helix
Detail Level: Generalized
Add 95961 Cpt? (Important Note: In 2017 The Use Of 76250 Is Being Tracked By Cms To Determine Future Global Period Reimbursement For Global Periods): yes

## 2023-08-12 ENCOUNTER — HOSPITAL ENCOUNTER (INPATIENT)
Facility: MEDICAL CENTER | Age: 73
LOS: 5 days | DRG: 418 | End: 2023-08-17
Attending: EMERGENCY MEDICINE | Admitting: STUDENT IN AN ORGANIZED HEALTH CARE EDUCATION/TRAINING PROGRAM
Payer: MEDICARE

## 2023-08-12 ENCOUNTER — APPOINTMENT (OUTPATIENT)
Dept: RADIOLOGY | Facility: MEDICAL CENTER | Age: 73
DRG: 418 | End: 2023-08-12
Attending: EMERGENCY MEDICINE
Payer: MEDICARE

## 2023-08-12 DIAGNOSIS — K80.50 CHOLEDOCHOLITHIASIS: ICD-10-CM

## 2023-08-12 DIAGNOSIS — G89.18 POST-OPERATIVE PAIN: ICD-10-CM

## 2023-08-12 DIAGNOSIS — R10.11 RUQ ABDOMINAL PAIN: ICD-10-CM

## 2023-08-12 DIAGNOSIS — K81.0 ACUTE CHOLECYSTITIS: ICD-10-CM

## 2023-08-12 PROBLEM — K80.42 CHOLEDOCHOLITHIASIS WITH ACUTE CHOLECYSTITIS: Status: ACTIVE | Noted: 2023-08-12

## 2023-08-12 PROBLEM — Z71.89 ACP (ADVANCE CARE PLANNING): Status: ACTIVE | Noted: 2023-08-12

## 2023-08-12 LAB
ALBUMIN SERPL BCP-MCNC: 4.5 G/DL (ref 3.2–4.9)
ALBUMIN/GLOB SERPL: 2 G/DL
ALP SERPL-CCNC: 327 U/L (ref 30–99)
ALT SERPL-CCNC: 291 U/L (ref 2–50)
ANION GAP SERPL CALC-SCNC: 11 MMOL/L (ref 7–16)
AST SERPL-CCNC: 207 U/L (ref 12–45)
BASOPHILS # BLD AUTO: 0.6 % (ref 0–1.8)
BASOPHILS # BLD: 0.03 K/UL (ref 0–0.12)
BILIRUB SERPL-MCNC: 3.3 MG/DL (ref 0.1–1.5)
BUN SERPL-MCNC: 14 MG/DL (ref 8–22)
CALCIUM ALBUM COR SERPL-MCNC: 9.1 MG/DL (ref 8.5–10.5)
CALCIUM SERPL-MCNC: 9.5 MG/DL (ref 8.5–10.5)
CHLORIDE SERPL-SCNC: 105 MMOL/L (ref 96–112)
CO2 SERPL-SCNC: 24 MMOL/L (ref 20–33)
CREAT SERPL-MCNC: 1.17 MG/DL (ref 0.5–1.4)
EOSINOPHIL # BLD AUTO: 0.17 K/UL (ref 0–0.51)
EOSINOPHIL NFR BLD: 3.2 % (ref 0–6.9)
ERYTHROCYTE [DISTWIDTH] IN BLOOD BY AUTOMATED COUNT: 53.5 FL (ref 35.9–50)
GFR SERPLBLD CREATININE-BSD FMLA CKD-EPI: 65 ML/MIN/1.73 M 2
GLOBULIN SER CALC-MCNC: 2.3 G/DL (ref 1.9–3.5)
GLUCOSE SERPL-MCNC: 81 MG/DL (ref 65–99)
HCT VFR BLD AUTO: 38.8 % (ref 42–52)
HGB BLD-MCNC: 13.2 G/DL (ref 14–18)
IMM GRANULOCYTES # BLD AUTO: 0.02 K/UL (ref 0–0.11)
IMM GRANULOCYTES NFR BLD AUTO: 0.4 % (ref 0–0.9)
LIPASE SERPL-CCNC: 38 U/L (ref 11–82)
LYMPHOCYTES # BLD AUTO: 1.15 K/UL (ref 1–4.8)
LYMPHOCYTES NFR BLD: 21.8 % (ref 22–41)
MCH RBC QN AUTO: 33 PG (ref 27–33)
MCHC RBC AUTO-ENTMCNC: 34 G/DL (ref 32.3–36.5)
MCV RBC AUTO: 97 FL (ref 81.4–97.8)
MONOCYTES # BLD AUTO: 0.61 K/UL (ref 0–0.85)
MONOCYTES NFR BLD AUTO: 11.6 % (ref 0–13.4)
NEUTROPHILS # BLD AUTO: 3.3 K/UL (ref 1.82–7.42)
NEUTROPHILS NFR BLD: 62.4 % (ref 44–72)
NRBC # BLD AUTO: 0 K/UL
NRBC BLD-RTO: 0 /100 WBC (ref 0–0.2)
PLATELET # BLD AUTO: 124 K/UL (ref 164–446)
PMV BLD AUTO: 9.3 FL (ref 9–12.9)
POTASSIUM SERPL-SCNC: 4.3 MMOL/L (ref 3.6–5.5)
PROT SERPL-MCNC: 6.8 G/DL (ref 6–8.2)
RBC # BLD AUTO: 4 M/UL (ref 4.7–6.1)
SODIUM SERPL-SCNC: 140 MMOL/L (ref 135–145)
WBC # BLD AUTO: 5.3 K/UL (ref 4.8–10.8)

## 2023-08-12 PROCEDURE — 700102 HCHG RX REV CODE 250 W/ 637 OVERRIDE(OP): Performed by: STUDENT IN AN ORGANIZED HEALTH CARE EDUCATION/TRAINING PROGRAM

## 2023-08-12 PROCEDURE — 99222 1ST HOSP IP/OBS MODERATE 55: CPT | Performed by: SURGERY

## 2023-08-12 PROCEDURE — 36415 COLL VENOUS BLD VENIPUNCTURE: CPT

## 2023-08-12 PROCEDURE — 99285 EMERGENCY DEPT VISIT HI MDM: CPT

## 2023-08-12 PROCEDURE — 96366 THER/PROPH/DIAG IV INF ADDON: CPT

## 2023-08-12 PROCEDURE — 99222 1ST HOSP IP/OBS MODERATE 55: CPT | Mod: 25,AI | Performed by: STUDENT IN AN ORGANIZED HEALTH CARE EDUCATION/TRAINING PROGRAM

## 2023-08-12 PROCEDURE — 83690 ASSAY OF LIPASE: CPT

## 2023-08-12 PROCEDURE — 96365 THER/PROPH/DIAG IV INF INIT: CPT

## 2023-08-12 PROCEDURE — 85025 COMPLETE CBC W/AUTO DIFF WBC: CPT

## 2023-08-12 PROCEDURE — 770006 HCHG ROOM/CARE - MED/SURG/GYN SEMI*

## 2023-08-12 PROCEDURE — 700105 HCHG RX REV CODE 258: Performed by: STUDENT IN AN ORGANIZED HEALTH CARE EDUCATION/TRAINING PROGRAM

## 2023-08-12 PROCEDURE — 76705 ECHO EXAM OF ABDOMEN: CPT

## 2023-08-12 PROCEDURE — 99497 ADVNCD CARE PLAN 30 MIN: CPT | Performed by: STUDENT IN AN ORGANIZED HEALTH CARE EDUCATION/TRAINING PROGRAM

## 2023-08-12 PROCEDURE — A9270 NON-COVERED ITEM OR SERVICE: HCPCS | Performed by: STUDENT IN AN ORGANIZED HEALTH CARE EDUCATION/TRAINING PROGRAM

## 2023-08-12 PROCEDURE — 700111 HCHG RX REV CODE 636 W/ 250 OVERRIDE (IP): Mod: JZ | Performed by: STUDENT IN AN ORGANIZED HEALTH CARE EDUCATION/TRAINING PROGRAM

## 2023-08-12 PROCEDURE — 80053 COMPREHEN METABOLIC PANEL: CPT

## 2023-08-12 RX ORDER — SPIRONOLACTONE 25 MG/1
25 TABLET ORAL DAILY
COMMUNITY
Start: 2023-08-12

## 2023-08-12 RX ORDER — ONDANSETRON 4 MG/1
4 TABLET, ORALLY DISINTEGRATING ORAL EVERY 4 HOURS PRN
Status: DISCONTINUED | OUTPATIENT
Start: 2023-08-12 | End: 2023-08-17 | Stop reason: HOSPADM

## 2023-08-12 RX ORDER — ACETAMINOPHEN 325 MG/1
650 TABLET ORAL EVERY 6 HOURS PRN
Status: DISCONTINUED | OUTPATIENT
Start: 2023-08-12 | End: 2023-08-17 | Stop reason: HOSPADM

## 2023-08-12 RX ORDER — CARVEDILOL 25 MG/1
25 TABLET ORAL 2 TIMES DAILY WITH MEALS
Status: DISCONTINUED | OUTPATIENT
Start: 2023-08-12 | End: 2023-08-17 | Stop reason: HOSPADM

## 2023-08-12 RX ORDER — AMLODIPINE BESYLATE 10 MG/1
10 TABLET ORAL DAILY
COMMUNITY
Start: 2023-05-26

## 2023-08-12 RX ORDER — TAMSULOSIN HYDROCHLORIDE 0.4 MG/1
0.4 CAPSULE ORAL DAILY
COMMUNITY

## 2023-08-12 RX ORDER — ATORVASTATIN CALCIUM 40 MG/1
40 TABLET, FILM COATED ORAL DAILY
COMMUNITY
Start: 2023-08-12

## 2023-08-12 RX ORDER — FENOFIBRATE 54 MG/1
54 TABLET ORAL DAILY
COMMUNITY

## 2023-08-12 RX ORDER — SODIUM CHLORIDE 9 MG/ML
INJECTION, SOLUTION INTRAVENOUS CONTINUOUS
Status: DISCONTINUED | OUTPATIENT
Start: 2023-08-12 | End: 2023-08-17 | Stop reason: HOSPADM

## 2023-08-12 RX ORDER — MORPHINE SULFATE 4 MG/ML
4 INJECTION INTRAVENOUS EVERY 4 HOURS PRN
Status: DISCONTINUED | OUTPATIENT
Start: 2023-08-12 | End: 2023-08-17 | Stop reason: HOSPADM

## 2023-08-12 RX ORDER — AMLODIPINE BESYLATE 10 MG/1
10 TABLET ORAL DAILY
Status: DISCONTINUED | OUTPATIENT
Start: 2023-08-12 | End: 2023-08-17 | Stop reason: HOSPADM

## 2023-08-12 RX ORDER — FINASTERIDE 5 MG/1
5 TABLET, FILM COATED ORAL EVERY EVENING
Status: DISCONTINUED | OUTPATIENT
Start: 2023-08-12 | End: 2023-08-17 | Stop reason: HOSPADM

## 2023-08-12 RX ORDER — ACETAMINOPHEN 325 MG/1
650 TABLET ORAL 3 TIMES DAILY
COMMUNITY

## 2023-08-12 RX ORDER — LOSARTAN POTASSIUM 25 MG/1
25 TABLET ORAL DAILY
Status: DISCONTINUED | OUTPATIENT
Start: 2023-08-12 | End: 2023-08-17 | Stop reason: HOSPADM

## 2023-08-12 RX ORDER — CARVEDILOL 25 MG/1
25 TABLET ORAL 2 TIMES DAILY
COMMUNITY
Start: 2023-05-16

## 2023-08-12 RX ORDER — GABAPENTIN 400 MG/1
400 CAPSULE ORAL 4 TIMES DAILY
Status: DISCONTINUED | OUTPATIENT
Start: 2023-08-12 | End: 2023-08-17 | Stop reason: HOSPADM

## 2023-08-12 RX ORDER — LOSARTAN POTASSIUM 25 MG/1
25 TABLET ORAL DAILY
COMMUNITY

## 2023-08-12 RX ORDER — ONDANSETRON 2 MG/ML
4 INJECTION INTRAMUSCULAR; INTRAVENOUS EVERY 4 HOURS PRN
Status: DISCONTINUED | OUTPATIENT
Start: 2023-08-12 | End: 2023-08-17 | Stop reason: HOSPADM

## 2023-08-12 RX ORDER — TAMSULOSIN HYDROCHLORIDE 0.4 MG/1
0.4 CAPSULE ORAL DAILY
Status: DISCONTINUED | OUTPATIENT
Start: 2023-08-12 | End: 2023-08-17 | Stop reason: HOSPADM

## 2023-08-12 RX ORDER — SPIRONOLACTONE 25 MG/1
25 TABLET ORAL DAILY
Status: DISCONTINUED | OUTPATIENT
Start: 2023-08-12 | End: 2023-08-17 | Stop reason: HOSPADM

## 2023-08-12 RX ORDER — FENOFIBRATE 67 MG/1
67 CAPSULE ORAL DAILY
Status: DISCONTINUED | OUTPATIENT
Start: 2023-08-12 | End: 2023-08-17 | Stop reason: HOSPADM

## 2023-08-12 RX ADMIN — GABAPENTIN 400 MG: 400 CAPSULE ORAL at 09:47

## 2023-08-12 RX ADMIN — FENOFIBRATE 67 MG: 67 CAPSULE ORAL at 05:47

## 2023-08-12 RX ADMIN — GABAPENTIN 400 MG: 400 CAPSULE ORAL at 14:57

## 2023-08-12 RX ADMIN — SODIUM CHLORIDE: 9 INJECTION, SOLUTION INTRAVENOUS at 05:41

## 2023-08-12 RX ADMIN — PIPERACILLIN AND TAZOBACTAM 3.38 G: 3; .375 INJECTION, POWDER, FOR SOLUTION INTRAVENOUS at 05:38

## 2023-08-12 RX ADMIN — SODIUM CHLORIDE: 9 INJECTION, SOLUTION INTRAVENOUS at 19:33

## 2023-08-12 RX ADMIN — LOSARTAN POTASSIUM 25 MG: 25 TABLET, FILM COATED ORAL at 05:36

## 2023-08-12 RX ADMIN — CARVEDILOL 25 MG: 25 TABLET, FILM COATED ORAL at 19:06

## 2023-08-12 RX ADMIN — MORPHINE SULFATE 4 MG: 4 INJECTION, SOLUTION INTRAMUSCULAR; INTRAVENOUS at 14:57

## 2023-08-12 RX ADMIN — MORPHINE SULFATE 4 MG: 4 INJECTION, SOLUTION INTRAMUSCULAR; INTRAVENOUS at 20:49

## 2023-08-12 RX ADMIN — FINASTERIDE 5 MG: 5 TABLET, FILM COATED ORAL at 19:06

## 2023-08-12 RX ADMIN — PIPERACILLIN AND TAZOBACTAM 3.38 G: 3; .375 INJECTION, POWDER, FOR SOLUTION INTRAVENOUS at 20:53

## 2023-08-12 RX ADMIN — PIPERACILLIN AND TAZOBACTAM 3.38 G: 3; .375 INJECTION, POWDER, FOR SOLUTION INTRAVENOUS at 09:49

## 2023-08-12 RX ADMIN — SODIUM CHLORIDE: 9 INJECTION, SOLUTION INTRAVENOUS at 15:49

## 2023-08-12 RX ADMIN — TAMSULOSIN HYDROCHLORIDE 0.4 MG: 0.4 CAPSULE ORAL at 05:37

## 2023-08-12 RX ADMIN — GABAPENTIN 400 MG: 400 CAPSULE ORAL at 20:50

## 2023-08-12 RX ADMIN — SPIRONOLACTONE 25 MG: 25 TABLET ORAL at 05:36

## 2023-08-12 RX ADMIN — AMLODIPINE BESYLATE 10 MG: 10 TABLET ORAL at 05:36

## 2023-08-12 ASSESSMENT — COGNITIVE AND FUNCTIONAL STATUS - GENERAL
DAILY ACTIVITIY SCORE: 24
SUGGESTED CMS G CODE MODIFIER DAILY ACTIVITY: CH
MOBILITY SCORE: 24
SUGGESTED CMS G CODE MODIFIER MOBILITY: CH

## 2023-08-12 ASSESSMENT — ENCOUNTER SYMPTOMS
FLANK PAIN: 0
FOCAL WEAKNESS: 0
NAUSEA: 1
BLURRED VISION: 0
MUSCULOSKELETAL NEGATIVE: 1
SINUS PAIN: 0
PSYCHIATRIC NEGATIVE: 1
VOMITING: 0
SHORTNESS OF BREATH: 0
ABDOMINAL PAIN: 1
DOUBLE VISION: 0
COUGH: 0
EYES NEGATIVE: 1
SORE THROAT: 0
DIARRHEA: 0
RESPIRATORY NEGATIVE: 1
NEUROLOGICAL NEGATIVE: 1
MYALGIAS: 0
SPEECH CHANGE: 0
SENSORY CHANGE: 0
CARDIOVASCULAR NEGATIVE: 1
FALLS: 0

## 2023-08-12 ASSESSMENT — LIFESTYLE VARIABLES
HOW MANY TIMES IN THE PAST YEAR HAVE YOU HAD 5 OR MORE DRINKS IN A DAY: 0
HAVE YOU EVER FELT YOU SHOULD CUT DOWN ON YOUR DRINKING: NO
HAVE PEOPLE ANNOYED YOU BY CRITICIZING YOUR DRINKING: NO
DOES PATIENT WANT TO STOP DRINKING: NO
EVER FELT BAD OR GUILTY ABOUT YOUR DRINKING: NO
EVER HAD A DRINK FIRST THING IN THE MORNING TO STEADY YOUR NERVES TO GET RID OF A HANGOVER: NO
TOTAL SCORE: 0
ON A TYPICAL DAY WHEN YOU DRINK ALCOHOL HOW MANY DRINKS DO YOU HAVE: 1
AVERAGE NUMBER OF DAYS PER WEEK YOU HAVE A DRINK CONTAINING ALCOHOL: 1
TOTAL SCORE: 0
TOTAL SCORE: 0
ALCOHOL_USE: YES
CONSUMPTION TOTAL: NEGATIVE

## 2023-08-12 ASSESSMENT — PAIN DESCRIPTION - PAIN TYPE
TYPE: ACUTE PAIN

## 2023-08-12 ASSESSMENT — FIBROSIS 4 INDEX: FIB4 SCORE: 7.24

## 2023-08-12 ASSESSMENT — PATIENT HEALTH QUESTIONNAIRE - PHQ9
2. FEELING DOWN, DEPRESSED, IRRITABLE, OR HOPELESS: NOT AT ALL
1. LITTLE INTEREST OR PLEASURE IN DOING THINGS: NOT AT ALL
SUM OF ALL RESPONSES TO PHQ9 QUESTIONS 1 AND 2: 0

## 2023-08-12 ASSESSMENT — PAIN SCALES - WONG BAKER
WONGBAKER_NUMERICALRESPONSE: HURTS EVEN MORE
WONGBAKER_NUMERICALRESPONSE: HURTS A LITTLE MORE

## 2023-08-12 NOTE — ED NOTES
Bedside report given to Cherry FLANNERY. Pt resting comfortably and aware of POC with call light available and in reach. Pt on 2L while resting. Pt reports no needs at this time. Appropriate equipment in room.

## 2023-08-12 NOTE — H&P
Hospital Medicine History & Physical Note    Date of Service  8/12/2023    Primary Care Physician  Pcp Unknown    Consultants  General surgery    Code Status  DNAR, I OK    Chief Complaint  Chief Complaint   Patient presents with    Sent by MD RICKIE Aggarwal from [facility], pt had CT which showed gallstones, pt presented w/ RUQ pain and hx of aortic dissection w/ repair. Pt received 4mg morphine, zosyn, and zofran.        History of Presenting Illness  Xavier Richardson is a 72 y.o. male who presented 8/12/2023 with abdominal pain.  Patient reports a progressively worsening dull abdominal pain is epigastric and right upper quadrant area.  Associate with nausea without any episodes of vomiting.  Denies fever chills.  Due to worsening symptoms, decision was made to come to ER for evaluation.  He states that morphine has alleviated his pain.    In ER, patient found to have bradycardia. Found to have labs of , , ALK-P 327, total bilirubin 3.3. Lipase wnl.  Right upper quadrant ultrasound showing cholelithiasis with acute cholecystitis.  Dilation of the common bile duct is seen.    I discussed the plan of care with patient.    Review of Systems  Review of Systems   Constitutional:  Positive for malaise/fatigue.   HENT: Negative.     Eyes: Negative.    Respiratory: Negative.     Cardiovascular: Negative.    Gastrointestinal:  Positive for abdominal pain and nausea.   Genitourinary: Negative.    Musculoskeletal: Negative.    Skin: Negative.    Neurological: Negative.    Endo/Heme/Allergies: Negative.    Psychiatric/Behavioral: Negative.         Past Medical History   has a past medical history of BPH (benign prostatic hyperplasia), Hypertension, JESUS MANUEL (obstructive sleep apnea), and Sleep apnea.    Surgical History   has a past surgical history that includes rhinoplasty and other cardiac surgery.     Family History  family history includes Cancer in his father.   Family history reviewed with patient. There  is no family history that is pertinent to the chief complaint.     Social History   reports that he has never smoked. He has never used smokeless tobacco. He reports current alcohol use of about 0.6 oz of alcohol per week. He reports that he does not use drugs.    Allergies  No Known Allergies    Medications  Prior to Admission Medications   Prescriptions Last Dose Informant Patient Reported? Taking?   acetaminophen (TYLENOL) 325 MG Tab 8/11/2023 at PM Patient Yes Yes   Sig: Take 650 mg by mouth 3 times a day.   amLODIPine (NORVASC) 10 MG Tab 8/11/2023 at AM Patient Yes No   Sig: Take 10 mg by mouth every day.   atorvastatin (LIPITOR) 40 MG Tab 8/11/2023 at PM Patient Yes No   Sig: Take 40 mg by mouth every day.   carvedilol (COREG) 25 MG Tab 8/11/2023 at PM Patient Yes No   Sig: Take 25 mg by mouth 2 times a day.   fenofibrate (TRICOR) 54 MG tablet 8/11/2023 at NOON Patient Yes Yes   Sig: Take 54 mg by mouth every day.   finasteride (PROSCAR) 5 MG Tab 8/11/2023 at PM Patient No No   Sig: Take 1 Tab by mouth every day.   furosemide (LASIX) 40 MG Tab 8/11/2023 at PM Patient No No   Sig: Take 1 Tab by mouth every day.   gabapentin (NEURONTIN) 400 MG Cap 8/11/2023 at PM Patient No No   Sig: Take 1 Cap by mouth 4 times a day.   losartan (COZAAR) 25 MG Tab 8/11/2023 at NOON Patient Yes Yes   Sig: Take 25 mg by mouth every day.   spironolactone (ALDACTONE) 25 MG Tab 8/11/2023 at NOON Patient Yes No   Sig: Take 25 mg by mouth every day.   tamsulosin (FLOMAX) 0.4 MG capsule 8/11/2023 at AM Patient Yes Yes   Sig: Take 0.4 mg by mouth every day.      Facility-Administered Medications: None       Physical Exam  Temp:  [36.3 °C (97.4 °F)] 36.3 °C (97.4 °F)  Pulse:  [51-56] 51  Resp:  [12-17] 12  BP: (125-136)/(60-65) 136/65  SpO2:  [88 %-97 %] 97 %  Blood Pressure : 136/65   Temperature: 36.3 °C (97.4 °F)   Pulse: (!) 51   Respiration: 12   Pulse Oximetry: 97 %       Physical Exam  Constitutional:       Appearance: Normal  appearance. He is normal weight.   HENT:      Head: Normocephalic.      Nose: Nose normal.      Mouth/Throat:      Mouth: Mucous membranes are moist.   Eyes:      Pupils: Pupils are equal, round, and reactive to light.   Cardiovascular:      Rate and Rhythm: Normal rate and regular rhythm.      Pulses: Normal pulses.   Pulmonary:      Effort: Pulmonary effort is normal.      Breath sounds: Normal breath sounds.   Abdominal:      General: Abdomen is flat.      Palpations: Abdomen is soft.      Comments: Somewhat distended abdomen  Pain upon light palpation of right upper quadrant   Musculoskeletal:         General: Normal range of motion.      Cervical back: Neck supple.   Skin:     General: Skin is warm.   Neurological:      General: No focal deficit present.      Mental Status: He is alert and oriented to person, place, and time. Mental status is at baseline.   Psychiatric:         Mood and Affect: Mood normal.         Behavior: Behavior normal.         Thought Content: Thought content normal.         Judgment: Judgment normal.         Laboratory:  Recent Labs     08/12/23  0100   WBC 5.3   RBC 4.00*   HEMOGLOBIN 13.2*   HEMATOCRIT 38.8*   MCV 97.0   MCH 33.0   MCHC 34.0   RDW 53.5*   PLATELETCT 124*   MPV 9.3     Recent Labs     08/12/23  0100   SODIUM 140   POTASSIUM 4.3   CHLORIDE 105   CO2 24   GLUCOSE 81   BUN 14   CREATININE 1.17   CALCIUM 9.5     Recent Labs     08/12/23  0100   ALTSGPT 291*   ASTSGOT 207*   ALKPHOSPHAT 327*   TBILIRUBIN 3.3*   LIPASE 38   GLUCOSE 81         No results for input(s): NTPROBNP in the last 72 hours.      No results for input(s): TROPONINT in the last 72 hours.    Imaging:  US-RUQ   Final Result      1.  Cholelithiasis with acute cholecystitis.      2.  Dilatation of the common duct, and the possibility of distal ductal obstruction should be considered.      3.  6 cm right upper pole renal cyst.          no X-Ray or EKG requiring interpretation    Assessment/Plan:  Justification  for Admission Status  I anticipate this patient will require at least two midnights for appropriate medical management, necessitating inpatient admission because patient has acute cholecystitis secondary to likely choledocholithiasis    Patient will need a Med/Surg bed on EMERGENCY service .  The need is secondary to choledocholithiasis.    * Choledocholithiasis with acute cholecystitis  Assessment & Plan  Spoke with ERP.  Patient presents for right upper quadrant abdominal pain.  Found to have transaminitis.  Right upper quadrant ultrasound showing choledocholithiasis and acute cholecystitis.  Patient administered Zosyn at outside facility.  General surgery on board, no acute intervention overnight.  MRCP in AM.  Patient will be medicated overnight with Zosyn, fluids, Zofran, morphine.  Monitor for respiratory depression from morphine administration.    ACP (advance care planning)  Assessment & Plan  16 minutes spent discussing goals of care with patient.  When asked about CODE STATUS, he states that he would like to be DNR with trial intubation.  He is okay with all other aggressive measures.    Dyslipidemia- (present on admission)  Assessment & Plan  Hold statin for now due to transaminitis    Essential hypertension- (present on admission)  Assessment & Plan  Restart as needed        VTE prophylaxis: pharmacologic prophylaxis contraindicated due to procedure in am

## 2023-08-12 NOTE — ASSESSMENT & PLAN NOTE
On home norvasc, coreg, cozaar, spironolactone    8/14 EKG was sinus bradycardia, medically optimized for planned procedure  Resume home cardiac medications

## 2023-08-12 NOTE — ASSESSMENT & PLAN NOTE
16 minutes spent discussing goals of care with patient.  When asked about CODE STATUS, he states that he would like to be DNR with trial intubation.  He is okay with all other aggressive measures.

## 2023-08-12 NOTE — ED PROVIDER NOTES
ED Provider Note    CHIEF COMPLAINT  Chief Complaint   Patient presents with    Sent by MD RICKIE Aggarwal from [facility], pt had CT which showed gallstones, pt presented w/ RUQ pain and hx of aortic dissection w/ repair. Pt received 4mg morphine, zosyn, and zofran.        EXTERNAL RECORDS REVIEWED  External ED Note transferred from [facility] after presenting for upper abdominal pain radiating to his back with nausea.  Describes similar episode 2 days prior but subsided some spontaneously.  History of aortic dissection with repair.  CTA chest abdomen pelvis with extensive stent graft repair thoracic and abdominal aortic dissection.  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  Comparison with more recent imaging would be helpful.  Body of the CT report does not discuss gallbladder.  POCUS right upper quadrant ultrasound by outside ERP with dilated gallbladder with stones and neck, no thickening of gallbladder wall or pericholecystic fluid, CBD measures 9.9 mm, incidental finding of cyst to superior pole right kidney.  Transaminitis and elevated total bilirubin.  Transferred to this facility for higher level of care.  Given Zosyn, morphine and Zofran prior to arrival.  Hemodynamically stable otherwise.    HPI/ROS  LIMITATION TO HISTORY   Select: : None  OUTSIDE HISTORIAN(S):  Transferring physician and records    Xavier Richardson is a 72 y.o. male who presents to the emergency department by ambulance from outside facility for higher level of care.  Please refer to external ED note above.  Patient has minimal right upper quadrant abdominal discomfort at this time.  Describes he had severe pain 10 out of 10 on Tuesday but was self-limiting before recurring again tonight.  He did have some discomfort with meals over the last few days.  Nausea without vomiting.  No diarrhea.  No fever or chills.  No chest pain, shortness of breath or syncope.  Compliant with home  medications.    PAST MEDICAL HISTORY   has a past medical history of BPH (benign prostatic hyperplasia), Hypertension, JESUS MANUEL (obstructive sleep apnea), and Sleep apnea.    SURGICAL HISTORY   has a past surgical history that includes rhinoplasty and other cardiac surgery.    FAMILY HISTORY  Family History   Problem Relation Age of Onset    Cancer Father        SOCIAL HISTORY  Social History     Tobacco Use    Smoking status: Never    Smokeless tobacco: Never   Vaping Use    Vaping Use: Never used   Substance and Sexual Activity    Alcohol use: Yes     Alcohol/week: 0.6 oz     Types: 1 Cans of beer per week    Drug use: Never    Sexual activity: Not on file       CURRENT MEDICATIONS  Home Medications       Reviewed by Debbi Muse R.N. (Registered Nurse) on 08/12/23 at 0106  Med List Status: Partial     Medication Last Dose Status   acetaminophen (TYLENOL) 325 MG Tab  Active   amiodarone (CORDARONE) 200 MG Tab  Active   aspirin (ASA) 325 MG Tab  Active   fenofibrate micronized (LOFIBRA) 67 MG capsule  Active   finasteride (PROSCAR) 5 MG Tab  Active   furosemide (LASIX) 40 MG Tab  Active   gabapentin (NEURONTIN) 400 MG Cap  Active   melatonin 3 MG Tab  Active   metoprolol (LOPRESSOR) 25 MG Tab  Active   omeprazole (PRILOSEC) 20 MG delayed-release capsule  Active   potassium chloride SA (KDUR) 20 MEQ Tab CR  Active   simethicone (MYLICON) 80 MG Chew Tab  Active   tamsulosin (FLOMAX) 0.4 MG capsule  Active   traZODone (DESYREL) 50 MG Tab  Active   vitamin D (VITAMIND D3) 1000 UNIT Tab  Active   warfarin (COUMADIN) 3 MG Tab  Active                    ALLERGIES  No Known Allergies    PHYSICAL EXAM  VITAL SIGNS: /63   Pulse (!) 56   Temp 36.3 °C (97.4 °F) (Temporal)   Resp 16   Ht 1.829 m (6')   Wt 120 kg (265 lb)   SpO2 92%   BMI 35.94 kg/m²    Pulse ox interpretation: I interpret this pulse ox as normal.  Constitutional: Alert in no apparent distress.  HENT: Normocephalic, atraumatic. Bilateral external  ears normal, Nose normal. Moist mucous membranes.    Eyes: Pupils are equal and reactive, Conjunctiva normal.   Neck: Normal range of motion, Supple  Lymphatic: No lymphadenopathy noted.   Cardiovascular: Regular rate and rhythm, no murmurs. Distal pulses intact.    Thorax & Lungs: Normal breath sounds.  No wheezing/rales/ronchi. No increased work of breathing  Abdomen: Obese, soft, nondistended.  Tender to palpation in the right upper quadrant without guarding or peritonitis.  Cerda point tenderness, reproducible.  No palpable pulsatile mass.  Skin: Warm, Dry, No erythema, No rash.   Musculoskeletal: Good range of motion in all major joints.   Neurologic: Alert and orient x4.  Speech clear and cohesive.  Moves 4 extremity spontaneously.  Psychiatric: Affect normal, Judgment normal, Mood normal.       DIAGNOSTIC STUDIES / PROCEDURES  LABS  Results for orders placed or performed during the hospital encounter of 08/12/23   CBC WITH DIFFERENTIAL   Result Value Ref Range    WBC 5.3 4.8 - 10.8 K/uL    RBC 4.00 (L) 4.70 - 6.10 M/uL    Hemoglobin 13.2 (L) 14.0 - 18.0 g/dL    Hematocrit 38.8 (L) 42.0 - 52.0 %    MCV 97.0 81.4 - 97.8 fL    MCH 33.0 27.0 - 33.0 pg    MCHC 34.0 32.3 - 36.5 g/dL    RDW 53.5 (H) 35.9 - 50.0 fL    Platelet Count 124 (L) 164 - 446 K/uL    MPV 9.3 9.0 - 12.9 fL    Neutrophils-Polys 62.40 44.00 - 72.00 %    Lymphocytes 21.80 (L) 22.00 - 41.00 %    Monocytes 11.60 0.00 - 13.40 %    Eosinophils 3.20 0.00 - 6.90 %    Basophils 0.60 0.00 - 1.80 %    Immature Granulocytes 0.40 0.00 - 0.90 %    Nucleated RBC 0.00 0.00 - 0.20 /100 WBC    Neutrophils (Absolute) 3.30 1.82 - 7.42 K/uL    Lymphs (Absolute) 1.15 1.00 - 4.80 K/uL    Monos (Absolute) 0.61 0.00 - 0.85 K/uL    Eos (Absolute) 0.17 0.00 - 0.51 K/uL    Baso (Absolute) 0.03 0.00 - 0.12 K/uL    Immature Granulocytes (abs) 0.02 0.00 - 0.11 K/uL    NRBC (Absolute) 0.00 K/uL   COMP METABOLIC PANEL   Result Value Ref Range    Sodium 140 135 - 145 mmol/L     Potassium 4.3 3.6 - 5.5 mmol/L    Chloride 105 96 - 112 mmol/L    Co2 24 20 - 33 mmol/L    Anion Gap 11.0 7.0 - 16.0    Glucose 81 65 - 99 mg/dL    Bun 14 8 - 22 mg/dL    Creatinine 1.17 0.50 - 1.40 mg/dL    Calcium 9.5 8.5 - 10.5 mg/dL    Correct Calcium 9.1 8.5 - 10.5 mg/dL    AST(SGOT) 207 (H) 12 - 45 U/L    ALT(SGPT) 291 (H) 2 - 50 U/L    Alkaline Phosphatase 327 (H) 30 - 99 U/L    Total Bilirubin 3.3 (H) 0.1 - 1.5 mg/dL    Albumin 4.5 3.2 - 4.9 g/dL    Total Protein 6.8 6.0 - 8.2 g/dL    Globulin 2.3 1.9 - 3.5 g/dL    A-G Ratio 2.0 g/dL   LIPASE   Result Value Ref Range    Lipase 38 11 - 82 U/L   ESTIMATED GFR   Result Value Ref Range    GFR (CKD-EPI) 65 >60 mL/min/1.73 m 2     RADIOLOGY  I have independently interpreted the diagnostic imaging associated with this visit and am waiting the final reading from the radiologist.     Radiologist interpretation:   US-RUQ   Final Result      1.  Cholelithiasis with acute cholecystitis.      2.  Dilatation of the common duct, and the possibility of distal ductal obstruction should be considered.      3.  6 cm right upper pole renal cyst.            COURSE & MEDICAL DECISION MAKING    ED Observation Status? No; Patient does not meet criteria for ED Observation.     INITIAL ASSESSMENT, COURSE AND PLAN  Care Narrative:   Seen evaluated bedside.  Reproducible right upper quadrant abdominal pain but patient is resting comfortably otherwise.  Pain 2 out of 10.  Declines additional medication.  Hemodynamically stable without fever, tachycardia, hypotension.  Will repeat labs and order right upper quadrant ultrasound for confirmation of POCUS findings prior to arrival.  Patient did receive Zosyn prior to arrival.    No leukocytosis, left shift or bandemia.  No electrolyte derangement.  Patient does have transaminitis with , , alkaline phosphatase 327 and total bilirubin of 3.3.  Lipase normal.    Ultrasound is consistent with cholelithiasis with acute  cholecystitis.  Dilation of the common bile duct and possibility of distal ductal obstruction considered as well.  Will discuss with surgery.    ADDITIONAL PROBLEM LIST  Hypertension, history of aortic dissection status postrepair    DISPOSITION AND DISCUSSIONS  I have discussed management of the patient with the following physicians and ASHU's:    4:03 AM Dr. Gray is aware of the patient agreeable to consultation but request hospitalist admission with GI consultation as indicated for choledocholithiasis.    0410 -Dr. Nails is aware of the patient and agreeable to consultation.      FINAL DIAGNOSIS  1. RUQ abdominal pain    2. Choledocholithiasis           Electronically signed by: Edna Abreu D.O., 8/12/2023 1:42 AM

## 2023-08-12 NOTE — ED TRIAGE NOTES
Chief Complaint   Patient presents with    Sent by MD RICKIE Aggarwal from [facility], pt had CT which showed gallstones, pt presented w/ RUQ pain and hx of aortic dissection w/ repair. Pt received 4mg morphine, zosyn, and zofran.      Pt placed in gown and connected to monitoring equipment. Chart up for ERP

## 2023-08-12 NOTE — HOSPITAL COURSE
Xavier Richardson is a 72 y.o. male who presented 8/12/2023 with RUQ abdominal pain.  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    Patient reported a progressively worsening dull abdominal pain in epigastric and right upper quadrant area.  Associated with nausea no vomiting. Due to worsening symptoms he came to the ER for evaluation.    In ER, patient found to have bradycardia. Found to have labs of , , ALK-P 327, total bilirubin 3.3. Lipase wnl.  Right upper quadrant ultrasound showing cholelithiasis with acute cholecystitis.  Dilation of the common bile duct was seen.  General surgery was consulted and recommended  MRCP followed by GI consult for ERCP eval if indicated.  He was started on zosyn and admitted for further evaluation.     He underwent MCRP which showed choledocholithiasis and CBD dilatation however was limited due to artifact from graft in close proximity. There was plan for ERCP however after further discussion that was cancelled and he will be taken back for planned lap chon with intraoperative cholangiogram.

## 2023-08-12 NOTE — PROGRESS NOTES
Hospital Medicine Daily Progress Note    Date of Service  8/12/2023    Chief Complaint  Xavier Richardosn is a 72 y.o. male admitted 8/12/2023 with RUQ pain    Hospital Course  Xavier Richardson is a 72 y.o. male who presented 8/12/2023 with RUQ abdominal pain.  Patient has history of essential hypertension, JESUS MANUEL, peripheral neuropathy, dissection of thoracoabdominal aorta with grafts, A-fib.    Patient reports a progressively worsening dull abdominal pain is epigastric and right upper quadrant area.  Associate with nausea without any episodes of vomiting.  Denies fever chills.  Due to worsening symptoms, decision was made to come to ER for evaluation.  He states that morphine has alleviated his pain.     In ER, patient found to have bradycardia. Found to have labs of , , ALK-P 327, total bilirubin 3.3. Lipase wnl.  Right upper quadrant ultrasound showing cholelithiasis with acute cholecystitis.  Dilation of the common bile duct is seen.  General surgery was consulted, recommends MRCP followed by GI consult for ERCP eval if indicated.  Patient admitted to hospitalist service for management of acute cholecystitis, pain control.     Interval Problem Update  8/12/2023: Plan is to have MRCP, hopefully sometime today.  On Zosyn.  History of aortic dissection, BP controlled.  States pain is under control.  No fevers or chills.  Trend labs, monitor vitals, await imaging..    I have discussed this patient's plan of care and discharge plan at IDT rounds today with Case Management, Nursing, Nursing leadership, and other members of the IDT team.    Consultants/Specialty  general surgery    Code Status  DNAR, I OK    Disposition  The patient is not medically cleared for discharge to home or a post-acute facility.  Anticipate discharge to: home with close outpatient follow-up    I have placed the appropriate orders for post-discharge needs.    Review of Systems  Review of Systems   Constitutional:  Positive for  malaise/fatigue.   HENT:  Negative for sinus pain and sore throat.    Eyes:  Negative for blurred vision and double vision.   Respiratory:  Negative for cough and shortness of breath.    Cardiovascular:  Negative for chest pain.   Gastrointestinal:  Positive for abdominal pain. Negative for diarrhea and vomiting.   Genitourinary:  Negative for dysuria and flank pain.   Musculoskeletal:  Negative for falls and myalgias.   Skin:  Negative for rash.   Neurological:  Negative for sensory change, speech change and focal weakness.        Physical Exam  Temp:  [36.3 °C (97.4 °F)] 36.3 °C (97.4 °F)  Pulse:  [50-58] 54  Resp:  [12-19] 14  BP: (124-137)/(59-65) 130/63  SpO2:  [88 %-97 %] 94 %    Physical Exam  Constitutional:       General: He is not in acute distress.     Appearance: Normal appearance. He is obese.   HENT:      Head: Normocephalic.      Nose: Nose normal.      Mouth/Throat:      Mouth: Mucous membranes are moist.   Eyes:      Pupils: Pupils are equal, round, and reactive to light.   Cardiovascular:      Rate and Rhythm: Normal rate and regular rhythm.      Pulses: Normal pulses.   Pulmonary:      Effort: Pulmonary effort is normal.      Breath sounds: Normal breath sounds.   Abdominal:      General: Abdomen is flat.      Palpations: Abdomen is soft.      Comments: Diffuse mild tenderness of abdomen  Very tender RUQ   Musculoskeletal:         General: Normal range of motion.      Cervical back: Neck supple.   Skin:     General: Skin is warm.   Neurological:      General: No focal deficit present.      Mental Status: He is alert and oriented to person, place, and time. Mental status is at baseline.   Psychiatric:         Mood and Affect: Mood normal.         Behavior: Behavior normal.         Thought Content: Thought content normal.         Judgment: Judgment normal.         Fluids  No intake or output data in the 24 hours ending 08/12/23 1353    Laboratory  Recent Labs     08/12/23  0100   WBC 5.3   RBC 4.00*    HEMOGLOBIN 13.2*   HEMATOCRIT 38.8*   MCV 97.0   MCH 33.0   MCHC 34.0   RDW 53.5*   PLATELETCT 124*   MPV 9.3     Recent Labs     08/12/23  0100   SODIUM 140   POTASSIUM 4.3   CHLORIDE 105   CO2 24   GLUCOSE 81   BUN 14   CREATININE 1.17   CALCIUM 9.5                   Imaging  US-RUQ   Final Result      1.  Cholelithiasis with acute cholecystitis.      2.  Dilatation of the common duct, and the possibility of distal ductal obstruction should be considered.      3.  6 cm right upper pole renal cyst.      [id number]    (Results Pending)        Assessment/Plan  * Choledocholithiasis with acute cholecystitis  Assessment & Plan   presents for right upper quadrant abdominal pain.    + transaminitis.    RUQ US with acute cholecystitis.   Patient started on Zosyn at outside facility.    General surgery on board, no acute intervention yet.    Obtain MRCP.    Continue Zosyn, fluids, zofran, morphine.   Monitor vitals closely while on IV narcotics.    ACP (advance care planning)  Assessment & Plan  16 minutes spent discussing goals of care with patient.  When asked about CODE STATUS, he states that he would like to be DNR with trial intubation.  He is okay with all other aggressive measures.    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 In review of yesterday's note (8/11/2023), there are no changes except as documented  above.

## 2023-08-12 NOTE — ED NOTES
Med Rec complete per patient  No oral antibiotics in the last 30 days   Allergies reviewed  Preferred Pharmacy: [facility] in [place]

## 2023-08-12 NOTE — ASSESSMENT & PLAN NOTE
Hx cortical blindness post CVA  On fenofibrate, holding statin due to elevated LFTS  Resume on discharge

## 2023-08-12 NOTE — ASSESSMENT & PLAN NOTE
presents for right upper quadrant abdominal pain.    + transaminitis, improving   RUQ US with acute cholecystitis and biliary dilation   Patient started on Zosyn at outside facility, continue pending operative plan   MRCP done, ERCP cancelled, plan for intraoperative cholangiogram during lap chon, this was positive    General surgery s/p lap chon   ERCP today with removal of 2 stones   Dc antibiotics   Advance diet as tolerated   Supportive care with pain control, antiemetics

## 2023-08-12 NOTE — CONSULTS
DATE OF CONSULTATION:  8/12/2023     REFERRING PHYSICIAN:   Edna Abreu D.O.     CONSULTING PHYSICIAN:  Brenton Gray M.D.     REASON FOR CONSULTATION:  I have been asked by  to see the patient in surgical consultation for evaluation of acute cholecystitis and suspected choledocholithiasis.    HISTORY OF PRESENT ILLNESS: The patient is a 72 year-old White man who was initially evaluated at [facility] in [place], [place] where CT imaging demonstrated cholelithiasis and biliary ductal dilation. He was transported to [facility] in [place], [place] for a higher level of care evaluation.  He describes the insidious onset of diffuse abdominal and right upper quadrant pain.  He denies any symptoms of overt biliary colic.  He denies any significant nausea or vomiting.  he patient denies any recent or intercurrent illness. The patient denies any history of previous abdominal surgery.    PAST MEDICAL HISTORY: Hypertension, peripheral vascular disease, hyperlipidemia, obstructive sleep apnea, benign prostatic hypertrophy.    PAST SURGICAL HISTORY: Rhinoplasty. Open ascending aortic dissection repair.  Descending thoracic and abdominal aortic stent grafting.    ALLERGIES: No Known Allergies    CURRENT MEDICATIONS:    Home Medications       Reviewed by Jose Roberto (Pharmacy Tech) on 08/12/23 at 0357  Med List Status: Complete     Medication Last Dose Status   acetaminophen (TYLENOL) 325 MG Tab 8/11/2023 Active   amLODIPine (NORVASC) 10 MG Tab 8/11/2023 Active   atorvastatin (LIPITOR) 40 MG Tab 8/11/2023 Active   carvedilol (COREG) 25 MG Tab 8/11/2023 Active   fenofibrate (TRICOR) 54 MG tablet 8/11/2023 Active   finasteride (PROSCAR) 5 MG Tab 8/11/2023 Active   furosemide (LASIX) 40 MG Tab 8/11/2023 Active   gabapentin (NEURONTIN) 400 MG Cap 8/11/2023 Active   losartan (COZAAR) 25 MG Tab 8/11/2023 Active   spironolactone (ALDACTONE) 25 MG Tab 8/11/2023 Active   tamsulosin (FLOMAX) 0.4  MG capsule 8/11/2023 Active                FAMILY HISTORY: family history includes Cancer in his father.    SOCIAL HISTORY:  reports that he has never smoked. He has never used smokeless tobacco. He reports current alcohol use of about 0.6 oz of alcohol per week. He reports that he does not use drugs.    REVIEW OF SYSTEMS: Comprehensive review of systems is negative with the exception of the aforementioned HPI, PMH, and PSH bullets in accordance with CMS guidelines.    PHYSICAL EXAMINATION:      Constitutional:     Vital Signs: /65   Pulse (!) 51   Temp 36.3 °C (97.4 °F) (Temporal)   Resp 12   Ht 1.829 m (6')   Wt 120 kg (265 lb)   SpO2 97%    General Appearance: appears stated age, is in no apparent distress.  HEENT: The pupils are equal, round, and reactive to light bilaterally. The extraocular muscles are intact bilaterally. The sclera are mildly icteric. Nares and oropharynx are clear.   Neck: Supple. No adenopathy.  Respiratory:   Inspection: Unlabored respirations, no intercostal retractions, paradoxical motion, or accessory muscle use.   Auscultation: clear to auscultation.  Cardiovascular:   Inspection: The skin is warm and dry.  Well-healed median sternotomy incision.  Auscultation: bradycardia   Peripheral Pulses: Normal.   Abdomen:  Inspection: Abdominal inspection reveals  a soft, moderately obese abdomen .   Palpation: Palpation is remarkable for moderate tenderness in the right subcostal region. No abdominal wall hernias.  Extremities:   Examination of the upper and lower extremities demonstrates no cyanosis edema or clubbing.  Neurologic:   Alert & oriented to person, time and place. Normal motor function. Normal sensory function. No focal deficits noted.    LABORATORY VALUES:   Recent Labs     08/12/23  0100   WBC 5.3   RBC 4.00*   HEMOGLOBIN 13.2*   HEMATOCRIT 38.8*   MCV 97.0   MCH 33.0   MCHC 34.0   RDW 53.5*   PLATELETCT 124*   MPV 9.3     Recent Labs     08/12/23  0100   SODIUM 140    POTASSIUM 4.3   CHLORIDE 105   CO2 24   GLUCOSE 81   BUN 14   CREATININE 1.17   CALCIUM 9.5     Recent Labs     08/12/23  0100   ASTSGOT 207*   ALTSGPT 291*   TBILIRUBIN 3.3*   ALKPHOSPHAT 327*   GLOBULIN 2.3      IMAGING:   US-RUQ   Final Result      1.  Cholelithiasis with acute cholecystitis.      2.  Dilatation of the common duct, and the possibility of distal ductal obstruction should be considered.      3.  6 cm right upper pole renal cyst.          ASSESSMENT AND PLAN:   The patient has Grade I (mild) acute cholecystitis, hyperbilirubinemia, and strong suspicion of choledocholithiasis.     DISPOSITION: Medical evaluation and admission for hypertension, history of aortic dissection and stenting, hyperlipidemia, obstructive sleep apnea, and history of stroke.   Recommend empiric antibiotic therapy and additional MR imaging to assess for choledocholithiasis and consideration of gastroenterology consultation for ERCP.  [facility] Walker Service will follow.         ____________________________________     Brenton Gray M.D.    DD: 8/12/2023  3:58 AM

## 2023-08-12 NOTE — PROGRESS NOTES
4 Eyes Skin Assessment Completed by ALMA DELIA Albright and ALMA DELIA Naranjo.    Head WDL  Ears WDL  Nose WDL  Mouth WDL  Neck Scar  Breast/Chest Scar  Shoulder Blades WDL  Spine WDL  (R) Arm/Elbow/Hand WDL  (L) Arm/Elbow/Hand WDL  Abdomen WDL  Groin WDL  Scrotum/Coccyx/Buttocks Redness and Blanching  (R) Leg WDL  (L) Leg WDL  (R) Heel/Foot/Toe WDL  (L) Heel/Foot/Toe WDL          Devices In Places Nasal Cannula      Interventions In Place Gray Ear Foams and Pillows    Possible Skin Injury No    Pictures Uploaded Into Epic N/A  Wound Consult Placed N/A  RN Wound Prevention Protocol Ordered No

## 2023-08-13 ENCOUNTER — APPOINTMENT (OUTPATIENT)
Dept: RADIOLOGY | Facility: MEDICAL CENTER | Age: 73
DRG: 418 | End: 2023-08-13
Attending: SURGERY
Payer: MEDICARE

## 2023-08-13 LAB
ALBUMIN SERPL BCP-MCNC: 3.9 G/DL (ref 3.2–4.9)
ALBUMIN/GLOB SERPL: 2.1 G/DL
ALP SERPL-CCNC: 244 U/L (ref 30–99)
ALT SERPL-CCNC: 188 U/L (ref 2–50)
ANION GAP SERPL CALC-SCNC: 10 MMOL/L (ref 7–16)
AST SERPL-CCNC: 92 U/L (ref 12–45)
BASOPHILS # BLD AUTO: 0.6 % (ref 0–1.8)
BASOPHILS # BLD: 0.04 K/UL (ref 0–0.12)
BILIRUB SERPL-MCNC: 1.2 MG/DL (ref 0.1–1.5)
BUN SERPL-MCNC: 14 MG/DL (ref 8–22)
CALCIUM ALBUM COR SERPL-MCNC: 8.7 MG/DL (ref 8.5–10.5)
CALCIUM SERPL-MCNC: 8.6 MG/DL (ref 8.5–10.5)
CHLORIDE SERPL-SCNC: 107 MMOL/L (ref 96–112)
CO2 SERPL-SCNC: 23 MMOL/L (ref 20–33)
CREAT SERPL-MCNC: 1.31 MG/DL (ref 0.5–1.4)
EOSINOPHIL # BLD AUTO: 0.23 K/UL (ref 0–0.51)
EOSINOPHIL NFR BLD: 3.7 % (ref 0–6.9)
ERYTHROCYTE [DISTWIDTH] IN BLOOD BY AUTOMATED COUNT: 52.3 FL (ref 35.9–50)
GFR SERPLBLD CREATININE-BSD FMLA CKD-EPI: 57 ML/MIN/1.73 M 2
GLOBULIN SER CALC-MCNC: 1.9 G/DL (ref 1.9–3.5)
GLUCOSE SERPL-MCNC: 124 MG/DL (ref 65–99)
HCT VFR BLD AUTO: 36.2 % (ref 42–52)
HGB BLD-MCNC: 12.3 G/DL (ref 14–18)
IMM GRANULOCYTES # BLD AUTO: 0.04 K/UL (ref 0–0.11)
IMM GRANULOCYTES NFR BLD AUTO: 0.6 % (ref 0–0.9)
INR PPP: 1.11 (ref 0.87–1.13)
LYMPHOCYTES # BLD AUTO: 0.96 K/UL (ref 1–4.8)
LYMPHOCYTES NFR BLD: 15.3 % (ref 22–41)
MCH RBC QN AUTO: 33 PG (ref 27–33)
MCHC RBC AUTO-ENTMCNC: 34 G/DL (ref 32.3–36.5)
MCV RBC AUTO: 97.1 FL (ref 81.4–97.8)
MONOCYTES # BLD AUTO: 0.55 K/UL (ref 0–0.85)
MONOCYTES NFR BLD AUTO: 8.8 % (ref 0–13.4)
NEUTROPHILS # BLD AUTO: 4.45 K/UL (ref 1.82–7.42)
NEUTROPHILS NFR BLD: 71 % (ref 44–72)
NRBC # BLD AUTO: 0 K/UL
NRBC BLD-RTO: 0 /100 WBC (ref 0–0.2)
PLATELET # BLD AUTO: 129 K/UL (ref 164–446)
PMV BLD AUTO: 9.4 FL (ref 9–12.9)
POTASSIUM SERPL-SCNC: 3.6 MMOL/L (ref 3.6–5.5)
PROT SERPL-MCNC: 5.8 G/DL (ref 6–8.2)
PROTHROMBIN TIME: 14.2 SEC (ref 12–14.6)
RBC # BLD AUTO: 3.73 M/UL (ref 4.7–6.1)
SODIUM SERPL-SCNC: 140 MMOL/L (ref 135–145)
WBC # BLD AUTO: 6.3 K/UL (ref 4.8–10.8)

## 2023-08-13 PROCEDURE — 36415 COLL VENOUS BLD VENIPUNCTURE: CPT

## 2023-08-13 PROCEDURE — 700111 HCHG RX REV CODE 636 W/ 250 OVERRIDE (IP): Mod: JZ | Performed by: STUDENT IN AN ORGANIZED HEALTH CARE EDUCATION/TRAINING PROGRAM

## 2023-08-13 PROCEDURE — 700105 HCHG RX REV CODE 258: Performed by: STUDENT IN AN ORGANIZED HEALTH CARE EDUCATION/TRAINING PROGRAM

## 2023-08-13 PROCEDURE — 80053 COMPREHEN METABOLIC PANEL: CPT

## 2023-08-13 PROCEDURE — 99232 SBSQ HOSP IP/OBS MODERATE 35: CPT | Performed by: INTERNAL MEDICINE

## 2023-08-13 PROCEDURE — 770006 HCHG ROOM/CARE - MED/SURG/GYN SEMI*

## 2023-08-13 PROCEDURE — A9270 NON-COVERED ITEM OR SERVICE: HCPCS | Performed by: INTERNAL MEDICINE

## 2023-08-13 PROCEDURE — A9270 NON-COVERED ITEM OR SERVICE: HCPCS | Performed by: STUDENT IN AN ORGANIZED HEALTH CARE EDUCATION/TRAINING PROGRAM

## 2023-08-13 PROCEDURE — 700102 HCHG RX REV CODE 250 W/ 637 OVERRIDE(OP): Performed by: STUDENT IN AN ORGANIZED HEALTH CARE EDUCATION/TRAINING PROGRAM

## 2023-08-13 PROCEDURE — 700102 HCHG RX REV CODE 250 W/ 637 OVERRIDE(OP): Performed by: INTERNAL MEDICINE

## 2023-08-13 PROCEDURE — 85610 PROTHROMBIN TIME: CPT

## 2023-08-13 PROCEDURE — 74181 MRI ABDOMEN W/O CONTRAST: CPT

## 2023-08-13 PROCEDURE — 85025 COMPLETE CBC W/AUTO DIFF WBC: CPT

## 2023-08-13 RX ORDER — OXYCODONE HYDROCHLORIDE 5 MG/1
5 TABLET ORAL EVERY 4 HOURS PRN
Status: DISCONTINUED | OUTPATIENT
Start: 2023-08-13 | End: 2023-08-17 | Stop reason: HOSPADM

## 2023-08-13 RX ADMIN — MORPHINE SULFATE 4 MG: 4 INJECTION, SOLUTION INTRAMUSCULAR; INTRAVENOUS at 06:23

## 2023-08-13 RX ADMIN — PIPERACILLIN AND TAZOBACTAM 3.38 G: 3; .375 INJECTION, POWDER, FOR SOLUTION INTRAVENOUS at 04:28

## 2023-08-13 RX ADMIN — LOSARTAN POTASSIUM 25 MG: 25 TABLET, FILM COATED ORAL at 04:54

## 2023-08-13 RX ADMIN — TAMSULOSIN HYDROCHLORIDE 0.4 MG: 0.4 CAPSULE ORAL at 04:54

## 2023-08-13 RX ADMIN — GABAPENTIN 400 MG: 400 CAPSULE ORAL at 21:33

## 2023-08-13 RX ADMIN — SPIRONOLACTONE 25 MG: 25 TABLET ORAL at 04:54

## 2023-08-13 RX ADMIN — FINASTERIDE 5 MG: 5 TABLET, FILM COATED ORAL at 19:33

## 2023-08-13 RX ADMIN — CARVEDILOL 25 MG: 25 TABLET, FILM COATED ORAL at 19:34

## 2023-08-13 RX ADMIN — OXYCODONE HYDROCHLORIDE 5 MG: 5 TABLET ORAL at 15:32

## 2023-08-13 RX ADMIN — SODIUM CHLORIDE: 9 INJECTION, SOLUTION INTRAVENOUS at 05:16

## 2023-08-13 RX ADMIN — CARVEDILOL 25 MG: 25 TABLET, FILM COATED ORAL at 07:37

## 2023-08-13 RX ADMIN — GABAPENTIN 400 MG: 400 CAPSULE ORAL at 15:31

## 2023-08-13 RX ADMIN — FENOFIBRATE 67 MG: 67 CAPSULE ORAL at 04:55

## 2023-08-13 RX ADMIN — PIPERACILLIN AND TAZOBACTAM 3.38 G: 3; .375 INJECTION, POWDER, FOR SOLUTION INTRAVENOUS at 15:39

## 2023-08-13 RX ADMIN — AMLODIPINE BESYLATE 10 MG: 10 TABLET ORAL at 04:54

## 2023-08-13 RX ADMIN — MORPHINE SULFATE 4 MG: 4 INJECTION, SOLUTION INTRAMUSCULAR; INTRAVENOUS at 21:33

## 2023-08-13 RX ADMIN — GABAPENTIN 400 MG: 400 CAPSULE ORAL at 19:33

## 2023-08-13 ASSESSMENT — ENCOUNTER SYMPTOMS
NERVOUS/ANXIOUS: 0
FEVER: 0
SENSORY CHANGE: 0
CHILLS: 0
DEPRESSION: 0
VOMITING: 0
COUGH: 0
MEMORY LOSS: 0
DOUBLE VISION: 0
DIARRHEA: 0
SINUS PAIN: 0
SHORTNESS OF BREATH: 0
SPEECH CHANGE: 0
DIZZINESS: 0
FOCAL WEAKNESS: 0
FLANK PAIN: 0
SORE THROAT: 0
MYALGIAS: 0
BLURRED VISION: 0
HEARTBURN: 0
ABDOMINAL PAIN: 1

## 2023-08-13 ASSESSMENT — PAIN DESCRIPTION - PAIN TYPE
TYPE: ACUTE PAIN

## 2023-08-13 ASSESSMENT — PATIENT HEALTH QUESTIONNAIRE - PHQ9
SUM OF ALL RESPONSES TO PHQ9 QUESTIONS 1 AND 2: 0
2. FEELING DOWN, DEPRESSED, IRRITABLE, OR HOPELESS: NOT AT ALL
1. LITTLE INTEREST OR PLEASURE IN DOING THINGS: NOT AT ALL

## 2023-08-13 NOTE — PROGRESS NOTES
Hospital Medicine Daily Progress Note    Date of Service  8/13/2023    Chief Complaint  Xavier Richardson is a 72 y.o. male admitted 8/12/2023 with RUQ pain    Hospital Course  Xavier Richardson is a 72 y.o. male who presented 8/12/2023 with RUQ abdominal pain.  Patient has history of essential hypertension, JESUS MANUEL, peripheral neuropathy, dissection of thoracoabdominal aorta with grafts, A-fib.    Patient reports a progressively worsening dull abdominal pain is epigastric and right upper quadrant area.  Associate with nausea without any episodes of vomiting.  Denies fever chills.  Due to worsening symptoms, decision was made to come to ER for evaluation.  He states that morphine has alleviated his pain.     In ER, patient found to have bradycardia. Found to have labs of , , ALK-P 327, total bilirubin 3.3. Lipase wnl.  Right upper quadrant ultrasound showing cholelithiasis with acute cholecystitis.  Dilation of the common bile duct is seen.  General surgery was consulted, recommends MRCP followed by GI consult for ERCP eval if indicated.  Patient admitted to hospitalist service for management of acute cholecystitis, pain control.     Interval Problem Update  8/12/2023: Plan is to have MRCP, hopefully sometime today.  On Zosyn.  History of aortic dissection, BP controlled.  States pain is under control.  No fevers or chills.  Trend labs, monitor vitals, await imaging..    8/13 proving LFTs, pending MRCP, will consult GI as needed  Surgery following  Reports minimal abdominal discomfort, no nausea    I have discussed this patient's plan of care and discharge plan at IDT rounds today with Case Management, Nursing, Nursing leadership, and other members of the IDT team.    Consultants/Specialty  general surgery    Code Status  DNAR, I OK    Disposition  The patient is not medically cleared for discharge to home or a post-acute facility.      I have placed the appropriate orders for post-discharge needs.    Review of  Systems  Review of Systems   Constitutional:  Positive for malaise/fatigue. Negative for chills and fever.   HENT:  Negative for sinus pain and sore throat.    Eyes:  Negative for blurred vision and double vision.   Respiratory:  Negative for cough and shortness of breath.    Cardiovascular:  Negative for chest pain.   Gastrointestinal:  Positive for abdominal pain. Negative for diarrhea, heartburn and vomiting.   Genitourinary:  Negative for dysuria and flank pain.   Musculoskeletal:  Negative for myalgias.   Skin:  Negative for rash.   Neurological:  Negative for dizziness, sensory change, speech change and focal weakness.   Psychiatric/Behavioral:  Negative for depression and memory loss. The patient is not nervous/anxious.         Physical Exam  Temp:  [35.9 °C (96.6 °F)-36.9 °C (98.5 °F)] 36.6 °C (97.9 °F)  Pulse:  [56-68] 66  Resp:  [17-19] 19  BP: (127-138)/(59-62) 127/59  SpO2:  [90 %-95 %] 90 %    Physical Exam  Constitutional:       General: He is not in acute distress.     Appearance: Normal appearance. He is obese. He is not ill-appearing.   HENT:      Head: Normocephalic.      Nose: Nose normal.      Mouth/Throat:      Mouth: Mucous membranes are moist.   Eyes:      General: No scleral icterus.     Extraocular Movements: Extraocular movements intact.      Pupils: Pupils are equal, round, and reactive to light.   Cardiovascular:      Rate and Rhythm: Normal rate and regular rhythm.      Pulses: Normal pulses.   Pulmonary:      Effort: Pulmonary effort is normal.      Breath sounds: Normal breath sounds.   Abdominal:      General: Abdomen is flat. There is no distension.      Palpations: Abdomen is soft. There is no mass.      Tenderness: There is abdominal tenderness.      Comments: Diffuse mild tenderness of abdomen  Very tender RUQ   Musculoskeletal:         General: Normal range of motion.      Cervical back: Neck supple.   Skin:     General: Skin is warm.      Coloration: Skin is not jaundiced or  pale.   Neurological:      General: No focal deficit present.      Mental Status: He is alert and oriented to person, place, and time. Mental status is at baseline.   Psychiatric:         Mood and Affect: Mood normal.         Behavior: Behavior normal.         Fluids    Intake/Output Summary (Last 24 hours) at 8/13/2023 1413  Last data filed at 8/13/2023 1100  Gross per 24 hour   Intake 600 ml   Output 650 ml   Net -50 ml       Laboratory  Recent Labs     08/12/23  0100 08/13/23  0011   WBC 5.3 6.3   RBC 4.00* 3.73*   HEMOGLOBIN 13.2* 12.3*   HEMATOCRIT 38.8* 36.2*   MCV 97.0 97.1   MCH 33.0 33.0   MCHC 34.0 34.0   RDW 53.5* 52.3*   PLATELETCT 124* 129*   MPV 9.3 9.4     Recent Labs     08/12/23  0100 08/13/23  0011   SODIUM 140 140   POTASSIUM 4.3 3.6   CHLORIDE 105 107   CO2 24 23   GLUCOSE 81 124*   BUN 14 14   CREATININE 1.17 1.31   CALCIUM 9.5 8.6                   Imaging  US-RUQ   Final Result      1.  Cholelithiasis with acute cholecystitis.      2.  Dilatation of the common duct, and the possibility of distal ductal obstruction should be considered.      3.  6 cm right upper pole renal cyst.      [id number]    (Results Pending)        Assessment/Plan  * Choledocholithiasis with acute cholecystitis  Assessment & Plan   presents for right upper quadrant abdominal pain.    + transaminitis.    RUQ US with acute cholecystitis.   Patient started on Zosyn at outside facility.    General surgery on board, no acute intervention yet.    Obtain MRCP.    Continue Zosyn, fluids, zofran, morphine.   Monitor vitals closely while on IV narcotics.    8/13 cont zosyn  For mrcp, npo after MN,   GI consult if +  Surgery  Following  Improving lft  Add oxy prn    ACP (advance care planning)  Assessment & Plan  16 minutes spent discussing goals of care with patient.  When asked about CODE STATUS, he states that he would like to be DNR with trial intubation.  He is okay with all other aggressive measures.    Dyslipidemia-  (present on admission)  Assessment & Plan  Hold statin for now due to transaminitis    Dissection of thoracoabdominal aorta (HCC)- (present on admission)  Assessment & Plan  S/P hypothermic protocol and repair at [facility]    Essential hypertension- (present on admission)  Assessment & Plan  On home norvasc, coreg, cozaar, spironolactone  Restart home lasix if needed    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)  Assessment & Plan  Hx cortical blindness post CVA  On fenofibrate, holding statin            VTE prophylaxis:   SCDs/TEDs      I have performed a physical exam and reviewed and updated ROS and Plan today (8/13/2023). In review of yesterday's note (8/12/2023), there are no changes except as documented above.

## 2023-08-13 NOTE — CARE PLAN
The patient is Stable - Low risk of patient condition declining or worsening    Shift Goals  Clinical Goals: iv hydration, pain management  Patient Goals: rest, comfort  Family Goals: NA    Progress made toward(s) clinical / shift goals:          Problem: Knowledge Deficit - Standard  Goal: Patient and family/care givers will demonstrate understanding of plan of care, disease process/condition, diagnostic tests and medications  Description: Target End Date:  1-3 days or as soon as patient condition allows    Document in Patient Education    1.  Patient and family/caregiver oriented to unit, equipment, visitation policy and means for communicating concern  2.  Complete/review Learning Assessment  3.  Assess knowledge level of disease process/condition, treatment plan, diagnostic tests and medications  4.  Explain disease process/condition, treatment plan, diagnostic tests and medications  Outcome: Progressing     Problem: Pain - Standard  Goal: Alleviation of pain or a reduction in pain to the patient’s comfort goal  Description: Target End Date:  Prior to discharge or change in level of care    Document on Vitals flowsheet    1.  Document pain using the appropriate pain scale per order or unit policy  2.  Educate and implement non-pharmacologic comfort measures (i.e. relaxation, distraction, massage, cold/heat therapy, etc.)  3.  Pain management medications as ordered  4.  Reassess pain after pain med administration per policy  5.  If opiods administered assess patient's response to pain medication is appropriate per POSS sedation scale  6.  Follow pain management plan developed in collaboration with patient and interdisciplinary team (including palliative care or pain specialists if applicable)  Outcome: Progressing

## 2023-08-13 NOTE — CARE PLAN
The patient is Stable - Low risk of patient condition declining or worsening    Shift Goals  Clinical Goals:  (pain management, MRI)  Patient Goals:  (MRI, food)  Family Goals: NA    Progress made toward(s) clinical / shift goals:    Problem: Pain - Standard  Goal: Alleviation of pain or a reduction in pain to the patient’s comfort goal  Outcome: Progressing  Note: Morphine 4 mg IV for PRN pain       Patient is not progressing towards the following goals:

## 2023-08-13 NOTE — PROGRESS NOTES
ACS Walker staff    Patient talking in hallway on phone, NAD.  T bili has normalized. Awaiting MRCP for enlarged CBD and initially elevated T bili.  Will follow.    Hossein Sebastian MD  [phone]

## 2023-08-14 ENCOUNTER — APPOINTMENT (OUTPATIENT)
Dept: RADIOLOGY | Facility: MEDICAL CENTER | Age: 73
DRG: 418 | End: 2023-08-14
Attending: INTERNAL MEDICINE
Payer: MEDICARE

## 2023-08-14 LAB
ALBUMIN SERPL BCP-MCNC: 4.2 G/DL (ref 3.2–4.9)
ALBUMIN/GLOB SERPL: 2 G/DL
ALP SERPL-CCNC: 202 U/L (ref 30–99)
ALT SERPL-CCNC: 134 U/L (ref 2–50)
ANION GAP SERPL CALC-SCNC: 10 MMOL/L (ref 7–16)
AST SERPL-CCNC: 58 U/L (ref 12–45)
BASOPHILS # BLD AUTO: 0.8 % (ref 0–1.8)
BASOPHILS # BLD: 0.04 K/UL (ref 0–0.12)
BILIRUB SERPL-MCNC: 1.2 MG/DL (ref 0.1–1.5)
BUN SERPL-MCNC: 10 MG/DL (ref 8–22)
CALCIUM ALBUM COR SERPL-MCNC: 9 MG/DL (ref 8.5–10.5)
CALCIUM SERPL-MCNC: 9.2 MG/DL (ref 8.5–10.5)
CHLORIDE SERPL-SCNC: 109 MMOL/L (ref 96–112)
CO2 SERPL-SCNC: 24 MMOL/L (ref 20–33)
CREAT SERPL-MCNC: 1.06 MG/DL (ref 0.5–1.4)
EKG IMPRESSION: NORMAL
EOSINOPHIL # BLD AUTO: 0.18 K/UL (ref 0–0.51)
EOSINOPHIL NFR BLD: 3.6 % (ref 0–6.9)
ERYTHROCYTE [DISTWIDTH] IN BLOOD BY AUTOMATED COUNT: 49.5 FL (ref 35.9–50)
GFR SERPLBLD CREATININE-BSD FMLA CKD-EPI: 73 ML/MIN/1.73 M 2
GLOBULIN SER CALC-MCNC: 2.1 G/DL (ref 1.9–3.5)
GLUCOSE SERPL-MCNC: 87 MG/DL (ref 65–99)
HCT VFR BLD AUTO: 36.3 % (ref 42–52)
HGB BLD-MCNC: 12.9 G/DL (ref 14–18)
IMM GRANULOCYTES # BLD AUTO: 0.02 K/UL (ref 0–0.11)
IMM GRANULOCYTES NFR BLD AUTO: 0.4 % (ref 0–0.9)
LYMPHOCYTES # BLD AUTO: 1.05 K/UL (ref 1–4.8)
LYMPHOCYTES NFR BLD: 20.9 % (ref 22–41)
MCH RBC QN AUTO: 33.5 PG (ref 27–33)
MCHC RBC AUTO-ENTMCNC: 35.5 G/DL (ref 32.3–36.5)
MCV RBC AUTO: 94.3 FL (ref 81.4–97.8)
MONOCYTES # BLD AUTO: 0.54 K/UL (ref 0–0.85)
MONOCYTES NFR BLD AUTO: 10.7 % (ref 0–13.4)
NEUTROPHILS # BLD AUTO: 3.2 K/UL (ref 1.82–7.42)
NEUTROPHILS NFR BLD: 63.6 % (ref 44–72)
NRBC # BLD AUTO: 0 K/UL
NRBC BLD-RTO: 0 /100 WBC (ref 0–0.2)
PLATELET # BLD AUTO: 125 K/UL (ref 164–446)
PMV BLD AUTO: 8.8 FL (ref 9–12.9)
POTASSIUM SERPL-SCNC: 3.9 MMOL/L (ref 3.6–5.5)
PROT SERPL-MCNC: 6.3 G/DL (ref 6–8.2)
RBC # BLD AUTO: 3.85 M/UL (ref 4.7–6.1)
SODIUM SERPL-SCNC: 143 MMOL/L (ref 135–145)
WBC # BLD AUTO: 5 K/UL (ref 4.8–10.8)

## 2023-08-14 PROCEDURE — 93005 ELECTROCARDIOGRAM TRACING: CPT | Performed by: INTERNAL MEDICINE

## 2023-08-14 PROCEDURE — 700102 HCHG RX REV CODE 250 W/ 637 OVERRIDE(OP): Performed by: STUDENT IN AN ORGANIZED HEALTH CARE EDUCATION/TRAINING PROGRAM

## 2023-08-14 PROCEDURE — 770006 HCHG ROOM/CARE - MED/SURG/GYN SEMI*

## 2023-08-14 PROCEDURE — 36415 COLL VENOUS BLD VENIPUNCTURE: CPT

## 2023-08-14 PROCEDURE — 99232 SBSQ HOSP IP/OBS MODERATE 35: CPT | Mod: 57 | Performed by: SURGERY

## 2023-08-14 PROCEDURE — 99232 SBSQ HOSP IP/OBS MODERATE 35: CPT | Performed by: INTERNAL MEDICINE

## 2023-08-14 PROCEDURE — 93010 ELECTROCARDIOGRAM REPORT: CPT | Performed by: STUDENT IN AN ORGANIZED HEALTH CARE EDUCATION/TRAINING PROGRAM

## 2023-08-14 PROCEDURE — 85025 COMPLETE CBC W/AUTO DIFF WBC: CPT

## 2023-08-14 PROCEDURE — A9270 NON-COVERED ITEM OR SERVICE: HCPCS | Performed by: STUDENT IN AN ORGANIZED HEALTH CARE EDUCATION/TRAINING PROGRAM

## 2023-08-14 PROCEDURE — 700105 HCHG RX REV CODE 258: Performed by: STUDENT IN AN ORGANIZED HEALTH CARE EDUCATION/TRAINING PROGRAM

## 2023-08-14 PROCEDURE — 80053 COMPREHEN METABOLIC PANEL: CPT

## 2023-08-14 PROCEDURE — 700111 HCHG RX REV CODE 636 W/ 250 OVERRIDE (IP): Mod: JZ | Performed by: STUDENT IN AN ORGANIZED HEALTH CARE EDUCATION/TRAINING PROGRAM

## 2023-08-14 RX ADMIN — ACETAMINOPHEN 650 MG: 325 TABLET, FILM COATED ORAL at 07:49

## 2023-08-14 RX ADMIN — CARVEDILOL 25 MG: 25 TABLET, FILM COATED ORAL at 17:31

## 2023-08-14 RX ADMIN — GABAPENTIN 400 MG: 400 CAPSULE ORAL at 20:09

## 2023-08-14 RX ADMIN — FINASTERIDE 5 MG: 5 TABLET, FILM COATED ORAL at 17:31

## 2023-08-14 RX ADMIN — MORPHINE SULFATE 4 MG: 4 INJECTION, SOLUTION INTRAMUSCULAR; INTRAVENOUS at 17:36

## 2023-08-14 RX ADMIN — PIPERACILLIN AND TAZOBACTAM 3.38 G: 3; .375 INJECTION, POWDER, FOR SOLUTION INTRAVENOUS at 05:47

## 2023-08-14 RX ADMIN — CARVEDILOL 25 MG: 25 TABLET, FILM COATED ORAL at 07:47

## 2023-08-14 RX ADMIN — TAMSULOSIN HYDROCHLORIDE 0.4 MG: 0.4 CAPSULE ORAL at 05:42

## 2023-08-14 RX ADMIN — PIPERACILLIN AND TAZOBACTAM 3.38 G: 3; .375 INJECTION, POWDER, FOR SOLUTION INTRAVENOUS at 20:14

## 2023-08-14 RX ADMIN — GABAPENTIN 400 MG: 400 CAPSULE ORAL at 13:17

## 2023-08-14 RX ADMIN — FENOFIBRATE 67 MG: 67 CAPSULE ORAL at 05:42

## 2023-08-14 RX ADMIN — PIPERACILLIN AND TAZOBACTAM 3.38 G: 3; .375 INJECTION, POWDER, FOR SOLUTION INTRAVENOUS at 13:18

## 2023-08-14 RX ADMIN — GABAPENTIN 400 MG: 400 CAPSULE ORAL at 09:26

## 2023-08-14 RX ADMIN — MORPHINE SULFATE 4 MG: 4 INJECTION, SOLUTION INTRAMUSCULAR; INTRAVENOUS at 03:13

## 2023-08-14 RX ADMIN — GABAPENTIN 400 MG: 400 CAPSULE ORAL at 17:30

## 2023-08-14 ASSESSMENT — PAIN DESCRIPTION - PAIN TYPE
TYPE: ACUTE PAIN

## 2023-08-14 ASSESSMENT — ENCOUNTER SYMPTOMS
ABDOMINAL PAIN: 1
SENSORY CHANGE: 0
CONSTIPATION: 0
DIARRHEA: 0
BLOOD IN STOOL: 0
SHORTNESS OF BREATH: 0
FLANK PAIN: 0
NERVOUS/ANXIOUS: 0
NAUSEA: 0
MYALGIAS: 0
DEPRESSION: 0
ABDOMINAL PAIN: 0
COUGH: 0
FOCAL WEAKNESS: 0
MEMORY LOSS: 0
HEARTBURN: 0
WEAKNESS: 0
DIARRHEA: 1
FEVER: 0
SINUS PAIN: 0
WEAKNESS: 1
SPEECH CHANGE: 0
CHILLS: 0
VOMITING: 0
FALLS: 0
SORE THROAT: 0
DIZZINESS: 0
HEADACHES: 0

## 2023-08-14 ASSESSMENT — PATIENT HEALTH QUESTIONNAIRE - PHQ9
1. LITTLE INTEREST OR PLEASURE IN DOING THINGS: NOT AT ALL
2. FEELING DOWN, DEPRESSED, IRRITABLE, OR HOPELESS: NOT AT ALL
SUM OF ALL RESPONSES TO PHQ9 QUESTIONS 1 AND 2: 0

## 2023-08-14 ASSESSMENT — LIFESTYLE VARIABLES: SUBSTANCE_ABUSE: 0

## 2023-08-14 NOTE — CONSULTS
Date of Consultation:  8/14/2023    Patient: : Xavier Richardson  MRN: [id number]    Referring Physician: Isabella Nails DO     GI:GIORGIO Cazares     Reason for Consultation: Choledocholithiasis    History of Present Illness:     This is a 72-year-old male with a past medical history including hypertension, obesity, JESUS MANUEL, CVA, dissection of thoracoabdominal aorta s/p repair who was transferred from [facility] to [facility] on 8/12/2023 for right upper quadrant pain.  Patient reports acute onset right upper quadrant pain starting 8/8/2023.  He states the pain has subsided but then acutely returned and was more severe on 8/11/2023 prompting him to seek care at outside facility.  He reports he has had some pain with eating over the few days prior as well as associated nausea but no vomiting, diarrhea, fever, chills.  He has a history of an aortic dissection with repair and CTA chest abdomen pelvis was done at outside hospital with extensive stent graft repair seen.  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  A point-of-care ultrasound was done at outside facility which showed dilated gallbladder with stones in neck, there is no thickening of gallbladder wall or pericholecystic fluid.  Common bile duct measures 9.9 mm.  Additionally, patient had transaminitis and elevated total bilirubin and was transferred to [facility] for higher level of care.  Upon arrival, repeat lab work was done showing hemoglobin 13.2, no leukocytosis, platelets 124, electrolytes normal but LFT elevated with , , alk phos 327, total bilirubin 3.3.  Since arrival, his LFTs are downtrending and total bilirubin has normalized to 1.2.  INR 1.11  Right upper quadrant ultrasound was obtained with findings including cholelithiasis with acute cholecystitis, dilation of the common bile duct.  MRCP done on 8/13/2023 showed cholelithiasis, biliary dilation with possible  choledocholithiasis, extrahepatic bile duct dilated to a diameter of 14 mm at the alton hepatis.  2 small low signal T2 areas in the proximal common bile duct.  Patient is pending laparoscopic cholecystectomy on 8/15/2023 and GI was consulted for evaluation for ERCP.  Patient seen at bedside, states his abdominal pain is improving and denies nausea, vomiting, fever, chills.       Tobacco use: Denies  Alcohol use: Denies  Illicit drug use: Denies    Last EGD: Denies prior  Last colonoscopy: Patient states colonoscopy in the state of [place] in 2021.  He states he was having surveillance for polyps, but colonoscopy was normal  NSAID/ASA use: Daily aspirin use  Anticoagulation use: Denies      Past Medical History:   Diagnosis Date    BPH (benign prostatic hyperplasia)     Hypertension     JESUS MANUEL (obstructive sleep apnea)     Sleep apnea     Stroke (HCC)          Past Surgical History:   Procedure Laterality Date    OTHER CARDIAC SURGERY      RHINOPLASTY         Family History   Problem Relation Age of Onset    Cancer Father        Social History     Socioeconomic History    Marital status: Single   Tobacco Use    Smoking status: Never    Smokeless tobacco: Never   Vaping Use    Vaping Use: Never used   Substance and Sexual Activity    Alcohol use: Yes     Alcohol/week: 0.6 oz     Types: 1 Standard drinks or equivalent per week    Drug use: Never       Review of systems:  Review of Systems   Constitutional:  Negative for chills, fever and malaise/fatigue.   HENT:  Negative for congestion and sore throat.    Respiratory:  Negative for cough and shortness of breath.    Cardiovascular:  Positive for leg swelling. Negative for chest pain.   Gastrointestinal:  Positive for diarrhea. Negative for abdominal pain, blood in stool, constipation, melena, nausea and vomiting.   Genitourinary:  Negative for dysuria and flank pain.   Musculoskeletal:  Negative for falls and myalgias.   Neurological:  Negative for weakness and headaches.    Psychiatric/Behavioral:  Negative for substance abuse. The patient is not nervous/anxious.    All other systems reviewed and are negative.        Physical Exam:  Vitals:    08/13/23 1625 08/13/23 1912 08/14/23 0357 08/14/23 0726   BP: 134/65 (!) 148/67 100/48 (!) 141/66   Pulse: 66 62 62 63   Resp: 18 18 18 18   Temp: 36.7 °C (98.1 °F) 36.8 °C (98.2 °F) 36.6 °C (97.9 °F) 36.2 °C (97.2 °F)   TempSrc: Temporal Temporal Temporal Temporal   SpO2: 93% 91% 92% 90%   Weight:       Height:           Physical Exam  Vitals and nursing note reviewed.   Constitutional:       General: He is awake. He is not in acute distress.     Appearance: He is obese. He is not ill-appearing.   HENT:      Head: Normocephalic.      Nose: Nose normal. No congestion.      Mouth/Throat:      Mouth: Mucous membranes are moist.      Pharynx: Oropharynx is clear. No oropharyngeal exudate.   Eyes:      General: No scleral icterus.     Extraocular Movements: Extraocular movements intact.      Conjunctiva/sclera: Conjunctivae normal.   Cardiovascular:      Rate and Rhythm: Normal rate and regular rhythm.      Pulses: Normal pulses.      Heart sounds: Normal heart sounds. No murmur heard.  Pulmonary:      Effort: Pulmonary effort is normal. No respiratory distress.      Breath sounds: Normal breath sounds. No wheezing.   Abdominal:      General: Abdomen is flat. Bowel sounds are normal. There is no distension.      Palpations: Abdomen is soft.      Tenderness: There is no abdominal tenderness. There is no guarding.   Musculoskeletal:      Right lower leg: No edema.      Left lower leg: No edema.   Skin:     General: Skin is warm and dry.      Capillary Refill: Capillary refill takes less than 2 seconds.      Coloration: Skin is not jaundiced.   Neurological:      General: No focal deficit present.      Mental Status: He is alert and oriented to person, place, and time. Mental status is at baseline.      Motor: No weakness.   Psychiatric:         Mood  and Affect: Mood normal.         Behavior: Behavior normal. Behavior is cooperative.         Thought Content: Thought content normal.         Judgment: Judgment normal.           Labs:  Recent Labs     08/12/23 0100 08/13/23 0011 08/14/23 0347   WBC 5.3 6.3 5.0   RBC 4.00* 3.73* 3.85*   HEMOGLOBIN 13.2* 12.3* 12.9*   HEMATOCRIT 38.8* 36.2* 36.3*   MCV 97.0 97.1 94.3   MCH 33.0 33.0 33.5*   MCHC 34.0 34.0 35.5   RDW 53.5* 52.3* 49.5   PLATELETCT 124* 129* 125*   MPV 9.3 9.4 8.8*     Recent Labs     08/12/23 0100 08/13/23 0011 08/14/23 0347   SODIUM 140 140 143   POTASSIUM 4.3 3.6 3.9   CHLORIDE 105 107 109   CO2 24 23 24   GLUCOSE 81 124* 87   BUN 14 14 10     Recent Labs     08/13/23 2004   INR 1.11       Recent Labs     08/12/23 0100 08/13/23 0011 08/13/23 2004 08/14/23 0347   ASTSGOT 207* 92*  --  58*   ALTSGPT 291* 188*  --  134*   TBILIRUBIN 3.3* 1.2  --  1.2   ALKPHOSPHAT 327* 244*  --  202*   GLOBULIN 2.3 1.9  --  2.1   INR  --   --  1.11  --          Imaging:  [id number]  Narrative: HISTORY/REASON FOR EXAM: .    TECHNIQUE/EXAM DESCRIPTION: Magnetic resonance cholangiopancreatography.    Magnetic resonance cholangiopancreatography was performed on with axial, coronal, and sagittal thin and thick section heavily T2-weighted sequences.    3-D cholangiographic multiplanar maximum intensity projection (MIP) images were created on a separate workstation..    The study was performed on a [employer] 1.5 Tonia MRI scanner.    COMPARISON: None available.    FINDINGS:  The liver is normal in appearance.    The spleen is normal in appearance.    Pancreas is normal in appearance.    Gallbladder and biliary system:Gallstones are present. There is no pericholecystic fluid. There is slight intrahepatic biliary dilatation. The extrahepatic bile duct is dilated to a diameter of 14 mm the alton hepatis. There are 2 tiny low signal T2   areas in the proximal common duct. The distal bile duct is not well visualized  due to substantial magnetic susceptibility artifact from the patient's aortic region which is probably due to the presence of a stent graft.    There are simple appearing BILATERAL renal cysts The adrenal glands and the kidneys are otherwise normal in appearance.    The visualized bowel and retroperitoneum are within normal limits  Impression: 1.  Cholelithiasis. Cholecystitis is not excluded.  2.  Biliary dilatation with possible choledocholithiasis. The distal common bile duct is largely obscured by streak artifact from the patient's aortic stent graft.            Impressions:  Choledocholithiasis  Acute cholecystitis  Hypertension  Obesity  JESUS MANUEL  History of CVA  History of dissection of thoracoabdominal aorta s/p repair      MDM:  This is a pleasant 72-year-old male with a past medical history as listed above who presented from [facility] to [facility] 8/11/2023 with right upper quadrant pain.  A point-of-care ultrasound was done at outside facility which showed dilated gallbladder with stones in neck, there is no thickening of gallbladder wall or pericholecystic fluid.  Common bile duct measures 9.9 mm. LFT elevated with , , alk phos 327, total bilirubin 3.3.  Since arrival, his LFTs are downtrending and total bilirubin has normalized to 1.2.  Patient found to have acute cholecystitis, MRCP done on 8/13/2023 showed cholelithiasis, biliary dilation with possible choledocholithiasis, extrahepatic bile duct dilated to a diameter of 14 mm at the alton hepatis.  2 small low signal T2 areas in the proximal common bile duct.  Surgery involved and plans for laparoscopic cholecystectomy tomorrow.  Findings discussed with patient as well as further evaluation/treatment with ERCP.  Patient is agreeable to proceed.  He has been n.p.o. since midnight.  Not on any anticoagulation use.  INR normal at 1.1 on arrival.    Recommendations:  Surgery involved with plans for laparoscopic  cholecystectomy 8/15/2023  Maintain n.p.o. status  Plan for ERCP today-further recommendations to follow  Trend LFTs    Discussed with patient, Dr. Nails who discussed with general surgery, nursing, Dr. Cornejo, Dr. Barraza      This note was generated using voice recognition software which has a small chance of producing errors of grammar and possibly content. I have made every reasonable attempt to find and correct any obvious errors, but expect that some may not be found prior to finalization of this note.

## 2023-08-14 NOTE — PROGRESS NOTES
"    No acute changes overnight   MRI reviewed and discussed with hospitalist    BP (!) 141/66   Pulse 63   Temp 36.2 °C (97.2 °F) (Temporal)   Resp 18   Ht 1.829 m (6' 0.01\")   Wt 125 kg (274 lb 11.1 oz)   SpO2 90%   BMI 37.25 kg/m²     Mild scleral icterus but no obvious jaundice  Abdomen soft, obese no concerning scars or hernias  Minimal right upper quadrant tenderness to deep palpation    Recent Labs     08/12/23 0100 08/13/23 0011 08/14/23 0347   WBC 5.3 6.3 5.0   RBC 4.00* 3.73* 3.85*   HEMOGLOBIN 13.2* 12.3* 12.9*   HEMATOCRIT 38.8* 36.2* 36.3*   MCV 97.0 97.1 94.3   MCH 33.0 33.0 33.5*   RDW 53.5* 52.3* 49.5   PLATELETCT 124* 129* 125*   MPV 9.3 9.4 8.8*   NEUTSPOLYS 62.40 71.00 63.60   LYMPHOCYTES 21.80* 15.30* 20.90*   MONOCYTES 11.60 8.80 10.70   EOSINOPHILS 3.20 3.70 3.60   BASOPHILS 0.60 0.60 0.80     Recent Labs     08/12/23 0100 08/13/23 0011 08/14/23 0347   SODIUM 140 140 143   POTASSIUM 4.3 3.6 3.9   CHLORIDE 105 107 109   CO2 24 23 24   GLUCOSE 81 124* 87   BUN 14 14 10   CREATININE 1.17 1.31 1.06     Recent Labs     08/12/23 0100 08/13/23 0011 08/13/23 2004 08/14/23  0347   ASTSGOT 207* 92*  --  58*   ALTSGPT 291* 188*  --  134*   TBILIRUBIN 3.3* 1.2  --  1.2   ALKPHOSPHAT 327* 244*  --  202*   GLOBULIN 2.3 1.9  --  2.1   INR  --   --  1.11  --      Assessment and plan:  72-year-old male with multiple medical comorbidities who presents with likely cholecystitis and possible common duct stone.  Labs are improving but MRI was inconclusive secondary to artifact from neighboring aortic stent  GI plans ERCP today  We will schedule patient for cholecystectomy tomorrow if there are no signs of post ERCP pancreatitis    "

## 2023-08-14 NOTE — PROGRESS NOTES
Hospital Medicine Daily Progress Note    Date of Service  8/14/2023    Chief Complaint  Xavier Richardson is a 72 y.o. male admitted 8/12/2023 with RUQ pain    Hospital Course  Xavier Richardson is a 72 y.o. male who presented 8/12/2023 with RUQ abdominal pain.  Patient has history of essential hypertension, JESUS MANUEL, peripheral neuropathy, dissection of thoracoabdominal aorta with grafts, A-fib.    Patient reports a progressively worsening dull abdominal pain is epigastric and right upper quadrant area.  Associate with nausea without any episodes of vomiting.  Denies fever chills.  Due to worsening symptoms, decision was made to come to ER for evaluation.  He states that morphine has alleviated his pain.     In ER, patient found to have bradycardia. Found to have labs of , , ALK-P 327, total bilirubin 3.3. Lipase wnl.  Right upper quadrant ultrasound showing cholelithiasis with acute cholecystitis.  Dilation of the common bile duct is seen.  General surgery was consulted, recommends MRCP followed by GI consult for ERCP eval if indicated.  Patient admitted to hospitalist service for management of acute cholecystitis, pain control.     Interval Problem Update  8/12/2023: Plan is to have MRCP, hopefully sometime today.  On Zosyn.  History of aortic dissection, BP controlled.  States pain is under control.  No fevers or chills.  Trend labs, monitor vitals, await imaging..    8/13 proving LFTs, pending MRCP, will consult GI as needed  Surgery following  Reports minimal abdominal discomfort, no nausea    8/14 improving LFTs, MRCP with choledocholithiasis and CBD dilatation  Patient reports mild abdominal pain, afebrile  Feels better  Denies nausea  Lethargy  N.p.o. for ERCP today    I have discussed this patient's plan of care and discharge plan at IDT rounds today with Case Management, Nursing, Nursing leadership, and other members of the IDT team.    Consultants/Specialty  general surgery  GI    Code Status  DNAR, I  OK    Disposition  The patient is not medically cleared for discharge to home or a post-acute facility.      I have placed the appropriate orders for post-discharge needs.    Review of Systems  Review of Systems   Constitutional:  Positive for malaise/fatigue. Negative for fever.   HENT:  Negative for sinus pain and sore throat.    Respiratory:  Negative for cough and shortness of breath.    Cardiovascular:  Negative for chest pain.   Gastrointestinal:  Positive for abdominal pain. Negative for diarrhea, heartburn, nausea and vomiting.   Genitourinary:  Negative for dysuria and flank pain.   Musculoskeletal:  Negative for myalgias.   Skin:  Negative for rash.   Neurological:  Positive for weakness. Negative for dizziness, sensory change, speech change and focal weakness.   Psychiatric/Behavioral:  Negative for depression and memory loss. The patient is not nervous/anxious.         Physical Exam  Temp:  [36.2 °C (97.2 °F)-36.8 °C (98.2 °F)] 36.2 °C (97.2 °F)  Pulse:  [62-66] 63  Resp:  [18] 18  BP: (100-148)/(48-67) 141/66  SpO2:  [90 %-93 %] 90 %    Physical Exam  Constitutional:       General: He is not in acute distress.     Appearance: Normal appearance. He is obese. He is not ill-appearing or diaphoretic.   HENT:      Head: Normocephalic.      Nose: Nose normal.      Mouth/Throat:      Mouth: Mucous membranes are moist.   Eyes:      General: No scleral icterus.     Extraocular Movements: Extraocular movements intact.      Pupils: Pupils are equal, round, and reactive to light.   Cardiovascular:      Rate and Rhythm: Normal rate and regular rhythm.      Pulses: Normal pulses.   Pulmonary:      Breath sounds: Normal breath sounds.   Abdominal:      General: Abdomen is flat. There is no distension.      Palpations: Abdomen is soft. There is no mass.      Tenderness: There is abdominal tenderness.      Hernia: No hernia is present.      Comments: Diffuse mild tenderness of abdomen  Very tender RUQ   Musculoskeletal:          General: Normal range of motion.      Cervical back: Neck supple.   Skin:     General: Skin is warm.      Coloration: Skin is not jaundiced or pale.   Neurological:      General: No focal deficit present.      Mental Status: He is alert and oriented to person, place, and time. Mental status is at baseline.      Sensory: No sensory deficit.      Motor: Weakness present.      Coordination: Coordination normal.   Psychiatric:         Mood and Affect: Mood normal.         Behavior: Behavior normal.         Fluids    Intake/Output Summary (Last 24 hours) at 8/14/2023 1334  Last data filed at 8/14/2023 0930  Gross per 24 hour   Intake --   Output 1600 ml   Net -1600 ml       Laboratory  Recent Labs     08/12/23  0100 08/13/23  0011 08/14/23  0347   WBC 5.3 6.3 5.0   RBC 4.00* 3.73* 3.85*   HEMOGLOBIN 13.2* 12.3* 12.9*   HEMATOCRIT 38.8* 36.2* 36.3*   MCV 97.0 97.1 94.3   MCH 33.0 33.0 33.5*   MCHC 34.0 34.0 35.5   RDW 53.5* 52.3* 49.5   PLATELETCT 124* 129* 125*   MPV 9.3 9.4 8.8*     Recent Labs     08/12/23  0100 08/13/23  0011 08/14/23  0347   SODIUM 140 140 143   POTASSIUM 4.3 3.6 3.9   CHLORIDE 105 107 109   CO2 24 23 24   GLUCOSE 81 124* 87   BUN 14 14 10   CREATININE 1.17 1.31 1.06   CALCIUM 9.5 8.6 9.2     Recent Labs     08/13/23 2004   INR 1.11               Imaging  [id number]   Final Result      1.  Cholelithiasis. Cholecystitis is not excluded.   2.  Biliary dilatation with possible choledocholithiasis. The distal common bile duct is largely obscured by streak artifact from the patient's aortic stent graft.      US-RUQ   Final Result      1.  Cholelithiasis with acute cholecystitis.      2.  Dilatation of the common duct, and the possibility of distal ductal obstruction should be considered.      3.  6 cm right upper pole renal cyst.      [id number] STONE REMOVAL    (Results Pending)        Assessment/Plan  * Choledocholithiasis with acute cholecystitis  Assessment & Plan   presents for  right upper quadrant abdominal pain.    + transaminitis.    RUQ US with acute cholecystitis.   Patient started on Zosyn at outside facility.    General surgery on board, no acute intervention yet.    Obtain MRCP.    Continue Zosyn, fluids, zofran, morphine.   Monitor vitals closely while on IV narcotics.    8/13 cont zosyn  For mrcp, npo after MN,   GI consult if +  Surgery  Following  Improving lft  Add oxy prn    8/14 improving LFTs  MRCP with choledocholithiasis and CBD dilatation  GI consulted, plan for ERCP today  Per surgery, for laparoscopic cholecystectomy on August 15  N.p.o.  Continue Zosyn    ACP (advance care planning)  Assessment & Plan  16 minutes spent discussing goals of care with patient.  When asked about CODE STATUS, he states that he would like to be DNR with trial intubation.  He is okay with all other aggressive measures.    Dyslipidemia- (present on admission)  Assessment & Plan  Hold statin for now due to transaminitis    Dissection of thoracoabdominal aorta (HCC)- (present on admission)  Assessment & Plan  S/P hypothermic protocol and repair at [facility]    Essential hypertension- (present on admission)  Assessment & Plan  On home norvasc, coreg, cozaar, spironolactone  Restart home lasix if needed    8/14 EKG was sinus bradycardia, medically optimized for planned procedure    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)  Assessment & Plan  Hx cortical blindness post CVA  On fenofibrate, holding statin            VTE prophylaxis:   SCDs/TEDs      I have performed a physical exam and reviewed and updated ROS and Plan today (8/14/2023). In review of yesterday's note (8/13/2023), there are no changes except as documented above.

## 2023-08-14 NOTE — CARE PLAN
Problem: Knowledge Deficit - Standard  Goal: Patient and family/care givers will demonstrate understanding of plan of care, disease process/condition, diagnostic tests and medications  Outcome: Progressing     Problem: Pain - Standard  Goal: Alleviation of pain or a reduction in pain to the patient’s comfort goal  Outcome: Progressing   The patient is Stable - Low risk of patient condition declining or worsening    Shift Goals  Clinical Goals: Pain management  Patient Goals: rest, comfort  Family Goals: NA    Progress made toward(s) clinical / shift goals: Patient resting, bed is in lowest position, call bell within reach of patient.    Patient is not progressing towards the following goals:

## 2023-08-15 ENCOUNTER — ANESTHESIA EVENT (OUTPATIENT)
Dept: SURGERY | Facility: MEDICAL CENTER | Age: 73
DRG: 418 | End: 2023-08-15
Payer: MEDICARE

## 2023-08-15 ENCOUNTER — APPOINTMENT (OUTPATIENT)
Dept: RADIOLOGY | Facility: MEDICAL CENTER | Age: 73
DRG: 418 | End: 2023-08-15
Attending: SURGERY
Payer: MEDICARE

## 2023-08-15 ENCOUNTER — ANESTHESIA (OUTPATIENT)
Dept: SURGERY | Facility: MEDICAL CENTER | Age: 73
DRG: 418 | End: 2023-08-15
Payer: MEDICARE

## 2023-08-15 PROBLEM — Z98.890 STATUS POST AORTIC DISSECTION REPAIR: Status: ACTIVE | Noted: 2023-08-15

## 2023-08-15 LAB
ALBUMIN SERPL BCP-MCNC: 4.2 G/DL (ref 3.2–4.9)
ALBUMIN/GLOB SERPL: 1.9 G/DL
ALP SERPL-CCNC: 176 U/L (ref 30–99)
ALT SERPL-CCNC: 100 U/L (ref 2–50)
ANION GAP SERPL CALC-SCNC: 13 MMOL/L (ref 7–16)
AST SERPL-CCNC: 44 U/L (ref 12–45)
BASOPHILS # BLD AUTO: 0.6 % (ref 0–1.8)
BASOPHILS # BLD: 0.03 K/UL (ref 0–0.12)
BILIRUB SERPL-MCNC: 1.3 MG/DL (ref 0.1–1.5)
BUN SERPL-MCNC: 14 MG/DL (ref 8–22)
CALCIUM ALBUM COR SERPL-MCNC: 9.3 MG/DL (ref 8.5–10.5)
CALCIUM SERPL-MCNC: 9.5 MG/DL (ref 8.5–10.5)
CHLORIDE SERPL-SCNC: 105 MMOL/L (ref 96–112)
CO2 SERPL-SCNC: 22 MMOL/L (ref 20–33)
CREAT SERPL-MCNC: 0.97 MG/DL (ref 0.5–1.4)
EOSINOPHIL # BLD AUTO: 0.17 K/UL (ref 0–0.51)
EOSINOPHIL NFR BLD: 3.2 % (ref 0–6.9)
ERYTHROCYTE [DISTWIDTH] IN BLOOD BY AUTOMATED COUNT: 49.6 FL (ref 35.9–50)
GFR SERPLBLD CREATININE-BSD FMLA CKD-EPI: 81 ML/MIN/1.73 M 2
GLOBULIN SER CALC-MCNC: 2.2 G/DL (ref 1.9–3.5)
GLUCOSE SERPL-MCNC: 96 MG/DL (ref 65–99)
HCT VFR BLD AUTO: 37.1 % (ref 42–52)
HGB BLD-MCNC: 12.9 G/DL (ref 14–18)
IMM GRANULOCYTES # BLD AUTO: 0.02 K/UL (ref 0–0.11)
IMM GRANULOCYTES NFR BLD AUTO: 0.4 % (ref 0–0.9)
LYMPHOCYTES # BLD AUTO: 1.22 K/UL (ref 1–4.8)
LYMPHOCYTES NFR BLD: 23.2 % (ref 22–41)
MCH RBC QN AUTO: 33.2 PG (ref 27–33)
MCHC RBC AUTO-ENTMCNC: 34.8 G/DL (ref 32.3–36.5)
MCV RBC AUTO: 95.4 FL (ref 81.4–97.8)
MONOCYTES # BLD AUTO: 0.6 K/UL (ref 0–0.85)
MONOCYTES NFR BLD AUTO: 11.4 % (ref 0–13.4)
NEUTROPHILS # BLD AUTO: 3.21 K/UL (ref 1.82–7.42)
NEUTROPHILS NFR BLD: 61.2 % (ref 44–72)
NRBC # BLD AUTO: 0 K/UL
NRBC BLD-RTO: 0 /100 WBC (ref 0–0.2)
PATHOLOGY CONSULT NOTE: NORMAL
PLATELET # BLD AUTO: 135 K/UL (ref 164–446)
PMV BLD AUTO: 9.1 FL (ref 9–12.9)
POTASSIUM SERPL-SCNC: 3.5 MMOL/L (ref 3.6–5.5)
PROT SERPL-MCNC: 6.4 G/DL (ref 6–8.2)
RBC # BLD AUTO: 3.89 M/UL (ref 4.7–6.1)
SODIUM SERPL-SCNC: 140 MMOL/L (ref 135–145)
WBC # BLD AUTO: 5.3 K/UL (ref 4.8–10.8)

## 2023-08-15 PROCEDURE — 160035 HCHG PACU - 1ST 60 MINS PHASE I: Performed by: SURGERY

## 2023-08-15 PROCEDURE — 770006 HCHG ROOM/CARE - MED/SURG/GYN SEMI*

## 2023-08-15 PROCEDURE — 700102 HCHG RX REV CODE 250 W/ 637 OVERRIDE(OP): Performed by: NURSE PRACTITIONER

## 2023-08-15 PROCEDURE — 700102 HCHG RX REV CODE 250 W/ 637 OVERRIDE(OP): Performed by: ANESTHESIOLOGY

## 2023-08-15 PROCEDURE — 160041 HCHG SURGERY MINUTES - EA ADDL 1 MIN LEVEL 4: Performed by: SURGERY

## 2023-08-15 PROCEDURE — 47563 LAPARO CHOLECYSTECTOMY/GRAPH: CPT | Performed by: SURGERY

## 2023-08-15 PROCEDURE — 700105 HCHG RX REV CODE 258: Performed by: STUDENT IN AN ORGANIZED HEALTH CARE EDUCATION/TRAINING PROGRAM

## 2023-08-15 PROCEDURE — 99233 SBSQ HOSP IP/OBS HIGH 50: CPT | Performed by: STUDENT IN AN ORGANIZED HEALTH CARE EDUCATION/TRAINING PROGRAM

## 2023-08-15 PROCEDURE — 160009 HCHG ANES TIME/MIN: Performed by: SURGERY

## 2023-08-15 PROCEDURE — 85025 COMPLETE CBC W/AUTO DIFF WBC: CPT

## 2023-08-15 PROCEDURE — 80053 COMPREHEN METABOLIC PANEL: CPT

## 2023-08-15 PROCEDURE — 110371 HCHG SHELL REV 272: Performed by: SURGERY

## 2023-08-15 PROCEDURE — 700101 HCHG RX REV CODE 250: Performed by: ANESTHESIOLOGY

## 2023-08-15 PROCEDURE — A9270 NON-COVERED ITEM OR SERVICE: HCPCS | Performed by: NURSE PRACTITIONER

## 2023-08-15 PROCEDURE — 160048 HCHG OR STATISTICAL LEVEL 1-5: Performed by: SURGERY

## 2023-08-15 PROCEDURE — A9270 NON-COVERED ITEM OR SERVICE: HCPCS | Performed by: ANESTHESIOLOGY

## 2023-08-15 PROCEDURE — 160029 HCHG SURGERY MINUTES - 1ST 30 MINS LEVEL 4: Performed by: SURGERY

## 2023-08-15 PROCEDURE — 700102 HCHG RX REV CODE 250 W/ 637 OVERRIDE(OP): Performed by: STUDENT IN AN ORGANIZED HEALTH CARE EDUCATION/TRAINING PROGRAM

## 2023-08-15 PROCEDURE — 160002 HCHG RECOVERY MINUTES (STAT): Performed by: SURGERY

## 2023-08-15 PROCEDURE — 36415 COLL VENOUS BLD VENIPUNCTURE: CPT

## 2023-08-15 PROCEDURE — 700105 HCHG RX REV CODE 258: Performed by: INTERNAL MEDICINE

## 2023-08-15 PROCEDURE — 700111 HCHG RX REV CODE 636 W/ 250 OVERRIDE (IP): Performed by: ANESTHESIOLOGY

## 2023-08-15 PROCEDURE — A9270 NON-COVERED ITEM OR SERVICE: HCPCS | Performed by: STUDENT IN AN ORGANIZED HEALTH CARE EDUCATION/TRAINING PROGRAM

## 2023-08-15 PROCEDURE — 700105 HCHG RX REV CODE 258: Mod: JZ | Performed by: ANESTHESIOLOGY

## 2023-08-15 PROCEDURE — 700111 HCHG RX REV CODE 636 W/ 250 OVERRIDE (IP): Mod: JZ | Performed by: STUDENT IN AN ORGANIZED HEALTH CARE EDUCATION/TRAINING PROGRAM

## 2023-08-15 PROCEDURE — 88304 TISSUE EXAM BY PATHOLOGIST: CPT

## 2023-08-15 PROCEDURE — 74300 X-RAY BILE DUCTS/PANCREAS: CPT

## 2023-08-15 PROCEDURE — 700117 HCHG RX CONTRAST REV CODE 255: Performed by: SURGERY

## 2023-08-15 PROCEDURE — 700101 HCHG RX REV CODE 250: Performed by: SURGERY

## 2023-08-15 RX ORDER — HYDROMORPHONE HYDROCHLORIDE 1 MG/ML
0.4 INJECTION, SOLUTION INTRAMUSCULAR; INTRAVENOUS; SUBCUTANEOUS
Status: DISCONTINUED | OUTPATIENT
Start: 2023-08-15 | End: 2023-08-15 | Stop reason: HOSPADM

## 2023-08-15 RX ORDER — HALOPERIDOL 5 MG/ML
1 INJECTION INTRAMUSCULAR
Status: DISCONTINUED | OUTPATIENT
Start: 2023-08-15 | End: 2023-08-15 | Stop reason: HOSPADM

## 2023-08-15 RX ORDER — CEFAZOLIN SODIUM 1 G/3ML
INJECTION, POWDER, FOR SOLUTION INTRAMUSCULAR; INTRAVENOUS PRN
Status: DISCONTINUED | OUTPATIENT
Start: 2023-08-15 | End: 2023-08-15 | Stop reason: SURG

## 2023-08-15 RX ORDER — ONDANSETRON 2 MG/ML
4 INJECTION INTRAMUSCULAR; INTRAVENOUS
Status: DISCONTINUED | OUTPATIENT
Start: 2023-08-15 | End: 2023-08-15 | Stop reason: HOSPADM

## 2023-08-15 RX ORDER — HYDRALAZINE HYDROCHLORIDE 20 MG/ML
5 INJECTION INTRAMUSCULAR; INTRAVENOUS
Status: DISCONTINUED | OUTPATIENT
Start: 2023-08-15 | End: 2023-08-15 | Stop reason: HOSPADM

## 2023-08-15 RX ORDER — SODIUM CHLORIDE, SODIUM LACTATE, POTASSIUM CHLORIDE, CALCIUM CHLORIDE 600; 310; 30; 20 MG/100ML; MG/100ML; MG/100ML; MG/100ML
INJECTION, SOLUTION INTRAVENOUS
Status: DISCONTINUED | OUTPATIENT
Start: 2023-08-15 | End: 2023-08-15 | Stop reason: SURG

## 2023-08-15 RX ORDER — HYDROMORPHONE HYDROCHLORIDE 1 MG/ML
0.1 INJECTION, SOLUTION INTRAMUSCULAR; INTRAVENOUS; SUBCUTANEOUS
Status: DISCONTINUED | OUTPATIENT
Start: 2023-08-15 | End: 2023-08-15 | Stop reason: HOSPADM

## 2023-08-15 RX ORDER — SIMETHICONE 125 MG
125 TABLET,CHEWABLE ORAL ONCE
Status: COMPLETED | OUTPATIENT
Start: 2023-08-15 | End: 2023-08-15

## 2023-08-15 RX ORDER — HYDROMORPHONE HYDROCHLORIDE 1 MG/ML
0.2 INJECTION, SOLUTION INTRAMUSCULAR; INTRAVENOUS; SUBCUTANEOUS
Status: DISCONTINUED | OUTPATIENT
Start: 2023-08-15 | End: 2023-08-15 | Stop reason: HOSPADM

## 2023-08-15 RX ORDER — SODIUM CHLORIDE, SODIUM LACTATE, POTASSIUM CHLORIDE, CALCIUM CHLORIDE 600; 310; 30; 20 MG/100ML; MG/100ML; MG/100ML; MG/100ML
INJECTION, SOLUTION INTRAVENOUS CONTINUOUS
Status: DISCONTINUED | OUTPATIENT
Start: 2023-08-15 | End: 2023-08-15 | Stop reason: HOSPADM

## 2023-08-15 RX ORDER — LABETALOL HYDROCHLORIDE 5 MG/ML
5 INJECTION, SOLUTION INTRAVENOUS
Status: DISCONTINUED | OUTPATIENT
Start: 2023-08-15 | End: 2023-08-15 | Stop reason: HOSPADM

## 2023-08-15 RX ORDER — MIDAZOLAM HYDROCHLORIDE 1 MG/ML
1 INJECTION INTRAMUSCULAR; INTRAVENOUS
Status: DISCONTINUED | OUTPATIENT
Start: 2023-08-15 | End: 2023-08-15 | Stop reason: HOSPADM

## 2023-08-15 RX ORDER — LIDOCAINE HYDROCHLORIDE 20 MG/ML
INJECTION, SOLUTION EPIDURAL; INFILTRATION; INTRACAUDAL; PERINEURAL PRN
Status: DISCONTINUED | OUTPATIENT
Start: 2023-08-15 | End: 2023-08-15 | Stop reason: SURG

## 2023-08-15 RX ORDER — MEPERIDINE HYDROCHLORIDE 25 MG/ML
12.5 INJECTION INTRAMUSCULAR; INTRAVENOUS; SUBCUTANEOUS
Status: DISCONTINUED | OUTPATIENT
Start: 2023-08-15 | End: 2023-08-15 | Stop reason: HOSPADM

## 2023-08-15 RX ORDER — OXYCODONE HCL 5 MG/5 ML
10 SOLUTION, ORAL ORAL
Status: COMPLETED | OUTPATIENT
Start: 2023-08-15 | End: 2023-08-15

## 2023-08-15 RX ORDER — EPHEDRINE SULFATE 50 MG/ML
5 INJECTION, SOLUTION INTRAVENOUS
Status: DISCONTINUED | OUTPATIENT
Start: 2023-08-15 | End: 2023-08-15 | Stop reason: HOSPADM

## 2023-08-15 RX ORDER — IPRATROPIUM BROMIDE AND ALBUTEROL SULFATE 2.5; .5 MG/3ML; MG/3ML
3 SOLUTION RESPIRATORY (INHALATION)
Status: DISCONTINUED | OUTPATIENT
Start: 2023-08-15 | End: 2023-08-15 | Stop reason: HOSPADM

## 2023-08-15 RX ORDER — POTASSIUM CHLORIDE 20 MEQ/1
40 TABLET, EXTENDED RELEASE ORAL ONCE
Status: ACTIVE | OUTPATIENT
Start: 2023-08-15 | End: 2023-08-16

## 2023-08-15 RX ORDER — BUPIVACAINE HYDROCHLORIDE AND EPINEPHRINE 5; 5 MG/ML; UG/ML
INJECTION, SOLUTION EPIDURAL; INTRACAUDAL; PERINEURAL
Status: DISCONTINUED | OUTPATIENT
Start: 2023-08-15 | End: 2023-08-15 | Stop reason: HOSPADM

## 2023-08-15 RX ORDER — DIPHENHYDRAMINE HYDROCHLORIDE 50 MG/ML
12.5 INJECTION INTRAMUSCULAR; INTRAVENOUS
Status: DISCONTINUED | OUTPATIENT
Start: 2023-08-15 | End: 2023-08-15 | Stop reason: HOSPADM

## 2023-08-15 RX ORDER — OXYCODONE HCL 5 MG/5 ML
5 SOLUTION, ORAL ORAL
Status: COMPLETED | OUTPATIENT
Start: 2023-08-15 | End: 2023-08-15

## 2023-08-15 RX ADMIN — LOSARTAN POTASSIUM 25 MG: 25 TABLET, FILM COATED ORAL at 05:08

## 2023-08-15 RX ADMIN — AMLODIPINE BESYLATE 10 MG: 10 TABLET ORAL at 05:08

## 2023-08-15 RX ADMIN — GABAPENTIN 400 MG: 400 CAPSULE ORAL at 17:43

## 2023-08-15 RX ADMIN — TAMSULOSIN HYDROCHLORIDE 0.4 MG: 0.4 CAPSULE ORAL at 05:08

## 2023-08-15 RX ADMIN — ROCURONIUM BROMIDE 50 MG: 10 INJECTION, SOLUTION INTRAVENOUS at 11:00

## 2023-08-15 RX ADMIN — SPIRONOLACTONE 25 MG: 25 TABLET ORAL at 05:08

## 2023-08-15 RX ADMIN — OXYCODONE HYDROCHLORIDE 5 MG: 5 SOLUTION ORAL at 12:49

## 2023-08-15 RX ADMIN — FENTANYL CITRATE 50 MCG: 50 INJECTION, SOLUTION INTRAMUSCULAR; INTRAVENOUS at 12:36

## 2023-08-15 RX ADMIN — LIDOCAINE HYDROCHLORIDE 100 MG: 20 INJECTION, SOLUTION EPIDURAL; INFILTRATION; INTRACAUDAL at 11:00

## 2023-08-15 RX ADMIN — ACETAMINOPHEN 650 MG: 325 TABLET, FILM COATED ORAL at 19:52

## 2023-08-15 RX ADMIN — FENTANYL CITRATE 50 MCG: 50 INJECTION, SOLUTION INTRAMUSCULAR; INTRAVENOUS at 12:25

## 2023-08-15 RX ADMIN — FENTANYL CITRATE 100 MCG: 50 INJECTION, SOLUTION INTRAMUSCULAR; INTRAVENOUS at 11:00

## 2023-08-15 RX ADMIN — PROPOFOL 50 MG: 10 INJECTION, EMULSION INTRAVENOUS at 11:03

## 2023-08-15 RX ADMIN — HYDROMORPHONE HYDROCHLORIDE 0.2 MG: 1 INJECTION, SOLUTION INTRAMUSCULAR; INTRAVENOUS; SUBCUTANEOUS at 13:30

## 2023-08-15 RX ADMIN — SODIUM CHLORIDE, POTASSIUM CHLORIDE, SODIUM LACTATE AND CALCIUM CHLORIDE: 600; 310; 30; 20 INJECTION, SOLUTION INTRAVENOUS at 10:55

## 2023-08-15 RX ADMIN — CEFAZOLIN 3 G: 1 INJECTION, POWDER, FOR SOLUTION INTRAMUSCULAR; INTRAVENOUS at 11:06

## 2023-08-15 RX ADMIN — GABAPENTIN 400 MG: 400 CAPSULE ORAL at 09:06

## 2023-08-15 RX ADMIN — CARVEDILOL 25 MG: 25 TABLET, FILM COATED ORAL at 17:43

## 2023-08-15 RX ADMIN — MORPHINE SULFATE 4 MG: 4 INJECTION, SOLUTION INTRAMUSCULAR; INTRAVENOUS at 22:00

## 2023-08-15 RX ADMIN — ACETAMINOPHEN 650 MG: 325 TABLET, FILM COATED ORAL at 05:13

## 2023-08-15 RX ADMIN — FENOFIBRATE 67 MG: 67 CAPSULE ORAL at 05:09

## 2023-08-15 RX ADMIN — PROPOFOL 150 MG: 10 INJECTION, EMULSION INTRAVENOUS at 11:00

## 2023-08-15 RX ADMIN — PIPERACILLIN AND TAZOBACTAM 3.38 G: 3; .375 INJECTION, POWDER, FOR SOLUTION INTRAVENOUS at 19:56

## 2023-08-15 RX ADMIN — FENTANYL CITRATE 50 MCG: 50 INJECTION, SOLUTION INTRAMUSCULAR; INTRAVENOUS at 12:32

## 2023-08-15 RX ADMIN — HYDROMORPHONE HYDROCHLORIDE 0.2 MG: 1 INJECTION, SOLUTION INTRAMUSCULAR; INTRAVENOUS; SUBCUTANEOUS at 13:35

## 2023-08-15 RX ADMIN — SUGAMMADEX 200 MG: 100 INJECTION, SOLUTION INTRAVENOUS at 12:25

## 2023-08-15 RX ADMIN — FINASTERIDE 5 MG: 5 TABLET, FILM COATED ORAL at 17:43

## 2023-08-15 RX ADMIN — HYDROMORPHONE HYDROCHLORIDE 0.2 MG: 1 INJECTION, SOLUTION INTRAMUSCULAR; INTRAVENOUS; SUBCUTANEOUS at 13:00

## 2023-08-15 RX ADMIN — SODIUM CHLORIDE: 9 INJECTION, SOLUTION INTRAVENOUS at 08:57

## 2023-08-15 RX ADMIN — CARVEDILOL 25 MG: 25 TABLET, FILM COATED ORAL at 07:35

## 2023-08-15 RX ADMIN — SODIUM CHLORIDE, POTASSIUM CHLORIDE, SODIUM LACTATE AND CALCIUM CHLORIDE: 600; 310; 30; 20 INJECTION, SOLUTION INTRAVENOUS at 11:56

## 2023-08-15 RX ADMIN — HYDROMORPHONE HYDROCHLORIDE 0.2 MG: 1 INJECTION, SOLUTION INTRAMUSCULAR; INTRAVENOUS; SUBCUTANEOUS at 12:49

## 2023-08-15 RX ADMIN — GABAPENTIN 400 MG: 400 CAPSULE ORAL at 19:52

## 2023-08-15 RX ADMIN — SIMETHICONE 125 MG: 125 TABLET, CHEWABLE ORAL at 21:28

## 2023-08-15 RX ADMIN — PIPERACILLIN AND TAZOBACTAM 3.38 G: 3; .375 INJECTION, POWDER, FOR SOLUTION INTRAVENOUS at 05:10

## 2023-08-15 RX ADMIN — ROCURONIUM BROMIDE 20 MG: 10 INJECTION, SOLUTION INTRAVENOUS at 11:55

## 2023-08-15 ASSESSMENT — PAIN DESCRIPTION - PAIN TYPE
TYPE: SURGICAL PAIN
TYPE: SURGICAL PAIN
TYPE: ACUTE PAIN
TYPE: SURGICAL PAIN

## 2023-08-15 ASSESSMENT — ENCOUNTER SYMPTOMS
FEVER: 0
ABDOMINAL PAIN: 0
VOMITING: 0
SHORTNESS OF BREATH: 0
MYALGIAS: 0
SORE THROAT: 0
SPEECH CHANGE: 0
DIZZINESS: 0
SINUS PAIN: 0
NERVOUS/ANXIOUS: 0
FLANK PAIN: 0
DIARRHEA: 0
WEAKNESS: 1
NAUSEA: 0
FOCAL WEAKNESS: 0
DEPRESSION: 0
SENSORY CHANGE: 0
MEMORY LOSS: 0
COUGH: 0
HEARTBURN: 0

## 2023-08-15 ASSESSMENT — COPD QUESTIONNAIRES
DO YOU EVER COUGH UP ANY MUCUS OR PHLEGM?: NO/ONLY WITH OCCASIONAL COLDS OR INFECTIONS
COPD SCREENING SCORE: 3
DURING THE PAST 4 WEEKS HOW MUCH DID YOU FEEL SHORT OF BREATH: SOME OF THE TIME
HAVE YOU SMOKED AT LEAST 100 CIGARETTES IN YOUR ENTIRE LIFE: NO/DON'T KNOW

## 2023-08-15 NOTE — OR NURSING
1240: Pt arrived from OR, handoff received from anesthesiologist and RN. Patient drowsy, ,moving all extremities. On 4L via mask. Vitals stable. Four lap sites to abdomen with derma bond, C/D/I.     1245: Medicated for pain per MAR.     1300: Continuing to medicate for pain. Patient tolerating sips of water.     1315: Placed on 3L via nasal cannula. Patient resting.     1330: Medicated for pain. Patient declines update to [facility], states he will see them     1345: Patient states pain tolerable. Surgical sites remains C/D/I. Report given to S5 RN, Elizabeth.

## 2023-08-15 NOTE — PROGRESS NOTES
Assumed care of patient 0700. Received Report from [facility] nurse. Patient A&O X 4, on RA, Reporting a pain level of 0. Call light within reach, belongings within reach, Fall precautions in place, and bed alarm is on and bed in lowest position. Patient does not have any other needs at this time.

## 2023-08-15 NOTE — PROGRESS NOTES
Hospital Medicine Daily Progress Note    Date of Service  8/15/2023    Chief Complaint  Xavier Richardson is a 72 y.o. male admitted 8/12/2023 with RUQ pain    Hospital Course  Xavier Richardson is a 72 y.o. male who presented 8/12/2023 with RUQ abdominal pain.  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    Patient reported a progressively worsening dull abdominal pain in epigastric and right upper quadrant area.  Associated with nausea no vomiting. Due to worsening symptoms he came to the ER for evaluation.    In ER, patient found to have bradycardia. Found to have labs of , , ALK-P 327, total bilirubin 3.3. Lipase wnl.  Right upper quadrant ultrasound showing cholelithiasis with acute cholecystitis.  Dilation of the common bile duct was seen.  General surgery was consulted and recommended  MRCP followed by GI consult for ERCP eval if indicated.  He was started on zosyn and admitted for further evaluation.     He underwent MCRP which showed choledocholithiasis and CBD dilatation however was limited due to artifact from graft in close proximity. There was plan for ERCP however after further discussion that was cancelled and he will be taken back for planned lap chon with intraoperative cholangiogram.     Interval Problem Update    Patient seen and examined. Denies abdominal pain, nausea or vomiting. Is on 2L of NC   - vitals stable, wean O2, IS ordered   - LFTs continue to improve and T.bili is normalized   - discussed with GI/Surgery   - to OR this AM, will continue abx pending surgery   - K low, I have ordered replacement       I have discussed this patient's plan of care and discharge plan at IDT rounds today with Case Management, Nursing, Nursing leadership, and other members of the IDT team.    Consultants/Specialty  general surgery  GI    Code Status  DNAR, I OK    Disposition  The patient is not medically  cleared for discharge to home or a post-acute facility.  Anticipate discharge to: home with close outpatient follow-up    I have placed the appropriate orders for post-discharge needs.    Review of Systems  Review of Systems   Constitutional:  Positive for malaise/fatigue. Negative for fever.   HENT:  Negative for sinus pain and sore throat.    Respiratory:  Negative for cough and shortness of breath.    Cardiovascular:  Negative for chest pain.   Gastrointestinal:  Negative for abdominal pain, diarrhea, heartburn, nausea and vomiting.   Genitourinary:  Negative for dysuria and flank pain.   Musculoskeletal:  Negative for myalgias.   Skin:  Negative for rash.   Neurological:  Positive for weakness. Negative for dizziness, sensory change, speech change and focal weakness.   Psychiatric/Behavioral:  Negative for depression and memory loss. The patient is not nervous/anxious.         Physical Exam  Temp:  [36.1 °C (97 °F)-36.6 °C (97.9 °F)] 36.3 °C (97.4 °F)  Pulse:  [60-80] 80  Resp:  [17-20] 20  BP: (123-161)/(59-73) 151/67  SpO2:  [95 %-97 %] 96 %    Physical Exam  Constitutional:       General: He is not in acute distress.     Appearance: Normal appearance. He is obese. He is not ill-appearing or diaphoretic.   HENT:      Head: Normocephalic.      Nose: Nose normal.      Mouth/Throat:      Mouth: Mucous membranes are moist.   Eyes:      General: No scleral icterus.     Extraocular Movements: Extraocular movements intact.      Pupils: Pupils are equal, round, and reactive to light.   Cardiovascular:      Rate and Rhythm: Normal rate and regular rhythm.      Pulses: Normal pulses.   Pulmonary:      Breath sounds: Normal breath sounds.   Abdominal:      General: Abdomen is flat. There is no distension.      Palpations: Abdomen is soft. There is no mass.      Tenderness: There is no abdominal tenderness.      Hernia: No hernia is present.      Comments: Tenderness on deep palpitation in RUQ otherwise much improved     Musculoskeletal:         General: Normal range of motion.      Cervical back: Neck supple.   Skin:     General: Skin is warm.      Coloration: Skin is not jaundiced or pale.   Neurological:      General: No focal deficit present.      Mental Status: He is alert and oriented to person, place, and time. Mental status is at baseline.      Sensory: No sensory deficit.      Motor: Weakness present.      Coordination: Coordination normal.   Psychiatric:         Mood and Affect: Mood normal.         Behavior: Behavior normal.         Fluids  No intake or output data in the 24 hours ending 08/15/23 1023      Laboratory  Recent Labs     08/13/23  0011 08/14/23  0347 08/15/23  0039   WBC 6.3 5.0 5.3   RBC 3.73* 3.85* 3.89*   HEMOGLOBIN 12.3* 12.9* 12.9*   HEMATOCRIT 36.2* 36.3* 37.1*   MCV 97.1 94.3 95.4   MCH 33.0 33.5* 33.2*   MCHC 34.0 35.5 34.8   RDW 52.3* 49.5 49.6   PLATELETCT 129* 125* 135*   MPV 9.4 8.8* 9.1     Recent Labs     08/13/23  0011 08/14/23 0347 08/15/23  0039   SODIUM 140 143 140   POTASSIUM 3.6 3.9 3.5*   CHLORIDE 107 109 105   CO2 23 24 22   GLUCOSE 124* 87 96   BUN 14 10 14   CREATININE 1.31 1.06 0.97   CALCIUM 8.6 9.2 9.5     Recent Labs     08/13/23 2004   INR 1.11               Imaging  [id number]   Final Result      1.  Cholelithiasis. Cholecystitis is not excluded.   2.  Biliary dilatation with possible choledocholithiasis. The distal common bile duct is largely obscured by streak artifact from the patient's aortic stent graft.      US-RUQ   Final Result      1.  Cholelithiasis with acute cholecystitis.      2.  Dilatation of the common duct, and the possibility of distal ductal obstruction should be considered.      3.  6 cm right upper pole renal cyst.      [id number]    (Results Pending)        Assessment/Plan  * Choledocholithiasis with acute cholecystitis  Assessment & Plan   presents for right upper quadrant abdominal pain.    + transaminitis.    RUQ US with acute  cholecystitis and biliary dilation   Patient started on Zosyn at outside facility, continue pending operative plan   MRCP done, ERCP cancelled, plan for intraoperative cholangiogram during lap chon   General surgery on board, surgery today   Continue Zosyn, fluids, zofran, morphine.   Monitor vitals closely while on IV narcotics.    Improving LFTs  T. Bili is normalized   Post op care     ACP (advance care planning)  Assessment & Plan  16 minutes spent discussing goals of care with patient.  When asked about CODE STATUS, he states that he would like to be DNR with trial intubation.  He is okay with all other aggressive measures.    Dyslipidemia- (present on admission)  Assessment & Plan  Hold statin for now due to transaminitis, will resume on discharge     Dissection of thoracoabdominal aorta (HCC)- (present on admission)  Assessment & Plan  S/P hypothermic protocol and repair at [facility]    JESUS MANUEL (obstructive sleep apnea)- (present on admission)  Assessment & Plan  Chronic, currently on 2L   Wean O2   Nocturnal CPAP     Essential hypertension- (present on admission)  Assessment & Plan  On home norvasc, coreg, cozaar, spironolactone    8/14 EKG was sinus bradycardia, medically optimized for planned procedure  Resume home cardiac medications     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 In review of yesterday's note (8/14/2023), there are no changes except as documented above.      Greater than 51 minutes spent prepping to see patient (e.g. review of tests) obtaining and/or reviewing separately obtained history. Performing a medically appropriate examination and/ evaluation.  Counseling and educating the patient/family/caregiver.  Ordering  medications, tests, or procedures.  Referring and communicating with other health care professionals.  Documenting clinical information in EPIC.  Independently interpreting results and communicating results to patient/family/caregiver.  Care coordination.

## 2023-08-15 NOTE — PROGRESS NOTES
4 Eyes Skin Assessment Completed by Elizabeth RN and Feliberto RN.    Head WDL  Ears WDL  Nose WDL  Mouth WDL  Neck WDL  Breast/Chest WDL  Shoulder Blades WDL  Spine WDL  (R) Arm/Elbow/Hand WDL  (L) Arm/Elbow/Hand WDL  Abdomen Incision 4 lap site with Derma bond, clean, dry, intact.   Groin WDL  Scrotum/Coccyx/Buttocks WDL  (R) Leg WDL  (L) Leg WDL  (R) Heel/Foot/Toe WDL  (L) Heel/Foot/Toe WDL          Devices In Places Nasal Cannula      Interventions In Place N/A    Possible Skin Injury No    Pictures Uploaded Into Epic N/A  Wound Consult Placed N/A  RN Wound Prevention Protocol Ordered Yes

## 2023-08-15 NOTE — PROGRESS NOTES
No acute changes  ERCP deferred for IOC    Consent updated  Procedure discussed with patient including attendant steps, risks and potential complications: Bile leak, bile duct injury, abscess, wound infection, hernia, bowel injury, bleeding and conversion to open procedure.

## 2023-08-15 NOTE — ANESTHESIA POSTPROCEDURE EVALUATION
Patient: Xavier Richardson    Procedure Summary     Date: 08/15/23 Room / Location: [facility] 08 / SURGERY [facility]    Anesthesia Start: 1055 Anesthesia Stop: 1241    Procedure: CHOLECYSTECTOMY, LAPAROSCOPIC WITH INTRAOPERATIVE CHOLANGIOGRAM (Abdomen) Diagnosis: (CHRONIC CHOLECYSTITIS WITH CHOLEDOCHOLITHIASIS)    Surgeons: Efren Guzman D.O. Responsible Provider: Kamron Livingston M.D.    Anesthesia Type: general ASA Status: 3          Final Anesthesia Type: general  Last vitals  BP   Blood Pressure : (!) 151/67    Temp   36.3 °C (97.4 °F)    Pulse   80   Resp   20    SpO2   96 %      Anesthesia Post Evaluation    Patient location during evaluation: PACU  Patient participation: complete - patient participated  Level of consciousness: awake and alert    Airway patency: patent  Anesthetic complications: no  Cardiovascular status: hemodynamically stable  Respiratory status: acceptable  Hydration status: euvolemic    PONV: none          There were no known notable events for this encounter.     Nurse Pain Score: 3 (NPRS)

## 2023-08-15 NOTE — ANESTHESIA PREPROCEDURE EVALUATION
Case: [id number] Date/Time: 08/15/23 1142    Procedure: CHOLECYSTECTOMY, LAPAROSCOPIC    Location: [facility] 08 / SURGERY [facility]    Surgeons: Efren Guzman D.O.          Relevant Problems   ANESTHESIA   (positive) JESUS MANUEL (obstructive sleep apnea)      NEURO   (positive) Stroke (cerebrum) (HCC)      CARDIAC   (positive) A-fib (HCC)   (positive) Dissection of thoracoabdominal aorta (HCC)   (positive) Essential hypertension      Other   (positive) Choledocholithiasis   (positive) Choledocholithiasis with acute cholecystitis   (positive) Dyslipidemia   (positive) Peripheral neuropathy   (positive) Status post aortic dissection repair       Physical Exam    Airway   Mallampati: II  TM distance: >3 FB  Neck ROM: full       Cardiovascular - normal exam  Rhythm: regular  Rate: normal  (-) murmur     Dental - normal exam        Facial Hair   Pulmonary - normal exam  Breath sounds clear to auscultation     Abdominal    Neurological - normal exam                 Anesthesia Plan    ASA 3 (see prob list)   ASA physical status 3 criteria: other (comment)    Plan - general       Airway plan will be ETT          Induction: intravenous    Postoperative Plan: Postoperative administration of opioids is intended.    Pertinent diagnostic labs and testing reviewed    Informed Consent:    Anesthetic plan and risks discussed with patient.    Use of blood products discussed with: patient whom consented to blood products.

## 2023-08-15 NOTE — PROGRESS NOTES
GI update:    Intraoperative cholangiogram positive, patient will require ERCP.    May have clear liquid diet today  N.p.o. at midnight  ERCP tomorrow with Dr. Godoy    ..CHELSIE Ken.

## 2023-08-15 NOTE — CARE PLAN
The patient is Stable - Low risk of patient condition declining or worsening    Shift Goals  Clinical Goals:  (Pain management, ERCP)  Patient Goals:  (Pain management, procedure)  Family Goals: NA    Progress made toward(s) clinical / shift goals:    Problem: Knowledge Deficit - Standard  Goal: Patient and family/care givers will demonstrate understanding of plan of care, disease process/condition, diagnostic tests and medications  Outcome: Progressing     Problem: Pain - Standard  Goal: Alleviation of pain or a reduction in pain to the patient’s comfort goal  Outcome: Progressing       Patient is not progressing towards the following goals:

## 2023-08-15 NOTE — CARE PLAN
Problem: Knowledge Deficit - Standard  Goal: Patient and family/care givers will demonstrate understanding of plan of care, disease process/condition, diagnostic tests and medications  Outcome: Progressing     Problem: Pain - Standard  Goal: Alleviation of pain or a reduction in pain to the patient’s comfort goal  Outcome: Progressing   The patient is Stable - Low risk of patient condition declining or worsening    Shift Goals  Clinical Goals: Pain management  Patient Goals: rest  Family Goals: NA    Progress made toward(s) clinical / shift goals:  Patient resting in bed, pain controlled, bed in lowest position, call bell within reach     Patient is not progressing towards the following goals:

## 2023-08-15 NOTE — PROGRESS NOTES
Report received. Assumed care. Pt in bed sitting up in watching TV. A/O x4. VSS. Responds appropriately. Denies pain, SOB. Assessment complete. Discussed POC, , pt verbalizes understanding. Explained importance of calling before getting OOB. Call light and belongings within reach. Bed alarm on. Bed in the lowest position. Treaded socks in place. Hourly rounding in progress. Will continue to monitor .

## 2023-08-15 NOTE — ANESTHESIA PROCEDURE NOTES
Airway    Date/Time: 8/15/2023 11:02 AM    Performed by: Kamron Lviingston M.D.  Authorized by: Kamron Livingston M.D.    Location:  OR  Urgency:  Elective  Difficult Airway: No    Indications for Airway Management:  Anesthesia      Spontaneous Ventilation: absent    Sedation Level:  Deep  Preoxygenated: Yes    Patient Position:  Sniffing  Mask Difficulty Assessment:  2 - vent by mask + OA or adjuvant +/- NMBA  Final Airway Type:  Endotracheal airway  Final Endotracheal Airway:  ETT  Cuffed: Yes    Technique Used for Successful ETT Placement:  Direct laryngoscopy    Insertion Site:  Oral  Blade Type:  Roa  Laryngoscope Blade/Videolaryngoscope Blade Size:  3  ETT Size (mm):  8.0  Measured from:  Teeth  ETT to Teeth (cm):  24  Placement Verified by: auscultation and capnometry    Cormack-Lehane Classification:  Grade I - full view of glottis  Number of Attempts at Approach:  1

## 2023-08-15 NOTE — DOCUMENTATION QUERY
[facility]                                                                       Query Response Note      PATIENT:               DONALD KNOTT  ACCT #:                  [id number]  MRN:                     [id number]  :                      1950  ADMIT DATE:       2023 12:54 AM  DISCH DATE:          RESPONDING  PROVIDER #:        [id number]           QUERY TEXT:    Patient transferred for choledocholithiasis with acute cholecystitis.  In the ED @ 0104, RR 17 with SpO2 90% on RA followed almost 2 hrs later by 88% SpO2 on RA.  Placed on 2L O2 NC with SpO2 increased from 90% to  96%. Based on the clinical indicators documented, can a diagnosis be made?      The patient's clinical indicators include:   @  0104 & 0245 in ED - RR 17 with SpO2 90% on RA & RR 16 with 88% SpO2 on RA   @ 1902 RR 19 with 90% SpO2 on 2L (PF ratio 58/0.28 = 207.14)   @ 0432 and 0722 RR 17/19 with 90% SpO2 on 2L    ED note - no SOB...work of breathing   PN - negative for SOB    Treatment - 2L supplemental O2    Risk factors - Choledocholithiasis with acute cholecystitis, JESUS MANUEL, HTN    Thank you,  Harini Ramirez BSN  Clinical   Connect via [employer]  Options provided:   -- Acute respiratory failure with hypoxia   -- Hypoxia only   -- Other explanation, (please specify the other explanation)   -- Unable to determine      Query created by: Harini Ramirez on 8/15/2023 12:31 PM    RESPONSE TEXT:    Hypoxia only       QUERY TEXT:    Patient transferred for choledocholithiasis with acute cholecystitis.  Based on the clinical indicators documented, can a diagnosis be made?      The patient's clinical indicators include:  10/17/2019 Creatinine - 0.93  Creatinine since arrival - 1.17 -> 1.31 -> 1.06 -> 0.97    Treatment   IV NS infusion x3   &  - IV NS infusion x1    Risk factors - Choledocholithiasis with acute  cholecystitis    Thank you,  Harini Ramirez BSN  Clinical   Connect via [employer]  Options provided:   -- Acute kidney injury, after admission, is ruled in   -- Findings of no clinical significance   -- Other explanation, (please specify the other explanation)   -- Unable to determine      Query created by: Harini Ramirez on 8/15/2023 12:32 PM    RESPONSE TEXT:    Acute kidney injury, after admission, is ruled in          Electronically signed by:  PATRICIA BARRERA MD 8/15/2023 12:43 PM

## 2023-08-15 NOTE — OP REPORT
Operative report    PreOp Diagnosis: Choledocholithiasis      PostOp Diagnosis: Chronic cholecystitis with choledocholithiasis      Procedure(s):  CHOLECYSTECTOMY, LAPAROSCOPIC WITH INTRAOPERATIVE CHOLANGIOGRAM - Wound Class: Contaminated    Surgeon(s):  Efren Guzman D.O.    Anesthesiologist/Type of Anesthesia:  Anesthesiologist: Kamron Livingston M.D./General    Surgical Staff:  Circulator: Frankie Richardson R.N.  Relief Circulator: Jackson Kowalski R.N.  Scrub Person: Chris Rosario    Specimens removed if any:  ID Type Source Tests Collected by Time Destination   A : GALLBLADDER Tissue Gallbladder PATHOLOGY SPECIMEN Efren Guzman D.O. 8/15/2023 11:56 AM        Estimated Blood Loss: 20 cc    Findings: Significant omental adhesions to the gallbladder.  Chronic inflammation of the infundibulum.  Multiple stones within the long cystic duct on cholangiogram.  Dilated common bile duct with brisk backfilling into the right and left hepatic tree and no drainage into the duodenum after 5-minute delay    Complications: None    Indication: 72-year-old male with multiple medical comorbidities who presented with right upper quadrant abdominal pain, elevated bilirubin and dilated common bile duct on imaging.    OPERATIVE REPORT: The patient was brought to the operating room and placed in  supine position on the operating table. After adequate general anesthesia,   the abdomen was prepped and draped in standard fashion. An area inferior to the umbilucus was infiltrated with 0.25% bupivacaine. An incision was made through the skin and subcutaneous tissues. We then bluntly dissected down to the anterior fascia, which was elevated into the wound using a Kocher clamp. A stay suture of 0 Vicryl was then placed. The fascia was then incised and we dissected through the abdominal wall in layers until the peritoneum was entered. We then placed the Nataly port and insufflated the abdomen. The area in the epigastrium was chosen for a 5 mm port.  The skin and subcutaneous tissue were infiltrated with 0.25% bupivacaine. A small incision was made and a 5 mm port was inserted under direct camera observation. Two additional 5 mm ports were placed in the right lateral abdominal wall using identical technique. I then grasped the fundus of the gallbladder and retracted it anteriorly and superiorly.  There was significant omental adhesions to the gallbladder including the transverse colon.  We did note that there was a tattoo in the proximal transverse colon.  These adhesions were carefully taken down staying as close to the gallbladder as possible.  This allowed us to retract the gallbladder sufficiently so that the infundibulum could be retracted anteriorly and laterally. We then skeletonized the cystic duct, and obtained an anterior and posterior view of safety.  We partially divided the cystic duct at the gallbladder junction.  An introducer needle was brought through the abdominal wall above the area and fed into the defect and snake down the cystic duct.  This was followed by the cholangiocatheter.  The balloon was then inflated and flushed easily without sign of leakage.  A cholangiogram was then performed.  There were multiple stones seen within the cystic duct and then the common bile duct briskly filled distally then backfilled into the liver.  There was no emptying into the duodenum observed.  We gave a few minutes for delay and the common duct appeared dilated with good liver filling still and there was no contrast seen within the duodenum.  We then removed the cholangiogram apparatus and the duct was then doubly clip distally and singly clipped proximally and then divided with the endoscopic shear. The cystic artery was then skeletonized and doubly clipped proximally and singly clipped distally prior to dividing with the endoscopic shear. The gallbladder was then dissected free from its fossa. When this was completed, we placed in an EndoCatch bag and  retrieved it from the umbilical port site. We then irrigated the gallbladder fossa in the right upper quadrant until the effluent was clear. There was no sign of bleeding or bile leakage. The ports were then removed. The fascia at the umbilical port site was closed using the stay suture placed at the beginning of the case. The wounds were then irrigated and the skin was closed using a 4-0 Monocryl stitch in a subcuticular fashion. The area was then cleaned and dried and Dermabond was applied. The patient was then awakened from anesthesia and taken to post-anesthesia care unit in stable condition. The sponge, needle, and instrument count was correct at the end of the case and I was present for the entirety of the case.     The nature of the surgical procedure warranted additional skilled operative assistance from an Advanced Registered Nurse Practitioner (ARNP). The assistant was present during the entire operation. The surgical assistant performed the following: provided assistance with optimal surgical exposure of the operative field, provided high complexity, subspecialty decision making input, and operated the camera for the laparoscopic portion of the procedure.      8/15/2023 12:30 PM Efren Guzman D.O.

## 2023-08-16 ENCOUNTER — APPOINTMENT (OUTPATIENT)
Dept: RADIOLOGY | Facility: MEDICAL CENTER | Age: 73
DRG: 418 | End: 2023-08-16
Attending: INTERNAL MEDICINE
Payer: MEDICARE

## 2023-08-16 ENCOUNTER — ANESTHESIA (OUTPATIENT)
Dept: SURGERY | Facility: MEDICAL CENTER | Age: 73
DRG: 418 | End: 2023-08-16
Payer: MEDICARE

## 2023-08-16 ENCOUNTER — ANESTHESIA EVENT (OUTPATIENT)
Dept: SURGERY | Facility: MEDICAL CENTER | Age: 73
DRG: 418 | End: 2023-08-16
Payer: MEDICARE

## 2023-08-16 LAB
ALBUMIN SERPL BCP-MCNC: 4.4 G/DL (ref 3.2–4.9)
ALBUMIN/GLOB SERPL: 1.8 G/DL
ALP SERPL-CCNC: 165 U/L (ref 30–99)
ALT SERPL-CCNC: 77 U/L (ref 2–50)
ANION GAP SERPL CALC-SCNC: 12 MMOL/L (ref 7–16)
AST SERPL-CCNC: 44 U/L (ref 12–45)
BILIRUB SERPL-MCNC: 1.7 MG/DL (ref 0.1–1.5)
BUN SERPL-MCNC: 13 MG/DL (ref 8–22)
CALCIUM ALBUM COR SERPL-MCNC: 9.3 MG/DL (ref 8.5–10.5)
CALCIUM SERPL-MCNC: 9.6 MG/DL (ref 8.5–10.5)
CHLORIDE SERPL-SCNC: 103 MMOL/L (ref 96–112)
CO2 SERPL-SCNC: 22 MMOL/L (ref 20–33)
CREAT SERPL-MCNC: 1.01 MG/DL (ref 0.5–1.4)
GFR SERPLBLD CREATININE-BSD FMLA CKD-EPI: 78 ML/MIN/1.73 M 2
GLOBULIN SER CALC-MCNC: 2.5 G/DL (ref 1.9–3.5)
GLUCOSE SERPL-MCNC: 100 MG/DL (ref 65–99)
POTASSIUM SERPL-SCNC: 3.9 MMOL/L (ref 3.6–5.5)
PROT SERPL-MCNC: 6.9 G/DL (ref 6–8.2)
SODIUM SERPL-SCNC: 137 MMOL/L (ref 135–145)

## 2023-08-16 PROCEDURE — 160009 HCHG ANES TIME/MIN: Performed by: INTERNAL MEDICINE

## 2023-08-16 PROCEDURE — 700111 HCHG RX REV CODE 636 W/ 250 OVERRIDE (IP): Mod: JZ | Performed by: STUDENT IN AN ORGANIZED HEALTH CARE EDUCATION/TRAINING PROGRAM

## 2023-08-16 PROCEDURE — A9270 NON-COVERED ITEM OR SERVICE: HCPCS | Performed by: STUDENT IN AN ORGANIZED HEALTH CARE EDUCATION/TRAINING PROGRAM

## 2023-08-16 PROCEDURE — 700102 HCHG RX REV CODE 250 W/ 637 OVERRIDE(OP): Performed by: STUDENT IN AN ORGANIZED HEALTH CARE EDUCATION/TRAINING PROGRAM

## 2023-08-16 PROCEDURE — 0FT44ZZ RESECTION OF GALLBLADDER, PERCUTANEOUS ENDOSCOPIC APPROACH: ICD-10-PCS | Performed by: INTERNAL MEDICINE

## 2023-08-16 PROCEDURE — 700101 HCHG RX REV CODE 250: Performed by: ANESTHESIOLOGY

## 2023-08-16 PROCEDURE — 74328 X-RAY BILE DUCT ENDOSCOPY: CPT

## 2023-08-16 PROCEDURE — 160028 HCHG SURGERY MINUTES - 1ST 30 MINS LEVEL 3: Performed by: INTERNAL MEDICINE

## 2023-08-16 PROCEDURE — 160002 HCHG RECOVERY MINUTES (STAT): Performed by: INTERNAL MEDICINE

## 2023-08-16 PROCEDURE — 0FC98ZZ EXTIRPATION OF MATTER FROM COMMON BILE DUCT, VIA NATURAL OR ARTIFICIAL OPENING ENDOSCOPIC: ICD-10-PCS | Performed by: INTERNAL MEDICINE

## 2023-08-16 PROCEDURE — 99024 POSTOP FOLLOW-UP VISIT: CPT

## 2023-08-16 PROCEDURE — 700105 HCHG RX REV CODE 258: Performed by: STUDENT IN AN ORGANIZED HEALTH CARE EDUCATION/TRAINING PROGRAM

## 2023-08-16 PROCEDURE — 43262 ENDO CHOLANGIOPANCREATOGRAPH: CPT | Performed by: INTERNAL MEDICINE

## 2023-08-16 PROCEDURE — 160048 HCHG OR STATISTICAL LEVEL 1-5: Performed by: INTERNAL MEDICINE

## 2023-08-16 PROCEDURE — 43264 ERCP REMOVE DUCT CALCULI: CPT | Performed by: INTERNAL MEDICINE

## 2023-08-16 PROCEDURE — 160035 HCHG PACU - 1ST 60 MINS PHASE I: Performed by: INTERNAL MEDICINE

## 2023-08-16 PROCEDURE — 700117 HCHG RX CONTRAST REV CODE 255: Performed by: INTERNAL MEDICINE

## 2023-08-16 PROCEDURE — C1889 IMPLANT/INSERT DEVICE, NOC: HCPCS | Performed by: INTERNAL MEDICINE

## 2023-08-16 PROCEDURE — BF101ZZ FLUOROSCOPY OF BILE DUCTS USING LOW OSMOLAR CONTRAST: ICD-10-PCS | Performed by: INTERNAL MEDICINE

## 2023-08-16 PROCEDURE — 770006 HCHG ROOM/CARE - MED/SURG/GYN SEMI*

## 2023-08-16 PROCEDURE — C1769 GUIDE WIRE: HCPCS | Performed by: INTERNAL MEDICINE

## 2023-08-16 PROCEDURE — 700105 HCHG RX REV CODE 258: Performed by: INTERNAL MEDICINE

## 2023-08-16 PROCEDURE — 80053 COMPREHEN METABOLIC PANEL: CPT

## 2023-08-16 PROCEDURE — 99232 SBSQ HOSP IP/OBS MODERATE 35: CPT | Performed by: STUDENT IN AN ORGANIZED HEALTH CARE EDUCATION/TRAINING PROGRAM

## 2023-08-16 PROCEDURE — 700111 HCHG RX REV CODE 636 W/ 250 OVERRIDE (IP): Mod: JZ | Performed by: ANESTHESIOLOGY

## 2023-08-16 PROCEDURE — 700105 HCHG RX REV CODE 258: Mod: JZ | Performed by: ANESTHESIOLOGY

## 2023-08-16 RX ORDER — HALOPERIDOL 5 MG/ML
1 INJECTION INTRAMUSCULAR
Status: DISCONTINUED | OUTPATIENT
Start: 2023-08-16 | End: 2023-08-16 | Stop reason: HOSPADM

## 2023-08-16 RX ORDER — SIMETHICONE 125 MG
125 TABLET,CHEWABLE ORAL 3 TIMES DAILY PRN
Status: DISCONTINUED | OUTPATIENT
Start: 2023-08-16 | End: 2023-08-17 | Stop reason: HOSPADM

## 2023-08-16 RX ORDER — ROCURONIUM BROMIDE 10 MG/ML
INJECTION, SOLUTION INTRAVENOUS PRN
Status: DISCONTINUED | OUTPATIENT
Start: 2023-08-16 | End: 2023-08-16 | Stop reason: SURG

## 2023-08-16 RX ORDER — SODIUM CHLORIDE, SODIUM LACTATE, POTASSIUM CHLORIDE, CALCIUM CHLORIDE 600; 310; 30; 20 MG/100ML; MG/100ML; MG/100ML; MG/100ML
INJECTION, SOLUTION INTRAVENOUS CONTINUOUS
Status: DISCONTINUED | OUTPATIENT
Start: 2023-08-16 | End: 2023-08-16 | Stop reason: HOSPADM

## 2023-08-16 RX ORDER — ONDANSETRON 2 MG/ML
4 INJECTION INTRAMUSCULAR; INTRAVENOUS
Status: DISCONTINUED | OUTPATIENT
Start: 2023-08-16 | End: 2023-08-16 | Stop reason: HOSPADM

## 2023-08-16 RX ORDER — OXYCODONE HCL 5 MG/5 ML
5 SOLUTION, ORAL ORAL
Status: DISCONTINUED | OUTPATIENT
Start: 2023-08-16 | End: 2023-08-16 | Stop reason: HOSPADM

## 2023-08-16 RX ORDER — OXYCODONE HCL 5 MG/5 ML
10 SOLUTION, ORAL ORAL
Status: DISCONTINUED | OUTPATIENT
Start: 2023-08-16 | End: 2023-08-16 | Stop reason: HOSPADM

## 2023-08-16 RX ORDER — LABETALOL HYDROCHLORIDE 5 MG/ML
5 INJECTION, SOLUTION INTRAVENOUS
Status: DISCONTINUED | OUTPATIENT
Start: 2023-08-16 | End: 2023-08-16 | Stop reason: HOSPADM

## 2023-08-16 RX ORDER — DIPHENHYDRAMINE HYDROCHLORIDE 50 MG/ML
12.5 INJECTION INTRAMUSCULAR; INTRAVENOUS
Status: DISCONTINUED | OUTPATIENT
Start: 2023-08-16 | End: 2023-08-16 | Stop reason: HOSPADM

## 2023-08-16 RX ORDER — SODIUM CHLORIDE, SODIUM LACTATE, POTASSIUM CHLORIDE, CALCIUM CHLORIDE 600; 310; 30; 20 MG/100ML; MG/100ML; MG/100ML; MG/100ML
INJECTION, SOLUTION INTRAVENOUS
Status: DISCONTINUED | OUTPATIENT
Start: 2023-08-16 | End: 2023-08-16 | Stop reason: SURG

## 2023-08-16 RX ORDER — HYDRALAZINE HYDROCHLORIDE 20 MG/ML
5 INJECTION INTRAMUSCULAR; INTRAVENOUS
Status: DISCONTINUED | OUTPATIENT
Start: 2023-08-16 | End: 2023-08-16 | Stop reason: HOSPADM

## 2023-08-16 RX ORDER — LIDOCAINE HYDROCHLORIDE 20 MG/ML
INJECTION, SOLUTION EPIDURAL; INFILTRATION; INTRACAUDAL; PERINEURAL PRN
Status: DISCONTINUED | OUTPATIENT
Start: 2023-08-16 | End: 2023-08-16 | Stop reason: SURG

## 2023-08-16 RX ADMIN — CARVEDILOL 25 MG: 25 TABLET, FILM COATED ORAL at 07:44

## 2023-08-16 RX ADMIN — CARVEDILOL 25 MG: 25 TABLET, FILM COATED ORAL at 17:14

## 2023-08-16 RX ADMIN — LIDOCAINE HYDROCHLORIDE 15 ML: 20 SOLUTION OROPHARYNGEAL at 17:14

## 2023-08-16 RX ADMIN — GABAPENTIN 400 MG: 400 CAPSULE ORAL at 20:54

## 2023-08-16 RX ADMIN — PROPOFOL 200 MG: 10 INJECTION, EMULSION INTRAVENOUS at 12:12

## 2023-08-16 RX ADMIN — PIPERACILLIN AND TAZOBACTAM 3.38 G: 3; .375 INJECTION, POWDER, FOR SOLUTION INTRAVENOUS at 05:51

## 2023-08-16 RX ADMIN — SIMETHICONE 125 MG: 125 TABLET, CHEWABLE ORAL at 20:56

## 2023-08-16 RX ADMIN — SIMETHICONE 125 MG: 125 TABLET, CHEWABLE ORAL at 17:14

## 2023-08-16 RX ADMIN — SODIUM CHLORIDE: 9 INJECTION, SOLUTION INTRAVENOUS at 20:56

## 2023-08-16 RX ADMIN — GABAPENTIN 400 MG: 400 CAPSULE ORAL at 15:06

## 2023-08-16 RX ADMIN — LIDOCAINE HYDROCHLORIDE 80 MG: 20 INJECTION, SOLUTION EPIDURAL; INFILTRATION; INTRACAUDAL at 12:12

## 2023-08-16 RX ADMIN — FENOFIBRATE 67 MG: 67 CAPSULE ORAL at 06:00

## 2023-08-16 RX ADMIN — ACETAMINOPHEN 650 MG: 325 TABLET, FILM COATED ORAL at 07:44

## 2023-08-16 RX ADMIN — SODIUM CHLORIDE, POTASSIUM CHLORIDE, SODIUM LACTATE AND CALCIUM CHLORIDE: 600; 310; 30; 20 INJECTION, SOLUTION INTRAVENOUS at 12:08

## 2023-08-16 RX ADMIN — FENTANYL CITRATE 100 MCG: 50 INJECTION, SOLUTION INTRAMUSCULAR; INTRAVENOUS at 12:12

## 2023-08-16 RX ADMIN — TAMSULOSIN HYDROCHLORIDE 0.4 MG: 0.4 CAPSULE ORAL at 05:46

## 2023-08-16 RX ADMIN — SUGAMMADEX 200 MG: 100 INJECTION, SOLUTION INTRAVENOUS at 12:33

## 2023-08-16 RX ADMIN — ROCURONIUM BROMIDE 50 MG: 50 INJECTION, SOLUTION INTRAVENOUS at 12:12

## 2023-08-16 RX ADMIN — MORPHINE SULFATE 4 MG: 4 INJECTION, SOLUTION INTRAMUSCULAR; INTRAVENOUS at 23:54

## 2023-08-16 RX ADMIN — PIPERACILLIN AND TAZOBACTAM 3.38 G: 3; .375 INJECTION, POWDER, FOR SOLUTION INTRAVENOUS at 15:08

## 2023-08-16 RX ADMIN — FINASTERIDE 5 MG: 5 TABLET, FILM COATED ORAL at 17:14

## 2023-08-16 RX ADMIN — GABAPENTIN 400 MG: 400 CAPSULE ORAL at 08:58

## 2023-08-16 ASSESSMENT — ENCOUNTER SYMPTOMS
ABDOMINAL PAIN: 0
SENSORY CHANGE: 0
SPEECH CHANGE: 0
VOMITING: 0
NAUSEA: 0
COUGH: 0
MEMORY LOSS: 0
SORE THROAT: 0
MYALGIAS: 0
HEARTBURN: 0
WEAKNESS: 1
DIARRHEA: 0
FEVER: 0
NERVOUS/ANXIOUS: 0
SHORTNESS OF BREATH: 0
FLANK PAIN: 0
SINUS PAIN: 0
FOCAL WEAKNESS: 0
DEPRESSION: 0
DIZZINESS: 0

## 2023-08-16 ASSESSMENT — PAIN DESCRIPTION - PAIN TYPE
TYPE: ACUTE PAIN
TYPE: ACUTE PAIN

## 2023-08-16 ASSESSMENT — PAIN SCALES - GENERAL: PAIN_LEVEL: 0

## 2023-08-16 NOTE — ANESTHESIA TIME REPORT
Anesthesia Start and Stop Event Times     Date Time Event    8/16/2023 1159 Ready for Procedure     1208 Anesthesia Start     1241 Anesthesia Stop        Responsible Staff  08/16/23    Name Role Begin End    Sol Rocha M.D. Anesth 1208 1241        Overtime Reason:  no overtime (within assigned shift)    Comments:

## 2023-08-16 NOTE — ANESTHESIA POSTPROCEDURE EVALUATION
Patient: Xavier Richardson    Procedure Summary     Date: 08/16/23 Room / Location: [facility] ROOM 26 / SURGERY SAME DAY [facility]    Anesthesia Start: 1208 Anesthesia Stop: 1241    Procedures:       ERCP (ENDOSCOPIC RETROGRADE CHOLANGIOPANCREATOGRAPHY) (Esophagus)      SPHINCTEROTOMY (Esophagus)      ERCP, WITH CALCULUS REMOVAL FROM BILE OR PANCREATIC DUCT (Esophagus) Diagnosis: (Choledocholithiasis)    Surgeons: Krishan Godoy M.D. Responsible Provider: Sol Rocha M.D.    Anesthesia Type: general ASA Status: 3          Final Anesthesia Type: general  Last vitals  BP   Blood Pressure : (!) 144/66    Temp   36.3 °C (97.4 °F)    Pulse   65   Resp   16    SpO2   94 %      Anesthesia Post Evaluation    Patient location during evaluation: PACU  Patient participation: complete - patient participated  Level of consciousness: awake  Pain score: 0    Airway patency: patent  Anesthetic complications: no  Cardiovascular status: adequate and hemodynamically stable  Respiratory status: acceptable and nasal cannula  Hydration status: acceptable    PONV: none          No notable events documented.     Nurse Pain Score: 0 (NPRS)

## 2023-08-16 NOTE — ANESTHESIA PREPROCEDURE EVALUATION
Case: [id number] Date/Time: 08/16/23 1130    Procedure: ERCP (ENDOSCOPIC RETROGRADE CHOLANGIOPANCREATOGRAPHY) (Esophagus)    Anesthesia type: General    Pre-op diagnosis: Choledocholithiasis    Location: [facility] ROOM 26 / SURGERY SAME DAY [facility]    Surgeons: Krishan Godoy M.D.          Relevant Problems   ANESTHESIA   (positive) JESUS MANUEL (obstructive sleep apnea)      NEURO   (positive) Stroke (cerebrum) (HCC)      CARDIAC   (positive) A-fib (HCC)   (positive) Dissection of thoracoabdominal aorta (HCC)   (positive) Essential hypertension       Physical Exam    Airway   Mallampati: II       Cardiovascular - normal exam     Dental    Pulmonary    Abdominal    Neurological              Anesthesia Plan    ASA 3   ASA physical status 3 criteria: CVA or TIA - history (> 3 months)    Plan - general       Airway plan will be ETT          Induction: intravenous      Pertinent diagnostic labs and testing reviewed    Informed Consent:    Anesthetic plan and risks discussed with patient.    Use of blood products discussed with: whom consented to blood products.

## 2023-08-16 NOTE — ANESTHESIA PROCEDURE NOTES
Airway    Date/Time: 8/16/2023 12:13 PM    Performed by: Sol Rocha M.D.  Authorized by: Sol Rocha M.D.    Location:  OR  Urgency:  Elective  Indications for Airway Management:  Anesthesia      Spontaneous Ventilation: absent    Sedation Level:  Deep  Preoxygenated: Yes    Patient Position:  Sniffing  MILS Maintained Throughout: No    Mask Difficulty Assessment:  2 - vent by mask + OA or adjuvant +/- NMBA  Final Airway Type:  Endotracheal airway  Final Endotracheal Airway:  ETT  Cuffed: Yes    Technique Used for Successful ETT Placement:  Direct laryngoscopy  Devices/Methods Used in Placement:  Intubating stylet    Insertion Site:  Oral  Blade Type:  Roa  Laryngoscope Blade/Videolaryngoscope Blade Size:  2  ETT Size (mm):  7.0  Placement Verified by: capnometry    Cormack-Lehane Classification:  Grade IIa - partial view of glottis  Number of Attempts at Approach:  1

## 2023-08-16 NOTE — PROGRESS NOTES
Report received. Assumed care. Pt in sitting at edge of bed. A/O x4. VSS. Responds appropriately. Slight gas/ pressure pain requesting tylenol pain, SOB. Assessment complete. Discussed POC, , pt verbalizes understanding. Explained importance of calling before getting OOB. Call light and belongings within reach. Bed alarm on. Bed in the lowest position. Treaded socks in place. Hourly rounding in progress. Will continue to monitor .     Goal:  Surgery  Ambulation  NPO since midnight   Brush teeth  Fluids   Lap sites

## 2023-08-16 NOTE — PROGRESS NOTES
"    DATE: 8/16/2023    Post Operative Day  1  laparoscopic cholecystectomy  with intraoperative cholangiogram    INTERVAL EVENTS:  Doing well post surgery.  Slight improvement in liver enzymes.  Awaiting ERCP.  -flatus.    PHYSICAL EXAMINATION:  Vital Signs: BP (!) 155/69   Pulse 68   Temp 36.8 °C (98.3 °F) (Temporal)   Resp 18   Ht 1.829 m (6' 0.01\")   Wt 125 kg (274 lb 11.1 oz)   SpO2 93%     Abdomen soft, tympany and nontender.  Lap sites well approximated with dermabond.    LABORATORY VALUES:   Recent Labs     08/14/23  0347 08/15/23  0039   WBC 5.0 5.3   RBC 3.85* 3.89*   HEMOGLOBIN 12.9* 12.9*   HEMATOCRIT 36.3* 37.1*   MCV 94.3 95.4   MCH 33.5* 33.2*   MCHC 35.5 34.8   RDW 49.5 49.6   PLATELETCT 125* 135*   MPV 8.8* 9.1     Recent Labs     08/14/23  0347 08/15/23  0039 08/16/23  0150   SODIUM 143 140 137   POTASSIUM 3.9 3.5* 3.9   CHLORIDE 109 105 103   CO2 24 22 22   GLUCOSE 87 96 100*   BUN 10 14 13   CREATININE 1.06 0.97 1.01   CALCIUM 9.2 9.5 9.6     Recent Labs     08/13/23  2004 08/14/23  0347 08/15/23  0039 08/16/23  0150   ASTSGOT  --  58* 44 44   ALTSGPT  --  134* 100* 77*   TBILIRUBIN  --  1.2 1.3 1.7*   ALKPHOSPHAT  --  202* 176* 165*   GLOBULIN  --  2.1 2.2 2.5   INR 1.11  --   --   --          ASSESSMENT AND PLAN:   Ambulate patient.  Encourage sitting in chair.  Continue antibiotics.      Discussed patient condition with RN, Patient, and trauma surgery, Dr. Guzman.    "

## 2023-08-16 NOTE — OR NURSING
1239- pt arrived to PACU, report received.  Pt on 8L mask.      1300- Pt tolerating sips of water.  Pt glasses returned to face.     1323- recovery complete.  Report called to ALMA DELIA Johnson.  Pt placed on transport.      1330- Pt transported out of PACU and back to room.

## 2023-08-16 NOTE — PROGRESS NOTES
Hospital Medicine Daily Progress Note    Date of Service  8/16/2023    Chief Complaint  Xavier Richardson is a 72 y.o. male admitted 8/12/2023 with RUQ pain    Hospital Course  Xavier Richardson is a 72 y.o. male who presented 8/12/2023 with RUQ abdominal pain.  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    Patient reported a progressively worsening dull abdominal pain in epigastric and right upper quadrant area.  Associated with nausea no vomiting. Due to worsening symptoms he came to the ER for evaluation.    In ER, patient found to have bradycardia. Found to have labs of , , ALK-P 327, total bilirubin 3.3. Lipase wnl.  Right upper quadrant ultrasound showing cholelithiasis with acute cholecystitis.  Dilation of the common bile duct was seen.  General surgery was consulted and recommended  MRCP followed by GI consult for ERCP eval if indicated.  He was started on zosyn and admitted for further evaluation.     He underwent MCRP which showed choledocholithiasis and CBD dilatation however was limited due to artifact from graft in close proximity. There was plan for ERCP however after further discussion that was cancelled and he will be taken back for planned lap chon with intraoperative cholangiogram.     Interval Problem Update    Patient seen and examined. Post ERCP, feeling tried but denies pain, does have some abdominal bloating.   - ERCP with removal of 2 stones. Advanced diet as tolerated  - vitals stable  - LFTs improved, small bump in Tbili   - supportive care with pain control     Anticipate dc home tomorrow morning if he continues to clinically improve.       I have discussed this patient's plan of care and discharge plan at IDT rounds today with Case Management, Nursing, Nursing leadership, and other members of the IDT team.    Consultants/Specialty  general surgery  GI    Code Status  DNAR, I  OK    Disposition  The patient is not medically cleared for discharge to home or a post-acute facility.  Anticipate discharge to: home with close outpatient follow-up    I have placed the appropriate orders for post-discharge needs.    Review of Systems  Review of Systems   Constitutional:  Positive for malaise/fatigue. Negative for fever.   HENT:  Negative for sinus pain and sore throat.    Respiratory:  Negative for cough and shortness of breath.    Cardiovascular:  Negative for chest pain.   Gastrointestinal:  Negative for abdominal pain, diarrhea, heartburn, nausea and vomiting.   Genitourinary:  Negative for dysuria and flank pain.   Musculoskeletal:  Negative for myalgias.   Skin:  Negative for rash.   Neurological:  Positive for weakness. Negative for dizziness, sensory change, speech change and focal weakness.   Psychiatric/Behavioral:  Negative for depression and memory loss. The patient is not nervous/anxious.         Physical Exam  Temp:  [36.3 °C (97.4 °F)-37.3 °C (99.1 °F)] 36.8 °C (98.3 °F)  Pulse:  [62-75] 70  Resp:  [15-20] 18  BP: (113-155)/(50-81) 126/55  SpO2:  [90 %-97 %] 94 %    Physical Exam  Constitutional:       General: He is not in acute distress.     Appearance: Normal appearance. He is obese. He is not ill-appearing or diaphoretic.   HENT:      Head: Normocephalic.      Nose: Nose normal.      Mouth/Throat:      Mouth: Mucous membranes are moist.   Eyes:      General: No scleral icterus.     Extraocular Movements: Extraocular movements intact.      Pupils: Pupils are equal, round, and reactive to light.   Cardiovascular:      Rate and Rhythm: Normal rate and regular rhythm.      Pulses: Normal pulses.   Pulmonary:      Breath sounds: Normal breath sounds.   Abdominal:      General: Abdomen is flat. There is no distension.      Palpations: Abdomen is soft. There is no mass.      Tenderness: There is no abdominal tenderness.      Hernia: No hernia is present.      Comments: Tenderness on  palpation, post surgical lap sites stable.    Musculoskeletal:         General: Normal range of motion.      Cervical back: Neck supple.   Skin:     General: Skin is warm.      Coloration: Skin is not jaundiced or pale.   Neurological:      General: No focal deficit present.      Mental Status: He is alert and oriented to person, place, and time. Mental status is at baseline.      Sensory: No sensory deficit.      Motor: Weakness present.      Coordination: Coordination normal.   Psychiatric:         Mood and Affect: Mood normal.         Behavior: Behavior normal.         Fluids    Intake/Output Summary (Last 24 hours) at 8/16/2023 1531  Last data filed at 8/16/2023 1241  Gross per 24 hour   Intake 400 ml   Output 1101 ml   Net -701 ml         Laboratory  Recent Labs     08/14/23  0347 08/15/23  0039   WBC 5.0 5.3   RBC 3.85* 3.89*   HEMOGLOBIN 12.9* 12.9*   HEMATOCRIT 36.3* 37.1*   MCV 94.3 95.4   MCH 33.5* 33.2*   MCHC 35.5 34.8   RDW 49.5 49.6   PLATELETCT 125* 135*   MPV 8.8* 9.1     Recent Labs     08/14/23  0347 08/15/23  0039 08/16/23  0150   SODIUM 143 140 137   POTASSIUM 3.9 3.5* 3.9   CHLORIDE 109 105 103   CO2 24 22 22   GLUCOSE 87 96 100*   BUN 10 14 13   CREATININE 1.06 0.97 1.01   CALCIUM 9.2 9.5 9.6     Recent Labs     08/13/23 2004   INR 1.11               Imaging  [id number] STONE REMOVAL   Final Result      Portable fluoroscopy as described.      [id number]   Final Result      1.  Initial cholangiogram showing some small irregular filling defects in the proximal common duct.   2.  Inverted meniscus configuration at the distal common duct with minimal contrast transit into the duodenum. A partially obstructive distal common duct stone is not excluded.   3.  Common duct dilatation up to about 16 mm, not corrected for magnification.      [id number]   Final Result      1.  Cholelithiasis. Cholecystitis is not excluded.   2.  Biliary dilatation with possible  choledocholithiasis. The distal common bile duct is largely obscured by streak artifact from the patient's aortic stent graft.      US-RUQ   Final Result      1.  Cholelithiasis with acute cholecystitis.      2.  Dilatation of the common duct, and the possibility of distal ductal obstruction should be considered.      3.  6 cm right upper pole renal cyst.           Assessment/Plan  * Choledocholithiasis with acute cholecystitis  Assessment & Plan   presents for right upper quadrant abdominal pain.    + transaminitis, improving   RUQ US with acute cholecystitis and biliary dilation   Patient started on Zosyn at outside facility, continue pending operative plan   MRCP done, ERCP cancelled, plan for intraoperative cholangiogram during lap chno, this was positive    General surgery s/p lap chon   ERCP today with removal of 2 stones   Dc antibiotics   Advance diet as tolerated   Supportive care with pain control, antiemetics     ACP (advance care planning)  Assessment & Plan  16 minutes spent discussing goals of care with patient.  When asked about CODE STATUS, he states that he would like to be DNR with trial intubation.  He is okay with all other aggressive measures.    Dyslipidemia- (present on admission)  Assessment & Plan  Hold statin for now due to transaminitis, will resume on discharge     Dissection of thoracoabdominal aorta (HCC)- (present on admission)  Assessment & Plan  S/P hypothermic protocol and repair at [facility]    JESUS MANUEL (obstructive sleep apnea)- (present on admission)  Assessment & Plan  Chronic, currently on 2L   Wean O2   Nocturnal CPAP     Essential hypertension- (present on admission)  Assessment & Plan  On home norvasc, coreg, cozaar, spironolactone    8/14 EKG was sinus bradycardia, medically optimized for planned procedure  Resume home cardiac medications     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)  Assessment & Plan  Hx cortical blindness post CVA  On fenofibrate, holding statin due to  elevated LFTS  Resume on discharge            VTE prophylaxis:    enoxaparin ppx      I have performed a physical exam and reviewed and updated ROS and Plan today (8/16/2023). In review of yesterday's note (8/15/2023), there are no changes except as documented above.      Greater than 51 minutes spent prepping to see patient (e.g. review of tests) obtaining and/or reviewing separately obtained history. Performing a medically appropriate examination and/ evaluation.  Counseling and educating the patient/family/caregiver.  Ordering medications, tests, or procedures.  Referring and communicating with other health care professionals.  Documenting clinical information in EPIC.  Independently interpreting results and communicating results to patient/family/caregiver.  Care coordination.

## 2023-08-16 NOTE — CARE PLAN
Problem: Knowledge Deficit - Standard  Goal: Patient and family/care givers will demonstrate understanding of plan of care, disease process/condition, diagnostic tests and medications  Outcome: Progressing   The patient is Stable - Low risk of patient condition declining or worsening    Shift Goals  Clinical Goals: Pain management  Patient Goals: Pain management  Family Goals: N/A    Progress made toward(s) clinical / shift goals:  Patient resting in bed, bed is locked and in lowest position, call bell within reach of patient.    Patient is not progressing towards the following goals:

## 2023-08-16 NOTE — OP REPORT
OPERATIVE REPORT    PATIENT:   Xavier Richardson   1950       PREOPERATIVE DIAGNOSES/INDICATIONS: Choledocholithiasis    PROCEDURE: ERCP with biliary sphincterotomy and calculi removal    PHYSICIAN:  Krishan Godoy MD     ANESTHESIA:  Per anesthesiologist.  SUDHAKAR    LOCATION: [facility]    CONSENT: The risks, benefits and alternatives of the procedure were discussed in detail. The risks include and are not limited to bleeding, infection, perforation, missed lesions, and sedations risks (cardiopulmonary compromise and allergic reaction to medications).    DESCRIPTION:   The patient presented to the operating room.  A time out was performed prior to beginning the procedure.   The patient was placed in the supine position. Patient was sedated by anesthesia: GETA.    OPERATIVE FINDINGS:    Endoscope advanced under indirect visualization of the second portion of the duodenum.  The ampulla was normal in appearance.  The common bile duct was cannulated.  Intraluminal filling defect consistent with choledocholithiasis identified.  1 cm biliary sphincterotomy pursued in standard fashion.  Balloon sweep from the bifurcation revealed 2 stones.  No stones remained.      Blood loss: None    The patient tolerated the procedure well.      There were no immediate complications.    Pathology samples obtained: None    IMPRESSION:  Choledocholithiasis successfully removed after biliary sphincterotomy.  No stent inserted.      RECOMMENDATIONS:  Observe clinical course.  Anticipate no further GI intervention needs.  Advance diet as tolerated.  Return patient to hospital [facility] for continued care.

## 2023-08-16 NOTE — CARE PLAN
The patient is Watcher - Medium risk of patient condition declining or worsening    Shift Goals  Clinical Goals: pain control, ERCP, NPO  Patient Goals: ERCP plain  Family Goals: N/A    Progress made toward(s) clinical / shift goals:      Problem: Knowledge Deficit - Standard  Goal: Patient and family/care givers will demonstrate understanding of plan of care, disease process/condition, diagnostic tests and medications  Outcome: Progressing  Note: Patient surgery went well  Tolerated fluids  Ambulated around unit   Wants to ambulate before bed.   NPO at midnight for follow up surgery.     Patient is not progressing towards the following goals:    DC update

## 2023-08-17 ENCOUNTER — PATIENT OUTREACH (OUTPATIENT)
Dept: SCHEDULING | Facility: IMAGING CENTER | Age: 73
End: 2023-08-17
Payer: MEDICARE

## 2023-08-17 VITALS
HEART RATE: 62 BPM | BODY MASS INDEX: 37.21 KG/M2 | OXYGEN SATURATION: 91 % | DIASTOLIC BLOOD PRESSURE: 49 MMHG | WEIGHT: 274.69 LBS | HEIGHT: 72 IN | TEMPERATURE: 97.5 F | SYSTOLIC BLOOD PRESSURE: 128 MMHG | RESPIRATION RATE: 18 BRPM

## 2023-08-17 LAB
ALBUMIN SERPL BCP-MCNC: 4.2 G/DL (ref 3.2–4.9)
ALBUMIN/GLOB SERPL: 1.8 G/DL
ALP SERPL-CCNC: 399 U/L (ref 30–99)
ALT SERPL-CCNC: 133 U/L (ref 2–50)
ANION GAP SERPL CALC-SCNC: 11 MMOL/L (ref 7–16)
AST SERPL-CCNC: 133 U/L (ref 12–45)
BILIRUB SERPL-MCNC: 1.5 MG/DL (ref 0.1–1.5)
BUN SERPL-MCNC: 12 MG/DL (ref 8–22)
CALCIUM ALBUM COR SERPL-MCNC: 9.5 MG/DL (ref 8.5–10.5)
CALCIUM SERPL-MCNC: 9.7 MG/DL (ref 8.5–10.5)
CHLORIDE SERPL-SCNC: 102 MMOL/L (ref 96–112)
CO2 SERPL-SCNC: 25 MMOL/L (ref 20–33)
CREAT SERPL-MCNC: 0.94 MG/DL (ref 0.5–1.4)
GFR SERPLBLD CREATININE-BSD FMLA CKD-EPI: 85 ML/MIN/1.73 M 2
GLOBULIN SER CALC-MCNC: 2.4 G/DL (ref 1.9–3.5)
GLUCOSE SERPL-MCNC: 100 MG/DL (ref 65–99)
POTASSIUM SERPL-SCNC: 3.8 MMOL/L (ref 3.6–5.5)
PROT SERPL-MCNC: 6.6 G/DL (ref 6–8.2)
SODIUM SERPL-SCNC: 138 MMOL/L (ref 135–145)

## 2023-08-17 PROCEDURE — 99024 POSTOP FOLLOW-UP VISIT: CPT | Performed by: SURGERY

## 2023-08-17 PROCEDURE — A9270 NON-COVERED ITEM OR SERVICE: HCPCS | Performed by: STUDENT IN AN ORGANIZED HEALTH CARE EDUCATION/TRAINING PROGRAM

## 2023-08-17 PROCEDURE — 700102 HCHG RX REV CODE 250 W/ 637 OVERRIDE(OP): Performed by: STUDENT IN AN ORGANIZED HEALTH CARE EDUCATION/TRAINING PROGRAM

## 2023-08-17 PROCEDURE — 80053 COMPREHEN METABOLIC PANEL: CPT

## 2023-08-17 PROCEDURE — 700105 HCHG RX REV CODE 258: Performed by: INTERNAL MEDICINE

## 2023-08-17 PROCEDURE — 99239 HOSP IP/OBS DSCHRG MGMT >30: CPT | Performed by: STUDENT IN AN ORGANIZED HEALTH CARE EDUCATION/TRAINING PROGRAM

## 2023-08-17 RX ORDER — HYDROCODONE BITARTRATE AND ACETAMINOPHEN 5; 325 MG/1; MG/1
1 TABLET ORAL EVERY 8 HOURS PRN
Qty: 9 TABLET | Refills: 0 | Status: SHIPPED | OUTPATIENT
Start: 2023-08-17 | End: 2023-08-20

## 2023-08-17 RX ADMIN — CARVEDILOL 25 MG: 25 TABLET, FILM COATED ORAL at 07:55

## 2023-08-17 RX ADMIN — AMLODIPINE BESYLATE 10 MG: 10 TABLET ORAL at 05:37

## 2023-08-17 RX ADMIN — FENOFIBRATE 67 MG: 67 CAPSULE ORAL at 05:40

## 2023-08-17 RX ADMIN — SODIUM CHLORIDE: 9 INJECTION, SOLUTION INTRAVENOUS at 05:30

## 2023-08-17 RX ADMIN — LOSARTAN POTASSIUM 25 MG: 25 TABLET, FILM COATED ORAL at 05:37

## 2023-08-17 RX ADMIN — GABAPENTIN 400 MG: 400 CAPSULE ORAL at 07:55

## 2023-08-17 RX ADMIN — TAMSULOSIN HYDROCHLORIDE 0.4 MG: 0.4 CAPSULE ORAL at 05:37

## 2023-08-17 RX ADMIN — SPIRONOLACTONE 25 MG: 25 TABLET ORAL at 05:37

## 2023-08-17 RX ADMIN — ACETAMINOPHEN 650 MG: 325 TABLET, FILM COATED ORAL at 07:59

## 2023-08-17 ASSESSMENT — PAIN DESCRIPTION - PAIN TYPE
TYPE: ACUTE PAIN

## 2023-08-17 NOTE — DISCHARGE INSTRUCTIONS
Take medications as directed   Stay hydrates, diet as tolerated   Follow up with surgery for post op care, their office information is below to schedule   If you have any questions or concerns you can call their office.   Follow up with Primary care in the next 2 weeks for post hospital follow up       Discharge Instructions    Discharged to home by car with relative. Discharged via wheelchair, hospital escort: Yes.  Special equipment needed: Not Applicable    Be sure to schedule a follow-up appointment with your primary care doctor or any specialists as instructed.     Discharge Plan:   Diet Plan: Discussed  Activity Level: Discussed  Confirmed Follow up Appointment: Patient to Call and Schedule Appointment  Confirmed Symptoms Management: Discussed  Medication Reconciliation Updated: Yes    I understand that a diet low in cholesterol, fat, and sodium is recommended for good health. Unless I have been given specific instructions below for another diet, I accept this instruction as my diet prescription.   Other diet: low fat diet    Special Instructions: None    -Is this patient being discharged with medication to prevent blood clots?  No    Is patient discharged on Warfarin / Coumadin?   No

## 2023-08-17 NOTE — PROGRESS NOTES
Assumed care of patient at 0700 from [place] FLANNERY. Patient is A&O x4, states pain level is 3/10, medicated patient per mar. Bed locked in lowest position with 2 rail up. Call light  in place, belongings at bedside. hourly rounding is in place.

## 2023-08-17 NOTE — CARE PLAN
The patient is Stable - Low risk of patient condition declining or worsening    Shift Goals  Clinical Goals: ween oxygen down, bowel movement, comfort  Patient Goals: bowel movement, comfort  Family Goals: alex    Progress made toward(s) clinical / shift goals:  Patient was updated on the plan of care, and clinical goals were discussed to have a bowel movement and ween off of supplemental oxygen by morning shift. The patient was educated on the importance of these goals and how they will help him discharge, and the patient verbalized understanding. Prune juice was given to the patient, and he was able to have a bowel movement during the night. His oxygen saturation levels were checked periodically, and oxygen titration levels were brought down accordingly.      Problem: Knowledge Deficit - Standard  Goal: Patient and family/care givers will demonstrate understanding of plan of care, disease process/condition, diagnostic tests and medications  8/17/2023 0305 by Jodi Haley R.N.  Outcome: Progressing  8/17/2023 0305 by Jodi Haley R.N.  Outcome: Progressing       Patient is not progressing towards the following goals:

## 2023-08-17 NOTE — DISCHARGE SUMMARY
Discharge Summary    CHIEF COMPLAINT ON ADMISSION  Chief Complaint   Patient presents with    Sent by MD RICKIE Aggarwal from [facility], pt had CT which showed gallstones, pt presented w/ RUQ pain and hx of aortic dissection w/ repair. Pt received 4mg morphine, zosyn, and zofran.        Reason for Admission  Choledocolithiasis     Admission Date  8/12/2023    CODE STATUS  DNAR, I OK    HPI & HOSPITAL COURSE  Xavier Richardson is a 72 y.o. male who presented 8/12/2023 with Right upper quadrant  abdominal pain.  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    Patient reported a progressively worsening dull abdominal pain in epigastric and right upper quadrant area.  Associated with nausea no vomiting. Due to worsening symptoms he came to the ER for evaluation.    In ER, patient found to have bradycardia. Found to have labs of , , ALK-P 327, total bilirubin 3.3. Lipase wnl.  Right upper quadrant ultrasound showing cholelithiasis with acute cholecystitis.  Dilation of the common bile duct was seen.  General surgery was consulted and recommended  MRCP followed by GI consult for ERCP eval if indicated.  He was started on zosyn and admitted for further evaluation.     He underwent MCRP which showed choledocholithiasis and CBD dilatation however was limited due to artifact from graft in close proximity. There was plan for ERCP however after further discussion that was cancelled and he will be taken back for planned lap chon with intraoperative cholangiogram. He underwent lap chon without complicated but his introperative cholangiogram did show obstruction thus he underwent ERCP the following day with removal of 2 bilary stones. He did well procedure. He was tolerated diet and had a bowel movement. He was having some post operative abdominal pain but was mild and tolerable. Thus he was discharge home with outpatient  follow up     Therefore, he is discharged in fair and stable condition to home with close outpatient follow-up.    The patient met 2-midnight criteria for an inpatient stay at the time of discharge.    Discharge Date  8/17/2023    FOLLOW UP ITEMS POST DISCHARGE  Post operative follow up   Downtrending of liver enzymes   Chronic medical conditions     DISCHARGE DIAGNOSES  Principal Problem:    Choledocholithiasis with acute cholecystitis (POA: Unknown)  Active Problems:    Stroke (cerebrum) (HCC) (POA: Yes)    Essential hypertension (POA: Yes)    JESUS MANUEL (obstructive sleep apnea) (POA: Yes)    Dissection of thoracoabdominal aorta (HCC) (POA: Yes)    Dyslipidemia (POA: Yes)    Status post aortic dissection repair (POA: Unknown)  Resolved Problems:    * No resolved hospital problems. *      FOLLOW UP  No future appointments.  [facility]  [facility]  [address] [address]  [phone]  Appts and Walk-Ins Mon-Wens 8am-5pm, Thurs 8am-12pm, Closed on Fri  Go on 8/24/2023  Appts and Walk-Ins Mon-Wens 8am-5pm, Thurs 8am-12pm, Closed on Fri  Please call to schedule a follow up appointment or you can walk in to be seen.    Efren Guzman D.O.  [address] [place] [id number] [phone]    Schedule an appointment as soon as possible for a visit in 2 week(s)  post op follow up, sooner if needed      MEDICATIONS ON DISCHARGE     Medication List        START taking these medications        Instructions   HYDROcodone-acetaminophen 5-325 MG Tabs per tablet  Commonly known as: Norco   Take 1 Tablet by mouth every 8 hours as needed (severe abdominal pain) for up to 3 days.  Dose: 1 Tablet            CONTINUE taking these medications        Instructions   acetaminophen 325 MG Tabs  Commonly known as: Tylenol   Take 650 mg by mouth 3 times a day.  Dose: 650 mg     amLODIPine 10 MG Tabs  Commonly known as: Norvasc   Take 10 mg by mouth every day.  Dose: 10 mg     atorvastatin 40  MG Tabs  Commonly known as: Lipitor   Take 40 mg by mouth every day.  Dose: 40 mg     carvedilol 25 MG Tabs  Commonly known as: Coreg   Take 25 mg by mouth 2 times a day.  Dose: 25 mg     fenofibrate 54 MG tablet  Commonly known as: Tricor   Take 54 mg by mouth every day.  Dose: 54 mg     finasteride 5 MG Tabs  Commonly known as: Proscar   Take 1 Tab by mouth every day.  Dose: 5 mg     furosemide 40 MG Tabs  Commonly known as: Lasix   Take 1 Tab by mouth every day.  Dose: 40 mg     gabapentin 400 MG Caps  Commonly known as: Neurontin   Take 1 Cap by mouth 4 times a day.  Dose: 400 mg     losartan 25 MG Tabs  Commonly known as: Cozaar   Take 25 mg by mouth every day.  Dose: 25 mg     spironolactone 25 MG Tabs  Commonly known as: Aldactone   Take 25 mg by mouth every day.  Dose: 25 mg     tamsulosin 0.4 MG capsule  Commonly known as: Flomax   Take 0.4 mg by mouth every day.  Dose: 0.4 mg              Allergies  Not on File    DIET  Orders Placed This Encounter   Procedures    Diet Order Diet: Regular     Standing Status:   Standing     Number of Occurrences:   1     Order Specific Question:   Diet:     Answer:   Regular [1]       ACTIVITY  As tolerated.      CONSULTATIONS  General surgery   Gastroenterology     PROCEDURES  Procedure(s): 8/15/2023  CHOLECYSTECTOMY, LAPAROSCOPIC WITH INTRAOPERATIVE CHOLANGIOGRAM     PROCEDURE: 8/16/2023  ERCP with biliary sphincterotomy and calculi removal    LABORATORY  Lab Results   Component Value Date    SODIUM 138 08/17/2023    POTASSIUM 3.8 08/17/2023    CHLORIDE 102 08/17/2023    CO2 25 08/17/2023    GLUCOSE 100 (H) 08/17/2023    BUN 12 08/17/2023    CREATININE 0.94 08/17/2023        Lab Results   Component Value Date    WBC 5.3 08/15/2023    HEMOGLOBIN 12.9 (L) 08/15/2023    HEMATOCRIT 37.1 (L) 08/15/2023    PLATELETCT 135 (L) 08/15/2023        Total time of the discharge process exceeds 45 minutes.

## 2023-08-17 NOTE — PROGRESS NOTES
"      Doing well overnight  Minimum pain  Tolerating diet    /49   Pulse 62   Temp 36.4 °C (97.5 °F) (Temporal)   Resp 18   Ht 1.829 m (6' 0.01\")   Wt 125 kg (274 lb 11.1 oz)   SpO2 91%   BMI 37.25 kg/m²     No jaundice or icterus  Abdomen soft, obese  Incisions CDI    Recent Labs     08/15/23  0039   WBC 5.3   RBC 3.89*   HEMOGLOBIN 12.9*   HEMATOCRIT 37.1*   MCV 95.4   MCH 33.2*   RDW 49.6   PLATELETCT 135*   MPV 9.1   NEUTSPOLYS 61.20   LYMPHOCYTES 23.20   MONOCYTES 11.40   EOSINOPHILS 3.20   BASOPHILS 0.60     Recent Labs     08/15/23  0039 08/16/23  0150 08/17/23  0016   SODIUM 140 137 138   POTASSIUM 3.5* 3.9 3.8   CHLORIDE 105 103 102   CO2 22 22 25   GLUCOSE 96 100* 100*   BUN 14 13 12   CREATININE 0.97 1.01 0.94     72-year-old male status post laparoscopic cholecystectomy with intraoperative cholangiogram and subsequent ERCP done yesterday.  Doing well  Tolerating diet  Discharging today    Follow-up in surgical office in 2 weeks  "

## 2023-08-17 NOTE — PROGRESS NOTES
PIV D/C tip was intact. Discharge paperwork discussed with the patient, signed copy in the chart along with controlled substance consent.

## 2023-08-17 NOTE — PROGRESS NOTES
Assumed pt care with RN. Pt is A&OX4, and states he is in 3/10 pain but declines interventions at this time. Plan of care discussed, no further questions at this time. Personal belongings and call light within reach.

## 2024-08-15 ENCOUNTER — APPOINTMENT (RX ONLY)
Dept: URBAN - METROPOLITAN AREA CLINIC 4 | Facility: CLINIC | Age: 74
Setting detail: DERMATOLOGY
End: 2024-08-15

## 2024-08-15 DIAGNOSIS — Z85.828 PERSONAL HISTORY OF OTHER MALIGNANT NEOPLASM OF SKIN: ICD-10-CM

## 2024-08-15 DIAGNOSIS — Z71.89 OTHER SPECIFIED COUNSELING: ICD-10-CM

## 2024-08-15 DIAGNOSIS — L81.4 OTHER MELANIN HYPERPIGMENTATION: ICD-10-CM

## 2024-08-15 DIAGNOSIS — L82.1 OTHER SEBORRHEIC KERATOSIS: ICD-10-CM

## 2024-08-15 DIAGNOSIS — L57.0 ACTINIC KERATOSIS: ICD-10-CM

## 2024-08-15 DIAGNOSIS — L82.0 INFLAMED SEBORRHEIC KERATOSIS: ICD-10-CM

## 2024-08-15 DIAGNOSIS — D22 MELANOCYTIC NEVI: ICD-10-CM

## 2024-08-15 PROBLEM — D22.72 MELANOCYTIC NEVI OF LEFT LOWER LIMB, INCLUDING HIP: Status: ACTIVE | Noted: 2024-08-15

## 2024-08-15 PROBLEM — D22.71 MELANOCYTIC NEVI OF RIGHT LOWER LIMB, INCLUDING HIP: Status: ACTIVE | Noted: 2024-08-15

## 2024-08-15 PROBLEM — D48.5 NEOPLASM OF UNCERTAIN BEHAVIOR OF SKIN: Status: ACTIVE | Noted: 2024-08-15

## 2024-08-15 PROCEDURE — 99203 OFFICE O/P NEW LOW 30 MIN: CPT | Mod: 25

## 2024-08-15 PROCEDURE — 17110 DESTRUCTION B9 LES UP TO 14: CPT

## 2024-08-15 PROCEDURE — ? COUNSELING

## 2024-08-15 PROCEDURE — ? SUNSCREEN RECOMMENDATIONS

## 2024-08-15 PROCEDURE — 17000 DESTRUCT PREMALG LESION: CPT | Mod: 59

## 2024-08-15 PROCEDURE — ? LIQUID NITROGEN

## 2024-08-15 PROCEDURE — 17003 DESTRUCT PREMALG LES 2-14: CPT | Mod: 59

## 2024-08-15 PROCEDURE — 11102 TANGNTL BX SKIN SINGLE LES: CPT | Mod: 59

## 2024-08-15 PROCEDURE — 11103 TANGNTL BX SKIN EA SEP/ADDL: CPT | Mod: 59

## 2024-08-15 PROCEDURE — ? BIOPSY BY SHAVE METHOD

## 2024-08-15 ASSESSMENT — LOCATION ZONE DERM
LOCATION ZONE: NOSE
LOCATION ZONE: HAND
LOCATION ZONE: LEG
LOCATION ZONE: FACE
LOCATION ZONE: TRUNK
LOCATION ZONE: LIP
LOCATION ZONE: ARM

## 2024-08-15 ASSESSMENT — LOCATION SIMPLE DESCRIPTION DERM
LOCATION SIMPLE: RIGHT THIGH
LOCATION SIMPLE: RIGHT UPPER ARM
LOCATION SIMPLE: LEFT FOREARM
LOCATION SIMPLE: RIGHT NOSE
LOCATION SIMPLE: LEFT UPPER ARM
LOCATION SIMPLE: LEFT LIP
LOCATION SIMPLE: LEFT THIGH
LOCATION SIMPLE: RIGHT CHEEK
LOCATION SIMPLE: RIGHT HAND
LOCATION SIMPLE: UPPER BACK
LOCATION SIMPLE: LEFT HAND
LOCATION SIMPLE: RIGHT FOREARM
LOCATION SIMPLE: LEFT CHEEK

## 2024-08-15 ASSESSMENT — LOCATION DETAILED DESCRIPTION DERM
LOCATION DETAILED: LEFT DISTAL POSTERIOR UPPER ARM
LOCATION DETAILED: LEFT INFERIOR CENTRAL MALAR CHEEK
LOCATION DETAILED: RIGHT PROXIMAL POSTERIOR UPPER ARM
LOCATION DETAILED: LEFT ANTERIOR PROXIMAL THIGH
LOCATION DETAILED: RIGHT RADIAL DORSAL HAND
LOCATION DETAILED: LEFT PROXIMAL POSTERIOR UPPER ARM
LOCATION DETAILED: RIGHT VENTRAL PROXIMAL FOREARM
LOCATION DETAILED: RIGHT ANTERIOR PROXIMAL THIGH
LOCATION DETAILED: LEFT PROXIMAL ULNAR DORSAL FOREARM
LOCATION DETAILED: LEFT RADIAL DORSAL HAND
LOCATION DETAILED: RIGHT DISTAL POSTERIOR UPPER ARM
LOCATION DETAILED: INFERIOR THORACIC SPINE
LOCATION DETAILED: RIGHT CENTRAL MALAR CHEEK
LOCATION DETAILED: LEFT LOWER CUTANEOUS LIP
LOCATION DETAILED: RIGHT NASAL SIDEWALL
LOCATION DETAILED: LEFT VENTRAL DISTAL FOREARM
LOCATION DETAILED: LEFT LATERAL BUCCAL CHEEK

## 2024-08-15 NOTE — PROCEDURE: LIQUID NITROGEN
Render Post-Care Instructions In Note?: no
Show Aperture Variable?: Yes
Number Of Freeze-Thaw Cycles: 1 freeze-thaw cycle
Consent: The patient's consent was obtained including but not limited to risks of crusting, scabbing, blistering, scarring, darker or lighter pigmentary change, recurrence, incomplete removal and infection.
Aperture Size (Optional): C
Detail Level: Detailed
Application Tool (Optional): Cry-AC
Duration Of Freeze Thaw-Cycle (Seconds): 3
Post-Care Instructions: I reviewed with the patient in detail post-care instructions. Patient is to wear sunprotection, and avoid picking at any of the treated lesions. Pt may apply Vaseline to crusted or scabbing areas.
Medical Necessity Information: It is in your best interest to select a reason for this procedure from the list below. All of these items fulfill various CMS LCD requirements except the new and changing color options.
Spray Paint Text: The liquid nitrogen was applied to the skin utilizing a spray paint frosting technique.
Medical Necessity Clause: This procedure was medically necessary because the lesions that were treated were:

## 2024-09-04 ENCOUNTER — APPOINTMENT (RX ONLY)
Dept: URBAN - METROPOLITAN AREA CLINIC 4 | Facility: CLINIC | Age: 74
Setting detail: DERMATOLOGY
End: 2024-09-04

## 2024-09-04 PROBLEM — C44.619 BASAL CELL CARCINOMA OF SKIN OF LEFT UPPER LIMB, INCLUDING SHOULDER: Status: ACTIVE | Noted: 2024-09-04

## 2024-09-04 PROCEDURE — 13121 CMPLX RPR S/A/L 2.6-7.5 CM: CPT

## 2024-09-04 PROCEDURE — ? EXCISION

## 2024-09-04 PROCEDURE — 11604 EXC TR-EXT MAL+MARG 3.1-4 CM: CPT

## 2024-09-25 ENCOUNTER — APPOINTMENT (RX ONLY)
Dept: URBAN - METROPOLITAN AREA CLINIC 36 | Facility: CLINIC | Age: 74
Setting detail: DERMATOLOGY
End: 2024-09-25

## 2024-09-25 PROBLEM — C44.319 BASAL CELL CARCINOMA OF SKIN OF OTHER PARTS OF FACE: Status: ACTIVE | Noted: 2024-09-25

## 2024-09-25 PROCEDURE — 17312 MOHS ADDL STAGE: CPT

## 2024-09-25 PROCEDURE — ? MOHS SURGERY

## 2024-09-25 PROCEDURE — 17311 MOHS 1 STAGE H/N/HF/G: CPT

## 2024-09-25 PROCEDURE — 14040 TIS TRNFR F/C/C/M/N/A/G/H/F: CPT

## 2024-09-25 NOTE — PROCEDURE: MOHS SURGERY
Body Location Override (Optional - Billing Will Still Be Based On Selected Body Map Location If Applicable): right medial forehead
Mohs Case Number: [id number]
Previous Accession (Optional): [id number]
Biopsy Photograph Reviewed: Yes
Referring Physician (Optional): hogan
Consent Type: Consent 1 (Standard)
Eye Shield Used: No
Surgeon Performing Repair (Optional): Vanessa
Initial Size Of Lesion: 0.7
X Size Of Lesion In Cm (Optional): 0.6
Number Of Stages: 2
Primary Defect Length In Cm (Final Defect Size - Required For Flaps/Grafts): 1.5
Primary Defect Width In Cm (Final Defect Size - Required For Flaps/Grafts): 1.1
Primary Defect Depth In Cm (Optional But Required For Some Insurers): 0
Repair Type: Flap
Which Instrument Did You Use For Dermabrasion?: Wire Brush
Which Eyelid Repair Cpt Are You Using?: 57380
Oculoplastic Surgeon (A): Javi
Oculoplastic Surgeon Procedure Text (A): After obtaining clear surgical margins the patient was sent to oculoplastics for surgical repair.  The patient understands they will receive post-surgical care and follow-up from the referring physician's office.
Otolaryngologist Procedure Text (A): After obtaining clear surgical margins the patient was sent to otolaryngology for surgical repair.  The patient understands they will receive post-surgical care and follow-up from the referring physician's office.
Plastic Surgeon Procedure Text (A): After obtaining clear surgical margins the patient was sent to plastics for surgical repair.  The patient understands they will receive post-surgical care and follow-up from the referring physician's office.
Mid-Level (A): did
Mid-Level Procedure Text (A): After obtaining clear surgical margins the patient was sent to a mid-level provider for surgical repair.  The patient understands they will receive post-surgical care and follow-up from the mid-level provider.
Provider Procedure Text (A): After obtaining clear surgical margins the defect was repaired by another provider.
Asc Procedure Text (A): After obtaining clear surgical margins the patient was sent to an ASC for surgical repair.  The patient understands they will receive post-surgical care and follow-up from the ASC physician.
Suturegard Retention Suture: 2-0 Nylon
Retention Suture Bite Size: 3 mm
Length To Time In Minutes Device Was In Place: 10
Number Of Hemigard Strips Per Side: 1
Undermining Type: Entire Wound
Debridement Text: The wound edges were debrided prior to proceeding with the closure to facilitate wound healing.
Helical Rim Text: The closure involved the helical rim.
Vermilion Border Text: The closure involved the vermilion border.
Nostril Rim Text: The closure involved the nostril rim.
Retention Suture Text: Retention sutures were placed to support the closure and prevent dehiscence.
Flap Type: A-T Advancement Flap
Secondary Defect Length In Cm (Required For Flaps): 1.4
Area H Indication Text: Tumors in this location are included in Area H (eyelids, eyebrows, nose, lips, chin, ear, pre-auricular, post-auricular, temple, genitalia, hands, feet, ankles and areola).  Tissue conservation is critical in these anatomic locations.
Area M Indication Text: Tumors in this location are included in Area M (cheek, forehead, scalp, neck, jawline and pretibial skin).  Mohs surgery is indicated for tumors in these anatomic locations.
Area L Indication Text: Tumors in this location are included in Area L (trunk and extremities).  Mohs surgery is indicated for larger tumors, or tumors with aggressive histologic features, in these anatomic locations.
Depth Of Tumor Invasion (For Histology): dermis
Perineural Invasion (For Histology - Be Specific If Possible): absent
Surgical Defect Length In Cm (Optional): 1.3
Surgical Defect Width In Cm (Optional): 0.9
Special Stains Stage 1 - Results: Base On Clearance Noted Above
Stage 2: Additional Anesthesia Type: 1% lidocaine with 1:100,000 epinephrine and 408mcg clindamycin/ml and a 1:10 solution of 8.4% sodium bicarbonate
Stage 4: Additional Anesthesia Type: 1% lidocaine with epinephrine
Staging Info: By selecting yes to the question above you will include information on AJCC 8 tumor staging in your Mohs note. Information on tumor staging will be automatically added for SCCs on the head and neck. AJCC 8 includes tumor size, tumor depth, perineural involvement and bone invasion.
Tumor Depth: Less than 6mm from granular layer and no invasion beyond the subcutaneous fat
Was The Patient On Physician Recommended Anticoagulation Therapy?: Please Select the Appropriate Response
Medical Necessity Statement: Based on my medical judgement, Mohs surgery is the most appropriate treatment for this cancer compared to other treatments.
Alternatives Discussed Intro (Do Not Add Period): I discussed alternative treatments to Mohs surgery and specifically discussed the risks and benefits of
Consent 1/Introductory Paragraph: The rationale for Mohs was explained to the patient and consent was obtained. The risks, benefits and alternatives to therapy were discussed in detail. Specifically, the risks of infection, scarring, bleeding, prolonged wound healing, incomplete removal, allergy to anesthesia, nerve injury and recurrence were addressed. Prior to the procedure, the treatment site was clearly identified and confirmed by the patient. All components of Universal Protocol/PAUSE Rule completed.
Consent 2/Introductory Paragraph: Mohs surgery was explained to the patient and consent was obtained. The risks, benefits and alternatives to therapy were discussed in detail. Specifically, the risks of infection, scarring, bleeding, prolonged wound healing, incomplete removal, allergy to anesthesia, nerve injury and recurrence were addressed. Prior to the procedure, the treatment site was clearly identified and confirmed by the patient. All components of Universal Protocol/PAUSE Rule completed.
Consent 3/Introductory Paragraph: I gave the patient a chance to ask questions they had about the procedure.  Following this I explained the Mohs procedure and consent was obtained. The risks, benefits and alternatives to therapy were discussed in detail. Specifically, the risks of infection, scarring, bleeding, prolonged wound healing, incomplete removal, allergy to anesthesia, nerve injury and recurrence were addressed. Prior to the procedure, the treatment site was clearly identified and confirmed by the patient. All components of Universal Protocol/PAUSE Rule completed.
Consent (Temporal Branch)/Introductory Paragraph: The rationale for Mohs was explained to the patient and consent was obtained. The risks, benefits and alternatives to therapy were discussed in detail. Specifically, the risks of damage to the temporal branch of the facial nerve, infection, scarring, bleeding, prolonged wound healing, incomplete removal, allergy to anesthesia, and recurrence were addressed. Prior to the procedure, the treatment site was clearly identified and confirmed by the patient. All components of Universal Protocol/PAUSE Rule completed.
Consent (Marginal Mandibular)/Introductory Paragraph: The rationale for Mohs was explained to the patient and consent was obtained. The risks, benefits and alternatives to therapy were discussed in detail. Specifically, the risks of damage to the marginal mandibular branch of the facial nerve, infection, scarring, bleeding, prolonged wound healing, incomplete removal, allergy to anesthesia, and recurrence were addressed. Prior to the procedure, the treatment site was clearly identified and confirmed by the patient. All components of Universal Protocol/PAUSE Rule completed.
Consent (Spinal Accessory)/Introductory Paragraph: The rationale for Mohs was explained to the patient and consent was obtained. The risks, benefits and alternatives to therapy were discussed in detail. Specifically, the risks of damage to the spinal accessory nerve, infection, scarring, bleeding, prolonged wound healing, incomplete removal, allergy to anesthesia, and recurrence were addressed. Prior to the procedure, the treatment site was clearly identified and confirmed by the patient. All components of Universal Protocol/PAUSE Rule completed.
Consent (Near Eyelid Margin)/Introductory Paragraph: The rationale for Mohs was explained to the patient and consent was obtained. The risks, benefits and alternatives to therapy were discussed in detail. Specifically, the risks of ectropion or eyelid deformity, infection, scarring, bleeding, prolonged wound healing, incomplete removal, allergy to anesthesia, nerve injury and recurrence were addressed. Prior to the procedure, the treatment site was clearly identified and confirmed by the patient. All components of Universal Protocol/PAUSE Rule completed.
Consent (Ear)/Introductory Paragraph: The rationale for Mohs was explained to the patient and consent was obtained. The risks, benefits and alternatives to therapy were discussed in detail. Specifically, the risks of ear deformity, infection, scarring, bleeding, prolonged wound healing, incomplete removal, allergy to anesthesia, nerve injury and recurrence were addressed. Prior to the procedure, the treatment site was clearly identified and confirmed by the patient. All components of Universal Protocol/PAUSE Rule completed.
Consent (Nose)/Introductory Paragraph: The rationale for Mohs was explained to the patient and consent was obtained. The risks, benefits and alternatives to therapy were discussed in detail. Specifically, the risks of nasal deformity, changes in the flow of air through the nose, infection, scarring, bleeding, prolonged wound healing, incomplete removal, allergy to anesthesia, nerve injury and recurrence were addressed. Prior to the procedure, the treatment site was clearly identified and confirmed by the patient. All components of Universal Protocol/PAUSE Rule completed.
Consent (Lip)/Introductory Paragraph: The rationale for Mohs was explained to the patient and consent was obtained. The risks, benefits and alternatives to therapy were discussed in detail. Specifically, the risks of lip deformity, changes in the oral aperture, infection, scarring, bleeding, prolonged wound healing, incomplete removal, allergy to anesthesia, nerve injury and recurrence were addressed. Prior to the procedure, the treatment site was clearly identified and confirmed by the patient. All components of Universal Protocol/PAUSE Rule completed.
Consent (Scalp)/Introductory Paragraph: The rationale for Mohs was explained to the patient and consent was obtained. The risks, benefits and alternatives to therapy were discussed in detail. Specifically, the risks of changes in hair growth pattern secondary to repair, infection, scarring, bleeding, prolonged wound healing, incomplete removal, allergy to anesthesia, nerve injury and recurrence were addressed. Prior to the procedure, the treatment site was clearly identified and confirmed by the patient. All components of Universal Protocol/PAUSE Rule completed.
Detail Level: Detailed
Postop Diagnosis: same
Anesthesia Type: 1% lidocaine with 1:100,000 epinephrine and a 1:10 solution of 8.4% sodium bicarbonate
Anesthesia Volume In Cc: 6
Hemostasis: Electrocautery
Estimated Blood Loss (Cc): less than 5 cc
Repair Anesthesia Method: local infiltration
Brow Lift Text: A midfrontal incision was made medially to the defect to allow access to the tissues just superior to the left eyebrow. Following careful dissection inferiorly in a supraperiosteal plane to the level of the left eyebrow, several 3-0 monocryl sutures were used to resuspend the eyebrow orbicularis oculi muscular unit to the superior frontal bone periosteum. This resulted in an appropriate reapproximation of static eyebrow symmetry and correction of the left brow ptosis.
Deep Sutures: 5-0 Polysorb
Epidermal Sutures: 5-0 Chromic Gut
Epidermal Closure: running cuticular
Suturegard Intro: Intraoperative tissue expansion was performed, utilizing the SUTUREGARD device, in order to reduce wound tension.
Suturegard Body: The suture ends were repeatedly re-tightened and re-clamped to achieve the desired tissue expansion.
Hemigard Intro: Due to skin fragility and wound tension, it was decided to use HEMIGARD adhesive retention suture devices to permit a linear closure. The skin was cleaned and dried for a 6cm distance away from the wound. Excessive hair, if present, was removed to allow for adhesion.
Hemigard Postcare Instructions: The HEMIGARD strips are to remain completely dry for at least 5-7 days.
Donor Site Anesthesia Type: same as repair anesthesia
Graft Basting Suture (Optional): 5-0 Fast Absorbing Gut
Graft Donor Site Epidermal Sutures (Optional): 5-0 Ethibond
Epidermal Closure Graft Donor Site (Optional): simple interrupted
Graft Donor Site Bandage (Optional-Leave Blank If You Don't Want In Note): Aquaphor and telefa placed on wound. Pressure dressing applied to donor site
Closure 2 Information: This tab is for additional flaps and grafts, including complex repair and grafts and complex repair and flaps. You can also specify a different location for the additional defect, if the location is the same you do not need to select a new one. We will insert the automated text for the repair you select below just as we do for solitary flaps and grafts. Please note that at this time if you select a location with a different insurance zone you will need to override the ICD10 and CPT if appropriate.
Closure 3 Information: This tab is for additional flaps and grafts above and beyond our usual structured repairs.  Please note if you enter information here it will not currently bill and you will need to add the billing information manually.
Wound Care: Aquaphor
Dressing: dry sterile dressing
Wound Care (No Sutures): Petrolatum
Suture Removal: 7 days
Unna Boot Text: An Unna boot was placed to help immobilize the limb and facilitate more rapid healing.
Home Suture Removal Text: Patient was provided instructions on removing sutures and will remove their sutures at home.  If they have any questions or difficulties they will call the office.
Post-Care Instructions: I reviewed with the patient in detail post-care instructions. Patient is not to engage in any heavy lifting, exercise, or swimming for the next 14 days. Should the patient develop any fevers, chills, bleeding, severe pain patient will contact the office immediately.
Pain Refusal Text: I offered to prescribe pain medication but the patient refused to take this medication.
Mauc Instructions: By selecting yes to the question below the MAUC number will be added into the note.  This will be calculated automatically based on the diagnosis chosen, the size entered, the body zone selected (H,M,L) and the specific indications you chose. You will also have the option to override the Mohs AUC if you disagree with the automatically calculated number and this option is found in the Case Summary tab.
Where Do You Want The Question To Include Opioid Counseling Located?: Case Summary Tab
Eye Protection Verbiage: Before proceeding with the stage, a plastic scleral shield was inserted. The globe was anesthetized with a few drops of 1% lidocaine with 1:100,000 epinephrine. Then, an appropriate sized scleral shield was chosen and coated with lacrilube ointment. The shield was gently inserted and left in place for the duration of each stage. After the stage was completed, the shield was gently removed.
Mohs Method Verbiage: An incision at a 45 degree angle following the standard Mohs approach was done and the specimen was harvested as a microscopic controlled layer.
Surgeon/Pathologist Verbiage (Will Incorporate Name Of Surgeon From Intro If Not Blank): operated in two distinct and integrated capacities as the surgeon and pathologist.
Mohs Histo Method Verbiage: Each section was then chromacoded and processed in the Mohs lab using the Mohs protocol and submitted for frozen section.
Subsequent Stages Histo Method Verbiage: Using a similar technique to that described above, a thin layer of tissue was removed from all areas where tumor was visible on the previous stage.  The tissue was again oriented, mapped, dyed, and processed as above.
Mohs Rapid Report Verbiage: The area of clinically evident tumor was marked with skin marking ink and appropriately hatched.  The initial incision was made following the Mohs approach through the skin.  The specimen was taken to the lab, divided into the necessary number of pieces, chromacoded and processed according to the Mohs protocol.  This was repeated in successive stages until a tumor free defect was achieved.
Intermediate Repair Preamble Text (Leave Blank If You Do Not Want): Undermining was performed with blunt dissection.
Graft Cartilage Fenestration Text: The cartilage was fenestrated with a 2mm punch biopsy to help facilitate graft survival and healing.
Non-Graft Cartilage Fenestration Text: The cartilage was fenestrated with a 2mm punch biopsy to help facilitate healing.
Secondary Intention Text (Leave Blank If You Do Not Want): The defect will heal with secondary intention.
No Repair - Repaired With Adjacent Surgical Defect Text (Leave Blank If You Do Not Want): After obtaining clear surgical margins the defect was repaired concurrently with another surgical defect which was in close approximation.
Unique Flap 1 Name: Myocutaneous Island pedicle Flap
Unique Flap 2 Name: Peng Flap
Unique Flap 3 Name: Mercedes Flap
Unique Flap 4 Name: Banner Flap
Unique Flap 5 Name: tunneled myocutaneous flap
Unique Flap 6 Name: Elizabeth-B?ch flap
Unique Flap 7 Name: Mustarde flap
Unique Flap 8 Name: East to West Flap
Unique Flap 1 Text: A decision was made to reconstruct the defect utilizing a myocutaneous Island pedicle Flap based on the levator labii superioris muscle.  A telfa template was made of the defect.  This telfa template was then used to outline the myocutaneous flap, based along the meilolabial fold.  The donor area for the pedicle flap was then injected with anesthesia.  The flap was excised through the skin and subcutaneous tissue down to the layer of the underlying musculature.  The myocutaneous flap was carefully excised within this deep plane to maintain its blood supply. Based on the muscle. The edges of the donor site were undermined.   The donor site was closed in a primary fashion to the point of transposition.  The pedicle was then transposed into position and sutured.  Once the flap was sutured into place, adequate blood supply was confirmed with blanching and refill.
Unique Flap 2 Text: A decision was made to reconstruct the defect utilizing a Peng Flap (Bilateral Advancement Rotation Flap). Given the location of the defect and the proximity to free margins, this flap was deemed most appropriate.  Using a sterile surgical marker, the appropriate rotation flaps were drawn incorporating the defect and placing the expected incisions within the relaxed skin tension lines where possible.    The area thus outlined was incised deep to adipose tissue with a #15 scalpel blade.  The skin margins were undermined to an appropriate distance in all directions utilizing iris scissors.
Unique Flap 3 Text: The defect edges were debeveled with a #15 scalpel blade.  Given the location of the defect, shape of the defect and the proximity to free margins a Mercedes (double advancement flap) was deemed most appropriate.  Using a sterile surgical marker, the appropriate transposition flaps were drawn incorporating the defect and placing the expected incisions within the relaxed skin tension lines where possible.    The area thus outlined was incised deep to adipose tissue with a #15 scalpel blade.  The skin margins were undermined to an appropriate distance in all directions utilizing iris scissors.  Hemostasis was achieved with electrocautery.  The flaps were then advanced into the defect and anchored with interrupted buried subcutaneous sutures.
Unique Flap 4 Text: The defect edges were debeveled with a #15 scalpel blade.  Given the location of the defect and the proximity to free margins a Banner transposition flap was deemed most appropriate.  Using a sterile surgical marker, an appropriate Banner transposition flap was drawn incorporating the defect.    The area thus outlined was incised deep to adipose tissue with a #15 scalpel blade.  The skin margins were undermined to an appropriate distance in all directions utilizing iris scissors.
Unique Flap 5 Text: A decision was made to reconstruct the defect utilizing a tunneled myocutaneous Island pedicle Flap based on the anterior auricularis muscle.  A telfa template was made of the defect.  This telfa template was then used to outline the myocutaneous flap, based along the preauricular fold.  The donor area for the pedicle flap was then injected with anesthesia.  The flap was excised through the skin and subcutaneous tissue down to the layer of the underlying musculature.  The myocutaneous flap was carefully excised within this deep plane to maintain its blood supply based on the muscle. The edges of the donor site were undermined.   The donor site was closed in a primary fashion to the point of transposition.  The pedicle was then transposed through a tunnel into position and sutured.  Once the flap was sutured into place, adequate blood supply was confirmed with blanching and refill.
Unique Flap 6 Text: A decision was made to reconstruct the defect utilizing an Anti-aging-B?ch Flap (Bilateral helical Advancement Rotation Flap). Given the location of the defect and the proximity to free margins, this flap was deemed most appropriate.  Using a sterile surgical marker, the appropriate flaps were drawn incorporating the defect and placing the expected incisions within the relaxed skin tension lines where possible.  The area thus outlined was incised deep to adipose tissue with a #15 scalpel blade.  The skin margins were undermined to an appropriate distance in all directions utilizing iris scissors. Cartilage was incorporated into the flap arms to maintain helical anatomy.
Unique Flap 7 Text: A decision was made to reconstruct the defect utilizing a Mustarde Flap (Advancement Rotation Flap). Given the location of the defect and the proximity to free margins, this flap was deemed most appropriate.  Using a sterile surgical marker, the appropriate rotation flap was drawn incorporating the defect and placing the expected incisions within the relaxed skin tension lines where possible.    The area thus outlined was incised deep to adipose tissue with a #15 scalpel blade.  The skin margins were undermined to an appropriate distance in all directions utilizing iris scissors. The flap was advanced and rotated under the eyelid with a sling created laterally to keep ectropion minimal.
Unique Flap 8 Text: A decision was made to reconstruct the defect utilizing an East to West Flap (Modified Burows Advancement Flap). Given the location of the defect and the proximity to free margins, this flap was deemed most appropriate.  Using a sterile surgical marker, the appropriate advancement flaps were drawn incorporating the defect and placing the expected incisions within the relaxed skin tension lines where possible.    The area thus outlined was incised deep to adipose tissue with a #15 scalpel blade.  The skin margins were undermined to an appropriate distance in all directions utilizing iris scissors. Minimal alar distortion was created with flap approximation.
Adjacent Tissue Transfer Text: The defect edges were debeveled with a #15 scalpel blade.  Given the location of the defect and the proximity to free margins an adjacent tissue transfer was deemed most appropriate.  Using a sterile surgical marker, an appropriate flap was drawn incorporating the defect and placing the expected incisions within the relaxed skin tension lines where possible.    The area thus outlined was incised deep to adipose tissue with a #15 scalpel blade.  The skin margins were undermined to an appropriate distance in all directions utilizing iris scissors.
Advancement Flap (Single) Text: The defect edges were debeveled with a #15 scalpel blade.  Given the location of the defect and the proximity to free margins a single advancement flap was deemed most appropriate.  Using a sterile surgical marker, an appropriate advancement flap was drawn incorporating the defect and placing the expected incisions within the relaxed skin tension lines where possible.    The area thus outlined was incised deep to adipose tissue with a #15 scalpel blade.  The skin margins were undermined to an appropriate distance in all directions utilizing iris scissors.
Advancement Flap (Double) Text: The defect edges were debeveled with a #15 scalpel blade.  Given the location of the defect and the proximity to free margins a double advancement flap was deemed most appropriate.  Using a sterile surgical marker, the appropriate advancement flaps were drawn incorporating the defect and placing the expected incisions within the relaxed skin tension lines where possible.    The area thus outlined was incised deep to adipose tissue with a #15 scalpel blade.  The skin margins were undermined to an appropriate distance in all directions utilizing iris scissors.
Advancement-Rotation Flap Text: The defect edges were debeveled with a #15 scalpel blade.  Given the location of the defect, shape of the defect and the proximity to free margins an advancement-rotation flap was deemed most appropriate.  Using a sterile surgical marker, an appropriate flap was drawn incorporating the defect and placing the expected incisions within the relaxed skin tension lines where possible. The area thus outlined was incised deep to adipose tissue with a #15 scalpel blade.  The skin margins were undermined to an appropriate distance in all directions utilizing iris scissors.
Alar Island Pedicle Flap Text: The defect edges were debeveled with a #15 scalpel blade.  Given the location of the defect, shape of the defect and the proximity to the alar rim an island pedicle advancement flap was deemed most appropriate.  Using a sterile surgical marker, an appropriate advancement flap was drawn incorporating the defect, outlining the appropriate donor tissue and placing the expected incisions within the nasal ala running parallel to the alar rim. The area thus outlined was incised with a #15 scalpel blade.  The skin margins were undermined minimally to an appropriate distance in all directions around the primary defect and laterally outward around the island pedicle utilizing iris scissors.  There was minimal undermining beneath the pedicle flap.
A-T Advancement Flap Text: The defect edges were debeveled with a #15 scalpel blade.  Given the location of the defect, shape of the defect and the proximity to free margins an A-T advancement flap was deemed most appropriate.  Using a sterile surgical marker, an appropriate advancement flap was drawn incorporating the defect and placing the expected incisions within the relaxed skin tension lines where possible.    The area thus outlined was incised deep to adipose tissue with a #15 scalpel blade.  The skin margins were undermined to an appropriate distance in all directions utilizing iris scissors.
Banner Transposition Flap Text: The defect edges were debeveled with a #15 scalpel blade.  Given the location of the defect and the proximity to free margins a Banner transposition flap was deemed most appropriate.  Using a sterile surgical marker, an appropriate flap drawn around the defect. The area thus outlined was incised deep to adipose tissue with a #15 scalpel blade.  The skin margins were undermined to an appropriate distance in all directions utilizing iris scissors.
Bilateral Helical Rim Advancement Flap Text: The defect edges were debeveled with a #15 blade scalpel.  Given the location of the defect and the proximity to free margins (helical rim) a bilateral helical rim advancement flap was deemed most appropriate.  Using a sterile surgical marker, the appropriate advancement flaps were drawn incorporating the defect and placing the expected incisions between the helical rim and antihelix where possible.  The area thus outlined was incised through and through with a #15 scalpel blade.  With a skin hook and iris scissors, the flaps were gently and sharply undermined and freed up.
Bilateral Rotation Flap Text: The defect edges were debeveled with a #15 scalpel blade. Given the location of the defect, shape of the defect and the proximity to free margins a bilateral rotation flap was deemed most appropriate. Using a sterile surgical marker, an appropriate rotation flap was drawn incorporating the defect and placing the expected incisions within the relaxed skin tension lines where possible. The area thus outlined was incised deep to adipose tissue with a #15 scalpel blade. The skin margins were undermined to an appropriate distance in all directions utilizing iris scissors. Following this, the designed flap was carried over into the primary defect and sutured into place.
Bilobed Flap Text: The defect edges were debeveled with a #15 scalpel blade.  Given the location of the defect and the proximity to free margins a bilobe flap was deemed most appropriate.  Using a sterile surgical marker, an appropriate bilobe flap drawn around the defect.    The area thus outlined was incised deep to adipose tissue with a #15 scalpel blade.  The skin margins were undermined to an appropriate distance in all directions utilizing iris scissors.
Bilobed Transposition Flap Text: The defect edges were debeveled with a #15 scalpel blade.  Given the location of the defect and the proximity to free margins a bilobed transposition flap was deemed most appropriate.  Using a sterile surgical marker, an appropriate bilobe flap drawn around the defect.    The area thus outlined was incised deep to adipose tissue with a #15 scalpel blade.  The skin margins were undermined to an appropriate distance in all directions utilizing iris scissors.
Bi-Rhombic Flap Text: The defect edges were debeveled with a #15 scalpel blade.  Given the location of the defect and the proximity to free margins a bi-rhombic flap was deemed most appropriate.  Using a sterile surgical marker, an appropriate rhombic flap was drawn incorporating the defect. The area thus outlined was incised deep to adipose tissue with a #15 scalpel blade.  The skin margins were undermined to an appropriate distance in all directions utilizing iris scissors.
Burow's Advancement Flap Text: The defect edges were debeveled with a #15 scalpel blade.  Given the location of the defect and the proximity to free margins a Burow's advancement flap was deemed most appropriate.  Using a sterile surgical marker, the appropriate advancement flap was drawn incorporating the defect and placing the expected incisions within the relaxed skin tension lines where possible.    The area thus outlined was incised deep to adipose tissue with a #15 scalpel blade.  The skin margins were undermined to an appropriate distance in all directions utilizing iris scissors.
Chonodrocutaneous Helical Advancement Flap Text: The defect edges were debeveled with a #15 scalpel blade.  Given the location of the defect and the proximity to free margins a chondrocutaneous helical advancement flap was deemed most appropriate.  Using a sterile surgical marker, the appropriate advancement flap was drawn incorporating the defect and placing the expected incisions within the relaxed skin tension lines where possible.    The area thus outlined was incised deep to adipose tissue with a #15 scalpel blade.  The skin margins were undermined to an appropriate distance in all directions utilizing iris scissors.
Crescentic Advancement Flap Text: The defect edges were debeveled with a #15 scalpel blade.  Given the location of the defect and the proximity to free margins a crescentic advancement flap was deemed most appropriate.  Using a sterile surgical marker, the appropriate advancement flap was drawn incorporating the defect and placing the expected incisions within the relaxed skin tension lines where possible.    The area thus outlined was incised deep to adipose tissue with a #15 scalpel blade.  The skin margins were undermined to an appropriate distance in all directions utilizing iris scissors.
Dorsal Nasal Flap Text: The defect edges were debeveled with a #15 scalpel blade.  Given the location of the defect and the proximity to free margins a dorsal nasal flap,based upon the glabellar folds, was deemed most appropriate.  Using a sterile surgical marker, an appropriate dorsal nasal flap was drawn around the defect.    The area thus outlined was incised deep to adipose tissue with a #15 scalpel blade.  The skin margins were undermined to an appropriate distance in all directions utilizing iris scissors.
Double Island Pedicle Flap Text: The defect edges were debeveled with a #15 scalpel blade.  Given the location of the defect, shape of the defect and the proximity to free margins a double island pedicle advancement flap was deemed most appropriate.  Using a sterile surgical marker, an appropriate advancement flap was drawn incorporating the defect, outlining the appropriate donor tissue and placing the expected incisions within the relaxed skin tension lines where possible.    The area thus outlined was incised deep to adipose tissue with a #15 scalpel blade.  The skin margins were undermined to an appropriate distance in all directions around the primary defect and laterally outward around the island pedicle utilizing iris scissors.  There was minimal undermining beneath the pedicle flap.
Double O-Z Flap Text: The defect edges were debeveled with a #15 scalpel blade.  Given the location of the defect, shape of the defect and the proximity to free margins a Double O-Z flap was deemed most appropriate.  Using a sterile surgical marker, an appropriate transposition flap was drawn incorporating the defect and placing the expected incisions within the relaxed skin tension lines where possible. The area thus outlined was incised deep to adipose tissue with a #15 scalpel blade.  The skin margins were undermined to an appropriate distance in all directions utilizing iris scissors.
Double O-Z Plasty Text: The defect edges were debeveled with a #15 scalpel blade.  Given the location of the defect, shape of the defect and the proximity to free margins a Double O-Z plasty (double transposition flap) was deemed most appropriate.  Using a sterile surgical marker, the appropriate transposition flaps were drawn incorporating the defect and placing the expected incisions within the relaxed skin tension lines where possible. The area thus outlined was incised deep to adipose tissue with a #15 scalpel blade.  The skin margins were undermined to an appropriate distance in all directions utilizing iris scissors.  Hemostasis was achieved with electrocautery.  The flaps were then transposed into place, one clockwise and the other counterclockwise, and anchored with interrupted buried subcutaneous sutures.
Double Z Plasty Text: The lesion was extirpated to the level of the fat with a #15 scalpel blade. Given the location of the defect, shape of the defect and the proximity to free margins a double Z-plasty was deemed most appropriate for repair. Using a sterile surgical marker, the appropriate transposition arms of the double Z-plasty were drawn incorporating the defect and placing the expected incisions within the relaxed skin tension lines where possible. The area thus outlined was incised deep to adipose tissue with a #15 scalpel blade. The skin margins were undermined to an appropriate distance in all directions utilizing iris scissors. The opposing transposition arms were then transposed and carried over into place in opposite direction and anchored with interrupted buried subcutaneous sutures.
Ear Star Wedge Flap Text: The defect edges were debeveled with a #15 blade scalpel.  Given the location of the defect and the proximity to free margins (helical rim) an ear star wedge flap was deemed most appropriate.  Using a sterile surgical marker, the appropriate flap was drawn incorporating the defect and placing the expected incisions between the helical rim and antihelix where possible.  The area thus outlined was incised through and through with a #15 scalpel blade.
Flip-Flop Flap Text: The defect edges were debeveled with a #15 blade scalpel.  Given the location of the defect and the proximity to free margins a flip-flop flap was deemed most appropriate. Using a sterile surgical marker, the appropriate flap was drawn incorporating the defect and placing the expected incisions between the helical rim and antihelix where possible.  The area thus outlined was incised through and through with a #15 scalpel blade. Following this, the designed flap was carried over into the primary defect and sutured into place.
Hatchet Flap Text: The defect edges were debeveled with a #15 scalpel blade.  Given the location of the defect, shape of the defect and the proximity to free margins a hatchet flap based from the glabella was deemed most appropriate.  Using a sterile surgical marker, an appropriate glabellar hatchet flap was drawn incorporating the defect and placing the expected incisions within the relaxed skin tension lines where possible.    The area thus outlined was incised deep to adipose tissue with a #15 scalpel blade.  The skin margins were undermined to an appropriate distance in all directions utilizing iris scissors.
Helical Rim Advancement Flap Text: The defect edges were debeveled with a #15 blade scalpel.  Given the location of the defect and the proximity to free margins (helical rim) a double helical rim advancement flap was deemed most appropriate.  Using a sterile surgical marker, the appropriate advancement flaps were drawn incorporating the defect and placing the expected incisions between the helical rim and antihelix where possible.  The area thus outlined was incised through and through with a #15 scalpel blade.  With a skin hook and iris scissors, the flaps were gently and sharply undermined and freed up.
H Plasty Text: Given the location of the defect, shape of the defect and the proximity to free margins a H-plasty was deemed most appropriate for repair.  Using a sterile surgical marker, the appropriate advancement arms of the H-plasty were drawn incorporating the defect and placing the expected incisions within the relaxed skin tension lines where possible. The area thus outlined was incised deep to adipose tissue with a #15 scalpel blade. The skin margins were undermined to an appropriate distance in all directions utilizing iris scissors.  The opposing advancement arms were then advanced into place in opposite direction and anchored with interrupted buried subcutaneous sutures.
Island Pedicle Flap Text: The defect edges were debeveled with a #15 scalpel blade.  Given the location of the defect, shape of the defect and the proximity to free margins an island pedicle advancement flap was deemed most appropriate.  Using a sterile surgical marker, an appropriate advancement flap was drawn incorporating the defect, outlining the appropriate donor tissue and placing the expected incisions within the relaxed skin tension lines where possible.    The area thus outlined was incised deep to adipose tissue with a #15 scalpel blade.  The skin margins were undermined to an appropriate distance in all directions around the primary defect and laterally outward around the island pedicle utilizing iris scissors.  There was minimal undermining beneath the pedicle flap.
Island Pedicle Flap With Canthal Suspension Text: The defect edges were debeveled with a #15 scalpel blade.  Given the location of the defect, shape of the defect and the proximity to free margins an island pedicle advancement flap was deemed most appropriate.  Using a sterile surgical marker, an appropriate advancement flap was drawn incorporating the defect, outlining the appropriate donor tissue and placing the expected incisions within the relaxed skin tension lines where possible. The area thus outlined was incised deep to adipose tissue with a #15 scalpel blade.  The skin margins were undermined to an appropriate distance in all directions around the primary defect and laterally outward around the island pedicle utilizing iris scissors.  There was minimal undermining beneath the pedicle flap. A suspension suture was placed in the canthal tendon to prevent tension and prevent ectropion.
Island Pedicle Flap-Requiring Vessel Identification Text: The defect edges were debeveled with a #15 scalpel blade.  Given the location of the defect, shape of the defect and the proximity to free margins an island pedicle advancement flap was deemed most appropriate.  Using a sterile surgical marker, an appropriate advancement flap was drawn, based on the axial vessel mentioned above, incorporating the defect, outlining the appropriate donor tissue and placing the expected incisions within the relaxed skin tension lines where possible.    The area thus outlined was incised deep to adipose tissue with a #15 scalpel blade.  The skin margins were undermined to an appropriate distance in all directions around the primary defect and laterally outward around the island pedicle utilizing iris scissors.  There was minimal undermining beneath the pedicle flap.
Keystone Flap Text: The defect edges were debeveled with a #15 scalpel blade.  Given the location of the defect, shape of the defect a keystone flap was deemed most appropriate.  Using a sterile surgical marker, an appropriate keystone flap was drawn incorporating the defect, outlining the appropriate donor tissue and placing the expected incisions within the relaxed skin tension lines where possible. The area thus outlined was incised deep to adipose tissue with a #15 scalpel blade.  The skin margins were undermined to an appropriate distance in all directions around the primary defect and laterally outward around the flap utilizing iris scissors.
Melolabial Transposition Flap Text: The defect edges were debeveled with a #15 scalpel blade.  Given the location of the defect and the proximity to free margins a melolabial flap was deemed most appropriate.  Using a sterile surgical marker, an appropriate melolabial transposition flap was drawn incorporating the defect.    The area thus outlined was incised deep to adipose tissue with a #15 scalpel blade.  The skin margins were undermined to an appropriate distance in all directions utilizing iris scissors.
Mercedes Flap Text: The defect edges were debeveled with a #15 scalpel blade.  Given the location of the defect, shape of the defect and the proximity to free margins a Mercedes flap was deemed most appropriate.  Using a sterile surgical marker, an appropriate advancement flap was drawn incorporating the defect and placing the expected incisions within the relaxed skin tension lines where possible. The area thus outlined was incised deep to adipose tissue with a #15 scalpel blade.  The skin margins were undermined to an appropriate distance in all directions utilizing iris scissors.
Modified Advancement Flap Text: The defect edges were debeveled with a #15 scalpel blade.  Given the location of the defect, shape of the defect and the proximity to free margins a modified advancement flap was deemed most appropriate.  Using a sterile surgical marker, an appropriate advancement flap was drawn incorporating the defect and placing the expected incisions within the relaxed skin tension lines where possible.    The area thus outlined was incised deep to adipose tissue with a #15 scalpel blade.  The skin margins were undermined to an appropriate distance in all directions utilizing iris scissors.
Mucosal Advancement Flap Text: Given the location of the defect, shape of the defect and the proximity to free margins a mucosal advancement flap was deemed most appropriate. Incisions were made with a 15 blade scalpel in the appropriate fashion along the cutaneous vermilion border and the mucosal lip. The remaining actinically damaged mucosal tissue was excised.  The mucosal advancement flap was then elevated to the gingival sulcus with care taken to preserve the neurovascular structures and advanced into the primary defect. Care was taken to ensure that precise realignment of the vermilion border was achieved.
Muscle Hinge Flap Text: The defect edges were debeveled with a #15 scalpel blade.  Given the size, depth and location of the defect and the proximity to free margins a muscle hinge flap was deemed most appropriate.  Using a sterile surgical marker, an appropriate hinge flap was drawn incorporating the defect. The area thus outlined was incised with a #15 scalpel blade.  The skin margins were undermined to an appropriate distance in all directions utilizing iris scissors.
Mustarde Flap Text: The defect edges were debeveled with a #15 scalpel blade.  Given the size, depth and location of the defect and the proximity to free margins a Mustarde flap was deemed most appropriate.  Using a sterile surgical marker, an appropriate flap was drawn incorporating the defect. The area thus outlined was incised with a #15 scalpel blade.  The skin margins were undermined to an appropriate distance in all directions utilizing iris scissors.
Nasal Turnover Hinge Flap Text: The defect edges were debeveled with a #15 scalpel blade.  Given the size, depth, location of the defect and the defect being full thickness a nasal turnover hinge flap was deemed most appropriate.  Using a sterile surgical marker, an appropriate hinge flap was drawn incorporating the defect. The area thus outlined was incised with a #15 scalpel blade. The flap was designed to recreate the nasal mucosal lining and the alar rim. The skin margins were undermined to an appropriate distance in all directions utilizing iris scissors.
Nasalis-Muscle-Based Myocutaneous Island Pedicle Flap Text: Using a #15 blade, an incision was made around the donor flap to the level of the nasalis muscle. Wide lateral undermining was then performed in both the subcutaneous plane above the nasalis muscle, and in a submuscular plane just above periosteum. This allowed the formation of a free nasalis muscle axial pedicle (based on the angular artery) which was still attached to the actual cutaneous flap, increasing its mobility and vascular viability. Hemostasis was obtained with pinpoint electrocoagulation. The flap was mobilized into position and the pivotal anchor points positioned and stabilized with buried interrupted sutures. Subcutaneous and dermal tissues were closed in a multilayered fashion with sutures. Tissue redundancies were excised, and the epidermal edges were apposed without significant tension and sutured with sutures.
Nasalis Myocutaneous Flap Text: Using a #15 blade, an incision was made around the donor flap to the level of the nasalis muscle. Wide lateral undermining was then performed in both the subcutaneous plane above the nasalis muscle, and in a submuscular plane just above periosteum. This allowed the formation of a free nasalis muscle axial pedicle which was still attached to the actual cutaneous flap, increasing its mobility and vascular viability. Hemostasis was obtained with pinpoint electrocoagulation. The flap was mobilized into position and the pivotal anchor points positioned and stabilized with buried interrupted sutures. Subcutaneous and dermal tissues were closed in a multilayered fashion with sutures. Tissue redundancies were excised, and the epidermal edges were apposed without significant tension and sutured with sutures.
Nasolabial Transposition Flap Text: The defect edges were debeveled with a #15 scalpel blade.  Given the size, depth and location of the defect and the proximity to free margins a nasolabial transposition flap was deemed most appropriate. Using a sterile surgical marker, an appropriate flap was drawn incorporating the defect. The area thus outlined was incised with a #15 scalpel blade. The skin margins were undermined to an appropriate distance in all directions utilizing iris scissors. Following this, the designed flap was carried into the primary defect and sutured into place.
Orbicularis Oris Muscle Flap Text: The defect edges were debeveled with a #15 scalpel blade.  Given that the defect affected the competency of the oral sphincter an orbicularis oris muscle flap was deemed most appropriate to restore this competency and normal muscle function.  Using a sterile surgical marker, an appropriate flap was drawn incorporating the defect. The area thus outlined was incised with a #15 scalpel blade.
O-T Advancement Flap Text: The defect edges were debeveled with a #15 scalpel blade.  Given the location of the defect, shape of the defect and the proximity to free margins an O-T advancement flap was deemed most appropriate.  Using a sterile surgical marker, an appropriate advancement flap was drawn incorporating the defect and placing the expected incisions within the relaxed skin tension lines where possible.    The area thus outlined was incised deep to adipose tissue with a #15 scalpel blade.  The skin margins were undermined to an appropriate distance in all directions utilizing iris scissors.
O-T Plasty Text: The defect edges were debeveled with a #15 scalpel blade.  Given the location of the defect, shape of the defect and the proximity to free margins an O-T plasty was deemed most appropriate.  Using a sterile surgical marker, an appropriate O-T plasty was drawn incorporating the defect and placing the expected incisions within the relaxed skin tension lines where possible.    The area thus outlined was incised deep to adipose tissue with a #15 scalpel blade.  The skin margins were undermined to an appropriate distance in all directions utilizing iris scissors.
O-L Flap Text: The defect edges were debeveled with a #15 scalpel blade.  Given the location of the defect, shape of the defect and the proximity to free margins an O-L flap was deemed most appropriate.  Using a sterile surgical marker, an appropriate advancement flap was drawn incorporating the defect and placing the expected incisions within the relaxed skin tension lines where possible.    The area thus outlined was incised deep to adipose tissue with a #15 scalpel blade.  The skin margins were undermined to an appropriate distance in all directions utilizing iris scissors.
O-Z Flap Text: The defect edges were debeveled with a #15 scalpel blade.  Given the location of the defect, shape of the defect and the proximity to free margins an O-Z flap was deemed most appropriate.  Using a sterile surgical marker, an appropriate transposition flap was drawn incorporating the defect and placing the expected incisions within the relaxed skin tension lines where possible. The area thus outlined was incised deep to adipose tissue with a #15 scalpel blade.  The skin margins were undermined to an appropriate distance in all directions utilizing iris scissors.
O-Z Plasty Text: The defect edges were debeveled with a #15 scalpel blade.  Given the location of the defect, shape of the defect and the proximity to free margins an O-Z plasty (double transposition flap) was deemed most appropriate.  Using a sterile surgical marker, the appropriate transposition flaps were drawn incorporating the defect and placing the expected incisions within the relaxed skin tension lines where possible.    The area thus outlined was incised deep to adipose tissue with a #15 scalpel blade.  The skin margins were undermined to an appropriate distance in all directions utilizing iris scissors.  Hemostasis was achieved with electrocautery.  The flaps were then transposed into place, one clockwise and the other counterclockwise, and anchored with interrupted buried subcutaneous sutures.
Peng Advancement Flap Text: The defect edges were debeveled with a #15 scalpel blade.  Given the location of the defect, shape of the defect and the proximity to free margins a Peng advancement flap was deemed most appropriate.  Using a sterile surgical marker, an appropriate advancement flap was drawn incorporating the defect and placing the expected incisions within the relaxed skin tension lines where possible. The area thus outlined was incised deep to adipose tissue with a #15 scalpel blade.  The skin margins were undermined to an appropriate distance in all directions utilizing iris scissors.
Rectangular Flap Text: The defect edges were debeveled with a #15 scalpel blade. Given the location of the defect and the proximity to free margins a rectangular flap was deemed most appropriate. Using a sterile surgical marker, an appropriate rectangular flap was drawn incorporating the defect. The area thus outlined was incised deep to adipose tissue with a #15 scalpel blade. The skin margins were undermined to an appropriate distance in all directions utilizing iris scissors. Following this, the designed flap was carried over into the primary defect and sutured into place.
Rhombic Flap Text: The defect edges were debeveled with a #15 scalpel blade.  Given the location of the defect and the proximity to free margins a rhombic flap was deemed most appropriate.  Using a sterile surgical marker, an appropriate rhombic flap was drawn incorporating the defect.    The area thus outlined was incised deep to adipose tissue with a #15 scalpel blade.  The skin margins were undermined to an appropriate distance in all directions utilizing iris scissors.
Rhomboid Transposition Flap Text: The defect edges were debeveled with a #15 scalpel blade.  Given the location of the defect and the proximity to free margins a rhomboid transposition flap was deemed most appropriate.  Using a sterile surgical marker, an appropriate rhomboid flap was drawn incorporating the defect.    The area thus outlined was incised deep to adipose tissue with a #15 scalpel blade.  The skin margins were undermined to an appropriate distance in all directions utilizing iris scissors.
Rotation Flap Text: The defect edges were debeveled with a #15 scalpel blade.  Given the location of the defect, shape of the defect and the proximity to free margins a rotation flap was deemed most appropriate.  Using a sterile surgical marker, an appropriate rotation flap was drawn incorporating the defect and placing the expected incisions within the relaxed skin tension lines where possible.    The area thus outlined was incised deep to adipose tissue with a #15 scalpel blade.  The skin margins were undermined to an appropriate distance in all directions utilizing iris scissors.
Spiral Flap Text: The defect edges were debeveled with a #15 scalpel blade.  Given the location of the defect, shape of the defect and the proximity to free margins a spiral flap was deemed most appropriate.  Using a sterile surgical marker, an appropriate rotation flap was drawn incorporating the defect and placing the expected incisions within the relaxed skin tension lines where possible. The area thus outlined was incised deep to adipose tissue with a #15 scalpel blade.  The skin margins were undermined to an appropriate distance in all directions utilizing iris scissors.
Staged Advancement Flap Text: The defect edges were debeveled with a #15 scalpel blade.  Given the location of the defect, shape of the defect and the proximity to free margins a staged advancement flap was deemed most appropriate.  Using a sterile surgical marker, an appropriate advancement flap was drawn incorporating the defect and placing the expected incisions within the relaxed skin tension lines where possible. The area thus outlined was incised deep to adipose tissue with a #15 scalpel blade.  The skin margins were undermined to an appropriate distance in all directions utilizing iris scissors.
Star Wedge Flap Text: The defect edges were debeveled with a #15 scalpel blade.  Given the location of the defect, shape of the defect and the proximity to free margins a star wedge flap was deemed most appropriate.  Using a sterile surgical marker, an appropriate rotation flap was drawn incorporating the defect and placing the expected incisions within the relaxed skin tension lines where possible. The area thus outlined was incised deep to adipose tissue with a #15 scalpel blade.  The skin margins were undermined to an appropriate distance in all directions utilizing iris scissors.
Transposition Flap Text: The defect edges were debeveled with a #15 scalpel blade.  Given the location of the defect and the proximity to free margins a transposition flap was deemed most appropriate.  Using a sterile surgical marker, an appropriate transposition flap was drawn incorporating the defect.    The area thus outlined was incised deep to adipose tissue with a #15 scalpel blade.  The skin margins were undermined to an appropriate distance in all directions utilizing iris scissors.
Trilobed Flap Text: The defect edges were debeveled with a #15 scalpel blade.  Given the location of the defect and the proximity to free margins a trilobed flap was deemed most appropriate.  Using a sterile surgical marker, an appropriate trilobed flap drawn around the defect.    The area thus outlined was incised deep to adipose tissue with a #15 scalpel blade.  The skin margins were undermined to an appropriate distance in all directions utilizing iris scissors.
V-Y Flap Text: The defect edges were debeveled with a #15 scalpel blade.  Given the location of the defect, shape of the defect and the proximity to free margins a V-Y flap was deemed most appropriate.  Using a sterile surgical marker, an appropriate advancement flap was drawn incorporating the defect and placing the expected incisions within the relaxed skin tension lines where possible.    The area thus outlined was incised deep to adipose tissue with a #15 scalpel blade.  The skin margins were undermined to an appropriate distance in all directions utilizing iris scissors.
V-Y Plasty Text: The defect edges were debeveled with a #15 scalpel blade.  Given the location of the defect, shape of the defect and the proximity to free margins an V-Y advancement flap was deemed most appropriate.  Using a sterile surgical marker, an appropriate advancement flap was drawn incorporating the defect and placing the expected incisions within the relaxed skin tension lines where possible.    The area thus outlined was incised deep to adipose tissue with a #15 scalpel blade.  The skin margins were undermined to an appropriate distance in all directions utilizing iris scissors.
W Plasty Text: The lesion was extirpated to the level of the fat with a #15 scalpel blade.  Given the location of the defect, shape of the defect and the proximity to free margins a W-plasty was deemed most appropriate for repair.  Using a sterile surgical marker, the appropriate transposition arms of the W-plasty were drawn incorporating the defect and placing the expected incisions within the relaxed skin tension lines where possible.    The area thus outlined was incised deep to adipose tissue with a #15 scalpel blade.  The skin margins were undermined to an appropriate distance in all directions utilizing iris scissors.  The opposing transposition arms were then transposed into place in opposite direction and anchored with interrupted buried subcutaneous sutures.
Z Plasty Text: The lesion was extirpated to the level of the fat with a #15 scalpel blade.  Given the location of the defect, shape of the defect and the proximity to free margins a Z-plasty was deemed most appropriate for repair.  Using a sterile surgical marker, the appropriate transposition arms of the Z-plasty were drawn incorporating the defect and placing the expected incisions within the relaxed skin tension lines where possible.    The area thus outlined was incised deep to adipose tissue with a #15 scalpel blade.  The skin margins were undermined to an appropriate distance in all directions utilizing iris scissors.  The opposing transposition arms were then transposed into place in opposite direction and anchored with interrupted buried subcutaneous sutures.
Zygomaticofacial Flap Text: Given the location of the defect, shape of the defect and the proximity to free margins a zygomaticofacial flap was deemed most appropriate for repair.  Using a sterile surgical marker, the appropriate flap was drawn incorporating the defect and placing the expected incisions within the relaxed skin tension lines where possible. The area thus outlined was incised deep to adipose tissue with a #15 scalpel blade with preservation of a vascular pedicle.  The skin margins were undermined to an appropriate distance in all directions utilizing iris scissors.  The flap was then placed into the defect and anchored with interrupted buried subcutaneous sutures.
Abbe Flap (Lower To Upper Lip) Text: The defect of the upper lip was assessed and measured.  Given the location and size of the defect, an Abbe flap was deemed most appropriate.  Using a sterile surgical marker, an appropriate Abbe flap was measured and drawn on the lower lip. Local anesthesia was then infiltrated. A scalpel was then used to incise the upper lip through and through the skin, vermilion, muscle and mucosa, leaving the flap pedicled on the opposite side.  The flap was then rotated and transferred to the lower lip defect.  The flap was then sutured into place with a three layer technique, closing the orbicularis oris muscle layer with subcutaneous buried sutures, followed by a mucosal layer and an epidermal layer.
Abbe Flap (Upper To Lower Lip) Text: The defect of the lower lip was assessed and measured.  Given the location and size of the defect, an Abbe flap was deemed most appropriate.  Using a sterile surgical marker, an appropriate Abbe flap was measured and drawn on the upper lip. Local anesthesia was then infiltrated.  A scalpel was then used to incise the upper lip through and through the skin, vermilion, muscle and mucosa, leaving the flap pedicled on the opposite side.  The flap was then rotated and transferred to the lower lip defect.  The flap was then sutured into place with a three layer technique, closing the orbicularis oris muscle layer with subcutaneous buried sutures, followed by a mucosal layer and an epidermal layer.
Cheek Interpolation Flap Text: A decision was made to reconstruct the defect utilizing an interpolation axial flap and a staged reconstruction.  A telfa template was made of the defect.  This telfa template was then used to outline the Cheek Interpolation flap.  The donor area for the pedicle flap was then injected with anesthesia.  The flap was excised through the skin and subcutaneous tissue down to the layer of the underlying musculature.  The interpolation flap was carefully excised within this deep plane to maintain its blood supply.  The edges of the donor site were undermined.   The donor site was closed in a primary fashion.  The pedicle was then rotated into position and sutured.  Once the tube was sutured into place, adequate blood supply was confirmed with blanching and refill.  The pedicle was then wrapped with xeroform gauze and dressed appropriately with a telfa and gauze bandage to ensure continued blood supply and protect the attached pedicle.
Cheek-To-Nose Interpolation Flap Text: A decision was made to reconstruct the defect utilizing an interpolation axial flap and a staged reconstruction.  A telfa template was made of the defect.  This telfa template was then used to outline the Cheek-To-Nose Interpolation flap.  The donor area for the pedicle flap was then injected with anesthesia.  The flap was excised through the skin and subcutaneous tissue down to the layer of the underlying musculature.  The interpolation flap was carefully excised within this deep plane to maintain its blood supply.  The edges of the donor site were undermined.   The donor site was closed in a primary fashion.  The pedicle was then rotated into position and sutured.  Once the tube was sutured into place, adequate blood supply was confirmed with blanching and refill.  The pedicle was then wrapped with xeroform gauze and dressed appropriately with a telfa and gauze bandage to ensure continued blood supply and protect the attached pedicle.
Estlander Flap (Lower To Upper Lip) Text: The defect of the lower lip was assessed and measured.  Given the location and size of the defect, an Estlander flap was deemed most appropriate.  Using a sterile surgical marker, an appropriate Estlander flap was measured and drawn on the upper lip. Local anesthesia was then infiltrated. A scalpel was then used to incise the lateral aspect of the flap, through skin, muscle and mucosa, leaving the flap pedicled medially.  The flap was then rotated and positioned to fill the lower lip defect.  The flap was then sutured into place with a three layer technique, closing the orbicularis oris muscle layer with subcutaneous buried sutures, followed by a mucosal layer and an epidermal layer.
Interpolation Flap Text: A decision was made to reconstruct the defect utilizing an interpolation axial flap and a staged reconstruction.  A telfa template was made of the defect.  This telfa template was then used to outline the interpolation flap.  The donor area for the pedicle flap was then injected with anesthesia.  The flap was excised through the skin and subcutaneous tissue down to the layer of the underlying musculature.  The interpolation flap was carefully excised within this deep plane to maintain its blood supply.  The edges of the donor site were undermined.   The donor site was closed in a primary fashion.  The pedicle was then rotated into position and sutured.  Once the tube was sutured into place, adequate blood supply was confirmed with blanching and refill.  The pedicle was then wrapped with xeroform gauze and dressed appropriately with a telfa and gauze bandage to ensure continued blood supply and protect the attached pedicle.
Melolabial Interpolation Flap Text: A decision was made to reconstruct the defect utilizing an interpolation axial flap and a staged reconstruction.  A telfa template was made of the defect.  This telfa template was then used to outline the melolabial interpolation flap.  The donor area for the pedicle flap was then injected with anesthesia.  The flap was excised through the skin and subcutaneous tissue down to the layer of the underlying musculature.  The pedicle flap was carefully excised within this deep plane to maintain its blood supply.  The edges of the donor site were undermined.   The donor site was closed in a primary fashion.  The pedicle was then rotated into position and sutured.  Once the tube was sutured into place, adequate blood supply was confirmed with blanching and refill.  The pedicle was then wrapped with xeroform gauze and dressed appropriately with a telfa and gauze bandage to ensure continued blood supply and protect the attached pedicle.
Mastoid Interpolation Flap Text: A decision was made to reconstruct the defect utilizing an interpolation axial flap and a staged reconstruction.  A telfa template was made of the defect.  This telfa template was then used to outline the mastoid interpolation flap.  The donor area for the pedicle flap was then injected with anesthesia.  The flap was excised through the skin and subcutaneous tissue down to the layer of the underlying musculature.  The pedicle flap was carefully excised within this deep plane to maintain its blood supply.  The edges of the donor site were undermined.   The donor site was closed in a primary fashion.  The pedicle was then rotated into position and sutured.  Once the tube was sutured into place, adequate blood supply was confirmed with blanching and refill.  The pedicle was then wrapped with xeroform gauze and dressed appropriately with a telfa and gauze bandage to ensure continued blood supply and protect the attached pedicle.
Paramedian Forehead Flap Text: A decision was made to reconstruct the defect utilizing an interpolation axial flap and a staged reconstruction.  A telfa template was made of the defect.  This telfa template was then used to outline the paramedian forehead pedicle flap.  The donor area for the pedicle flap was then injected with anesthesia.  The flap was excised through the skin and subcutaneous tissue down to the layer of the underlying musculature.  The pedicle flap was carefully excised within this deep plane to maintain its blood supply.  The edges of the donor site were undermined.   The donor site was closed in a primary fashion.  The pedicle was then rotated into position and sutured.  Once the tube was sutured into place, adequate blood supply was confirmed with blanching and refill.  The pedicle was then wrapped with xeroform gauze and dressed appropriately with a telfa and gauze bandage to ensure continued blood supply and protect the attached pedicle.
Posterior Auricular Interpolation Flap Text: A decision was made to reconstruct the defect utilizing an interpolation axial flap and a staged reconstruction.  A telfa template was made of the defect.  This telfa template was then used to outline the posterior auricular interpolation flap.  The donor area for the pedicle flap was then injected with anesthesia.  The flap was excised through the skin and subcutaneous tissue down to the layer of the underlying musculature.  The pedicle flap was carefully excised within this deep plane to maintain its blood supply.  The edges of the donor site were undermined.   The donor site was closed in a primary fashion.  The pedicle was then rotated into position and sutured.  Once the tube was sutured into place, adequate blood supply was confirmed with blanching and refill.  The pedicle was then wrapped with xeroform gauze and dressed appropriately with a telfa and gauze bandage to ensure continued blood supply and protect the attached pedicle.
Cheiloplasty (Complex) Text: A decision was made to reconstruct the defect with a  cheiloplasty.  The defect was undermined extensively.  Additional obicularis oris muscle was excised with a 15 blade scalpel.  The defect was converted into a full thickness wedge to facilite a better cosmetic result.  Small vessels were then tied off with 5-0 monocyrl. The obicularis oris, superficial fascia, adipose and dermis were then reapproximated.  After the deeper layers were approximated the epidermis was reapproximated with particular care given to realign the vermilion border.
Cheiloplasty (Less Than 50%) Text: A decision was made to reconstruct the defect with a  cheiloplasty.  The defect was undermined extensively.  Additional obicularis oris muscle was excised with a 15 blade scalpel.  The defect was converted into a full thickness wedge, of less than 50% of the vertical height of the lip, to facilite a better cosmetic result.  Small vessels were then tied off with 5-0 monocyrl. The obicularis oris, superficial fascia, adipose and dermis were then reapproximated.  After the deeper layers were approximated the epidermis was reapproximated with particular care given to realign the vermilion border.
Ear Wedge Repair Text: A wedge excision was completed by carrying down an excision through the full thickness of the ear and cartilage with an inward facing Burow's triangle. The wound was then closed in a layered fashion.
Full Thickness Lip Wedge Repair (Flap) Text: Given the location of the defect and the proximity to free margins a full thickness wedge repair was deemed most appropriate.  Using a sterile surgical marker, the appropriate repair was drawn incorporating the defect and placing the expected incisions perpendicular to the vermilion border.  The vermilion border was also meticulously outlined to ensure appropriate reapproximation during the repair.  The area thus outlined was incised through and through with a #15 scalpel blade.  The muscularis and dermis were reaproximated with deep sutures following hemostasis. Care was taken to realign the vermilion border before proceeding with the superficial closure.  Once the vermilion was realigned the superfical and mucosal closure was finished.
Burow's Graft Text: The defect edges were debeveled with a #15 scalpel blade.  Given the location of the defect, shape of the defect, the proximity to free margins and the presence of a standing cone deformity a Burow's skin graft was deemed most appropriate. The standing cone was removed and this tissue was then trimmed to the shape of the primary defect. The adipose tissue was also removed until only dermis and epidermis were left.  The skin margins of the secondary defect were undermined to an appropriate distance in all directions utilizing iris scissors.  The secondary defect was closed with interrupted buried subcutaneous sutures.  The skin edges were then re-apposed with running  sutures.  The skin graft was then placed in the primary defect and oriented appropriately.
Cartilage Graft Text: The defect edges were debeveled with a #15 scalpel blade.  Given the location of the defect, shape of the defect, the fact the defect involved a full thickness cartilage defect a cartilage graft was deemed most appropriate.  An appropriate donor site was identified, cleansed, and anesthetized. The cartilage graft was then harvested and transferred to the recipient site, oriented appropriately and then sutured into place.  The secondary defect was then repaired using a primary closure.
Composite Graft Text: The defect edges were debeveled with a #15 scalpel blade.  Given the location of the defect, shape of the defect, the proximity to free margins and the fact the defect was full thickness a composite graft was deemed most appropriate.  The defect was outline and then transferred to the donor site.  A full thickness graft was then excised from the donor site. The graft was then placed in the primary defect, oriented appropriately and then sutured into place.  The secondary defect was then repaired using a primary closure.
Epidermal Autograft Text: The defect edges were debeveled with a #15 scalpel blade.  Given the location of the defect, shape of the defect and the proximity to free margins an epidermal autograft was deemed most appropriate.  Using a sterile surgical marker, the primary defect shape was transferred to the donor site. The epidermal graft was then harvested.  The skin graft was then placed in the primary defect and oriented appropriately.
Dermal Autograft Text: The defect edges were debeveled with a #15 scalpel blade.  Given the location of the defect, shape of the defect and the proximity to free margins a dermal autograft was deemed most appropriate.  Using a sterile surgical marker, the primary defect shape was transferred to the donor site. The area thus outlined was incised deep to adipose tissue with a #15 scalpel blade.  The harvested graft was then trimmed of adipose and epidermal tissue until only dermis was left.  The skin graft was then placed in the primary defect and oriented appropriately.
Ftsg Text: The defect edges were debeveled with a #15 scalpel blade.  Given the location of the defect, shape of the defect and the proximity to free margins a full thickness skin graft was deemed most appropriate.  Using a sterile surgical marker, the primary defect shape was transferred to the donor site. The area thus outlined was incised deep to adipose tissue with a #15 scalpel blade.  The harvested graft was then trimmed of adipose tissue until only dermis and epidermis was left.  The skin margins of the secondary defect were undermined to an appropriate distance in all directions utilizing iris scissors.  The secondary defect was closed with interrupted buried subcutaneous sutures.  The skin edges were then re-apposed with running  sutures.  The skin graft was then placed in the primary defect and oriented appropriately.
Pinch Graft Text: The defect edges were debeveled with a #15 scalpel blade. Given the location of the defect, shape of the defect and the proximity to free margins a pinch graft was deemed most appropriate. Using a sterile surgical marker, the primary defect shape was transferred to the donor site. The area thus outlined was incised deep to adipose tissue with a #15 scalpel blade.  The harvested graft was then trimmed of adipose tissue until only dermis and epidermis was left. The skin graft was then placed in the primary defect and oriented appropriately.
Skin Substitute Text: The defect edges were debeveled with a #15 scalpel blade.  Given the location of the defect, shape of the defect and the proximity to free margins a skin substitute graft was deemed most appropriate.  The graft material was trimmed to fit the size of the defect. The graft was then placed in the primary defect and oriented appropriately.
Split-Thickness Skin Graft Text: The defect edges were debeveled with a #15 scalpel blade.  Given the location of the defect, shape of the defect and the proximity to free margins a split thickness skin graft was deemed most appropriate.  Using a sterile surgical marker, the primary defect shape was transferred to the donor site. The split thickness graft was then harvested.  The skin graft was then placed in the primary defect and oriented appropriately.
Tissue Cultured Epidermal Autograft Text: The defect edges were debeveled with a #15 scalpel blade.  Given the location of the defect, shape of the defect and the proximity to free margins a tissue cultured epidermal autograft was deemed most appropriate.  The graft was then trimmed to fit the size of the defect.  The graft was then placed in the primary defect and oriented appropriately.
Xenograft Text: The defect edges were debeveled with a #15 scalpel blade.  Given the location of the defect, shape of the defect and the proximity to free margins a xenograft was deemed most appropriate.  The graft was then trimmed to fit the size of the defect.  The graft was then placed in the primary defect and oriented appropriately.
Complex Repair And Flap Additional Text (Will Appearing After The Standard Complex Repair Text): The complex repair was not sufficient to completely close the primary defect. The remaining additional defect was repaired with the flap mentioned below.
Complex Repair And Graft Additional Text (Will Appearing After The Standard Complex Repair Text): The complex repair was not sufficient to completely close the primary defect. The remaining additional defect was repaired with the graft mentioned below.
Eyelid Full Thickness Repair - 11593: The eyelid defect was full thickness which required a wedge repair of the eyelid. Special care was taken to ensure that the eyelid margin was realligned when placing sutures.
Eyelid Partial Thickness Repair - 44969: The eyelid defect was partial thickness which required a wedge repair of the eyelid. Special care was taken to ensure that the eyelid margin was realligned when placing sutures.
Intermediate Repair And Flap Additional Text (Will Appearing After The Standard Complex Repair Text): The intermediate repair was not sufficient to completely close the primary defect. The remaining additional defect was repaired with the flap mentioned below.
Intermediate Repair And Graft Additional Text (Will Appearing After The Standard Complex Repair Text): The intermediate repair was not sufficient to completely close the primary defect. The remaining additional defect was repaired with the graft mentioned below.
Localized Dermabrasion With 15 Blade Text: The patient was draped in routine manner.  Localized dermabrasion using a 15 blade was performed in routine manner to papillary dermis. This spot dermabrasion is being performed to complete skin cancer reconstruction. It also will eliminate the other sun damaged precancerous cells that are known to be part of the regional effect of a lifetime's worth of sun exposure. This localized dermabrasion is therapeutic and should not be considered cosmetic in any regard.
Localized Dermabrasion With Sand Papertext: The patient was draped in routine manner.  Localized dermabrasion using sterile sand paper was performed in routine manner to papillary dermis. This spot dermabrasion is being performed to complete skin cancer reconstruction. It also will eliminate the other sun damaged precancerous cells that are known to be part of the regional effect of a lifetime's worth of sun exposure. This localized dermabrasion is therapeutic and should not be considered cosmetic in any regard.
Localized Dermabrasion With Wire Brush Text: The patient was draped in routine manner.  Localized dermabrasion using 3 x 17 mm wire brush was performed in routine manner to papillary dermis. This spot dermabrasion is being performed to complete skin cancer reconstruction. It also will eliminate the other sun damaged precancerous cells that are known to be part of the regional effect of a lifetime's worth of sun exposure. This localized dermabrasion is therapeutic and should not be considered cosmetic in any regard.
Purse String (Simple) Text: Given the location of the defect and the characteristics of the surrounding skin a purse string closure was deemed most appropriate.  Undermining was performed circumfirentially around the surgical defect.  A purse string suture was then placed and tightened.
Purse String (Intermediate) Text: Given the location of the defect and the characteristics of the surrounding skin a purse string intermediate closure was deemed most appropriate.  Undermining was performed circumfirentially around the surgical defect.  A purse string suture was then placed and tightened.
Partial Purse String (Simple) Text: Given the location of the defect and the characteristics of the surrounding skin a simple purse string closure was deemed most appropriate.  Undermining was performed circumfirentially around the surgical defect.  A purse string suture was then placed and tightened. Wound tension only allowed a partial closure of the circular defect.
Partial Purse String (Intermediate) Text: Given the location of the defect and the characteristics of the surrounding skin an intermediate purse string closure was deemed most appropriate.  Undermining was performed circumfirentially around the surgical defect.  A purse string suture was then placed and tightened. Wound tension only allowed a partial closure of the circular defect.
Tarsorrhaphy Text: A tarsorrhaphy was performed using Frost sutures.
Manual Repair Warning Statement: We plan on removing the manually selected variable below in favor of our much easier automatic structured text blocks found in the previous tab. We decided to do this to help make the flow better and give you the full power of structured data. Manual selection is never going to be ideal in our platform and I would encourage you to avoid using manual selection from this point on, especially since I will be sunsetting this feature. It is important that you do one of two things with the customized text below. First, you can save all of the text in a word file so you can have it for future reference. Second, transfer the text to the appropriate area in the Library tab. Lastly, if there is a flap or graft type which we do not have you need to let us know right away so I can add it in before the variable is hidden. No need to panic, we plan to give you roughly 6 months to make the change.
Same Histology In Subsequent Stages Text: The pattern and morphology of the tumor is as described in the first stage.
No Residual Tumor Seen Histology Text: There were no malignant cells seen in the sections examined.
Inflammation Suggestive Of Cancer Camouflage Histology Text: There was a dense lymphocytic infiltrate which prevented adequate histologic evaluation of adjacent structures.
Bcc Histology Text: There were numerous aggregates of basaloid cells.
Bcc Infiltrative Histology Text: There were numerous aggregates of basaloid cells demonstrating an infiltrative pattern.
Mart-1 - Positive Histology Text: MART-1 staining demonstrates areas of higher density and clustering of melanocytes with Pagetoid spread upwards within the epidermis. The surgical margins are positive for tumor cells.
Mart-1 - Negative Histology Text: MART-1 staining demonstrates a normal density and pattern of melanocytes along the dermal-epidermal junction. The surgical margins are negative for tumor cells.
Information: Selecting Yes will display possible errors in your note based on the variables you have selected. This validation is only offered as a suggestion for you. PLEASE NOTE THAT THE VALIDATION TEXT WILL BE REMOVED WHEN YOU FINALIZE YOUR NOTE. IF YOU WANT TO FAX A PRELIMINARY NOTE YOU WILL NEED TO TOGGLE THIS TO 'NO' IF YOU DO NOT WANT IT IN YOUR FAXED NOTE.
Bill 59 Modifier?: No - Continue to Bill 79 Modifier

## 2024-10-30 ENCOUNTER — HOSPITAL ENCOUNTER (OUTPATIENT)
Dept: LAB | Facility: MEDICAL CENTER | Age: 74
End: 2024-10-30
Attending: NURSE PRACTITIONER
Payer: MEDICARE

## 2024-10-30 ENCOUNTER — OFFICE VISIT (OUTPATIENT)
Dept: MEDICAL GROUP | Facility: MEDICAL CENTER | Age: 74
End: 2024-10-30
Payer: MEDICARE

## 2024-10-30 VITALS
HEIGHT: 72 IN | OXYGEN SATURATION: 96 % | WEIGHT: 287 LBS | SYSTOLIC BLOOD PRESSURE: 124 MMHG | TEMPERATURE: 97.6 F | DIASTOLIC BLOOD PRESSURE: 76 MMHG | RESPIRATION RATE: 14 BRPM | HEART RATE: 64 BPM | BODY MASS INDEX: 38.87 KG/M2

## 2024-10-30 DIAGNOSIS — N40.1 BENIGN PROSTATIC HYPERPLASIA WITH LOWER URINARY TRACT SYMPTOMS, SYMPTOM DETAILS UNSPECIFIED: ICD-10-CM

## 2024-10-30 DIAGNOSIS — R63.5 WEIGHT GAIN: ICD-10-CM

## 2024-10-30 DIAGNOSIS — E66.01 SEVERE OBESITY (BMI 35.0-39.9) WITH COMORBIDITY (HCC): ICD-10-CM

## 2024-10-30 DIAGNOSIS — I10 ESSENTIAL HYPERTENSION: ICD-10-CM

## 2024-10-30 DIAGNOSIS — D64.9 ANEMIA, UNSPECIFIED TYPE: ICD-10-CM

## 2024-10-30 DIAGNOSIS — Z23 NEED FOR VACCINATION: ICD-10-CM

## 2024-10-30 PROBLEM — E87.70 FLUID OVERLOAD: Status: RESOLVED | Noted: 2019-09-23 | Resolved: 2024-10-30

## 2024-10-30 PROBLEM — K80.42 CHOLEDOCHOLITHIASIS WITH ACUTE CHOLECYSTITIS: Status: RESOLVED | Noted: 2023-08-12 | Resolved: 2024-10-30

## 2024-10-30 PROBLEM — R33.9 RETENTION OF URINE: Status: RESOLVED | Noted: 2019-09-23 | Resolved: 2024-10-30

## 2024-10-30 PROBLEM — D61.818 PANCYTOPENIA (HCC): Status: RESOLVED | Noted: 2019-10-05 | Resolved: 2024-10-30

## 2024-10-30 PROBLEM — K80.50 CHOLEDOCHOLITHIASIS: Status: RESOLVED | Noted: 2023-08-12 | Resolved: 2024-10-30

## 2024-10-30 PROBLEM — I48.91 A-FIB (HCC): Status: RESOLVED | Noted: 2019-09-23 | Resolved: 2024-10-30

## 2024-10-30 PROBLEM — N39.0 UTI (URINARY TRACT INFECTION): Status: RESOLVED | Noted: 2019-10-02 | Resolved: 2024-10-30

## 2024-10-30 LAB
ALBUMIN SERPL BCP-MCNC: 4.6 G/DL (ref 3.2–4.9)
ALBUMIN/GLOB SERPL: 2 G/DL
ALP SERPL-CCNC: 58 U/L (ref 30–99)
ALT SERPL-CCNC: 14 U/L (ref 2–50)
ANION GAP SERPL CALC-SCNC: 12 MMOL/L (ref 7–16)
APPEARANCE UR: CLEAR
AST SERPL-CCNC: 20 U/L (ref 12–45)
BASOPHILS # BLD AUTO: 0.5 % (ref 0–1.8)
BASOPHILS # BLD: 0.04 K/UL (ref 0–0.12)
BILIRUB SERPL-MCNC: 1 MG/DL (ref 0.1–1.5)
BILIRUB UR QL STRIP.AUTO: NEGATIVE
BUN SERPL-MCNC: 19 MG/DL (ref 8–22)
CALCIUM ALBUM COR SERPL-MCNC: 9.9 MG/DL (ref 8.5–10.5)
CALCIUM SERPL-MCNC: 10.4 MG/DL (ref 8.5–10.5)
CHLORIDE SERPL-SCNC: 104 MMOL/L (ref 96–112)
CO2 SERPL-SCNC: 23 MMOL/L (ref 20–33)
COLOR UR: YELLOW
CREAT SERPL-MCNC: 1.22 MG/DL (ref 0.5–1.4)
EOSINOPHIL # BLD AUTO: 0.15 K/UL (ref 0–0.51)
EOSINOPHIL NFR BLD: 2 % (ref 0–6.9)
ERYTHROCYTE [DISTWIDTH] IN BLOOD BY AUTOMATED COUNT: 53.1 FL (ref 35.9–50)
FERRITIN SERPL-MCNC: 184 NG/ML (ref 22–322)
GFR SERPLBLD CREATININE-BSD FMLA CKD-EPI: 61 ML/MIN/1.73 M 2
GLOBULIN SER CALC-MCNC: 2.3 G/DL (ref 1.9–3.5)
GLUCOSE SERPL-MCNC: 88 MG/DL (ref 65–99)
GLUCOSE UR STRIP.AUTO-MCNC: NEGATIVE MG/DL
HCT VFR BLD AUTO: 38.3 % (ref 42–52)
HGB BLD-MCNC: 13.4 G/DL (ref 14–18)
IMM GRANULOCYTES # BLD AUTO: 0.04 K/UL (ref 0–0.11)
IMM GRANULOCYTES NFR BLD AUTO: 0.5 % (ref 0–0.9)
IRON SATN MFR SERPL: 30 % (ref 15–55)
IRON SERPL-MCNC: 96 UG/DL (ref 50–180)
KETONES UR STRIP.AUTO-MCNC: NEGATIVE MG/DL
LEUKOCYTE ESTERASE UR QL STRIP.AUTO: NEGATIVE
LYMPHOCYTES # BLD AUTO: 1.42 K/UL (ref 1–4.8)
LYMPHOCYTES NFR BLD: 18.5 % (ref 22–41)
MCH RBC QN AUTO: 34.4 PG (ref 27–33)
MCHC RBC AUTO-ENTMCNC: 35 G/DL (ref 32.3–36.5)
MCV RBC AUTO: 98.5 FL (ref 81.4–97.8)
MICRO URNS: NORMAL
MONOCYTES # BLD AUTO: 0.77 K/UL (ref 0–0.85)
MONOCYTES NFR BLD AUTO: 10 % (ref 0–13.4)
NEUTROPHILS # BLD AUTO: 5.27 K/UL (ref 1.82–7.42)
NEUTROPHILS NFR BLD: 68.5 % (ref 44–72)
NITRITE UR QL STRIP.AUTO: NEGATIVE
NRBC # BLD AUTO: 0 K/UL
NRBC BLD-RTO: 0 /100 WBC (ref 0–0.2)
PH UR STRIP.AUTO: 6.5 [PH] (ref 5–8)
PLATELET # BLD AUTO: 144 K/UL (ref 164–446)
PMV BLD AUTO: 9.6 FL (ref 9–12.9)
POTASSIUM SERPL-SCNC: 3.9 MMOL/L (ref 3.6–5.5)
PROT SERPL-MCNC: 6.9 G/DL (ref 6–8.2)
PROT UR QL STRIP: NEGATIVE MG/DL
PSA SERPL-MCNC: 1.74 NG/ML (ref 0–4)
RBC # BLD AUTO: 3.89 M/UL (ref 4.7–6.1)
RBC UR QL AUTO: NEGATIVE
SODIUM SERPL-SCNC: 139 MMOL/L (ref 135–145)
SP GR UR STRIP.AUTO: 1.01
TIBC SERPL-MCNC: 317 UG/DL (ref 250–450)
TSH SERPL-ACNC: 1.66 UIU/ML (ref 0.35–5.5)
UIBC SERPL-MCNC: 221 UG/DL (ref 110–370)
UROBILINOGEN UR STRIP.AUTO-MCNC: 0.2 EU/DL
WBC # BLD AUTO: 7.7 K/UL (ref 4.8–10.8)

## 2024-10-30 PROCEDURE — 99204 OFFICE O/P NEW MOD 45 MIN: CPT | Mod: 25 | Performed by: NURSE PRACTITIONER

## 2024-10-30 PROCEDURE — 81003 URINALYSIS AUTO W/O SCOPE: CPT

## 2024-10-30 PROCEDURE — 84443 ASSAY THYROID STIM HORMONE: CPT

## 2024-10-30 PROCEDURE — 85025 COMPLETE CBC W/AUTO DIFF WBC: CPT

## 2024-10-30 PROCEDURE — 3078F DIAST BP <80 MM HG: CPT | Performed by: NURSE PRACTITIONER

## 2024-10-30 PROCEDURE — 36415 COLL VENOUS BLD VENIPUNCTURE: CPT

## 2024-10-30 PROCEDURE — 80053 COMPREHEN METABOLIC PANEL: CPT

## 2024-10-30 PROCEDURE — 82728 ASSAY OF FERRITIN: CPT

## 2024-10-30 PROCEDURE — 84153 ASSAY OF PSA TOTAL: CPT

## 2024-10-30 PROCEDURE — G0008 ADMIN INFLUENZA VIRUS VAC: HCPCS

## 2024-10-30 PROCEDURE — 83540 ASSAY OF IRON: CPT

## 2024-10-30 PROCEDURE — 90662 IIV NO PRSV INCREASED AG IM: CPT

## 2024-10-30 PROCEDURE — 82043 UR ALBUMIN QUANTITATIVE: CPT

## 2024-10-30 PROCEDURE — 82570 ASSAY OF URINE CREATININE: CPT

## 2024-10-30 PROCEDURE — 83550 IRON BINDING TEST: CPT

## 2024-10-30 PROCEDURE — 3074F SYST BP LT 130 MM HG: CPT | Performed by: NURSE PRACTITIONER

## 2024-10-30 RX ORDER — SILDENAFIL 50 MG/1
50 TABLET, FILM COATED ORAL
COMMUNITY
Start: 2024-08-01

## 2024-10-30 RX ORDER — EPLERENONE 50 MG/1
1 TABLET, FILM COATED ORAL
COMMUNITY
Start: 2024-10-17 | End: 2025-10-12

## 2024-10-30 RX ORDER — PSYLLIUM HUSK 0.4 G
1000 CAPSULE ORAL
COMMUNITY

## 2024-10-30 ASSESSMENT — FIBROSIS 4 INDEX: FIB4 SCORE: 6.49

## 2024-10-30 ASSESSMENT — PATIENT HEALTH QUESTIONNAIRE - PHQ9: CLINICAL INTERPRETATION OF PHQ2 SCORE: 0

## 2024-10-31 LAB
CREAT UR-MCNC: 46.96 MG/DL
MICROALBUMIN UR-MCNC: <1.2 MG/DL
MICROALBUMIN/CREAT UR: NORMAL MG/G (ref 0–30)

## 2025-01-22 ENCOUNTER — APPOINTMENT (OUTPATIENT)
Dept: MEDICAL GROUP | Facility: MEDICAL CENTER | Age: 75
End: 2025-01-22
Payer: MEDICARE

## 2025-01-22 VITALS
WEIGHT: 282 LBS | TEMPERATURE: 97.6 F | SYSTOLIC BLOOD PRESSURE: 124 MMHG | DIASTOLIC BLOOD PRESSURE: 76 MMHG | BODY MASS INDEX: 38.19 KG/M2 | OXYGEN SATURATION: 96 % | RESPIRATION RATE: 18 BRPM | HEART RATE: 68 BPM | HEIGHT: 72 IN

## 2025-01-22 DIAGNOSIS — N40.1 BENIGN PROSTATIC HYPERPLASIA WITH LOWER URINARY TRACT SYMPTOMS, SYMPTOM DETAILS UNSPECIFIED: ICD-10-CM

## 2025-01-22 DIAGNOSIS — D64.9 ANEMIA, UNSPECIFIED TYPE: ICD-10-CM

## 2025-01-22 DIAGNOSIS — R26.89 IMBALANCE: ICD-10-CM

## 2025-01-22 DIAGNOSIS — C67.9 MALIGNANT NEOPLASM OF URINARY BLADDER, UNSPECIFIED SITE (HCC): ICD-10-CM

## 2025-01-22 DIAGNOSIS — D69.6 THROMBOCYTOPENIA (HCC): ICD-10-CM

## 2025-01-22 DIAGNOSIS — R39.15 URINARY URGENCY: ICD-10-CM

## 2025-01-22 PROBLEM — D09.10: Status: ACTIVE | Noted: 2023-11-15

## 2025-01-22 PROBLEM — D09.10: Status: RESOLVED | Noted: 2023-11-15 | Resolved: 2025-01-22

## 2025-01-22 PROCEDURE — 99214 OFFICE O/P EST MOD 30 MIN: CPT | Performed by: NURSE PRACTITIONER

## 2025-01-22 PROCEDURE — 3078F DIAST BP <80 MM HG: CPT | Performed by: NURSE PRACTITIONER

## 2025-01-22 PROCEDURE — 3074F SYST BP LT 130 MM HG: CPT | Performed by: NURSE PRACTITIONER

## 2025-01-22 ASSESSMENT — PATIENT HEALTH QUESTIONNAIRE - PHQ9: CLINICAL INTERPRETATION OF PHQ2 SCORE: 0

## 2025-01-22 ASSESSMENT — FIBROSIS 4 INDEX: FIB4 SCORE: 2.82

## 2025-01-22 NOTE — PROGRESS NOTES
"Subjective:     Xavier Richarsdon is a 74 y.o. male presents to discuss:   Chief Complaint   Patient presents with    Follow-Up     Verbal consent was acquired by the patient to use [employer] ambient listening note generation during this visit Yes   History of Present Illness  The patient presents for evaluation of balance issues, urinary urgency, and anemia, follow up labs.     He reports a recent exacerbation of feeling dizziness although he further describes this as an imbalance.  He does not describe the room as spinning.  He does not feel as if it is in his head however in his overall balance and coordination.  He has started to use a cane as he finds this helpful.  He noticed this over the past several weeks.  No new medications or new lower back pain, worsening numbness or tingling.  He does note slight neuropathy in his feet although this has been a longstanding issue.  Denies any unilateral weakness although he does acknowledge a longstanding mild left-sided weakness.  He is wondering if he has been feeling a bit weaker since he has been trying to lose weight by eating less.  He struggles with maintaining adequate hydration.  He has been trying to engage in daily walking exercises but does not see any improvement in his balance.He has previously undergone balance therapy and continues to perform home exercises for balance.    He reports an increase in urinary urgency, often triggered by rising from a seated position, which subsides after a brief period of resistance. The patient is currently on finasteride and tamsulosin.  Patient also has concerns regarding the bladder biopsy he had back in [place].    Per chart review patient underwent a cystoscopy and bladder biopsy at the [facility] in [facility]  -Per chart review it appears the pathology is \"consistent with papillary urothelial carcinoma\" patient has not had follow-up as [place] had [place] out of state.    Collected on " 1/6/2023  Final Diagnosis   A. Urinary bladder, biopsy:  - Small fragment of urothelial mucosa and multiple small detached superficial urothelial fragments with urothelial atypia, consistent with papillary urothelial carcinoma, low grade  - Lamina propria is limited and negative for carcinoma  - No muscularis propria present  Patient was recommended to have a follow-up cystoscopy in 6 months however patient states that he has not followed up.    He has a history of anemia, which was severe in 2019, and he experienced significant weakness at that time. The patient attributes his anemia to blood loss from a colon bleed during his aneurysm repair surgery in [place]. He was prescribed iron supplements and consulted with a hematologist, and his anemia gradually improved.    Most recent CBC with stable anemia.  Urine without hematuria.    Supplemental Information  He is scheduled for a CT scan at [facility] on Friday, followed by a consultation with the vascular team.        Latest Reference Range & Units 10/30/24 09:54   WBC 4.8 - 10.8 K/uL 7.7   RBC 4.70 - 6.10 M/uL 3.89 (L)   Hemoglobin 14.0 - 18.0 g/dL 13.4 (L)   Hematocrit 42.0 - 52.0 % 38.3 (L)   MCV 81.4 - 97.8 fL 98.5 (H)   MCH 27.0 - 33.0 pg 34.4 (H)   MCHC 32.3 - 36.5 g/dL 35.0   RDW 35.9 - 50.0 fL 53.1 (H)   Platelet Count 164 - 446 K/uL 144 (L)   MPV 9.0 - 12.9 fL 9.6       ROS: : see above      Current Outpatient Medications:     sildenafil citrate (VIAGRA) 50 MG tablet, Take 50 mg by mouth., Disp: , Rfl:     vitamin D (VITAMIN D-1000 MAX ST) 1000 UNIT Tab, Take 1,000 Units by mouth., Disp: , Rfl:     Multiple Vitamin (MULTIVITAMIN ADULT PO), Take  by mouth every day., Disp: , Rfl:     BABY ASPIRIN PO, Take 81 mg by mouth every day., Disp: , Rfl:     Eplerenone 50 MG Tab, Take 1 Tablet by mouth every day., Disp: , Rfl:     acetaminophen (TYLENOL) 325 MG Tab, Take 650 mg by mouth 3 times a day., Disp: , Rfl:     fenofibrate (TRICOR) 54 MG tablet, Take 54 mg by  mouth every day., Disp: , Rfl:     tamsulosin (FLOMAX) 0.4 MG capsule, Take 0.4 mg by mouth every day., Disp: , Rfl:     amLODIPine (NORVASC) 10 MG Tab, Take 10 mg by mouth every day., Disp: , Rfl:     atorvastatin (LIPITOR) 40 MG Tab, Take 40 mg by mouth every day., Disp: , Rfl:     carvedilol (COREG) 25 MG Tab, Take 25 mg by mouth 2 times a day., Disp: , Rfl:     losartan (COZAAR) 25 MG Tab, Take 25 mg by mouth every day., Disp: , Rfl:     finasteride (PROSCAR) 5 MG Tab, Take 1 Tab by mouth every day., Disp: 30 Tab, Rfl: 0    furosemide (LASIX) 40 MG Tab, Take 1 Tab by mouth every day., Disp: 30 Tab, Rfl: 0    gabapentin (NEURONTIN) 400 MG Cap, Take 1 Cap by mouth 4 times a day., Disp: 120 Cap, Rfl: 0    No Known Allergies    Objective:     Vitals: /76   Pulse 68   Temp 36.4 °C (97.6 °F) (Temporal)   Resp 18   Ht 1.829 m (6')   Wt (!) 128 kg (282 lb)   SpO2 96%   BMI 38.25 kg/m²    General: Alert, pleasant, NAD  HEENT: Normocephalic.  Neck supple.   Respiratory: no distress, no audible wheezing, RR -WNL  Skin: Warm, dry, no rashes.  Extremities: No leg edema. No discoloration  Neurological: No tremors, using cane, unsteady gait  Psych:  Affect/mood is normal, judgement is good, memory is intact, grooming is appropriate.      Assessment/Plan:      1. Malignant neoplasm of urinary bladder, unspecified site (HCC)  - Referral to Urology    2. Anemia, unspecified type    3. Thrombocytopenia (HCC)    4. Urinary urgency    5. Benign prostatic hyperplasia with lower urinary tract symptoms, symptom details unspecified    6. Imbalance     Assessment & Plan  1.  Malignant neoplasm of bladder.  New/chronic problem- per chart review-have found the pathology that was completed however patient had moved out of state and was lost to follow-up.    Uncertain status at this time-needs evaluation by urology team..   Referral to Urology for further work up and treatment.     Case Report Surgical Pathology   Case:  [id number]                                  Authorizing Provider:  Freddie Caballero MD    Collected:  01/06/2023 11:58 AM          Ordering Location:Urology Clinic             Received: 01/09/2023 11:01 AM          Pathologist: Glenroy Powell MD                                                              Specimen:  Urinary Bladder                                                                         Final Diagnosis    A.  Urinary bladder, biopsy:      - Small fragment of urothelial mucosa and multiple small detached superficial urothelial fragments with urothelial atypia, consistent with papillary urothelial carcinoma, low grade      - Lamina propria is limited and negative for carcinoma      - No muscularis propria present      - See [facility] cancer case summary for more details     2. Urinary urgency: Per patient has noticed increase in urinary urgency.  He continues with finasteride and tamsulosin.  Most recent PSA within normal.  No hematuria on urinalysis.  Uncertain significance at this time as patient is noted based on chart review to have a pathology from 2023 with concern for papillary urothelial carcinoma.  Needs workup with urology.  Referral placed.      3. Anemia: History of severe anemia in 2019, current hemoglobin 13.4, ferritin and circulating iron levels within normal limits.  - Continue monitoring hemoglobin and hematocrit levels.  - Maintain adequate hydration and nutrition.    4.Balance issues : Patient notes that this has been a longstanding problem for him however has noticed an increase in his balance over the past several weeks.  Hemoglobin 13.4, ferritin and circulating iron levels within normal limits, potassium within normal levels, fasting glucose and liver enzyme levels normal, kidney filtration rate slightly decreased from previous although stable. History of vascular issues and anemia.  - Increase water intake to ensure adequate hydration.  - Consider further evaluation if balance  worsens.  He has history of participating in balance therapy.  We discussed potential etiologies such as spinal issues, worsening neuropathy, deconditioning however at this time given the concerning findings on the pathology report for the bladder lesion we will defer for now.  He will also continue to use a cane and caution regarding fall precautions.      Return in about 3 months (around 4/22/2025).    {I have placed the above orders and discussed them with an approved delegating provider. The MA is performing the below orders under the direction of Dr. Rakesh ALVARES

## 2025-01-30 ENCOUNTER — OFFICE VISIT (OUTPATIENT)
Dept: UROLOGY | Facility: MEDICAL CENTER | Age: 75
End: 2025-01-30
Payer: MEDICARE

## 2025-01-30 DIAGNOSIS — C67.9 MALIGNANT NEOPLASM OF URINARY BLADDER, UNSPECIFIED SITE (HCC): ICD-10-CM

## 2025-01-30 NOTE — PROGRESS NOTES
"Subjective  Park Jackelin Richardson is a 74 y.o. male who presents today for bladder cancer.  Per chart review patient underwent a cystoscopy and bladder biopsy at the [facility] in [facility]  -Per chart review it appears the pathology is \"consistent with papillary urothelial carcinoma\" patient has not had follow-up as [place].     Collected on 1/6/2023  Final Diagnosis   A. Urinary bladder, biopsy:  - Small fragment of urothelial mucosa and multiple small detached superficial urothelial fragments with urothelial atypia, consistent with papillary urothelial carcinoma, low grade  - Lamina propria is limited and negative for carcinoma  - No muscularis propria present  Patient was recommended to have a follow-up cystoscopy in 6 months however patient states that he has not followed up.      Family History   Problem Relation Age of Onset    Cancer Father        Social History     Socioeconomic History    Marital status: Single   Tobacco Use    Smoking status: Never    Smokeless tobacco: Never   Vaping Use    Vaping status: Never Used   Substance and Sexual Activity    Alcohol use: Yes     Alcohol/week: 0.6 oz     Types: 1 Standard drinks or equivalent per week    Drug use: Never       Past Surgical History:   Procedure Laterality Date    ND ERCP,DIAGNOSTIC N/A 8/16/2023    Procedure: ERCP (ENDOSCOPIC RETROGRADE CHOLANGIOPANCREATOGRAPHY);  Surgeon: Krishan Godoy M.D.;  Location: SURGERY SAME DAY [facility];  Service: Gastroenterology    ND ERCP,W/REMOVAL STONE,LISET/PANCR DUCTS N/A 8/16/2023    Procedure: ERCP, WITH CALCULUS REMOVAL FROM BILE OR PANCREATIC DUCT;  Surgeon: Krishan Godoy M.D.;  Location: SURGERY SAME DAY [facility];  Service: Gastroenterology    SPHINCTEROTOMY N/A 8/16/2023    Procedure: SPHINCTEROTOMY;  Surgeon: Krishan Godoy M.D.;  Location: SURGERY SAME DAY [facility];  Service: Gastroenterology    MARK BY LAPAROSCOPY N/A 8/15/2023    Procedure: " CHOLECYSTECTOMY, LAPAROSCOPIC WITH INTRAOPERATIVE CHOLANGIOGRAM;  Surgeon: Efren Guzman D.O.;  Location: SURGERY [facility];  Service: Gen Robotic    OTHER CARDIAC SURGERY      RHINOPLASTY         Past Medical History:   Diagnosis Date    BPH (benign prostatic hyperplasia)     Hypertension     JESUS MANUEL (obstructive sleep apnea)     Stroke (HCC)        Current Outpatient Medications   Medication Sig    sildenafil citrate (VIAGRA) 50 MG tablet Take 50 mg by mouth.    vitamin D (VITAMIN D-1000 MAX ST) 1000 UNIT Tab Take 1,000 Units by mouth.    Multiple Vitamin (MULTIVITAMIN ADULT PO) Take  by mouth every day.    BABY ASPIRIN PO Take 81 mg by mouth every day.    Eplerenone 50 MG Tab Take 1 Tablet by mouth every day.    acetaminophen (TYLENOL) 325 MG Tab Take 650 mg by mouth 3 times a day.    fenofibrate (TRICOR) 54 MG tablet Take 54 mg by mouth every day.    tamsulosin (FLOMAX) 0.4 MG capsule Take 0.4 mg by mouth every day.    amLODIPine (NORVASC) 10 MG Tab Take 10 mg by mouth every day.    atorvastatin (LIPITOR) 40 MG Tab Take 40 mg by mouth every day.    carvedilol (COREG) 25 MG Tab Take 25 mg by mouth 2 times a day.    losartan (COZAAR) 25 MG Tab Take 25 mg by mouth every day.    finasteride (PROSCAR) 5 MG Tab Take 1 Tab by mouth every day.    furosemide (LASIX) 40 MG Tab Take 1 Tab by mouth every day.    gabapentin (NEURONTIN) 400 MG Cap Take 1 Cap by mouth 4 times a day.       No Known Allergies    Objective  There were no vitals taken for this visit.  Physical Exam      Labs:   CBC   Lab Results   Component Value Date/Time    WBC 7.7 10/30/2024 0954    RBC 3.89 (L) 10/30/2024 0954    HEMOGLOBIN 13.4 (L) 10/30/2024 0954    HEMATOCRIT 38.3 (L) 10/30/2024 0954    MCV 98.5 (H) 10/30/2024 0954    MCH 34.4 (H) 10/30/2024 0954    MCHC 35.0 10/30/2024 0954    RDW 53.1 (H) 10/30/2024 0954    MPV 9.6 10/30/2024 0954    LYMPHOCYTES 18.50 (L) 10/30/2024 0954    LYMPHS 1.42 10/30/2024 0954    MONOCYTES 10.00 10/30/2024 0954     MONOS 0.77 10/30/2024 0954    EOSINOPHILS 2.00 10/30/2024 0954    EOS 0.15 10/30/2024 0954    BASOPHILS 0.50 10/30/2024 0954    BASO 0.04 10/30/2024 0954    NRBC 0.00 10/30/2024 0954       BMP   Lab Results   Component Value Date/Time    SODIUM 139 10/30/2024 0954    POTASSIUM 3.9 10/30/2024 0954    CHLORIDE 104 10/30/2024 0954    CO2 23 10/30/2024 0954    GLUCOSE 88 10/30/2024 0954    BUN 19 10/30/2024 0954    CREATININE 1.22 10/30/2024 0954    CALCIUM 10.4 10/30/2024 0954     PSA   Lab Results   Component Value Date/Time    PSATOTAL 1.74 10/30/2024 0954       Imaging:   None relevant.      Assessment & Plan    Bladder cancer. Cysto today and annually for 2 more years.      Jose Eduardo Killian M.D., F.A.C.S.  Urologic Oncology  Vice-Chair, Department of Surgery  [facility]

## 2025-01-30 NOTE — PROGRESS NOTES
Subjective  Xavier Richardson is a 74 y.o. male who presents today for cystoscopy to evaluate Bladder Cancer.     Family History   Problem Relation Age of Onset    Cancer Father        Social History     Socioeconomic History    Marital status: Single   Tobacco Use    Smoking status: Never    Smokeless tobacco: Never   Vaping Use    Vaping status: Never Used   Substance and Sexual Activity    Alcohol use: Yes     Alcohol/week: 0.6 oz     Types: 1 Standard drinks or equivalent per week    Drug use: Never       Past Surgical History:   Procedure Laterality Date    NC ERCP,DIAGNOSTIC N/A 8/16/2023    Procedure: ERCP (ENDOSCOPIC RETROGRADE CHOLANGIOPANCREATOGRAPHY);  Surgeon: Krishan Godoy M.D.;  Location: SURGERY SAME DAY [facility];  Service: Gastroenterology    NC ERCP,W/REMOVAL STONE,LISET/PANCR DUCTS N/A 8/16/2023    Procedure: ERCP, WITH CALCULUS REMOVAL FROM BILE OR PANCREATIC DUCT;  Surgeon: Krishan Godoy M.D.;  Location: SURGERY SAME DAY [facility];  Service: Gastroenterology    SPHINCTEROTOMY N/A 8/16/2023    Procedure: SPHINCTEROTOMY;  Surgeon: Krishan Godoy M.D.;  Location: SURGERY SAME DAY [facility];  Service: Gastroenterology    MARK BY LAPAROSCOPY N/A 8/15/2023    Procedure: CHOLECYSTECTOMY, LAPAROSCOPIC WITH INTRAOPERATIVE CHOLANGIOGRAM;  Surgeon: Efren Guzman D.O.;  Location: SURGERY [facility];  Service: Gen Robotic    OTHER CARDIAC SURGERY      RHINOPLASTY         Past Medical History:   Diagnosis Date    BPH (benign prostatic hyperplasia)     Hypertension     JESUS MANUEL (obstructive sleep apnea)     Stroke (HCC)        Current Outpatient Medications   Medication Sig    sildenafil citrate (VIAGRA) 50 MG tablet Take 50 mg by mouth.    vitamin D (VITAMIN D-1000 MAX ST) 1000 UNIT Tab Take 1,000 Units by mouth.    Multiple Vitamin (MULTIVITAMIN ADULT PO) Take  by mouth every day.    BABY ASPIRIN PO Take 81 mg by mouth every day.    Eplerenone 50 MG Tab Take 1 Tablet by mouth every day.    acetaminophen  "(TYLENOL) 325 MG Tab Take 650 mg by mouth 3 times a day.    fenofibrate (TRICOR) 54 MG tablet Take 54 mg by mouth every day.    tamsulosin (FLOMAX) 0.4 MG capsule Take 0.4 mg by mouth every day.    amLODIPine (NORVASC) 10 MG Tab Take 10 mg by mouth every day.    atorvastatin (LIPITOR) 40 MG Tab Take 40 mg by mouth every day.    carvedilol (COREG) 25 MG Tab Take 25 mg by mouth 2 times a day.    losartan (COZAAR) 25 MG Tab Take 25 mg by mouth every day.    finasteride (PROSCAR) 5 MG Tab Take 1 Tab by mouth every day.    furosemide (LASIX) 40 MG Tab Take 1 Tab by mouth every day.    gabapentin (NEURONTIN) 400 MG Cap Take 1 Cap by mouth 4 times a day.       No Known Allergies    Objective  There were no vitals taken for this visit.  Physical Exam      Labs:     POC UA  No results found for: \"POCCOLOR\", \"POCAPPEAR\", \"POCLEUKEST\", \"POCNITRITE\", \"POCUROBILIGE\", \"POCPROTEIN\", \"POCURPH\", \"POCBLOOD\", \"POCSPGRV\", \"POCKETONES\", \"POCBILIRUBIN\", \"POCGLUCUA\"     CMP   Lab Results   Component Value Date/Time    SODIUM 139 10/30/2024 0954    POTASSIUM 3.9 10/30/2024 0954    CHLORIDE 104 10/30/2024 0954    CO2 23 10/30/2024 0954    ANION 12.0 10/30/2024 0954    GLUCOSE 88 10/30/2024 0954    BUN 19 10/30/2024 0954    CREATININE 1.22 10/30/2024 0954    GFRCKD 61 10/30/2024 0954    CALCIUM 10.4 10/30/2024 0954    CORRCALC 9.9 10/30/2024 0954    ASTSGOT 20 10/30/2024 0954    ALTSGPT 14 10/30/2024 0954    ALKPHOSPHAT 58 10/30/2024 0954    TBILIRUBIN 1.0 10/30/2024 0954    ALBUMIN 4.6 10/30/2024 0954    TOTPROTEIN 6.9 10/30/2024 0954    GLOBULIN 2.3 10/30/2024 0954    AGRATIO 2.0 10/30/2024 0954       BMP  Lab Results   Component Value Date/Time    SODIUM 139 10/30/2024 0954    POTASSIUM 3.9 10/30/2024 0954    CHLORIDE 104 10/30/2024 0954    CO2 23 10/30/2024 0954    GLUCOSE 88 10/30/2024 0954    BUN 19 10/30/2024 0954    CREATININE 1.22 10/30/2024 0954    CALCIUM 10.4 10/30/2024 0954       CBC  Lab Results   Component Value Date/Time    " WBC 7.7 10/30/2024 0954    RBC 3.89 (L) 10/30/2024 0954    HEMOGLOBIN 13.4 (L) 10/30/2024 0954    HEMATOCRIT 38.3 (L) 10/30/2024 0954    MCV 98.5 (H) 10/30/2024 0954    MCH 34.4 (H) 10/30/2024 0954    MCHC 35.0 10/30/2024 0954    RDW 53.1 (H) 10/30/2024 0954    MPV 9.6 10/30/2024 0954    LYMPHOCYTES 18.50 (L) 10/30/2024 0954    LYMPHS 1.42 10/30/2024 0954    MONOCYTES 10.00 10/30/2024 0954    MONOS 0.77 10/30/2024 0954    EOSINOPHILS 2.00 10/30/2024 0954    EOS 0.15 10/30/2024 0954    BASOPHILS 0.50 10/30/2024 0954    BASO 0.04 10/30/2024 0954    NRBC 0.00 10/30/2024 0954       A1C  Lab Results   Component Value Date/Time    HBA1C <4.2 (L) 02/03/2023 1513    AVGLUC 74 09/24/2019 0558     Procedure    Procedure performed: Cystoscopy     Surgeon: Dr. Nicolás Killian    Indications For Procedure: Bladder Cancer    Blood Loss: 0 cc     Anesthesia: Lidocaine jelly     Specimen: None     Findings:     1. Urethra: no lesions or masses    2. Bladder: no lesions or masses    3. Bilateral ureteral jets observed with clear efflux      4. Prostate: moderate lateral lobe hypertrophy, no median lobe, 4 cm prostate length     Description of Procedure: The patient was prepped and draped in the usual sterile fashion.  A 17Fr flexible cystoscope was advanced along the urethra into the bladder under direct vision.  We performed a thorough cystoscopic evaluation patient's bladder including retroflexion, findings noted above.  We removed the cystoscope under direct vision to evaluate the urethra and prostate, findings noted above.  This concluded the procedure, the patient tolerated it well.     Assessment  Patient was instructed to call our office if he develops any signs of infection including increased frequency, urgency, dysuria, fever, or chills.  We discussed the results of the cystoscopy with the patient, all questions and concerns addressed.     Plan    1. Malignant neoplasm of urinary bladder, unspecified site (HCC)  - POCT  Urinalysis      Return in about 1 year (around 1/30/2026) for bladder cancer follow up and cysto.    Jose Eduardo Killian M.D., F.A.C.S.  Urologic Oncology  Vice-Chair, Department of Surgery  [facility]

## 2025-02-13 ENCOUNTER — APPOINTMENT (OUTPATIENT)
Dept: URBAN - METROPOLITAN AREA CLINIC 4 | Facility: CLINIC | Age: 75
Setting detail: DERMATOLOGY
End: 2025-02-13

## 2025-02-13 DIAGNOSIS — L82.0 INFLAMED SEBORRHEIC KERATOSIS: ICD-10-CM

## 2025-02-13 DIAGNOSIS — L57.0 ACTINIC KERATOSIS: ICD-10-CM

## 2025-02-13 DIAGNOSIS — L82.1 OTHER SEBORRHEIC KERATOSIS: ICD-10-CM

## 2025-02-13 DIAGNOSIS — Z85.828 PERSONAL HISTORY OF OTHER MALIGNANT NEOPLASM OF SKIN: ICD-10-CM

## 2025-02-13 DIAGNOSIS — Z71.89 OTHER SPECIFIED COUNSELING: ICD-10-CM

## 2025-02-13 PROBLEM — D48.5 NEOPLASM OF UNCERTAIN BEHAVIOR OF SKIN: Status: ACTIVE | Noted: 2025-02-13

## 2025-02-13 PROCEDURE — ? LIQUID NITROGEN

## 2025-02-13 PROCEDURE — ? BENIGN DESTRUCTION

## 2025-02-13 PROCEDURE — ? BIOPSY BY SHAVE METHOD

## 2025-02-13 PROCEDURE — ? MEDICATION COUNSELING

## 2025-02-13 PROCEDURE — 17110 DESTRUCTION B9 LES UP TO 14: CPT

## 2025-02-13 PROCEDURE — ? PRESCRIPTION

## 2025-02-13 PROCEDURE — 11102 TANGNTL BX SKIN SINGLE LES: CPT | Mod: 59

## 2025-02-13 PROCEDURE — 99213 OFFICE O/P EST LOW 20 MIN: CPT | Mod: 25

## 2025-02-13 PROCEDURE — ? COUNSELING

## 2025-02-13 PROCEDURE — ? SUNSCREEN RECOMMENDATIONS

## 2025-02-13 RX ORDER — FLUOROURACIL 2 G/40G
CREAM TOPICAL BID
Qty: 40 | Refills: 0 | Status: ERX | COMMUNITY
Start: 2025-02-13

## 2025-02-13 RX ADMIN — FLUOROURACIL: 2 CREAM TOPICAL at 00:00

## 2025-02-13 ASSESSMENT — LOCATION SIMPLE DESCRIPTION DERM
LOCATION SIMPLE: RIGHT CHEEK
LOCATION SIMPLE: LEFT HAND
LOCATION SIMPLE: LEFT UPPER ARM
LOCATION SIMPLE: LEFT FOREARM
LOCATION SIMPLE: LEFT CHEEK
LOCATION SIMPLE: RIGHT FOREHEAD
LOCATION SIMPLE: LEFT LIP
LOCATION SIMPLE: RIGHT ZYGOMA
LOCATION SIMPLE: RIGHT HAND
LOCATION SIMPLE: RIGHT NOSE

## 2025-02-13 ASSESSMENT — LOCATION ZONE DERM
LOCATION ZONE: NOSE
LOCATION ZONE: ARM
LOCATION ZONE: LIP
LOCATION ZONE: HAND
LOCATION ZONE: FACE

## 2025-02-13 ASSESSMENT — LOCATION DETAILED DESCRIPTION DERM
LOCATION DETAILED: LEFT INFERIOR LATERAL MALAR CHEEK
LOCATION DETAILED: LEFT ANTERIOR PROXIMAL UPPER ARM
LOCATION DETAILED: RIGHT CENTRAL MALAR CHEEK
LOCATION DETAILED: RIGHT NASAL SIDEWALL
LOCATION DETAILED: RIGHT RADIAL DORSAL HAND
LOCATION DETAILED: LEFT PROXIMAL DORSAL FOREARM
LOCATION DETAILED: LEFT RADIAL DORSAL HAND
LOCATION DETAILED: RIGHT INFERIOR LATERAL FOREHEAD
LOCATION DETAILED: RIGHT INFERIOR CENTRAL MALAR CHEEK
LOCATION DETAILED: LEFT CENTRAL MALAR CHEEK
LOCATION DETAILED: LEFT LOWER CUTANEOUS LIP
LOCATION DETAILED: RIGHT CENTRAL ZYGOMA

## 2025-02-13 NOTE — PROCEDURE: LIQUID NITROGEN
Include Z78.9 (Other Specified Conditions Influencing Health Status) As An Associated Diagnosis?: No
Show Applicator Variable?: Yes
Post-Care Instructions: I reviewed with the patient in detail post-care instructions. Patient is to wear sunprotection, and avoid picking at any of the treated lesions. Pt may apply Vaseline to crusted or scabbing areas.
Detail Level: Detailed
Consent: The patient's consent was obtained including but not limited to risks of crusting, scabbing, blistering, scarring, darker or lighter pigmentary change, recurrence, incomplete removal and infection.
Medical Necessity Information: It is in your best interest to select a reason for this procedure from the list below. All of these items fulfill various CMS LCD requirements except the new and changing color options.
Medical Necessity Clause: This procedure was medically necessary because the lesions that were treated were:
Spray Paint Text: The liquid nitrogen was applied to the skin utilizing a spray paint frosting technique.

## 2025-02-13 NOTE — PROCEDURE: MEDICATION COUNSELING
Isotretinoin Counseling: Patient should get monthly blood tests, not donate blood, not drive at night if vision affected, not share medication, and not undergo elective surgery for 6 months after tx completed. Side effects reviewed, pt to contact office should one occur.
Stelara Counseling:  I discussed with the patient the risks of ustekinumab including but not limited to immunosuppression, malignancy, posterior leukoencephalopathy syndrome, and serious infections.  The patient understands that monitoring is required including a PPD at baseline and must alert us or the primary physician if symptoms of infection or other concerning signs are noted.
Cellcept Counseling:  I discussed with the patient the risks of mycophenolate mofetil including but not limited to infection/immunosuppression, GI upset, hypokalemia, hypercholesterolemia, bone marrow suppression, lymphoproliferative disorders, malignancy, GI ulceration/bleed/perforation, colitis, interstitial lung disease, kidney failure, progressive multifocal leukoencephalopathy, and birth defects.  The patient understands that monitoring is required including a baseline creatinine and regular CBC testing. In addition, patient must alert us immediately if symptoms of infection or other concerning signs are noted.
Albendazole Pregnancy And Lactation Text: This medication is Pregnancy Category C and it isn't known if it is safe during pregnancy. It is also excreted in breast milk.
Rhofade Counseling: Rhofade is a topical medication which can decrease superficial blood flow where applied. Side effects are uncommon and include stinging, redness and allergic reactions.
Thalidomide Pregnancy And Lactation Text: This medication is Pregnancy Category X and is absolutely contraindicated during pregnancy. It is unknown if it is excreted in breast milk.
Doxycycline Counseling:  Patient counseled regarding possible photosensitivity and increased risk for sunburn.  Patient instructed to avoid sunlight, if possible.  When exposed to sunlight, patients should wear protective clothing, sunglasses, and sunscreen.  The patient was instructed to call the office immediately if the following severe adverse effects occur:  hearing changes, easy bruising/bleeding, severe headache, or vision changes.  The patient verbalized understanding of the proper use and possible adverse effects of doxycycline.  All of the patient's questions and concerns were addressed.
Finasteride Male Counseling: Finasteride Counseling:  I discussed with the patient the risks of use of finasteride including but not limited to decreased libido, decreased ejaculate volume, gynecomastia, and depression. Women should not handle medication.  All of the patient's questions and concerns were addressed.
Topical Sulfur Applications Counseling: Topical Sulfur Counseling: Patient counseled that this medication may cause skin irritation or allergic reactions.  In the event of skin irritation, the patient was advised to reduce the amount of the drug applied or use it less frequently.   The patient verbalized understanding of the proper use and possible adverse effects of topical sulfur application.  All of the patient's questions and concerns were addressed.
Rituxan Counseling:  I discussed with the patient the risks of Rituxan infusions. Side effects can include infusion reactions, severe drug rashes including mucocutaneous reactions, reactivation of latent hepatitis and other infections and rarely progressive multifocal leukoencephalopathy.  All of the patient's questions and concerns were addressed.
Cantharidin Pregnancy And Lactation Text: This medication has not been proven safe during pregnancy. It is unknown if this medication is excreted in breast milk.
Rituxan Pregnancy And Lactation Text: This medication is Pregnancy Category C and it isn't know if it is safe during pregnancy. It is unknown if this medication is excreted in breast milk but similar antibodies are known to be excreted.
Finasteride Female Counseling: Finasteride Counseling:  I discussed with the patient the risks of use of finasteride including but not limited to decreased libido and sexual dysfunction. Explained the teratogenic nature of the medication and stressed the importance of not getting pregnant during treatment. All of the patient's questions and concerns were addressed.
Doxycycline Pregnancy And Lactation Text: This medication is Pregnancy Category D and not consider safe during pregnancy. It is also excreted in breast milk but is considered safe for shorter treatment courses.
Cosentyx Counseling:  I discussed with the patient the risks of Cosentyx including but not limited to worsening of Crohn's disease, immunosuppression, allergic reactions and infections.  The patient understands that monitoring is required including a PPD at baseline and must alert us or the primary physician if symptoms of infection or other concerning signs are noted.
Low Dose Naltrexone Pregnancy And Lactation Text: Naltrexone is pregnancy category C.  There have been no adequate and well-controlled studies in pregnant women.  It should be used in pregnancy only if the potential benefit justifies the potential risk to the fetus.   Limited data indicates that naltrexone is minimally excreted into breastmilk.
Hydroquinone Counseling:  Patient advised that medication may result in skin irritation, lightening (hypopigmentation), dryness, and burning.  In the event of skin irritation, the patient was advised to reduce the amount of the drug applied or use it less frequently.  Rarely, spots that are treated with hydroquinone can become darker (pseudoochronosis).  Should this occur, patient instructed to stop medication and call the office. The patient verbalized understanding of the proper use and possible adverse effects of hydroquinone.  All of the patient's questions and concerns were addressed.
Fluconazole Counseling:  Patient counseled regarding adverse effects of fluconazole including but not limited to headache, diarrhea, nausea, upset stomach, liver function test abnormalities, taste disturbance, and stomach pain.  There is a rare possibility of liver failure that can occur when taking fluconazole.  The patient understands that monitoring of LFTs and kidney function test may be required, especially at baseline. The patient verbalized understanding of the proper use and possible adverse effects of fluconazole.  All of the patient's questions and concerns were addressed.
Aklief Pregnancy And Lactation Text: It is unknown if this medication is safe to use during pregnancy.  It is unknown if this medication is excreted in breast milk.  Breastfeeding women should use the topical cream on the smallest area of the skin for the shortest time needed while breastfeeding.  Do not apply to nipple and areola.
Niacinamide Counseling: I recommended taking niacin or niacinamide, also know as vitamin B3, twice daily. Recent evidence suggests that taking vitamin B3 (500 mg twice daily) can reduce the risk of actinic keratoses and non-melanoma skin cancers. Side effects of vitamin B3 include flushing and headache.
Cosentyx Pregnancy And Lactation Text: This medication is Pregnancy Category B and is considered safe during pregnancy. It is unknown if this medication is excreted in breast milk.
Cibinqo Counseling: I discussed with the patient the risks of Cibinqo therapy including but not limited to common cold, nausea, headache, cold sores, increased blood CPK levels, dizziness, UTIs, fatigue, acne, and vomitting. Live vaccines should be avoided.  This medication has been linked to serious infections; higher rate of mortality; malignancy and lymphoproliferative disorders; major adverse cardiovascular events; thrombosis; thrombocytopenia and lymphopenia; lipid elevations; and retinal detachment.
Azelaic Acid Counseling: Patient counseled that medicine may cause skin irritation and to avoid applying near the eyes.  In the event of skin irritation, the patient was advised to reduce the amount of the drug applied or use it less frequently.   The patient verbalized understanding of the proper use and possible adverse effects of azelaic acid.  All of the patient's questions and concerns were addressed.
Hydroquinone Pregnancy And Lactation Text: This medication has not been assigned a Pregnancy Risk Category but animal studies failed to show danger with the topical medication. It is unknown if the medication is excreted in breast milk.
Ivermectin Counseling:  Patient instructed to take medication on an empty stomach with a full glass of water.  Patient informed of potential adverse effects including but not limited to nausea, diarrhea, dizziness, itching, and swelling of the extremities or lymph nodes.  The patient verbalized understanding of the proper use and possible adverse effects of ivermectin.  All of the patient's questions and concerns were addressed.
Tranexamic Acid Counseling:  Patient advised of the small risk of bleeding problems with tranexamic acid. They were also instructed to call if they developed any nausea, vomiting or diarrhea. All of the patient's questions and concerns were addressed.
Rhofade Pregnancy And Lactation Text: This medication has not been assigned a Pregnancy Risk Category. It is unknown if the medication is excreted in breast milk.
Cellcept Pregnancy And Lactation Text: This medication is Pregnancy Category D and isn't considered safe during pregnancy. It is unknown if this medication is excreted in breast milk.
Isotretinoin Pregnancy And Lactation Text: This medication is Pregnancy Category X and is considered extremely dangerous during pregnancy. It is unknown if it is excreted in breast milk.
Tranexamic Acid Pregnancy And Lactation Text: It is unknown if this medication is safe during pregnancy or breast feeding.
Finasteride Pregnancy And Lactation Text: This medication is absolutely contraindicated during pregnancy. It is unknown if it is excreted in breast milk.
High Dose Vitamin A Counseling: Side effects reviewed, pt to contact office should one occur.
Cimetidine Counseling:  I discussed with the patient the risks of Cimetidine including but not limited to gynecomastia, headache, diarrhea, nausea, drowsiness, arrhythmias, pancreatitis, skin rashes, psychosis, bone marrow suppression and kidney toxicity.
Siliq Counseling:  I discussed with the patient the risks of Siliq including but not limited to new or worsening depression, suicidal thoughts and behavior, immunosuppression, malignancy, posterior leukoencephalopathy syndrome, and serious infections.  The patient understands that monitoring is required including a PPD at baseline and must alert us or the primary physician if symptoms of infection or other concerning signs are noted. There is also a special program designed to monitor depression which is required with Siliq.
Libtayo Counseling- I discussed with the patient the risks of Libtayo including but not limited to nausea, vomiting, diarrhea, and bone or muscle pain.  The patient verbalized understanding of the proper use and possible adverse effects of Libtayo.  All of the patient's questions and concerns were addressed.
Fluconazole Pregnancy And Lactation Text: This medication is Pregnancy Category C and it isn't know if it is safe during pregnancy. It is also excreted in breast milk.
Griseofulvin Counseling:  I discussed with the patient the risks of griseofulvin including but not limited to photosensitivity, cytopenia, liver damage, nausea/vomiting and severe allergy.  The patient understands that this medication is best absorbed when taken with a fatty meal (e.g., ice cream or french fries).
Cibinqo Pregnancy And Lactation Text: It is unknown if this medication will adversely affect pregnancy or breast feeding.  You should not take this medication if you are currently pregnant or planning a pregnancy or while breastfeeding.
Taltz Counseling: I discussed with the patient the risks of ixekizumab including but not limited to immunosuppression, serious infections, worsening of inflammatory bowel disease and drug reactions.  The patient understands that monitoring is required including a PPD at baseline and must alert us or the primary physician if symptoms of infection or other concerning signs are noted.
Dupixent Counseling: I discussed with the patient the risks of dupilumab including but not limited to eye infection and irritation, cold sores, injection site reactions, worsening of asthma, allergic reactions and increased risk of parasitic infection.  Live vaccines should be avoided while taking dupilumab. Dupilumab will also interact with certain medications such as warfarin and cyclosporine. The patient understands that monitoring is required and they must alert us or the primary physician if symptoms of infection or other concerning signs are noted.
Azelaic Acid Pregnancy And Lactation Text: This medication is considered safe during pregnancy and breast feeding.
Imiquimod Counseling:  I discussed with the patient the risks of imiquimod including but not limited to erythema, scaling, itching, weeping, crusting, and pain.  Patient understands that the inflammatory response to imiquimod is variable from person to person and was educated regarded proper titration schedule.  If flu-like symptoms develop, patient knows to discontinue the medication and contact us.
Erythromycin Counseling:  I discussed with the patient the risks of erythromycin including but not limited to GI upset, allergic reaction, drug rash, diarrhea, increase in liver enzymes, and yeast infections.
Cyclophosphamide Counseling:  I discussed with the patient the risks of cyclophosphamide including but not limited to hair loss, hormonal abnormalities, decreased fertility, abdominal pain, diarrhea, nausea and vomiting, bone marrow suppression and infection. The patient understands that monitoring is required while taking this medication.
Solaraze Counseling:  I discussed with the patient the risks of Solaraze including but not limited to erythema, scaling, itching, weeping, crusting, and pain.
Niacinamide Pregnancy And Lactation Text: These medications are considered safe during pregnancy.
Erythromycin Pregnancy And Lactation Text: This medication is Pregnancy Category B and is considered safe during pregnancy. It is also excreted in breast milk.
Birth Control Pills Counseling: Birth Control Pill Counseling: I discussed with the patient the potential side effects of OCPs including but not limited to increased risk of stroke, heart attack, thrombophlebitis, deep venous thrombosis, hepatic adenomas, breast changes, GI upset, headaches, and depression.  The patient verbalized understanding of the proper use and possible adverse effects of OCPs. All of the patient's questions and concerns were addressed.
High Dose Vitamin A Pregnancy And Lactation Text: High dose vitamin A therapy is contraindicated during pregnancy and breast feeding.
Solaraze Pregnancy And Lactation Text: This medication is Pregnancy Category B and is considered safe. There is some data to suggest avoiding during the third trimester. It is unknown if this medication is excreted in breast milk.
Siliq Pregnancy And Lactation Text: The risk during pregnancy and breastfeeding is uncertain with this medication.
Rifampin Counseling: I discussed with the patient the risks of rifampin including but not limited to liver damage, kidney damage, red-orange body fluids, nausea/vomiting and severe allergy.
Sotyktu Counseling:  I discussed the most common side effects of Sotyktu including: common cold, sore throat, sinus infections, cold sores, canker sores, folliculitis, and acne.  I also discussed more serious side effects of Sotyktu including but not limited to: serious allergic reactions; increased risk for infections such as TB; cancers such as lymphomas; rhabdomyolysis and elevated CPK; and elevated triglycerides and liver enzymes. 
Libtayo Pregnancy And Lactation Text: This medication is contraindicated in pregnancy and when breast feeding.
Valtrex Counseling: I discussed with the patient the risks of valacyclovir including but not limited to kidney damage, nausea, vomiting and severe allergy.  The patient understands that if the infection seems to be worsening or is not improving, they are to call.
Cimetidine Pregnancy And Lactation Text: This medication is Pregnancy Category B and is considered safe during pregnancy. It is also excreted in breast milk and breast feeding isn't recommended.
Arava Counseling:  Patient counseled regarding adverse effects of Arava including but not limited to nausea, vomiting, abnormalities in liver function tests. Patients may develop mouth sores, rash, diarrhea, and abnormalities in blood counts. The patient understands that monitoring is required including LFTs and blood counts.  There is a rare possibility of scarring of the liver and lung problems that can occur when taking methotrexate. Persistent nausea, loss of appetite, pale stools, dark urine, cough, and shortness of breath should be reported immediately. Patient advised to discontinue Arava treatment and consult with a physician prior to attempting conception. The patient will have to undergo a treatment to eliminate Arava from the body prior to conception.
Litfulo Counseling: I discussed with the patient the risks of Litfulo therapy including but not limited to upper respiratory tract infections, shingles, cold sores, and nausea. Live vaccines should be avoided.  This medication has been linked to serious infections; higher rate of mortality; malignancy and lymphoproliferative disorders; major adverse cardiovascular events; thrombosis; gastrointestinal perforations; neutropenia; lymphopenia; anemia; liver enzyme elevations; and lipid elevations.
Odomzo Counseling- I discussed with the patient the risks of Odomzo including but not limited to nausea, vomiting, diarrhea, constipation, weight loss, changes in the sense of taste, decreased appetite, muscle spasms, and hair loss.  The patient verbalized understanding of the proper use and possible adverse effects of Odomzo.  All of the patient's questions and concerns were addressed.
Benzoyl Peroxide Counseling: Patient counseled that medicine may cause skin irritation and bleach clothing.  In the event of skin irritation, the patient was advised to reduce the amount of the drug applied or use it less frequently.   The patient verbalized understanding of the proper use and possible adverse effects of benzoyl peroxide.  All of the patient's questions and concerns were addressed.
Sotyktu Pregnancy And Lactation Text: There is insufficient data to evaluate whether or not Sotyktu is safe to use during pregnancy.   It is not known if Sotyktu passes into breast milk and whether or not it is safe to use when breastfeeding.  
Cyclophosphamide Pregnancy And Lactation Text: This medication is Pregnancy Category D and it isn't considered safe during pregnancy. This medication is excreted in breast milk.
Imiquimod Pregnancy And Lactation Text: This medication is Pregnancy Category C. It is unknown if this medication is excreted in breast milk.
Nsaids Counseling: NSAID Counseling: I discussed with the patient that NSAIDs should be taken with food. Prolonged use of NSAIDs can result in the development of stomach ulcers.  Patient advised to stop taking NSAIDs if abdominal pain occurs.  The patient verbalized understanding of the proper use and possible adverse effects of NSAIDs.  All of the patient's questions and concerns were addressed.
Griseofulvin Pregnancy And Lactation Text: This medication is Pregnancy Category X and is known to cause serious birth defects. It is unknown if this medication is excreted in breast milk but breast feeding should be avoided.
Dupixent Pregnancy And Lactation Text: This medication likely crosses the placenta but the risk for the fetus is uncertain. This medication is excreted in breast milk.
Ebglyss Counseling: I discussed with the patient the risks of lebrikizumab including but not limited to eye inflammation and irritation, cold sores, injection site reactions, allergic reactions and increased risk of parasitic infection. The patient understands that monitoring is required and they must alert us or the primary physician if symptoms of infection or other concerning signs are noted.
Tremfya Counseling: I discussed with the patient the risks of guselkumab including but not limited to immunosuppression, serious infections, and drug reactions.  The patient understands that monitoring is required including a PPD at baseline and must alert us or the primary physician if symptoms of infection or other concerning signs are noted.
Cyclosporine Counseling:  I discussed with the patient the risks of cyclosporine including but not limited to hypertension, gingival hyperplasia,myelosuppression, immunosuppression, liver damage, kidney damage, neurotoxicity, lymphoma, and serious infections. The patient understands that monitoring is required including baseline blood pressure, CBC, CMP, lipid panel and uric acid, and then 1-2 times monthly CMP and blood pressure.
Rifampin Pregnancy And Lactation Text: This medication is Pregnancy Category C and it isn't know if it is safe during pregnancy. It is also excreted in breast milk and should not be used if you are breast feeding.
Simlandi Counseling:  I discussed with the patient the risks of adalimumab including but not limited to myelosuppression, immunosuppression, autoimmune hepatitis, demyelinating diseases, lymphoma, and serious infections.  The patient understands that monitoring is required including a PPD at baseline and must alert us or the primary physician if symptoms of infection or other concerning signs are noted.
Soolantra Counseling: I discussed with the patients the risks of topial Soolantra. This is a medicine which decreases the number of mites and inflammation in the skin. You experience burning, stinging, eye irritation or allergic reactions.  Please call our office if you develop any problems from using this medication.
Valtrex Pregnancy And Lactation Text: this medication is Pregnancy Category B and is considered safe during pregnancy. This medication is not directly found in breast milk but it's metabolite acyclovir is present.
Metronidazole Counseling:  I discussed with the patient the risks of metronidazole including but not limited to seizures, nausea/vomiting, a metallic taste in the mouth, nausea/vomiting and severe allergy.
Birth Control Pills Pregnancy And Lactation Text: This medication should be avoided if pregnant and for the first 30 days post-partum.
Doxepin Counseling:  Patient advised that the medication is sedating and not to drive a car after taking this medication. Patient informed of potential adverse effects including but not limited to dry mouth, urinary retention, and blurry vision.  The patient verbalized understanding of the proper use and possible adverse effects of doxepin.  All of the patient's questions and concerns were addressed.
Wegovy Pregnancy And Lactation Text: The fetal risk of this medication is unknown and taking while pregnant is not recommended. It is unknown if this medication is present in breast milk.
Vtama Pregnancy And Lactation Text: It is unknown if this medication can cause problems during pregnancy and breastfeeding.
Adbry Pregnancy And Lactation Text: It is unknown if this medication will adversely affect pregnancy or breast feeding.
Ozempic Counseling: I reviewed the possible side effects including: thyroid tumors, kidney disease, gallbladder disease, abdominal pain, constipation, diarrhea, nausea, vomiting and pancreatitis. Do not take this medication if you have a history or family history of multiple endocrine neoplasia syndrome type 2. Side effects reviewed, pt to contact office should one occur.
Acitretin Counseling:  I discussed with the patient the risks of acitretin including but not limited to hair loss, dry lips/skin/eyes, liver damage, hyperlipidemia, depression/suicidal ideation, photosensitivity.  Serious rare side effects can include but are not limited to pancreatitis, pseudotumor cerebri, bony changes, clot formation/stroke/heart attack.  Patient understands that alcohol is contraindicated since it can result in liver toxicity and significantly prolong the elimination of the drug by many years.
Protopic Counseling: Patient may experience a mild burning sensation during topical application. Protopic is not approved in children less than 2 years of age. There have been case reports of hematologic and skin malignancies in patients using topical calcineurin inhibitors although causality is questionable.
Propranolol Pregnancy And Lactation Text: This medication is Pregnancy Category C and it isn't known if it is safe during pregnancy. It is excreted in breast milk.
Cephalexin Counseling: I counseled the patient regarding use of cephalexin as an antibiotic for prophylactic and/or therapeutic purposes. Cephalexin (commonly prescribed under brand name Keflex) is a cephalosporin antibiotic which is active against numerous classes of bacteria, including most skin bacteria. Side effects may include nausea, diarrhea, gastrointestinal upset, rash, hives, yeast infections, and in rare cases, hepatitis, kidney disease, seizures, fever, confusion, neurologic symptoms, and others. Patients with severe allergies to penicillin medications are cautioned that there is about a 10% incidence of cross-reactivity with cephalosporins. When possible, patients with penicillin allergies should use alternatives to cephalosporins for antibiotic therapy.
Topical Metronidazole Counseling: Metronidazole is a topical antibiotic medication. You may experience burning, stinging, redness, or allergic reactions.  Please call our office if you develop any problems from using this medication.
Infliximab Counseling:  I discussed with the patient the risks of infliximab including but not limited to myelosuppression, immunosuppression, autoimmune hepatitis, demyelinating diseases, lymphoma, and serious infections.  The patient understands that monitoring is required including a PPD at baseline and must alert us or the primary physician if symptoms of infection or other concerning signs are noted.
Topical Metronidazole Pregnancy And Lactation Text: This medication is Pregnancy Category B and considered safe during pregnancy.  It is also considered safe to use while breastfeeding.
Dutasteride Male Counseling: Dustasteride Counseling:  I discussed with the patient the risks of use of dutasteride including but not limited to decreased libido, decreased ejaculate volume, and gynecomastia. Women who can become pregnant should not handle medication.  All of the patient's questions and concerns were addressed.
Glycopyrrolate Pregnancy And Lactation Text: This medication is Pregnancy Category B and is considered safe during pregnancy. It is unknown if it is excreted breast milk.
Elidel Counseling: Patient may experience a mild burning sensation during topical application. Elidel is not approved in children less than 2 years of age. There have been case reports of hematologic and skin malignancies in patients using topical calcineurin inhibitors although causality is questionable.
Zoryve Counseling:  I discussed with the patient that Zoryve is not for use in the eyes, mouth or vagina. The most commonly reported side effects include diarrhea, headache, insomnia, application site pain, upper respiratory tract infections, and urinary tract infections.  All of the patient's questions and concerns were addressed.
Zepbound Counseling: I reviewed the possible side effects including: thyroid tumors, kidney disease, gallbladder disease, abdominal pain, constipation, diarrhea, nausea, vomiting and pancreatitis. Do not take this medication if you have a history or family history of multiple endocrine neoplasia syndrome type 2. Side effects reviewed, pt to contact office should one occur.
Bimzelx Counseling:  I discussed with the patient the risks of Bimzelx including but not limited to depression, immunosuppression, allergic reactions and infections.  The patient understands that monitoring is required including a PPD at baseline and must alert us or the primary physician if symptoms of infection or other concerning signs are noted.
Hydroxychloroquine Counseling:  I discussed with the patient that a baseline ophthalmologic exam is needed at the start of therapy and every year thereafter while on therapy. A CBC may also be warranted for monitoring.  The side effects of this medication were discussed with the patient, including but not limited to agranulocytosis, aplastic anemia, seizures, rashes, retinopathy, and liver toxicity. Patient instructed to call the office should any adverse effect occur.  The patient verbalized understanding of the proper use and possible adverse effects of Plaquenil.  All the patient's questions and concerns were addressed.
Bimzelx Pregnancy And Lactation Text: This medication crosses the placenta and the safety is uncertain during pregnancy. It is unknown if this medication is present in breast milk.
Saxenda Counseling: I reviewed the possible side effects including: thyroid tumors, kidney disease, gallbladder disease, abdominal pain, constipation, diarrhea, nausea, vomiting and pancreatitis. Do not take this medication if you have a history or family history of multiple endocrine neoplasia syndrome type 2. Side effects reviewed, pt to contact office should one occur.
Protopic Pregnancy And Lactation Text: This medication is Pregnancy Category C. It is unknown if this medication is excreted in breast milk when applied topically.
SSKI Counseling:  I discussed with the patient the risks of SSKI including but not limited to thyroid abnormalities, metallic taste, GI upset, fever, headache, acne, arthralgias, paraesthesias, lymphadenopathy, easy bleeding, arrhythmias, and allergic reaction.
Acitretin Pregnancy And Lactation Text: This medication is Pregnancy Category X and should not be given to women who are pregnant or may become pregnant in the future. This medication is excreted in breast milk.
Cephalexin Pregnancy And Lactation Text: This medication is Pregnancy Category B and considered safe during pregnancy.  It is also excreted in breast milk but can be used safely for shorter doses.
Sski Pregnancy And Lactation Text: This medication is Pregnancy Category D and isn't considered safe during pregnancy. It is excreted in breast milk.
Nemluvio Counseling: I discussed with the patient the risks of nemolizumab including but not limited to headache, gastrointestinal complaints, nasopharyngitis, musculoskeletal complaints, injection site reactions, and allergic reactions. The patient understands that monitoring is required and they must alert us or the primary physician if any side effects are noted.
Bexarotene Counseling:  I discussed with the patient the risks of bexarotene including but not limited to hair loss, dry lips/skin/eyes, liver abnormalities, hyperlipidemia, pancreatitis, depression/suicidal ideation, photosensitivity, drug rash/allergic reactions, hypothyroidism, anemia, leukopenia, infection, cataracts, and teratogenicity.  Patient understands that they will need regular blood tests to check lipid profile, liver function tests, white blood cell count, thyroid function tests and pregnancy test if applicable.
Topical Steroids Counseling: I discussed with the patient that prolonged use of topical steroids can result in the increased appearance of superficial blood vessels (telangiectasias), lightening (hypopigmentation) and thinning of the skin (atrophy).  Patient understands to avoid using high potency steroids in skin folds, the groin or the face.  The patient verbalized understanding of the proper use and possible adverse effects of topical steroids.  All of the patient's questions and concerns were addressed.
Dutasteride Female Counseling: Dutasteride Counseling:  I discussed with the patient the risks of use of dutasteride including but not limited to decreased libido and sexual dysfunction. Explained the teratogenic nature of the medication and stressed the importance of not getting pregnant during treatment. All of the patient's questions and concerns were addressed.
Cimzia Counseling:  I discussed with the patient the risks of Cimzia including but not limited to immunosuppression, allergic reactions and infections.  The patient understands that monitoring is required including a PPD at baseline and must alert us or the primary physician if symptoms of infection or other concerning signs are noted.
Zyclara Counseling:  I discussed with the patient the risks of imiquimod including but not limited to erythema, scaling, itching, weeping, crusting, and pain.  Patient understands that the inflammatory response to imiquimod is variable from person to person and was educated regarded proper titration schedule.  If flu-like symptoms develop, patient knows to discontinue the medication and contact us.
Eucrisa Counseling: Patient may experience a mild burning sensation during topical application. Eucrisa is not approved in children less than 3 months of age.
Clindamycin Counseling: I counseled the patient regarding use of clindamycin as an antibiotic for prophylactic and/or therapeutic purposes. Clindamycin is active against numerous classes of bacteria, including skin bacteria. Side effects may include nausea, diarrhea, gastrointestinal upset, rash, hives, yeast infections, and in rare cases, colitis.
Azathioprine Counseling:  I discussed with the patient the risks of azathioprine including but not limited to myelosuppression, immunosuppression, hepatotoxicity, lymphoma, and infections.  The patient understands that monitoring is required including baseline LFTs, Creatinine, possible TPMP genotyping and weekly CBCs for the first month and then every 2 weeks thereafter.  The patient verbalized understanding of the proper use and possible adverse effects of azathioprine.  All of the patient's questions and concerns were addressed.
Qbrexza Counseling:  I discussed with the patient the risks of Qbrexza including but not limited to headache, mydriasis, blurred vision, dry eyes, nasal dryness, dry mouth, dry throat, dry skin, urinary hesitation, and constipation.  Local skin reactions including erythema, burning, stinging, and itching can also occur.
Hydroxychloroquine Pregnancy And Lactation Text: This medication has been shown to cause fetal harm but it isn't assigned a Pregnancy Risk Category. There are small amounts excreted in breast milk.
Qbrexza Pregnancy And Lactation Text: There is no available data on Qbrexza use in pregnant women.  There is no available data on Qbrexza use in lactation.
Bexarotene Pregnancy And Lactation Text: This medication is Pregnancy Category X and should not be given to women who are pregnant or may become pregnant. This medication should not be used if you are breast feeding.
Clindamycin Pregnancy And Lactation Text: This medication can be used in pregnancy if certain situations. Clindamycin is also present in breast milk.
Dutasteride Pregnancy And Lactation Text: This medication is absolutely contraindicated in women, especially during pregnancy and breast feeding. Feminization of male fetuses is possible if taking while pregnant.
Topical Steroids Applications Pregnancy And Lactation Text: Most topical steroids are considered safe to use during pregnancy and lactation.  Any topical steroid applied to the breast or nipple should be washed off before breastfeeding.
Low Dose Naltrexone Counseling- I discussed with the patient the potential risks and side effects of low dose naltrexone including but not limited to: more vivid dreams, headaches, nausea, vomiting, abdominal pain, fatigue, dizziness, and anxiety.
Nemluvio Pregnancy And Lactation Text: It is not known if Nemluvio causes fetal harm or is present in breast milk. Please proceed with caution if patients who are pregnant or breastfeeding.
Albendazole Counseling:  I discussed with the patient the risks of albendazole including but not limited to cytopenia, kidney damage, nausea/vomiting and severe allergy.  The patient understands that this medication is being used in an off-label manner.
Thalidomide Counseling: I discussed with the patient the risks of thalidomide including but not limited to birth defects, anxiety, weakness, chest pain, dizziness, cough and severe allergy.
Aklief counseling:  Patient advised to apply a pea-sized amount only at bedtime and wait 30 minutes after washing their face before applying.  If too drying, patient may add a non-comedogenic moisturizer.  The most commonly reported side effects including irritation, redness, scaling, dryness, stinging, burning, itching, and increased risk of sunburn.  The patient verbalized understanding of the proper use and possible adverse effects of retinoids.  All of the patient's questions and concerns were addressed.
Erivedge Counseling- I discussed with the patient the risks of Erivedge including but not limited to nausea, vomiting, diarrhea, constipation, weight loss, changes in the sense of taste, decreased appetite, muscle spasms, and hair loss.  The patient verbalized understanding of the proper use and possible adverse effects of Erivedge.  All of the patient's questions and concerns were addressed.
Spevigo Pregnancy And Lactation Text: The risk during pregnancy and breastfeeding is uncertain with this medication. This medication does cross the placenta. It is unknown if this medication is found in breast milk.
Cimzia Pregnancy And Lactation Text: This medication crosses the placenta but can be considered safe in certain situations. Cimzia may be excreted in breast milk.
Otezla Counseling: The side effects of Otezla were discussed with the patient, including but not limited to worsening or new depression, weight loss, diarrhea, nausea, upper respiratory tract infection, and headache. Patient instructed to call the office should any adverse effect occur.  The patient verbalized understanding of the proper use and possible adverse effects of Otezla.  All the patient's questions and concerns were addressed.
Xeljanz Counseling: I discussed with the patient the risks of Xeljanz therapy including increased risk of infection, liver issues, headache, diarrhea, or cold symptoms. Live vaccines should be avoided. They were instructed to call if they have any problems.
Prednisone Pregnancy And Lactation Text: This medication is Pregnancy Category C and it isn't know if it is safe during pregnancy. This medication is excreted in breast milk.
Tazorac Pregnancy And Lactation Text: This medication is not safe during pregnancy. It is unknown if this medication is excreted in breast milk.
Wartpeel Pregnancy And Lactation Text: This medication is Pregnancy Category X and contraindicated in pregnancy and in women who may become pregnant. It is unknown if this medication is excreted in breast milk.
Spevigo Counseling: I discussed with the patient the risks of Spevigo including but not limited to fatigue, nasuea, vomiting, headache, pruritus, urinary tract infection, an infusion related reactions.  The patient understands that monitoring is required including screening for tuberculosis at baseline and yearly screening thereafter while continuing Spevigo therapy. They should contact us if symptoms of infection or other concerning signs are noted.
Cantharidin Counseling:  I discussed with the patient the risks of Cantharidin including but not limited to pain, redness, burning, itching, and blistering.
Dapsone Counseling: I discussed with the patient the risks of dapsone including but not limited to hemolytic anemia, agranulocytosis, rashes, methemoglobinemia, kidney failure, peripheral neuropathy, headaches, GI upset, and liver toxicity.  Patients who start dapsone require monitoring including baseline LFTs and weekly CBCs for the first month, then every month thereafter.  The patient verbalized understanding of the proper use and possible adverse effects of dapsone.  All of the patient's questions and concerns were addressed.
Azithromycin Counseling:  I discussed with the patient the risks of azithromycin including but not limited to GI upset, allergic reaction, drug rash, diarrhea, and yeast infections.
Xelmelanyz Pregnancy And Lactation Text: This medication is Pregnancy Category D and is not considered safe during pregnancy.  The risk during breast feeding is also uncertain.
5-Fu Counseling: 5-Fluorouracil Counseling:  I discussed with the patient the risks of 5-fluorouracil including but not limited to erythema, scaling, itching, weeping, crusting, and pain.
Opzelura Counseling:  I discussed with the patient the risks of Opzelura including but not limited to nasopharngitis, bronchitis, ear infection, eosinophila, hives, diarrhea, folliculitis, tonsillitis, and rhinorrhea.  Taken orally, this medication has been linked to serious infections; higher rate of mortality; malignancy and lymphoproliferative disorders; major adverse cardiovascular events; thrombosis; thrombocytopenia, anemia, and neutropenia; and lipid elevations.
Opioid Counseling: I discussed with the patient the potential side effects of opioids including but not limited to addiction, altered mental status, and depression. I stressed avoiding alcohol, benzodiazepines, muscle relaxants and sleep aids unless specifically okayed by a physician. The patient verbalized understanding of the proper use and possible adverse effects of opioids. All of the patient's questions and concerns were addressed. They were instructed to flush the remaining pills down the toilet if they did not need them for pain.
Otezla Pregnancy And Lactation Text: This medication is Pregnancy Category C and it isn't known if it is safe during pregnancy. It is unknown if it is excreted in breast milk.
Topical Clindamycin Counseling: Patient counseled that this medication may cause skin irritation or allergic reactions.  In the event of skin irritation, the patient was advised to reduce the amount of the drug applied or use it less frequently.   The patient verbalized understanding of the proper use and possible adverse effects of clindamycin.  All of the patient's questions and concerns were addressed.
Hyrimoz Counseling:  I discussed with the patient the risks of adalimumab including but not limited to myelosuppression, immunosuppression, autoimmune hepatitis, demyelinating diseases, lymphoma, and serious infections.  The patient understands that monitoring is required including a PPD at baseline and must alert us or the primary physician if symptoms of infection or other concerning signs are noted.
Topical Clindamycin Pregnancy And Lactation Text: This medication is Pregnancy Category B and is considered safe during pregnancy. It is unknown if it is excreted in breast milk.
Winlevi Counseling:  I discussed with the patient the risks of topical clascoterone including but not limited to erythema, scaling, itching, and stinging. Patient voiced their understanding.
Dapsone Pregnancy And Lactation Text: This medication is Pregnancy Category C and is not considered safe during pregnancy or breast feeding.
Semaglutide Counseling: I reviewed the possible side effects including: thyroid tumors, kidney disease, gallbladder disease, abdominal pain, constipation, diarrhea, nausea, vomiting and pancreatitis. Do not take this medication if you have a history or family history of multiple endocrine neoplasia syndrome type 2. Side effects reviewed, pt to contact office should one occur.
Gabapentin Counseling: I discussed with the patient the risks of gabapentin including but not limited to dizziness, somnolence, fatigue and ataxia.
Winlevi Pregnancy And Lactation Text: This medication is considered safe during pregnancy and breastfeeding.
Opioid Pregnancy And Lactation Text: These medications can lead to premature delivery and should be avoided during pregnancy. These medications are also present in breast milk in small amounts.
Opzelura Pregnancy And Lactation Text: There is insufficient data to evaluate drug-associated risk for major birth defects, miscarriage, or other adverse maternal or fetal outcomes.  There is a pregnancy registry that monitors pregnancy outcomes in pregnant persons exposed to the medication during pregnancy.  It is unknown if this medication is excreted in breast milk.  Do not breastfeed during treatment and for about 4 weeks after the last dose.
Oxybutynin Counseling:  I discussed with the patient the risks of oxybutynin including but not limited to skin rash, drowsiness, dry mouth, difficulty urinating, and blurred vision.
Azithromycin Pregnancy And Lactation Text: This medication is considered safe during pregnancy and is also secreted in breast milk.
Ilumya Counseling: I discussed with the patient the risks of tildrakizumab including but not limited to immunosuppression, malignancy, posterior leukoencephalopathy syndrome, and serious infections.  The patient understands that monitoring is required including a PPD at baseline and must alert us or the primary physician if symptoms of infection or other concerning signs are noted.
Topical Ketoconazole Counseling: Patient counseled that this medication may cause skin irritation or allergic reactions.  In the event of skin irritation, the patient was advised to reduce the amount of the drug applied or use it less frequently.   The patient verbalized understanding of the proper use and possible adverse effects of ketoconazole.  All of the patient's questions and concerns were addressed.
Wegovy Counseling: I reviewed the possible side effects including: thyroid tumors, kidney disease, gallbladder disease, abdominal pain, constipation, diarrhea, nausea, vomiting and pancreatitis. Do not take this medication if you have a history or family history of multiple endocrine neoplasia syndrome type 2. Side effects reviewed, pt to contact office should one occur.
Adbry Counseling: I discussed with the patient the risks of tralokinumab including but not limited to eye infection and irritation, cold sores, injection site reactions, worsening of asthma, allergic reactions and increased risk of parasitic infection.  Live vaccines should be avoided while taking tralokinumab. The patient understands that monitoring is required and they must alert us or the primary physician if symptoms of infection or other concerning signs are noted.
VTAMA Counseling: I discussed with the patient that VTAMA is not for use in the eyes, mouth or mouth. They should call the office if they develop any signs of allergic reactions to VTAMA. The patient verbalized understanding of the proper use and possible adverse effects of VTAMA.  All of the patient's questions and concerns were addressed.
Drysol Counseling:  I discussed with the patient the risks of drysol/aluminum chloride including but not limited to skin rash, itching, irritation, burning.
Bactrim Counseling:  I discussed with the patient the risks of sulfa antibiotics including but not limited to GI upset, allergic reaction, drug rash, diarrhea, dizziness, photosensitivity, and yeast infections.  Rarely, more serious reactions can occur including but not limited to aplastic anemia, agranulocytosis, methemoglobinemia, blood dyscrasias, liver or kidney failure, lung infiltrates or desquamative/blistering drug rashes.
Picato Counseling:  I discussed with the patient the risks of Picato including but not limited to erythema, scaling, itching, weeping, crusting, and pain.
Gabapentin Pregnancy And Lactation Text: This medication is Pregnancy Category C and isn't considered safe during pregnancy. It is excreted in breast milk.
Propranolol Counseling:  I discussed with the patient the risks of propranolol including but not limited to low heart rate, low blood pressure, low blood sugar, restlessness and increased cold sensitivity. They should call the office if they experience any of these side effects.
Bactrim Pregnancy And Lactation Text: This medication is Pregnancy Category D and is known to cause fetal risk.  It is also excreted in breast milk.
Glycopyrrolate Counseling:  I discussed with the patient the risks of glycopyrrolate including but not limited to skin rash, drowsiness, dry mouth, difficulty urinating, and blurred vision.
Doxepin Pregnancy And Lactation Text: This medication is Pregnancy Category C and it isn't known if it is safe during pregnancy. It is also excreted in breast milk and breast feeding isn't recommended.
Spironolactone Counseling: Patient advised regarding risks of diarrhea, abdominal pain, hyperkalemia, birth defects (for female patients), liver toxicity and renal toxicity. The patient may need blood work to monitor liver and kidney function and potassium levels while on therapy. The patient verbalized understanding of the proper use and possible adverse effects of spironolactone.  All of the patient's questions and concerns were addressed.
Benzoyl Peroxide Pregnancy And Lactation Text: This medication is Pregnancy Category C. It is unknown if benzoyl peroxide is excreted in breast milk.
Nsaids Pregnancy And Lactation Text: These medications are considered safe up to 30 weeks gestation. It is excreted in breast milk.
Itraconazole Counseling:  I discussed with the patient the risks of itraconazole including but not limited to liver damage, nausea/vomiting, neuropathy, and severe allergy.  The patient understands that this medication is best absorbed when taken with acidic beverages such as non-diet cola or ginger ale.  The patient understands that monitoring is required including baseline LFTs and repeat LFTs at intervals.  The patient understands that they are to contact us or the primary physician if concerning signs are noted.
Klisyri Counseling:  I discussed with the patient the risks of Klisyri including but not limited to erythema, scaling, itching, weeping, crusting, and pain.
Litfulo Pregnancy And Lactation Text: Based on animal studies, Lifulo may cause embryo-fetal harm when administered to pregnant women.  The medication should not be used in pregnancy.  Breastfeeding is not recommended during treatment.
Olanzapine Counseling- I discussed with the patient the common side effects of olanzapine including but are not limited to: lack of energy, dry mouth, increased appetite, sleepiness, tremor, constipation, dizziness, changes in behavior, or restlessness.  Explained that teenagers are more likely to experience headaches, abdominal pain, pain in the arms or legs, tiredness, and sleepiness.  Serious side effects include but are not limited: increased risk of death in elderly patients who are confused, have memory loss, or dementia-related psychosis; hyperglycemia; increased cholesterol and triglycerides; and weight gain.
Klisyri Pregnancy And Lactation Text: It is unknown if this medication can harm a developing fetus or if it is excreted in breast milk.
Ebglyss Pregnancy And Lactation Text: This medication likely crosses the placenta but the risk for the fetus is uncertain. It is unknown if this medication is excreted in breast milk.
Sarecycline Counseling: Patient advised regarding possible photosensitivity and discoloration of the teeth, skin, lips, tongue and gums.  Patient instructed to avoid sunlight, if possible.  When exposed to sunlight, patients should wear protective clothing, sunglasses, and sunscreen.  The patient was instructed to call the office immediately if the following severe adverse effects occur:  hearing changes, easy bruising/bleeding, severe headache, or vision changes.  The patient verbalized understanding of the proper use and possible adverse effects of sarecycline.  All of the patient's questions and concerns were addressed.
Carac Counseling:  I discussed with the patient the risks of Carac including but not limited to erythema, scaling, itching, weeping, crusting, and pain.
Olumiant Counseling: I discussed with the patient the risks of Olumiant therapy including but not limited to upper respiratory tract infections, shingles, cold sores, and nausea. Live vaccines should be avoided.  This medication has been linked to serious infections; higher rate of mortality; malignancy and lymphoproliferative disorders; major adverse cardiovascular events; thrombosis; gastrointestinal perforations; neutropenia; lymphopenia; anemia; liver enzyme elevations; and lipid elevations.
Use Enhanced Medication Counseling?: No
Metronidazole Pregnancy And Lactation Text: This medication is Pregnancy Category B and considered safe during pregnancy.  It is also excreted in breast milk.
Soolantra Pregnancy And Lactation Text: This medication is Pregnancy Category C. This medication is considered safe during breast feeding.
Simponi Counseling:  I discussed with the patient the risks of golimumab including but not limited to myelosuppression, immunosuppression, autoimmune hepatitis, demyelinating diseases, lymphoma, and serious infections.  The patient understands that monitoring is required including a PPD at baseline and must alert us or the primary physician if symptoms of infection or other concerning signs are noted.
Hydroxyzine Counseling: Patient advised that the medication is sedating and not to drive a car after taking this medication.  Patient informed of potential adverse effects including but not limited to dry mouth, urinary retention, and blurry vision.  The patient verbalized understanding of the proper use and possible adverse effects of hydroxyzine.  All of the patient's questions and concerns were addressed.
Spironolactone Pregnancy And Lactation Text: This medication can cause feminization of the male fetus and should be avoided during pregnancy. The active metabolite is also found in breast milk.
Clofazimine Counseling:  I discussed with the patient the risks of clofazimine including but not limited to skin and eye pigmentation, liver damage, nausea/vomiting, gastrointestinal bleeding and allergy.
Olumiant Pregnancy And Lactation Text: Based on animal studies, Olumiant may cause embryo-fetal harm when administered to pregnant women.  The medication should not be used in pregnancy.  Breastfeeding is not recommended during treatment.
Detail Level: Simple
Enbrel Counseling:  I discussed with the patient the risks of etanercept including but not limited to myelosuppression, immunosuppression, autoimmune hepatitis, demyelinating diseases, lymphoma, and infections.  The patient understands that monitoring is required including a PPD at baseline and must alert us or the primary physician if symptoms of infection or other concerning signs are noted.
Ketoconazole Counseling:   Patient counseled regarding improving absorption with orange juice.  Adverse effects include but are not limited to breast enlargement, headache, diarrhea, nausea, upset stomach, liver function test abnormalities, taste disturbance, and stomach pain.  There is a rare possibility of liver failure that can occur when taking ketoconazole. The patient understands that monitoring of LFTs may be required, especially at baseline. The patient verbalized understanding of the proper use and possible adverse effects of ketoconazole.  All of the patient's questions and concerns were addressed.
Sarecycline Pregnancy And Lactation Text: This medication is Pregnancy Category D and not consider safe during pregnancy. It is also excreted in breast milk.
Xolair Counseling:  Patient informed of potential adverse effects including but not limited to fever, muscle aches, rash and allergic reactions.  The patient verbalized understanding of the proper use and possible adverse effects of Xolair.  All of the patient's questions and concerns were addressed.
Minoxidil Counseling: Minoxidil is a topical medication which can increase blood flow where it is applied. It is uncertain how this medication increases hair growth. Side effects are uncommon and include stinging and allergic reactions.
Methotrexate Counseling:  Patient counseled regarding adverse effects of methotrexate including but not limited to nausea, vomiting, abnormalities in liver function tests. Patients may develop mouth sores, rash, diarrhea, and abnormalities in blood counts. The patient understands that monitoring is required including LFT's and blood counts.  There is a rare possibility of scarring of the liver and lung problems that can occur when taking methotrexate. Persistent nausea, loss of appetite, pale stools, dark urine, cough, and shortness of breath should be reported immediately. Patient advised to discontinue methotrexate treatment at least three months before attempting to become pregnant.  I discussed the need for folate supplements while taking methotrexate.  These supplements can decrease side effects during methotrexate treatment. The patient verbalized understanding of the proper use and possible adverse effects of methotrexate.  All of the patient's questions and concerns were addressed.
Minocycline Counseling: Patient advised regarding possible photosensitivity and discoloration of the teeth, skin, lips, tongue and gums.  Patient instructed to avoid sunlight, if possible.  When exposed to sunlight, patients should wear protective clothing, sunglasses, and sunscreen.  The patient was instructed to call the office immediately if the following severe adverse effects occur:  hearing changes, easy bruising/bleeding, severe headache, or vision changes.  The patient verbalized understanding of the proper use and possible adverse effects of minocycline.  All of the patient's questions and concerns were addressed.
Topical Retinoid counseling:  Patient advised to apply a pea-sized amount only at bedtime and wait 30 minutes after washing their face before applying.  If too drying, patient may add a non-comedogenic moisturizer. The patient verbalized understanding of the proper use and possible adverse effects of retinoids.  All of the patient's questions and concerns were addressed.
Olanzapine Pregnancy And Lactation Text: This medication is pregnancy category C.   There are no adequate and well controlled trials with olanzapine in pregnant females.  Olanzapine should be used during pregnancy only if the potential benefit justifies the potential risk to the fetus.   In a study in lactating healthy women, olanzapine was excreted in breast milk.  It is recommended that women taking olanzapine should not breast feed.
Tetracycline Counseling: Patient counseled regarding possible photosensitivity and increased risk for sunburn.  Patient instructed to avoid sunlight, if possible.  When exposed to sunlight, patients should wear protective clothing, sunglasses, and sunscreen.  The patient was instructed to call the office immediately if the following severe adverse effects occur:  hearing changes, easy bruising/bleeding, severe headache, or vision changes.  The patient verbalized understanding of the proper use and possible adverse effects of tetracycline.  All of the patient's questions and concerns were addressed. Patient understands to avoid pregnancy while on therapy due to potential birth defects.
Hydroxyzine Pregnancy And Lactation Text: This medication is not safe during pregnancy and should not be taken. It is also excreted in breast milk and breast feeding isn't recommended.
Colchicine Counseling:  Patient counseled regarding adverse effects including but not limited to stomach upset (nausea, vomiting, stomach pain, or diarrhea).  Patient instructed to limit alcohol consumption while taking this medication.  Colchicine may reduce blood counts especially with prolonged use.  The patient understands that monitoring of kidney function and blood counts may be required, especially at baseline. The patient verbalized understanding of the proper use and possible adverse effects of colchicine.  All of the patient's questions and concerns were addressed.
Topical Sulfur Applications Pregnancy And Lactation Text: This medication is considered safe during pregnancy and breast feeding secondary to limited systemic absorption.
Calcipotriene Counseling:  I discussed with the patient the risks of calcipotriene including but not limited to erythema, scaling, itching, and irritation.
Ketoconazole Pregnancy And Lactation Text: This medication is Pregnancy Category C and it isn't know if it is safe during pregnancy. It is also excreted in breast milk and breast feeding isn't recommended.
Methotrexate Pregnancy And Lactation Text: This medication is Pregnancy Category X and is known to cause fetal harm. This medication is excreted in breast milk.
Oral Minoxidil Counseling- I discussed with the patient the risks of oral minoxidil including but not limited to shortness of breath, swelling of the feet or ankles, dizziness, lightheadedness, unwanted hair growth and allergic reaction.  The patient verbalized understanding of the proper use and possible adverse effects of oral minoxidil.  All of the patient's questions and concerns were addressed.
Xolair Pregnancy And Lactation Text: This medication is Pregnancy Category B and is considered safe during pregnancy. This medication is excreted in breast milk.
Rinvoq Counseling: I discussed with the patient the risks of Rinvoq therapy including but not limited to upper respiratory tract infections, shingles, cold sores, bronchitis, nausea, cough, fever, acne, and headache. Live vaccines should be avoided.  This medication has been linked to serious infections; higher rate of mortality; malignancy and lymphoproliferative disorders; major adverse cardiovascular events; thrombosis; thrombocytopenia, anemia, and neutropenia; lipid elevations; liver enzyme elevations; and gastrointestinal perforations.
Humira Counseling:  I discussed with the patient the risks of adalimumab including but not limited to myelosuppression, immunosuppression, autoimmune hepatitis, demyelinating diseases, lymphoma, and serious infections.  The patient understands that monitoring is required including a PPD at baseline and must alert us or the primary physician if symptoms of infection or other concerning signs are noted.
Prednisone Counseling:  I discussed with the patient the risks of prolonged use of prednisone including but not limited to weight gain, insomnia, osteoporosis, mood changes, diabetes, susceptibility to infection, glaucoma and high blood pressure.  In cases where prednisone use is prolonged, patients should be monitored with blood pressure checks, serum glucose levels and an eye exam.  Additionally, the patient may need to be placed on GI prophylaxis, PCP prophylaxis, and calcium and vitamin D supplementation and/or a bisphosphonate.  The patient verbalized understanding of the proper use and the possible adverse effects of prednisone.  All of the patient's questions and concerns were addressed.
Skyrizi Counseling: I discussed with the patient the risks of risankizumab-rzaa including but not limited to immunosuppression, and serious infections.  The patient understands that monitoring is required including a PPD at baseline and must alert us or the primary physician if symptoms of infection or other concerning signs are noted.
Tazorac Counseling:  Patient advised that medication is irritating and drying.  Patient may need to apply sparingly and wash off after an hour before eventually leaving it on overnight.  The patient verbalized understanding of the proper use and possible adverse effects of tazorac.  All of the patient's questions and concerns were addressed.
Quinolones Counseling:  I discussed with the patient the risks of fluoroquinolones including but not limited to GI upset, allergic reaction, drug rash, diarrhea, dizziness, photosensitivity, yeast infections, liver function test abnormalities, tendonitis/tendon rupture.
Wartpeel Counseling:  I discussed with the patient the risks of Wartpeel including but not limited to erythema, scaling, itching, weeping, crusting, and pain.
Calcipotriene Pregnancy And Lactation Text: The use of this medication during pregnancy or lactation is not recommended as there is insufficient data.
Oral Minoxidil Pregnancy And Lactation Text: This medication should only be used when clearly needed if you are pregnant, attempting to become pregnant or breast feeding.
Terbinafine Counseling: Patient counseling regarding adverse effects of terbinafine including but not limited to headache, diarrhea, rash, upset stomach, liver function test abnormalities, itching, taste/smell disturbance, nausea, abdominal pain, and flatulence.  There is a rare possibility of liver failure that can occur when taking terbinafine.  The patient understands that a baseline LFT and kidney function test may be required. The patient verbalized understanding of the proper use and possible adverse effects of terbinafine.  All of the patient's questions and concerns were addressed.
Mirvaso Counseling: Mirvaso is a topical medication which can decrease superficial blood flow where applied. Side effects are uncommon and include stinging, redness and allergic reactions.
Rinvoq Pregnancy And Lactation Text: Based on animal studies, Rinvoq may cause embryo-fetal harm when administered to pregnant women.  The medication should not be used in pregnancy.  Breastfeeding is not recommended during treatment and for 6 days after the last dose.

## 2025-02-13 NOTE — PROCEDURE: BENIGN DESTRUCTION
Render Post-Care Instructions In Note?: no
Detail Level: Detailed
Post-Care Instructions: I reviewed with the patient in detail post-care instructions. Patient is to wear sunprotection, and avoid picking at any of the treated lesions. Pt may apply Vaseline to crusted or scabbing areas.
Anesthesia Volume In Cc: 1.5
Treatment Number (Will Not Render If 0): 0
Medical Necessity Information: It is in your best interest to select a reason for this procedure from the list below. All of these items fulfill various CMS LCD requirements except the new and changing color options.
Consent: The patient's consent was obtained including but not limited to risks of crusting, scabbing, blistering, scarring, darker or lighter pigmentary change, recurrence, incomplete removal and infection.
Medical Necessity Clause: This procedure was medically necessary because the lesions that were treated were:

## 2025-03-26 ENCOUNTER — APPOINTMENT (OUTPATIENT)
Dept: URBAN - METROPOLITAN AREA CLINIC 36 | Facility: CLINIC | Age: 75
Setting detail: DERMATOLOGY
End: 2025-03-26

## 2025-03-26 PROBLEM — D04.39 CARCINOMA IN SITU OF SKIN OF OTHER PARTS OF FACE: Status: ACTIVE | Noted: 2025-03-26

## 2025-03-26 PROCEDURE — 13132 CMPLX RPR F/C/C/M/N/AX/G/H/F: CPT

## 2025-03-26 PROCEDURE — ? MOHS SURGERY

## 2025-03-26 PROCEDURE — 17311 MOHS 1 STAGE H/N/HF/G: CPT

## 2025-03-26 NOTE — PROCEDURE: MOHS SURGERY
Retention Suture Bite Size: 1 mm
Bone Involvement: No
Stage 14: Additional Anesthesia Volume In Cc: 0
Consent 3/Introductory Paragraph: I gave the patient a chance to ask questions they had about the procedure.  Following this I explained the Mohs procedure and consent was obtained. The risks, benefits and alternatives to therapy were discussed in detail. Specifically, the risks of infection, scarring, bleeding, prolonged wound healing, incomplete removal, allergy to anesthesia, nerve injury and recurrence were addressed. Prior to the procedure, the treatment site was clearly identified and confirmed by the patient. All components of Universal Protocol/PAUSE Rule completed.
Mid-Level Procedure Text (E): After obtaining clear surgical margins the patient was sent to a mid-level provider for surgical repair.  The patient understands they will receive post-surgical care and follow-up from the mid-level provider.
Graft Donor Site Bandage (Optional-Leave Blank If You Don't Want In Note): Aquaplast was fitted to the graft site and sewn into place. A pressure bandage were applied to the donor site and over the aquaplast bolster.
Provider Procedure Text (F): After obtaining clear surgical margins the defect was repaired by another provider.
Bilobed Transposition Flap Text: The defect edges were debeveled with a #15 scalpel blade.  Given the location of the defect and the proximity to free margins a bilobed transposition flap was deemed most appropriate.  Using a sterile surgical marker, an appropriate bilobe flap drawn around the defect.    The area thus outlined was incised deep to adipose tissue with a #15 scalpel blade.  The skin margins were undermined to an appropriate distance in all directions utilizing iris scissors.
Area H Indication Text: Tumors in this location are included in Area H (eyelids, eyebrows, nose, lips, chin, ear, pre-auricular, post-auricular, temple, genitalia, hands, feet, ankles and areola).  Tissue conservation is critical in these anatomic locations.
Show Repair Surgeon Variable: Yes
Complex Repair Preamble Text (Leave Blank If You Do Not Want): Extensive wide undermining was performed.
Simple / Intermediate / Complex Repair - Final Wound Length In Cm: 2.6
Special Stains Stage 7 - Results: Base On Clearance Noted Above
Surgical Defect Length In Cm (Optional): 0.7
Plastic Surgeon Procedure Text (A): After obtaining clear surgical margins the patient was sent to plastics for surgical repair.  The patient understands they will receive post-surgical care and follow-up from the referring physician's office.
Closure 4 Information: This tab is for additional flaps and grafts above and beyond our usual structured repairs.  Please note if you enter information here it will not currently bill and you will need to add the billing information manually.
Intermediate Repair Preamble Text (Leave Blank If You Do Not Want): Undermining was performed with blunt dissection.
Was The Patient On Physician Recommended Anticoagulation Therapy?: Please Select the Appropriate Response
Hemigard Intro: Due to skin fragility and wound tension, it was decided to use HEMIGARD adhesive retention suture devices to permit a linear closure. The skin was cleaned and dried for a 6cm distance away from the wound. Excessive hair, if present, was removed to allow for adhesion.
Rhombic Flap Text: The defect edges were debeveled with a #15 scalpel blade.  Given the location of the defect and the proximity to free margins a rhombic flap was deemed most appropriate.  Using a sterile surgical marker, an appropriate rhombic flap was drawn incorporating the defect.    The area thus outlined was incised deep to adipose tissue with a #15 scalpel blade.  The skin margins were undermined to an appropriate distance in all directions utilizing iris scissors.
Estimated Blood Loss (Cc): minimal
Width Of Defect Perpendicular To Closure In Cm (Required): 0.8
Nasolabial Transposition Flap Text: The defect edges were debeveled with a #15 scalpel blade.  Given the size, depth and location of the defect and the proximity to free margins a nasolabial transposition flap was deemed most appropriate. Using a sterile surgical marker, an appropriate flap was drawn incorporating the defect. The area thus outlined was incised with a #15 scalpel blade. The skin margins were undermined to an appropriate distance in all directions utilizing iris scissors. Following this, the designed flap was carried into the primary defect and sutured into place.
Bcc Infiltrative Histology Text: There were numerous aggregates of basaloid cells demonstrating an infiltrative pattern.
Surgeon: Benton Mendez MD
Island Pedicle Flap With Canthal Suspension Text: The defect edges were debeveled with a #15 scalpel blade.  Given the location of the defect, shape of the defect and the proximity to free margins an island pedicle advancement flap was deemed most appropriate.  Using a sterile surgical marker, an appropriate advancement flap was drawn incorporating the defect, outlining the appropriate donor tissue and placing the expected incisions within the relaxed skin tension lines where possible. The area thus outlined was incised deep to adipose tissue with a #15 scalpel blade.  The skin margins were undermined to an appropriate distance in all directions around the primary defect and laterally outward around the island pedicle utilizing iris scissors.  There was minimal undermining beneath the pedicle flap. A suspension suture was placed in the canthal tendon to prevent tension and prevent ectropion.
Cartilage Graft Text: The defect edges were debeveled with a #15 scalpel blade.  Given the location of the defect, shape of the defect, the fact the defect involved a full thickness cartilage defect a cartilage graft was deemed most appropriate.  An appropriate donor site was identified, cleansed, and anesthetized. The cartilage graft was then harvested and transferred to the recipient site, oriented appropriately and then sutured into place.  The secondary defect was then repaired using a primary closure.
Advancement Flap (Single) Text: The defect edges were debeveled with a #15 scalpel blade.  Given the location of the defect and the proximity to free margins a single advancement flap was deemed most appropriate.  Using a sterile surgical marker, an appropriate advancement flap was drawn incorporating the defect and placing the expected incisions within the relaxed skin tension lines where possible.    The area thus outlined was incised deep to adipose tissue with a #15 scalpel blade.  The skin margins were undermined to an appropriate distance in all directions utilizing iris scissors.
Otolaryngologist Procedure Text (B): After obtaining clear surgical margins the patient was sent to otolaryngology for surgical repair.  The patient understands they will receive post-surgical care and follow-up from the referring physician's office.
Stage 10: Additional Anesthesia Type: 1% lidocaine with epinephrine
Asc Procedure Text (F): After obtaining clear surgical margins the patient was sent to an ASC for surgical repair.  The patient understands they will receive post-surgical care and follow-up from the ASC physician.
Brow Lift Text: A midfrontal incision was made medially to the defect to allow access to the tissues just superior to the left eyebrow. Following careful dissection inferiorly in a supraperiosteal plane to the level of the left eyebrow, several 3-0 monocryl sutures were used to resuspend the eyebrow orbicularis oculi muscular unit to the superior frontal bone periosteum. This resulted in an appropriate reapproximation of static eyebrow symmetry and correction of the left brow ptosis.
Consent (Lip)/Introductory Paragraph: The rationale for Mohs was explained to the patient and consent was obtained. The risks, benefits and alternatives to therapy were discussed in detail. Specifically, the risks of lip deformity, changes in the oral aperture, infection, scarring, bleeding, prolonged wound healing, incomplete removal, allergy to anesthesia, nerve injury and recurrence were addressed. Prior to the procedure, the treatment site was clearly identified and confirmed by the patient. All components of Universal Protocol/PAUSE Rule completed.
Tissue Cultured Epidermal Autograft Text: The defect edges were debeveled with a #15 scalpel blade.  Given the location of the defect, shape of the defect and the proximity to free margins a tissue cultured epidermal autograft was deemed most appropriate.  The graft was then trimmed to fit the size of the defect.  The graft was then placed in the primary defect and oriented appropriately.
Dermal Autograft Text: The defect edges were debeveled with a #15 scalpel blade.  Given the location of the defect, shape of the defect and the proximity to free margins a dermal autograft was deemed most appropriate.  Using a sterile surgical marker, the primary defect shape was transferred to the donor site. The area thus outlined was incised deep to adipose tissue with a #15 scalpel blade.  The harvested graft was then trimmed of adipose and epidermal tissue until only dermis was left.  The skin graft was then placed in the primary defect and oriented appropriately.
Crescentic Advancement Flap Text: The defect edges were debeveled with a #15 scalpel blade.  Given the location of the defect and the proximity to free margins a crescentic advancement flap was deemed most appropriate.  Using a sterile surgical marker, the appropriate advancement flap was drawn incorporating the defect and placing the expected incisions within the relaxed skin tension lines where possible.    The area thus outlined was incised deep to adipose tissue with a #15 scalpel blade.  The skin margins were undermined to an appropriate distance in all directions utilizing iris scissors.
Consent 1/Introductory Paragraph: The rationale for Mohs was explained to the patient and consent was obtained. The risks, benefits and alternatives to therapy were discussed in detail. Specifically, the risks of infection, scarring, bleeding, prolonged wound healing, incomplete removal, allergy to anesthesia, nerve injury and recurrence were addressed. Prior to the procedure, the treatment site was clearly identified and confirmed by the patient. All components of Universal Protocol/PAUSE Rule completed.
Epidermal Closure Graft Donor Site (Optional): running
Consent (Spinal Accessory)/Introductory Paragraph: The rationale for Mohs was explained to the patient and consent was obtained. The risks, benefits and alternatives to therapy were discussed in detail. Specifically, the risks of damage to the spinal accessory nerve, infection, scarring, bleeding, prolonged wound healing, incomplete removal, allergy to anesthesia, and recurrence were addressed. Prior to the procedure, the treatment site was clearly identified and confirmed by the patient. All components of Universal Protocol/PAUSE Rule completed.
No Repair - Repaired With Adjacent Surgical Defect Text (Leave Blank If You Do Not Want): After obtaining clear surgical margins the defect was repaired concurrently with another surgical defect which was in close approximation.
Rhomboid Transposition Flap Text: The defect edges were debeveled with a #15 scalpel blade.  Given the location of the defect and the proximity to free margins a rhomboid transposition flap was deemed most appropriate.  Using a sterile surgical marker, an appropriate rhomboid flap was drawn incorporating the defect.    The area thus outlined was incised deep to adipose tissue with a #15 scalpel blade.  The skin margins were undermined to an appropriate distance in all directions utilizing iris scissors.
Retention Suture Text: Retention sutures were placed to support the closure and prevent dehiscence.
Partial Purse String (Simple) Text: Given the location of the defect and the characteristics of the surrounding skin a simple purse string closure was deemed most appropriate.  Undermining was performed circumfirentially around the surgical defect.  A purse string suture was then placed and tightened. Wound tension only allowed a partial closure of the circular defect.
Zygomaticofacial Flap Text: Given the location of the defect, shape of the defect and the proximity to free margins a zygomaticofacial flap was deemed most appropriate for repair.  Using a sterile surgical marker, the appropriate flap was drawn incorporating the defect and placing the expected incisions within the relaxed skin tension lines where possible. The area thus outlined was incised deep to adipose tissue with a #15 scalpel blade with preservation of a vascular pedicle.  The skin margins were undermined to an appropriate distance in all directions utilizing iris scissors.  The flap was then placed into the defect and anchored with interrupted buried subcutaneous sutures.
W Plasty Text: The lesion was extirpated to the level of the fat with a #15 scalpel blade.  Given the location of the defect, shape of the defect and the proximity to free margins a W-plasty was deemed most appropriate for repair.  Using a sterile surgical marker, the appropriate transposition arms of the W-plasty were drawn incorporating the defect and placing the expected incisions within the relaxed skin tension lines where possible.    The area thus outlined was incised deep to adipose tissue with a #15 scalpel blade.  The skin margins were undermined to an appropriate distance in all directions utilizing iris scissors.  The opposing transposition arms were then transposed into place in opposite direction and anchored with interrupted buried subcutaneous sutures.
Number Of Stages: 1
Hemigard Postcare Instructions: The HEMIGARD strips are to remain completely dry for at least 5-7 days.
No Residual Tumor Seen Histology Text: There were no malignant cells seen in the sections examined.
Oculoplastic Surgeon Procedure Text (A): After obtaining clear surgical margins the patient was sent to oculoplastics for surgical repair.  The patient understands they will receive post-surgical care and follow-up from the referring physician's office.
Wound Check: 6 weeks
Island Pedicle Flap-Requiring Vessel Identification Text: The defect edges were debeveled with a #15 scalpel blade.  Given the location of the defect, shape of the defect and the proximity to free margins an island pedicle advancement flap was deemed most appropriate.  Using a sterile surgical marker, an appropriate advancement flap was drawn, based on the axial vessel mentioned above, incorporating the defect, outlining the appropriate donor tissue and placing the expected incisions within the relaxed skin tension lines where possible.    The area thus outlined was incised deep to adipose tissue with a #15 scalpel blade.  The skin margins were undermined to an appropriate distance in all directions around the primary defect and laterally outward around the island pedicle utilizing iris scissors.  There was minimal undermining beneath the pedicle flap.
Estlander Flap (Upper To Lower Lip) Text: The defect of the lower lip was assessed and measured.  Given the location and size of the defect, an Estlander flap was deemed most appropriate.  Using a sterile surgical marker, an appropriate Estlander flap was measured and drawn on the upper lip. Local anesthesia was then infiltrated. A scalpel was then used to incise the lateral aspect of the flap, through skin, muscle and mucosa, leaving the flap pedicled medially.  The flap was then rotated and positioned to fill the lower lip defect.  The flap was then sutured into place with a three layer technique, closing the orbicularis oris muscle layer with subcutaneous buried sutures, followed by a mucosal layer and an epidermal layer.
Ear Wedge Repair Text: A wedge excision was completed by carrying down an excision through the full thickness of the ear and cartilage with an inward facing Burow's triangle. The wound was then closed in a layered fashion.
Transposition Flap Text: The defect edges were debeveled with a #15 scalpel blade.  Given the location of the defect and the proximity to free margins a transposition flap was deemed most appropriate.  Using a sterile surgical marker, an appropriate transposition flap was drawn incorporating the defect.    The area thus outlined was incised deep to adipose tissue with a #15 scalpel blade.  The skin margins were undermined to an appropriate distance in all directions utilizing iris scissors.
Anesthesia Type: 1% lidocaine with epinephrine and a 1:10 solution of 8.4% sodium bicarbonate
Post-Care Instructions: I reviewed with the patient in detail post-care instructions. Patient is not to engage in any heavy lifting, exercise, or swimming for the next 14 days. Should the patient develop any fevers, chills, bleeding, severe pain patient will contact the office immediately.
Rectangular Flap Text: The defect edges were debeveled with a #15 scalpel blade. Given the location of the defect and the proximity to free margins a rectangular flap was deemed most appropriate. Using a sterile surgical marker, an appropriate rectangular flap was drawn incorporating the defect. The area thus outlined was incised deep to adipose tissue with a #15 scalpel blade. The skin margins were undermined to an appropriate distance in all directions utilizing iris scissors. Following this, the designed flap was carried over into the primary defect and sutured into place.
Tumor Debulked?: curette
V-Y Plasty Text: The defect edges were debeveled with a #15 scalpel blade.  Given the location of the defect, shape of the defect and the proximity to free margins an V-Y advancement flap was deemed most appropriate.  Using a sterile surgical marker, an appropriate advancement flap was drawn incorporating the defect and placing the expected incisions within the relaxed skin tension lines where possible.    The area thus outlined was incised deep to adipose tissue with a #15 scalpel blade.  The skin margins were undermined to an appropriate distance in all directions utilizing iris scissors.
Split-Thickness Skin Graft Text: The defect edges were debeveled with a #15 scalpel blade.  Given the location of the defect, shape of the defect and the proximity to free margins a split thickness skin graft was deemed most appropriate.  Using a sterile surgical marker, the primary defect shape was transferred to the donor site. The split thickness graft was then harvested.  The skin graft was then placed in the primary defect and oriented appropriately.
Consent (Near Eyelid Margin)/Introductory Paragraph: The rationale for Mohs was explained to the patient and consent was obtained. The risks, benefits and alternatives to therapy were discussed in detail. Specifically, the risks of ectropion or eyelid deformity, infection, scarring, bleeding, prolonged wound healing, incomplete removal, allergy to anesthesia, nerve injury and recurrence were addressed. Prior to the procedure, the treatment site was clearly identified and confirmed by the patient. All components of Universal Protocol/PAUSE Rule completed.
Graft Donor Site Epidermal Sutures (Optional): 5-0 Surgipro
Dressing (No Sutures): pressure dressing with telfa
Nasalis Myocutaneous Flap Text: Using a #15 blade, an incision was made around the donor flap to the level of the nasalis muscle. Wide lateral undermining was then performed in both the subcutaneous plane above the nasalis muscle, and in a submuscular plane just above periosteum. This allowed the formation of a free nasalis muscle axial pedicle which was still attached to the actual cutaneous flap, increasing its mobility and vascular viability. Hemostasis was obtained with pinpoint electrocoagulation. The flap was mobilized into position and the pivotal anchor points positioned and stabilized with buried interrupted sutures. Subcutaneous and dermal tissues were closed in a multilayered fashion with sutures. Tissue redundancies were excised, and the epidermal edges were apposed without significant tension and sutured with sutures.
Double O-Z Plasty Text: The defect edges were debeveled with a #15 scalpel blade.  Given the location of the defect, shape of the defect and the proximity to free margins a Double O-Z plasty (double transposition flap) was deemed most appropriate.  Using a sterile surgical marker, the appropriate transposition flaps were drawn incorporating the defect and placing the expected incisions within the relaxed skin tension lines where possible. The area thus outlined was incised deep to adipose tissue with a #15 scalpel blade.  The skin margins were undermined to an appropriate distance in all directions utilizing iris scissors.  Hemostasis was achieved with electrocautery.  The flaps were then transposed into place, one clockwise and the other counterclockwise, and anchored with interrupted buried subcutaneous sutures.
Non-Graft Cartilage Fenestration Text: The cartilage was fenestrated with a 2mm punch biopsy to help facilitate healing.
Star Wedge Flap Text: The defect edges were debeveled with a #15 scalpel blade.  Given the location of the defect, shape of the defect and the proximity to free margins a star wedge flap was deemed most appropriate.  Using a sterile surgical marker, an appropriate rotation flap was drawn incorporating the defect and placing the expected incisions within the relaxed skin tension lines where possible. The area thus outlined was incised deep to adipose tissue with a #15 scalpel blade.  The skin margins were undermined to an appropriate distance in all directions utilizing iris scissors.
Mustarde Flap Text: The defect edges were debeveled with a #15 scalpel blade.  Given the size, depth and location of the defect and the proximity to free margins a Mustarde flap was deemed most appropriate.  Using a sterile surgical marker, an appropriate flap was drawn incorporating the defect. The area thus outlined was incised with a #15 scalpel blade.  The skin margins were undermined to an appropriate distance in all directions utilizing iris scissors.
Cheek-To-Nose Interpolation Flap Text: A decision was made to reconstruct the defect utilizing an interpolation axial flap and a staged reconstruction.  A telfa template was made of the defect.  This telfa template was then used to outline the Cheek-To-Nose Interpolation flap.  The donor area for the pedicle flap was then injected with anesthesia.  The flap was excised through the skin and subcutaneous tissue down to the layer of the underlying musculature.  The interpolation flap was carefully excised within this deep plane to maintain its blood supply.  The edges of the donor site were undermined.   The donor site was closed in a primary fashion.  The pedicle was then rotated into position and sutured.  Once the tube was sutured into place, adequate blood supply was confirmed with blanching and refill.  The pedicle was then wrapped with xeroform gauze and dressed appropriately with a telfa and gauze bandage to ensure continued blood supply and protect the attached pedicle.
Hemigard Retention Suture: 0-0 Nylon
Medical Necessity Statement: Based on my medical judgement, Mohs surgery is the most appropriate treatment for this cancer compared to other treatments.
Bi-Rhombic Flap Text: The defect edges were debeveled with a #15 scalpel blade.  Given the location of the defect and the proximity to free margins a bi-rhombic flap was deemed most appropriate.  Using a sterile surgical marker, an appropriate rhombic flap was drawn incorporating the defect. The area thus outlined was incised deep to adipose tissue with a #15 scalpel blade.  The skin margins were undermined to an appropriate distance in all directions utilizing iris scissors.
Debridement Text: The wound edges were debrided prior to proceeding with the closure to facilitate wound healing.
Double Z Plasty Text: The lesion was extirpated to the level of the fat with a #15 scalpel blade. Given the location of the defect, shape of the defect and the proximity to free margins a double Z-plasty was deemed most appropriate for repair. Using a sterile surgical marker, the appropriate transposition arms of the double Z-plasty were drawn incorporating the defect and placing the expected incisions within the relaxed skin tension lines where possible. The area thus outlined was incised deep to adipose tissue with a #15 scalpel blade. The skin margins were undermined to an appropriate distance in all directions utilizing iris scissors. The opposing transposition arms were then transposed and carried over into place in opposite direction and anchored with interrupted buried subcutaneous sutures.
Mercedes Flap Text: The defect edges were debeveled with a #15 scalpel blade.  Given the location of the defect, shape of the defect and the proximity to free margins a Mercedes flap was deemed most appropriate.  Using a sterile surgical marker, an appropriate advancement flap was drawn incorporating the defect and placing the expected incisions within the relaxed skin tension lines where possible. The area thus outlined was incised deep to adipose tissue with a #15 scalpel blade.  The skin margins were undermined to an appropriate distance in all directions utilizing iris scissors.
Advancement Flap (Double) Text: The defect edges were debeveled with a #15 scalpel blade.  Given the location of the defect and the proximity to free margins a double advancement flap was deemed most appropriate.  Using a sterile surgical marker, the appropriate advancement flaps were drawn incorporating the defect and placing the expected incisions within the relaxed skin tension lines where possible.    The area thus outlined was incised deep to adipose tissue with a #15 scalpel blade.  The skin margins were undermined to an appropriate distance in all directions utilizing iris scissors.
Localized Dermabrasion With 15 Blade Text: The patient was draped in routine manner.  Localized dermabrasion using a 15 blade was performed in routine manner to papillary dermis. This spot dermabrasion is being performed to complete skin cancer reconstruction. It also will eliminate the other sun damaged precancerous cells that are known to be part of the regional effect of a lifetime's worth of sun exposure. This localized dermabrasion is therapeutic and should not be considered cosmetic in any regard.
Adjacent Tissue Transfer Text: The defect edges were debeveled with a #15 scalpel blade.  Given the location of the defect and the proximity to free margins an adjacent tissue transfer was deemed most appropriate.  Using a sterile surgical marker, an appropriate flap was drawn incorporating the defect and placing the expected incisions within the relaxed skin tension lines where possible.    The area thus outlined was incised deep to adipose tissue with a #15 scalpel blade.  The skin margins were undermined to an appropriate distance in all directions utilizing iris scissors.
Peng Advancement Flap Text: The defect edges were debeveled with a #15 scalpel blade.  Given the location of the defect, shape of the defect and the proximity to free margins a Peng advancement flap was deemed most appropriate.  Using a sterile surgical marker, an appropriate advancement flap was drawn incorporating the defect and placing the expected incisions within the relaxed skin tension lines where possible. The area thus outlined was incised deep to adipose tissue with a #15 scalpel blade.  The skin margins were undermined to an appropriate distance in all directions utilizing iris scissors.
Nostril Rim Text: The closure involved the nostril rim.
Localized Dermabrasion With Sand Papertext: The patient was draped in routine manner.  Localized dermabrasion using sterile sand paper was performed in routine manner to papillary dermis. This spot dermabrasion is being performed to complete skin cancer reconstruction. It also will eliminate the other sun damaged precancerous cells that are known to be part of the regional effect of a lifetime's worth of sun exposure. This localized dermabrasion is therapeutic and should not be considered cosmetic in any regard.
O-T Advancement Flap Text: The defect edges were debeveled with a #15 scalpel blade.  Given the location of the defect, shape of the defect and the proximity to free margins an O-T advancement flap was deemed most appropriate.  Using a sterile surgical marker, an appropriate advancement flap was drawn incorporating the defect and placing the expected incisions within the relaxed skin tension lines where possible.    The area thus outlined was incised deep to adipose tissue with a #15 scalpel blade.  The skin margins were undermined to an appropriate distance in all directions utilizing iris scissors.
Unna Boot Text: An Unna boot was placed to help immobilize the limb and facilitate more rapid healing.
Closure 2 Information: This tab is for additional flaps and grafts, including complex repair and grafts and complex repair and flaps. You can also specify a different location for the additional defect, if the location is the same you do not need to select a new one. We will insert the automated text for the repair you select below just as we do for solitary flaps and grafts. Please note that at this time if you select a location with a different insurance zone you will need to override the ICD10 and CPT if appropriate.
Perineural Invasion (For Histology - Be Specific If Possible): absent
Complex Repair And Graft Additional Text (Will Appearing After The Standard Complex Repair Text): The complex repair was not sufficient to completely close the primary defect. The remaining additional defect was repaired with the graft mentioned below.
Mohs Case Number: 
Initial Size Of Lesion: 0.3
Anesthesia Volume In Cc: 3
Mart-1 - Positive Histology Text: MART-1 staining demonstrates areas of higher density and clustering of melanocytes with Pagetoid spread upwards within the epidermis. The surgical margins are positive for tumor cells.
Intermediate Repair And Flap Additional Text (Will Appearing After The Standard Complex Repair Text): The intermediate repair was not sufficient to completely close the primary defect. The remaining additional defect was repaired with the flap mentioned below.
Consent (Marginal Mandibular)/Introductory Paragraph: The rationale for Mohs was explained to the patient and consent was obtained. The risks, benefits and alternatives to therapy were discussed in detail. Specifically, the risks of damage to the marginal mandibular branch of the facial nerve, infection, scarring, bleeding, prolonged wound healing, incomplete removal, allergy to anesthesia, and recurrence were addressed. Prior to the procedure, the treatment site was clearly identified and confirmed by the patient. All components of Universal Protocol/PAUSE Rule completed.
H Plasty Text: Given the location of the defect, shape of the defect and the proximity to free margins a H-plasty was deemed most appropriate for repair.  Using a sterile surgical marker, the appropriate advancement arms of the H-plasty were drawn incorporating the defect and placing the expected incisions within the relaxed skin tension lines where possible. The area thus outlined was incised deep to adipose tissue with a #15 scalpel blade. The skin margins were undermined to an appropriate distance in all directions utilizing iris scissors.  The opposing advancement arms were then advanced into place in opposite direction and anchored with interrupted buried subcutaneous sutures.
Consent (Scalp)/Introductory Paragraph: The rationale for Mohs was explained to the patient and consent was obtained. The risks, benefits and alternatives to therapy were discussed in detail. Specifically, the risks of changes in hair growth pattern secondary to repair, infection, scarring, bleeding, prolonged wound healing, incomplete removal, allergy to anesthesia, nerve injury and recurrence were addressed. Prior to the procedure, the treatment site was clearly identified and confirmed by the patient. All components of Universal Protocol/PAUSE Rule completed.
Double Island Pedicle Flap Text: The defect edges were debeveled with a #15 scalpel blade.  Given the location of the defect, shape of the defect and the proximity to free margins a double island pedicle advancement flap was deemed most appropriate.  Using a sterile surgical marker, an appropriate advancement flap was drawn incorporating the defect, outlining the appropriate donor tissue and placing the expected incisions within the relaxed skin tension lines where possible.    The area thus outlined was incised deep to adipose tissue with a #15 scalpel blade.  The skin margins were undermined to an appropriate distance in all directions around the primary defect and laterally outward around the island pedicle utilizing iris scissors.  There was minimal undermining beneath the pedicle flap.
Eyelid Full Thickness Repair - 79506: The eyelid defect was full thickness which required a wedge repair of the eyelid. Special care was taken to ensure that the eyelid margin was realligned when placing sutures.
Purse String (Intermediate) Text: Given the location of the defect and the characteristics of the surrounding skin a purse string intermediate closure was deemed most appropriate.  Undermining was performed circumfirentially around the surgical defect.  A purse string suture was then placed and tightened.
Alternatives Discussed Intro (Do Not Add Period): I discussed alternative treatments to Mohs surgery and specifically discussed the risks and benefits of
Repair Anesthesia Method: local infiltration
Island Pedicle Flap Text: The defect edges were debeveled with a #15 scalpel blade.  Given the location of the defect, shape of the defect and the proximity to free margins an island pedicle advancement flap was deemed most appropriate.  Using a sterile surgical marker, an appropriate advancement flap was drawn incorporating the defect, outlining the appropriate donor tissue and placing the expected incisions within the relaxed skin tension lines where possible.    The area thus outlined was incised deep to adipose tissue with a #15 scalpel blade.  The skin margins were undermined to an appropriate distance in all directions around the primary defect and laterally outward around the island pedicle utilizing iris scissors.  There was minimal undermining beneath the pedicle flap.
Suturegard Body: The suture ends were repeatedly re-tightened and re-clamped to achieve the desired tissue expansion.
Staging Info: By selecting yes to the question above you will include information on AJCC 8 tumor staging in your Mohs note. Information on tumor staging will be automatically added for SCCs on the head and neck. AJCC 8 includes tumor size, tumor depth, perineural involvement and bone invasion.
Secondary Intention Text (Leave Blank If You Do Not Want): The defect will heal with secondary intention.
Helical Rim Advancement Flap Text: The defect edges were debeveled with a #15 blade scalpel.  Given the location of the defect and the proximity to free margins (helical rim) a double helical rim advancement flap was deemed most appropriate.  Using a sterile surgical marker, the appropriate advancement flaps were drawn incorporating the defect and placing the expected incisions between the helical rim and antihelix where possible.  The area thus outlined was incised through and through with a #15 scalpel blade.  With a skin hook and iris scissors, the flaps were gently and sharply undermined and freed up.
Composite Graft Text: The defect edges were debeveled with a #15 scalpel blade.  Given the location of the defect, shape of the defect, the proximity to free margins and the fact the defect was full thickness a composite graft was deemed most appropriate.  The defect was outline and then transferred to the donor site.  A full thickness graft was then excised from the donor site. The graft was then placed in the primary defect, oriented appropriately and then sutured into place.  The secondary defect was then repaired using a primary closure.
Tumor Depth: Less than 6mm from granular layer and no invasion beyond the subcutaneous fat
Mohs Histo Method Verbiage: Each section was then chromacoded and processed in the Mohs lab using the Mohs protocol and submitted for frozen section.
Pinch Graft Text: The defect edges were debeveled with a #15 scalpel blade. Given the location of the defect, shape of the defect and the proximity to free margins a pinch graft was deemed most appropriate. Using a sterile surgical marker, the primary defect shape was transferred to the donor site. The area thus outlined was incised deep to adipose tissue with a #15 scalpel blade.  The harvested graft was then trimmed of adipose tissue until only dermis and epidermis was left. The skin margins of the secondary defect were undermined to an appropriate distance in all directions utilizing iris scissors.  The secondary defect was closed with interrupted buried subcutaneous sutures.  The skin edges were then re-apposed with running  sutures.  The skin graft was then placed in the primary defect and oriented appropriately.
Helical Rim Text: The closure involved the helical rim.
Consent Type: Consent 1 (Standard)
Mohs Method Verbiage: An incision at a 45 degree angle following the standard Mohs approach was done and the specimen was harvested as a microscopic controlled layer.
Eyelid Partial Thickness Repair - 37922: The eyelid defect was partial thickness which required a wedge repair of the eyelid. Special care was taken to ensure that the eyelid margin was realligned when placing sutures.
Epidermal Closure: running cuticular
Orbicularis Oris Muscle Flap Text: The defect edges were debeveled with a #15 scalpel blade.  Given that the defect affected the competency of the oral sphincter an orbicularis oris muscle flap was deemed most appropriate to restore this competency and normal muscle function.  Using a sterile surgical marker, an appropriate flap was drawn incorporating the defect. The area thus outlined was incised with a #15 scalpel blade.
Banner Transposition Flap Text: The defect edges were debeveled with a #15 scalpel blade.  Given the location of the defect and the proximity to free margins a Banner transposition flap was deemed most appropriate.  Using a sterile surgical marker, an appropriate flap drawn around the defect. The area thus outlined was incised deep to adipose tissue with a #15 scalpel blade.  The skin margins were undermined to an appropriate distance in all directions utilizing iris scissors.
Mohs Rapid Report Verbiage: The area of clinically evident tumor was marked with skin marking ink and appropriately hatched.  The initial incision was made following the Mohs approach through the skin.  The specimen was taken to the lab, divided into the necessary number of pieces, chromacoded and processed according to the Mohs protocol.  This was repeated in successive stages until a tumor free defect was achieved.
Subsequent Stages Histo Method Verbiage: Using a similar technique to that described above, a thin layer of tissue was removed from all areas where tumor was visible on the previous stage.  The tissue was again oriented, mapped, dyed, and processed as above.
Muscle Hinge Flap Text: The defect edges were debeveled with a #15 scalpel blade.  Given the size, depth and location of the defect and the proximity to free margins a muscle hinge flap was deemed most appropriate.  Using a sterile surgical marker, an appropriate hinge flap was drawn incorporating the defect. The area thus outlined was incised with a #15 scalpel blade.  The skin margins were undermined to an appropriate distance in all directions utilizing iris scissors.
Localized Dermabrasion With Wire Brush Text: The patient was draped in routine manner.  Localized dermabrasion using 3 x 17 mm wire brush was performed in routine manner to papillary dermis. This spot dermabrasion is being performed to complete skin cancer reconstruction. It also will eliminate the other sun damaged precancerous cells that are known to be part of the regional effect of a lifetime's worth of sun exposure. This localized dermabrasion is therapeutic and should not be considered cosmetic in any regard.
Staged Advancement Flap Text: The defect edges were debeveled with a #15 scalpel blade.  Given the location of the defect, shape of the defect and the proximity to free margins a staged advancement flap was deemed most appropriate.  Using a sterile surgical marker, an appropriate advancement flap was drawn incorporating the defect and placing the expected incisions within the relaxed skin tension lines where possible. The area thus outlined was incised deep to adipose tissue with a #15 scalpel blade.  The skin margins were undermined to an appropriate distance in all directions utilizing iris scissors.
Interpolation Flap Text: A decision was made to reconstruct the defect utilizing an interpolation axial flap and a staged reconstruction.  A telfa template was made of the defect.  This telfa template was then used to outline the interpolation flap.  The donor area for the pedicle flap was then injected with anesthesia.  The flap was excised through the skin and subcutaneous tissue down to the layer of the underlying musculature.  The interpolation flap was carefully excised within this deep plane to maintain its blood supply.  The edges of the donor site were undermined.   The donor site was closed in a primary fashion.  The pedicle was then rotated into position and sutured.  Once the tube was sutured into place, adequate blood supply was confirmed with blanching and refill.  The pedicle was then wrapped with xeroform gauze and dressed appropriately with a telfa and gauze bandage to ensure continued blood supply and protect the attached pedicle.
Suture Removal: 7 days
Melolabial Interpolation Flap Text: A decision was made to reconstruct the defect utilizing an interpolation axial flap and a staged reconstruction.  A telfa template was made of the defect.  This telfa template was then used to outline the melolabial interpolation flap.  The donor area for the pedicle flap was then injected with anesthesia.  The flap was excised through the skin and subcutaneous tissue down to the layer of the underlying musculature.  The pedicle flap was carefully excised within this deep plane to maintain its blood supply.  The edges of the donor site were undermined.   The donor site was closed in a primary fashion.  The pedicle was then rotated into position and sutured.  Once the tube was sutured into place, adequate blood supply was confirmed with blanching and refill.  The pedicle was then wrapped with xeroform gauze and dressed appropriately with a telfa and gauze bandage to ensure continued blood supply and protect the attached pedicle.
Consent (Nose)/Introductory Paragraph: The rationale for Mohs was explained to the patient and consent was obtained. The risks, benefits and alternatives to therapy were discussed in detail. Specifically, the risks of nasal deformity, changes in the flow of air through the nose, infection, scarring, bleeding, prolonged wound healing, incomplete removal, allergy to anesthesia, nerve injury and recurrence were addressed. Prior to the procedure, the treatment site was clearly identified and confirmed by the patient. All components of Universal Protocol/PAUSE Rule completed.
Pain Refusal Text: I offered to prescribe pain medication but the patient refused to take this medication.
Information: Selecting Yes will display possible errors in your note based on the variables you have selected. This validation is only offered as a suggestion for you. PLEASE NOTE THAT THE VALIDATION TEXT WILL BE REMOVED WHEN YOU FINALIZE YOUR NOTE. IF YOU WANT TO FAX A PRELIMINARY NOTE YOU WILL NEED TO TOGGLE THIS TO 'NO' IF YOU DO NOT WANT IT IN YOUR FAXED NOTE.
Melolabial Transposition Flap Text: The defect edges were debeveled with a #15 scalpel blade.  Given the location of the defect and the proximity to free margins a melolabial flap was deemed most appropriate.  Using a sterile surgical marker, an appropriate melolabial transposition flap was drawn incorporating the defect.    The area thus outlined was incised deep to adipose tissue with a #15 scalpel blade.  The skin margins were undermined to an appropriate distance in all directions utilizing iris scissors.
Advancement-Rotation Flap Text: The defect edges were debeveled with a #15 scalpel blade.  Given the location of the defect, shape of the defect and the proximity to free margins an advancement-rotation flap was deemed most appropriate.  Using a sterile surgical marker, an appropriate flap was drawn incorporating the defect and placing the expected incisions within the relaxed skin tension lines where possible. The area thus outlined was incised deep to adipose tissue with a #15 scalpel blade.  The skin margins were undermined to an appropriate distance in all directions utilizing iris scissors.
Graft Cartilage Fenestration Text: The cartilage was fenestrated with a 2mm punch biopsy to help facilitate graft survival and healing.
Tarsorrhaphy Text: A tarsorrhaphy was performed using Frost sutures.
Mastoid Interpolation Flap Text: A decision was made to reconstruct the defect utilizing an interpolation axial flap and a staged reconstruction.  A telfa template was made of the defect.  This telfa template was then used to outline the mastoid interpolation flap.  The donor area for the pedicle flap was then injected with anesthesia.  The flap was excised through the skin and subcutaneous tissue down to the layer of the underlying musculature.  The pedicle flap was carefully excised within this deep plane to maintain its blood supply.  The edges of the donor site were undermined.   The donor site was closed in a primary fashion.  The pedicle was then rotated into position and sutured.  Once the tube was sutured into place, adequate blood supply was confirmed with blanching and refill.  The pedicle was then wrapped with xeroform gauze and dressed appropriately with a telfa and gauze bandage to ensure continued blood supply and protect the attached pedicle.
Depth Of Tumor Invasion (For Histology): tumor not visualized (deep and peripheral margins are clear of tumor)
Partial Purse String (Intermediate) Text: Given the location of the defect and the characteristics of the surrounding skin an intermediate purse string closure was deemed most appropriate.  Undermining was performed circumfirentially around the surgical defect.  A purse string suture was then placed and tightened. Wound tension only allowed a partial closure of the circular defect.
Detail Level: Detailed
Epidermal Sutures: 5-0 Vicryl Rapide
Undermining Location (Optional): in the superficial subcutaneous fat
Alar Island Pedicle Flap Text: The defect edges were debeveled with a #15 scalpel blade.  Given the location of the defect, shape of the defect and the proximity to the alar rim an island pedicle advancement flap was deemed most appropriate.  Using a sterile surgical marker, an appropriate advancement flap was drawn incorporating the defect, outlining the appropriate donor tissue and placing the expected incisions within the nasal ala running parallel to the alar rim. The area thus outlined was incised with a #15 scalpel blade.  The skin margins were undermined minimally to an appropriate distance in all directions around the primary defect and laterally outward around the island pedicle utilizing iris scissors.  There was minimal undermining beneath the pedicle flap.
Where Do You Want The Question To Include Opioid Counseling Located?: Case Summary Tab
Posterior Auricular Interpolation Flap Text: A decision was made to reconstruct the defect utilizing an interpolation axial flap and a staged reconstruction.  A telfa template was made of the defect.  This telfa template was then used to outline the posterior auricular interpolation flap.  The donor area for the pedicle flap was then injected with anesthesia.  The flap was excised through the skin and subcutaneous tissue down to the layer of the underlying musculature.  The pedicle flap was carefully excised within this deep plane to maintain its blood supply.  The edges of the donor site were undermined.   The donor site was closed in a primary fashion.  The pedicle was then rotated into position and sutured.  Once the tube was sutured into place, adequate blood supply was confirmed with blanching and refill.  The pedicle was then wrapped with xeroform gauze and dressed appropriately with a telfa and gauze bandage to ensure continued blood supply and protect the attached pedicle.
Which Eyelid Repair Cpt Are You Using?: 26163
Epidermal Autograft Text: The defect edges were debeveled with a #15 scalpel blade.  Given the location of the defect, shape of the defect and the proximity to free margins an epidermal autograft was deemed most appropriate.  Using a sterile surgical marker, the primary defect shape was transferred to the donor site. The epidermal graft was then harvested.  The skin graft was then placed in the primary defect and oriented appropriately.
Bill 59 Modifier?: No - Continue to Bill 79 Modifier
Paramedian Forehead Flap Text: A decision was made to reconstruct the defect utilizing an interpolation axial flap and a staged reconstruction.  A telfa template was made of the defect.  This telfa template was then used to outline the paramedian forehead pedicle flap.  The donor area for the pedicle flap was then injected with anesthesia.  The flap was excised through the skin and subcutaneous tissue down to the layer of the underlying musculature.  The pedicle flap was carefully excised within this deep plane to maintain its blood supply.  The edges of the donor site were undermined.   The donor site was closed in a primary fashion.  The pedicle was then rotated into position and sutured.  Once the tube was sutured into place, adequate blood supply was confirmed with blanching and refill.  The pedicle was then wrapped with xeroform gauze and dressed appropriately with a telfa and gauze bandage to ensure continued blood supply and protect the attached pedicle.
Intermediate Repair And Graft Additional Text (Will Appearing After The Standard Complex Repair Text): The intermediate repair was not sufficient to completely close the primary defect. The remaining additional defect was repaired with the graft mentioned below.
Surgeon/Pathologist Verbiage (Will Incorporate Name Of Surgeon From Intro If Not Blank): operated in two distinct and integrated capacities as the surgeon and pathologist.
Flip-Flop Flap Text: The defect edges were debeveled with a #15 blade scalpel.  Given the location of the defect and the proximity to free margins a flip-flop flap was deemed most appropriate. Using a sterile surgical marker, the appropriate flap was drawn incorporating the defect and placing the expected incisions between the helical rim and antihelix where possible.  The area thus outlined was incised through and through with a #15 scalpel blade. Following this, the designed flap was carried over into the primary defect and sutured into place.
Nasal Turnover Hinge Flap Text: The defect edges were debeveled with a #15 scalpel blade.  Given the size, depth, location of the defect and the defect being full thickness a nasal turnover hinge flap was deemed most appropriate.  Using a sterile surgical marker, an appropriate hinge flap was drawn incorporating the defect. The area thus outlined was incised with a #15 scalpel blade. The flap was designed to recreate the nasal mucosal lining and the alar rim. The skin margins were undermined to an appropriate distance in all directions utilizing iris scissors.
Burow's Advancement Flap Text: The defect edges were debeveled with a #15 scalpel blade.  Given the location of the defect and the proximity to free margins a Burow's advancement flap was deemed most appropriate.  Using a sterile surgical marker, the appropriate advancement flap was drawn incorporating the defect and placing the expected incisions within the relaxed skin tension lines where possible.    The area thus outlined was incised deep to adipose tissue with a #15 scalpel blade.  The skin margins were undermined to an appropriate distance in all directions utilizing iris scissors.
Referring Physician (Optional): hogan
Previous Accession (Optional): [id number]
Donor Site Anesthesia Type: same as repair anesthesia
Eye Protection Verbiage: Before proceeding with the stage, a plastic scleral shield was inserted. The globe was anesthetized with a few drops of 1% lidocaine with 1:100,000 epinephrine. Then, an appropriate sized scleral shield was chosen and coated with lacrilube ointment. The shield was gently inserted and left in place for the duration of each stage. After the stage was completed, the shield was gently removed.
Wound Care: Vaseline
Area M Indication Text: Tumors in this location are included in Area M (cheek, forehead, scalp, neck, jawline and pretibial skin).  Mohs surgery is indicated for tumors in these anatomic locations.
Consent 2/Introductory Paragraph: Mohs surgery was explained to the patient and consent was obtained. The risks, benefits and alternatives to therapy were discussed in detail. Specifically, the risks of infection, scarring, bleeding, prolonged wound healing, incomplete removal, allergy to anesthesia, nerve injury and recurrence were addressed. Prior to the procedure, the treatment site was clearly identified and confirmed by the patient. All components of Universal Protocol/PAUSE Rule completed.
Repair Type: Complex Repair
Purse String (Simple) Text: Given the location of the defect and the characteristics of the surrounding skin a purse string closure was deemed most appropriate.  Undermining was performed circumfirentially around the surgical defect.  A purse string suture was then placed and tightened.
Same Histology In Subsequent Stages Text: The pattern and morphology of the tumor is as described in the first stage.
Complex Repair And Flap Additional Text (Will Appearing After The Standard Complex Repair Text): The complex repair was not sufficient to completely close the primary defect. The remaining additional defect was repaired with the flap mentioned below.
Abbe Flap (Upper To Lower Lip) Text: The defect of the lower lip was assessed and measured.  Given the location and size of the defect, an Abbe flap was deemed most appropriate.  Using a sterile surgical marker, an appropriate Abbe flap was measured and drawn on the upper lip. Local anesthesia was then infiltrated.  A scalpel was then used to incise the upper lip through and through the skin, vermilion, muscle and mucosa, leaving the flap pedicled on the opposite side.  The flap was then rotated and transferred to the lower lip defect.  The flap was then sutured into place with a three layer technique, closing the orbicularis oris muscle layer with subcutaneous buried sutures, followed by a mucosal layer and an epidermal layer.
Bilateral Rotation Flap Text: The defect edges were debeveled with a #15 scalpel blade. Given the location of the defect, shape of the defect and the proximity to free margins a bilateral rotation flap was deemed most appropriate. Using a sterile surgical marker, an appropriate rotation flap was drawn incorporating the defect and placing the expected incisions within the relaxed skin tension lines where possible. The area thus outlined was incised deep to adipose tissue with a #15 scalpel blade. The skin margins were undermined to an appropriate distance in all directions utilizing iris scissors. Following this, the designed flap was carried over into the primary defect and sutured into place.
Dorsal Nasal Flap Text: The defect edges were debeveled with a #15 scalpel blade.  Given the location of the defect and the proximity to free margins a dorsal nasal flap was deemed most appropriate.  Using a sterile surgical marker, an appropriate dorsal nasal flap was drawn around the defect.    The area thus outlined was incised deep to adipose tissue with a #15 scalpel blade.  The skin margins were undermined to an appropriate distance in all directions utilizing iris scissors.
Manual Repair Warning Statement: We plan on removing the manually selected variable below in favor of our much easier automatic structured text blocks found in the previous tab. We decided to do this to help make the flow better and give you the full power of structured data. Manual selection is never going to be ideal in our platform and I would encourage you to avoid using manual selection from this point on, especially since I will be sunsetting this feature. It is important that you do one of two things with the customized text below. First, you can save all of the text in a word file so you can have it for future reference. Second, transfer the text to the appropriate area in the Library tab. Lastly, if there is a flap or graft type which we do not have you need to let us know right away so I can add it in before the variable is hidden. No need to panic, we plan to give you roughly 6 months to make the change.
Bcc Histology Text: There were numerous aggregates of basaloid cells.
Bilobed Flap Text: The defect edges were debeveled with a #15 scalpel blade.  Given the location of the defect and the proximity to free margins a bilobe flap was deemed most appropriate.  Using a sterile surgical marker, an appropriate bilobe flap drawn around the defect.    The area thus outlined was incised deep to adipose tissue with a #15 scalpel blade.  The skin margins were undermined to an appropriate distance in all directions utilizing iris scissors.
O-T Plasty Text: The defect edges were debeveled with a #15 scalpel blade.  Given the location of the defect, shape of the defect and the proximity to free margins an O-T plasty was deemed most appropriate.  Using a sterile surgical marker, an appropriate O-T plasty was drawn incorporating the defect and placing the expected incisions within the relaxed skin tension lines where possible.    The area thus outlined was incised deep to adipose tissue with a #15 scalpel blade.  The skin margins were undermined to an appropriate distance in all directions utilizing iris scissors.
Double O-Z Flap Text: The defect edges were debeveled with a #15 scalpel blade.  Given the location of the defect, shape of the defect and the proximity to free margins a Double O-Z flap was deemed most appropriate.  Using a sterile surgical marker, an appropriate transposition flap was drawn incorporating the defect and placing the expected incisions within the relaxed skin tension lines where possible. The area thus outlined was incised deep to adipose tissue with a #15 scalpel blade.  The skin margins were undermined to an appropriate distance in all directions utilizing iris scissors.
Z Plasty Text: The lesion was extirpated to the level of the fat with a #15 scalpel blade.  Given the location of the defect, shape of the defect and the proximity to free margins a Z-plasty was deemed most appropriate for repair.  Using a sterile surgical marker, the appropriate transposition arms of the Z-plasty were drawn incorporating the defect and placing the expected incisions within the relaxed skin tension lines where possible.    The area thus outlined was incised deep to adipose tissue with a #15 scalpel blade.  The skin margins were undermined to an appropriate distance in all directions utilizing iris scissors.  The opposing transposition arms were then transposed into place in opposite direction and anchored with interrupted buried subcutaneous sutures.
Suturegard Intro: Intraoperative tissue expansion was performed, utilizing the SUTUREGARD device, in order to reduce wound tension.
Undermining Type: Entire Wound
Cheiloplasty (Complex) Text: A decision was made to reconstruct the defect with a  cheiloplasty.  The defect was undermined extensively.  Additional obicularis oris muscle was excised with a 15 blade scalpel.  The defect was converted into a full thickness wedge to facilite a better cosmetic result.  Small vessels were then tied off with 5-0 monocyrl. The obicularis oris, superficial fascia, adipose and dermis were then reapproximated.  After the deeper layers were approximated the epidermis was reapproximated with particular care given to realign the vermilion border.
Ftsg Text: The defect edges were debeveled with a #15 scalpel blade.  Given the location of the defect, shape of the defect and the proximity to free margins a full thickness skin graft was deemed most appropriate.  Using a sterile surgical marker, the primary defect shape was transferred to the donor site. The area thus outlined was incised deep to adipose tissue with a #15 scalpel blade.  The harvested graft was then trimmed of adipose tissue until only dermis and epidermis was left.  The skin margins of the secondary defect were undermined to an appropriate distance in all directions utilizing iris scissors.  The secondary defect was closed with interrupted buried subcutaneous sutures.  The skin edges were then re-apposed with running  sutures.  The skin graft was then placed in the primary defect and oriented appropriately.
Nasalis-Muscle-Based Myocutaneous Island Pedicle Flap Text: Using a #15 blade, an incision was made around the donor flap to the level of the nasalis muscle. Wide lateral undermining was then performed in both the subcutaneous plane above the nasalis muscle, and in a submuscular plane just above periosteum. This allowed the formation of a free nasalis muscle axial pedicle (based on the angular artery) which was still attached to the actual cutaneous flap, increasing its mobility and vascular viability. Hemostasis was obtained with pinpoint electrocoagulation. The flap was mobilized into position and the pivotal anchor points positioned and stabilized with buried interrupted sutures. Subcutaneous and dermal tissues were closed in a multilayered fashion with sutures. Tissue redundancies were excised, and the epidermal edges were apposed without significant tension and sutured with sutures.
Spiral Flap Text: The defect edges were debeveled with a #15 scalpel blade.  Given the location of the defect, shape of the defect and the proximity to free margins a spiral flap was deemed most appropriate.  Using a sterile surgical marker, an appropriate rotation flap was drawn incorporating the defect and placing the expected incisions within the relaxed skin tension lines where possible. The area thus outlined was incised deep to adipose tissue with a #15 scalpel blade.  The skin margins were undermined to an appropriate distance in all directions utilizing iris scissors.
O-L Flap Text: The defect edges were debeveled with a #15 scalpel blade.  Given the location of the defect, shape of the defect and the proximity to free margins an O-L flap was deemed most appropriate.  Using a sterile surgical marker, an appropriate advancement flap was drawn incorporating the defect and placing the expected incisions within the relaxed skin tension lines where possible.    The area thus outlined was incised deep to adipose tissue with a #15 scalpel blade.  The skin margins were undermined to an appropriate distance in all directions utilizing iris scissors.
Ear Star Wedge Flap Text: The defect edges were debeveled with a #15 blade scalpel.  Given the location of the defect and the proximity to free margins (helical rim) an ear star wedge flap was deemed most appropriate.  Using a sterile surgical marker, the appropriate flap was drawn incorporating the defect and placing the expected incisions between the helical rim and antihelix where possible.  The area thus outlined was incised through and through with a #15 scalpel blade.
V-Y Flap Text: The defect edges were debeveled with a #15 scalpel blade.  Given the location of the defect, shape of the defect and the proximity to free margins a V-Y flap was deemed most appropriate.  Using a sterile surgical marker, an appropriate advancement flap was drawn incorporating the defect and placing the expected incisions within the relaxed skin tension lines where possible.    The area thus outlined was incised deep to adipose tissue with a #15 scalpel blade.  The skin margins were undermined to an appropriate distance in all directions utilizing iris scissors.
Deep Sutures: 5-0 Maxon
Area L Indication Text: Tumors in this location are included in Area L (trunk and extremities).  Mohs surgery is indicated for larger tumors, or tumors with aggressive histologic features, in these anatomic locations.
Xenograft Text: The defect edges were debeveled with a #15 scalpel blade.  Given the location of the defect, shape of the defect and the proximity to free margins a xenograft was deemed most appropriate.  The graft was then trimmed to fit the size of the defect.  The graft was then placed in the primary defect and oriented appropriately.
Graft Donor Site Dermal Sutures (Optional): 5-0 Polysorb
Mauc Instructions: By selecting yes to the question below the MAUC number will be added into the note.  This will be calculated automatically based on the diagnosis chosen, the size entered, the body zone selected (H,M,L) and the specific indications you chose. You will also have the option to override the Mohs AUC if you disagree with the automatically calculated number and this option is found in the Case Summary tab.
Inflammation Suggestive Of Cancer Camouflage Histology Text: There was a dense lymphocytic infiltrate which prevented adequate histologic evaluation of adjacent structures.
Wound Care (No Sutures): Petrolatum
Which Instrument Did You Use For Dermabrasion?: Wire Brush
Chonodrocutaneous Helical Advancement Flap Text: The defect edges were debeveled with a #15 scalpel blade.  Given the location of the defect and the proximity to free margins a chondrocutaneous helical advancement flap was deemed most appropriate.  Using a sterile surgical marker, the appropriate advancement flap was drawn incorporating the defect and placing the expected incisions within the relaxed skin tension lines where possible.    The area thus outlined was incised deep to adipose tissue with a #15 scalpel blade.  The skin margins were undermined to an appropriate distance in all directions utilizing iris scissors.
Repair Hemostasis (Optional): Pinpoint electrocautery
Consent (Temporal Branch)/Introductory Paragraph: The rationale for Mohs was explained to the patient and consent was obtained. The risks, benefits and alternatives to therapy were discussed in detail. Specifically, the risks of damage to the temporal branch of the facial nerve, infection, scarring, bleeding, prolonged wound healing, incomplete removal, allergy to anesthesia, and recurrence were addressed. Prior to the procedure, the treatment site was clearly identified and confirmed by the patient. All components of Universal Protocol/PAUSE Rule completed.
Home Suture Removal Text: Patient was provided instructions on removing sutures and will remove their sutures at home.  If they have any questions or difficulties they will call the office.
Trilobed Flap Text: The defect edges were debeveled with a #15 scalpel blade.  Given the location of the defect and the proximity to free margins a trilobed flap was deemed most appropriate.  Using a sterile surgical marker, an appropriate trilobed flap drawn around the defect.    The area thus outlined was incised deep to adipose tissue with a #15 scalpel blade.  The skin margins were undermined to an appropriate distance in all directions utilizing iris scissors.
Cheek Interpolation Flap Text: A decision was made to reconstruct the defect utilizing an interpolation axial flap and a staged reconstruction.  A telfa template was made of the defect.  This telfa template was then used to outline the Cheek Interpolation flap.  The donor area for the pedicle flap was then injected with anesthesia.  The flap was excised through the skin and subcutaneous tissue down to the layer of the underlying musculature.  The interpolation flap was carefully excised within this deep plane to maintain its blood supply.  The edges of the donor site were undermined.   The donor site was closed in a primary fashion.  The pedicle was then rotated into position and sutured.  Once the tube was sutured into place, adequate blood supply was confirmed with blanching and refill.  The pedicle was then wrapped with xeroform gauze and dressed appropriately with a telfa and gauze bandage to ensure continued blood supply and protect the attached pedicle.
Cheiloplasty (Less Than 50%) Text: A decision was made to reconstruct the defect with a  cheiloplasty.  The defect was undermined extensively.  Additional obicularis oris muscle was excised with a 15 blade scalpel.  The defect was converted into a full thickness wedge, of less than 50% of the vertical height of the lip, to facilite a better cosmetic result.  Small vessels were then tied off with 5-0 monocyrl. The obicularis oris, superficial fascia, adipose and dermis were then reapproximated.  After the deeper layers were approximated the epidermis was reapproximated with particular care given to realign the vermilion border.
Complex/Intermediate Repair Variations: Crescentic
Hatchet Flap Text: The defect edges were debeveled with a #15 scalpel blade.  Given the location of the defect, shape of the defect and the proximity to free margins a hatchet flap was deemed most appropriate.  Using a sterile surgical marker, an appropriate hatchet flap was drawn incorporating the defect and placing the expected incisions within the relaxed skin tension lines where possible.    The area thus outlined was incised deep to adipose tissue with a #15 scalpel blade.  The skin margins were undermined to an appropriate distance in all directions utilizing iris scissors.
O-Z Plasty Text: The defect edges were debeveled with a #15 scalpel blade.  Given the location of the defect, shape of the defect and the proximity to free margins an O-Z plasty (double transposition flap) was deemed most appropriate.  Using a sterile surgical marker, the appropriate transposition flaps were drawn incorporating the defect and placing the expected incisions within the relaxed skin tension lines where possible.    The area thus outlined was incised deep to adipose tissue with a #15 scalpel blade.  The skin margins were undermined to an appropriate distance in all directions utilizing iris scissors.  Hemostasis was achieved with electrocautery.  The flaps were then transposed into place, one clockwise and the other counterclockwise, and anchored with interrupted buried subcutaneous sutures.
Skin Substitute Text: The defect edges were debeveled with a #15 scalpel blade.  Given the location of the defect, shape of the defect and the proximity to free margins a skin substitute graft was deemed most appropriate.  The graft material was trimmed to fit the size of the defect. The graft was then placed in the primary defect and oriented appropriately.
Additional Anesthesia Volume In Cc: 6
Mucosal Advancement Flap Text: Given the location of the defect, shape of the defect and the proximity to free margins a mucosal advancement flap was deemed most appropriate. Incisions were made with a 15 blade scalpel in the appropriate fashion along the cutaneous vermilion border and the mucosal lip. The remaining actinically damaged mucosal tissue was excised.  The mucosal advancement flap was then elevated to the gingival sulcus with care taken to preserve the neurovascular structures and advanced into the primary defect. Care was taken to ensure that precise realignment of the vermilion border was achieved.
O-Z Flap Text: The defect edges were debeveled with a #15 scalpel blade.  Given the location of the defect, shape of the defect and the proximity to free margins an O-Z flap was deemed most appropriate.  Using a sterile surgical marker, an appropriate transposition flap was drawn incorporating the defect and placing the expected incisions within the relaxed skin tension lines where possible. The area thus outlined was incised deep to adipose tissue with a #15 scalpel blade.  The skin margins were undermined to an appropriate distance in all directions utilizing iris scissors.
Vermilion Border Text: The closure involved the vermilion border.
A-T Advancement Flap Text: The defect edges were debeveled with a #15 scalpel blade.  Given the location of the defect, shape of the defect and the proximity to free margins an A-T advancement flap was deemed most appropriate.  Using a sterile surgical marker, an appropriate advancement flap was drawn incorporating the defect and placing the expected incisions within the relaxed skin tension lines where possible.    The area thus outlined was incised deep to adipose tissue with a #15 scalpel blade.  The skin margins were undermined to an appropriate distance in all directions utilizing iris scissors.
Rotation Flap Text: The defect edges were debeveled with a #15 scalpel blade.  Given the location of the defect, shape of the defect and the proximity to free margins a rotation flap was deemed most appropriate.  Using a sterile surgical marker, an appropriate rotation flap was drawn incorporating the defect and placing the expected incisions within the relaxed skin tension lines where possible.    The area thus outlined was incised deep to adipose tissue with a #15 scalpel blade.  The skin margins were undermined to an appropriate distance in all directions utilizing iris scissors.
Bilateral Helical Rim Advancement Flap Text: The defect edges were debeveled with a #15 blade scalpel.  Given the location of the defect and the proximity to free margins (helical rim) a bilateral helical rim advancement flap was deemed most appropriate.  Using a sterile surgical marker, the appropriate advancement flaps were drawn incorporating the defect and placing the expected incisions between the helical rim and antihelix where possible.  The area thus outlined was incised through and through with a #15 scalpel blade.  With a skin hook and iris scissors, the flaps were gently and sharply undermined and freed up.
Burow's Graft Text: The defect edges were debeveled with a #15 scalpel blade.  Given the location of the defect, shape of the defect, the proximity to free margins and the presence of a standing cone deformity a Burow's skin graft was deemed most appropriate. The standing cone was removed and this tissue was then trimmed to the shape of the primary defect. The adipose tissue was also removed until only dermis and epidermis were left.  The skin margins of the secondary defect were undermined to an appropriate distance in all directions utilizing iris scissors.  The secondary defect was closed with interrupted buried subcutaneous sutures.  The skin edges were then re-apposed with running  sutures.  The skin graft was then placed in the primary defect and oriented appropriately.
Hemostasis: Electrocautery
Keystone Flap Text: The defect edges were debeveled with a #15 scalpel blade.  Given the location of the defect, shape of the defect a keystone flap was deemed most appropriate.  Using a sterile surgical marker, an appropriate keystone flap was drawn incorporating the defect, outlining the appropriate donor tissue and placing the expected incisions within the relaxed skin tension lines where possible. The area thus outlined was incised deep to adipose tissue with a #15 scalpel blade.  The skin margins were undermined to an appropriate distance in all directions around the primary defect and laterally outward around the flap utilizing iris scissors.
Modified Advancement Flap Text: The defect edges were debeveled with a #15 scalpel blade.  Given the location of the defect, shape of the defect and the proximity to free margins a modified advancement flap was deemed most appropriate.  Using a sterile surgical marker, an appropriate advancement flap was drawn incorporating the defect and placing the expected incisions within the relaxed skin tension lines where possible.    The area thus outlined was incised deep to adipose tissue with a #15 scalpel blade.  The skin margins were undermined to an appropriate distance in all directions utilizing iris scissors.
Mart-1 - Negative Histology Text: MART-1 staining demonstrates a normal density and pattern of melanocytes along the dermal-epidermal junction. The surgical margins are negative for tumor cells.
Consent (Ear)/Introductory Paragraph: The rationale for Mohs was explained to the patient and consent was obtained. The risks, benefits and alternatives to therapy were discussed in detail. Specifically, the risks of ear deformity, infection, scarring, bleeding, prolonged wound healing, incomplete removal, allergy to anesthesia, nerve injury and recurrence were addressed. Prior to the procedure, the treatment site was clearly identified and confirmed by the patient. All components of Universal Protocol/PAUSE Rule completed.
Full Thickness Lip Wedge Repair (Flap) Text: Given the location of the defect and the proximity to free margins a full thickness wedge repair was deemed most appropriate.  Using a sterile surgical marker, the appropriate repair was drawn incorporating the defect and placing the expected incisions perpendicular to the vermilion border.  The vermilion border was also meticulously outlined to ensure appropriate reapproximation during the repair.  The area thus outlined was incised through and through with a #15 scalpel blade.  The muscularis and dermis were reaproximated with deep sutures following hemostasis. Care was taken to realign the vermilion border before proceeding with the superficial closure.  Once the vermilion was realigned the superfical and mucosal closure was finished.
Postop Diagnosis: same
Abbe Flap (Lower To Upper Lip) Text: The defect of the upper lip was assessed and measured.  Given the location and size of the defect, an Abbe flap was deemed most appropriate.  Using a sterile surgical marker, an appropriate Abbe flap was measured and drawn on the lower lip. Local anesthesia was then infiltrated. A scalpel was then used to incise the upper lip through and through the skin, vermilion, muscle and mucosa, leaving the flap pedicled on the opposite side.  The flap was then rotated and transferred to the lower lip defect.  The flap was then sutured into place with a three layer technique, closing the orbicularis oris muscle layer with subcutaneous buried sutures, followed by a mucosal layer and an epidermal layer.

## 2025-04-24 ENCOUNTER — APPOINTMENT (OUTPATIENT)
Dept: URBAN - METROPOLITAN AREA CLINIC 36 | Facility: CLINIC | Age: 75
Setting detail: DERMATOLOGY
End: 2025-04-24

## 2025-04-24 ENCOUNTER — OFFICE VISIT (OUTPATIENT)
Dept: MEDICAL GROUP | Facility: MEDICAL CENTER | Age: 75
End: 2025-04-24
Payer: MEDICARE

## 2025-04-24 ENCOUNTER — HOSPITAL ENCOUNTER (OUTPATIENT)
Dept: RADIOLOGY | Facility: MEDICAL CENTER | Age: 75
End: 2025-04-24
Attending: NURSE PRACTITIONER
Payer: MEDICARE

## 2025-04-24 VITALS
BODY MASS INDEX: 38.19 KG/M2 | HEART RATE: 74 BPM | TEMPERATURE: 98 F | WEIGHT: 281.97 LBS | RESPIRATION RATE: 16 BRPM | OXYGEN SATURATION: 92 % | SYSTOLIC BLOOD PRESSURE: 134 MMHG | DIASTOLIC BLOOD PRESSURE: 44 MMHG | HEIGHT: 72 IN

## 2025-04-24 DIAGNOSIS — G89.29 CHRONIC BILATERAL LOW BACK PAIN WITHOUT SCIATICA: ICD-10-CM

## 2025-04-24 DIAGNOSIS — Z48.817 ENCOUNTER FOR SURGICAL AFTERCARE FOLLOWING SURGERY ON THE SKIN AND SUBCUTANEOUS TISSUE: ICD-10-CM

## 2025-04-24 DIAGNOSIS — M54.50 CHRONIC BILATERAL LOW BACK PAIN WITHOUT SCIATICA: ICD-10-CM

## 2025-04-24 PROCEDURE — 3078F DIAST BP <80 MM HG: CPT | Performed by: NURSE PRACTITIONER

## 2025-04-24 PROCEDURE — 99214 OFFICE O/P EST MOD 30 MIN: CPT | Performed by: NURSE PRACTITIONER

## 2025-04-24 PROCEDURE — 3075F SYST BP GE 130 - 139MM HG: CPT | Performed by: NURSE PRACTITIONER

## 2025-04-24 PROCEDURE — ? POST-OP WOUND CHECK

## 2025-04-24 PROCEDURE — 72110 X-RAY EXAM L-2 SPINE 4/>VWS: CPT

## 2025-04-24 ASSESSMENT — LOCATION ZONE DERM: LOCATION ZONE: FACE

## 2025-04-24 ASSESSMENT — FIBROSIS 4 INDEX: FIB4 SCORE: 2.82

## 2025-04-24 ASSESSMENT — LOCATION SIMPLE DESCRIPTION DERM: LOCATION SIMPLE: RIGHT CHEEK

## 2025-04-24 ASSESSMENT — LOCATION DETAILED DESCRIPTION DERM: LOCATION DETAILED: RIGHT INFERIOR CENTRAL MALAR CHEEK

## 2025-04-24 NOTE — PROCEDURE: POST-OP WOUND CHECK
Detail Level: Detailed
Add 14523 Cpt? (Important Note: In 2017 The Use Of 87529 Is Being Tracked By Cms To Determine Future Global Period Reimbursement For Global Periods): no

## 2025-04-24 NOTE — PROGRESS NOTES
Subjective:     Xavier Richardson is a 74 y.o. male presents to discuss:     Chief Complaint   Patient presents with    Pain     Lower back pain. See doc's but it's bothering him more lately.      Verbal consent was acquired by the patient to use [facility] ???? ambient listening note generation during this visit Yes   History of Present Illness          IMPRESSION:   1.      Multilevel degenerative changes of the spine most prominent at L4-5 and L5-S1 with contact of the exiting left L4 and right L5 nerve roots. Moderate canal stenosis L4-5.   2.       Partially visualized aortic dissection, better visualized on prior dedicated imaging.     IMPRESSION:   Diffuse osteopenia reduces sensitivity for detecting acute fractures or infiltrative processes.   Vertebral body heights are maintained.   Lumbarized S1.   Dextroscoliosis of the lumbar spine with mild right lateral listhesis of L4 on L5.   Grade 1 retrolisthesis of L4 on L5 and grade 1 anterolisthesis of L5 on S1. Grade 1 retrolisthesis L3 on L4. No significant dynamic motion in flexion or extension.   Moderate L4-5 and severe L5-S1 disc space height loss.   Multilevel severe facet arthropathy. Baastrup's disease.     Bilateral sacroiliac joint degenerative changes. Sacrum is partially obscured. Partially imaged degenerative changes of the hips.     Partially imaged chronic right posterior 11th rib fracture.   Status post aortoiliac EVAR with vascular stents.     ROS: : see above      Current Outpatient Medications:     sildenafil citrate (VIAGRA) 50 MG tablet, Take 50 mg by mouth., Disp: , Rfl:     vitamin D (VITAMIN D-1000 MAX ST) 1000 UNIT Tab, Take 1,000 Units by mouth., Disp: , Rfl:     Multiple Vitamin (MULTIVITAMIN ADULT PO), Take  by mouth every day., Disp: , Rfl:     BABY ASPIRIN PO, Take 81 mg by mouth every day., Disp: , Rfl:     Eplerenone 50 MG Tab, Take 1 Tablet by mouth every day., Disp: , Rfl:     acetaminophen (TYLENOL) 325 MG Tab, Take 650 mg by  mouth 3 times a day., Disp: , Rfl:     fenofibrate (TRICOR) 54 MG tablet, Take 54 mg by mouth every day., Disp: , Rfl:     tamsulosin (FLOMAX) 0.4 MG capsule, Take 0.4 mg by mouth every day., Disp: , Rfl:     amLODIPine (NORVASC) 10 MG Tab, Take 10 mg by mouth every day., Disp: , Rfl:     atorvastatin (LIPITOR) 40 MG Tab, Take 40 mg by mouth every day., Disp: , Rfl:     carvedilol (COREG) 25 MG Tab, Take 25 mg by mouth 2 times a day., Disp: , Rfl:     losartan (COZAAR) 25 MG Tab, Take 25 mg by mouth every day., Disp: , Rfl:     finasteride (PROSCAR) 5 MG Tab, Take 1 Tab by mouth every day., Disp: 30 Tab, Rfl: 0    furosemide (LASIX) 40 MG Tab, Take 1 Tab by mouth every day., Disp: 30 Tab, Rfl: 0    gabapentin (NEURONTIN) 400 MG Cap, Take 1 Cap by mouth 4 times a day., Disp: 120 Cap, Rfl: 0    No Known Allergies    Objective:   Vitals: /44   Pulse 74   Temp 36.7 °C (98 °F) (Temporal)   Resp 16   Ht 1.829 m (6')   Wt (!) 128 kg (281 lb 15.5 oz)   SpO2 92%   BMI 38.24 kg/m²    General: Alert, pleasant, NAD  HEENT: Normocephalic.  Neck supple.   Respiratory: no distress, no audible wheezing, RR -WNL  Skin: Warm, dry, no rashes.  Extremities: No leg edema. No discoloration  Neurological: No tremors  Psych:  Affect/mood is normal, judgement is good, memory is intact, grooming is appropriate.  Assessment/Plan:      There are no diagnoses linked to this encounter.     Assessment & Plan      No follow-ups on file.    {I have placed the above orders and discussed them with an approved delegating provider. The MA is performing the below orders under the direction of Dr. Rakesh ALVARES    Health maintenance:    General Recommendations:   Smoking: recommend complete avoidance of all tobacco products  Alcohol: recommend limiting consumption  Physical Activity: goal is 30 min of moderate activity 5 times a week  Weight Management and Nutrition: high vegetable, high protein diet like the  Mediterranean diet, portion control, avoid excessive sugars, Low Glycemic Index foods, adequate hydration, sleep.

## 2025-05-01 NOTE — PROGRESS NOTES
Verbal consent was acquired by the patient to use [employer] ambient listening note generation during this visit Yes     NEW PATIENT VISIT     Interventional Spine and Pain  Physiatry (Physical Medicine and Rehabilitation)     Date of Service: See Epic  Chief Complaint:   Chief Complaint   Patient presents with    New Patient     Low back pain       Referring Provider: Deanne Doyle*   Patient Name: Xavier Richardson   : 1950   MRN: [id number]     History:     HPI:     Xavier Richardson is a 74 y.o. male with history of stroke, bladder cancer, JESUS MANUEL, neuropathy who presents to clinic for evaluation of low back pain.     History of Present Illness  The patient is a 74-year-old male who presents today for low back pain.    He reports a greater than 20-year history of bilateral lumbosacral pain, described as sharp and typically around 7 out of 10 in intensity. The pain is worse with prolonged standing, walking, bending forward, side bending or twisting, and stairs, and somewhat improved with sitting, bending backward, and lying down. He recalls an incident in the early  where he twisted his back while unloading freight from a trailer, and his lower back has been causing him discomfort since. The pain is symmetrical on both sides and does not radiate into the buttocks or legs. He experiences chronic mild numbness on the soles of his feet but reports no significant changes in bowel or bladder function. He has been using a cane intermittently since his aortic dissection in 2019 but reports no falls. He has undergone epidural injections and selective nerve root blocks at [facility], which were ineffective. However, an injection administered by a pain specialist in [place] around  provided relief for 3 to 4 months. His last lumbar MRI was conducted in , and he had x-rays taken a week ago. He completed 12 weeks of physical therapy in 2019 or , primarily for balance issues. He does  not currently engage in any back exercises or stretches due to uncertainty about their appropriateness. He finds that stretching his back provides some relief. He has lost 10 pounds over the past month and plans to relocate to [place] in mid-July 2025. He has an appointment scheduled with his primary care physician on 08/01/2025. He is currently managing his pain with Tylenol and gabapentin, the latter of which he finds particularly effective for nerve pain.    MEDICATIONS  Tylenol, gabapentin     Red Flags ROS:   Fever, Chills, Sweats: Denies  Involuntary Weight Loss: Denies  Bladder Incontinence: Denies  Bowel Incontinence: Denies  Saddle Anesthesia: Denies       Medical Records Review:  I reviewed the note from the referring provider Deanne Doyle* including the note dated 4/24/25 where they discussed low back pain.       PMHx:   Past Medical History:   Diagnosis Date    BPH (benign prostatic hyperplasia)     Hypertension     JESUS MANUEL (obstructive sleep apnea)     Stroke (HCC)        Current Outpatient Medications on File Prior to Visit   Medication Sig Dispense Refill    sildenafil citrate (VIAGRA) 50 MG tablet Take 50 mg by mouth.      vitamin D (VITAMIN D-1000 MAX ST) 1000 UNIT Tab Take 1,000 Units by mouth.      Multiple Vitamin (MULTIVITAMIN ADULT PO) Take  by mouth every day.      BABY ASPIRIN PO Take 81 mg by mouth every day.      Eplerenone 50 MG Tab Take 1 Tablet by mouth every day.      acetaminophen (TYLENOL) 325 MG Tab Take 650 mg by mouth 3 times a day.      fenofibrate (TRICOR) 54 MG tablet Take 54 mg by mouth every day.      tamsulosin (FLOMAX) 0.4 MG capsule Take 0.4 mg by mouth every day.      amLODIPine (NORVASC) 10 MG Tab Take 10 mg by mouth every day.      atorvastatin (LIPITOR) 40 MG Tab Take 40 mg by mouth every day.      carvedilol (COREG) 25 MG Tab Take 25 mg by mouth 2 times a day.      losartan (COZAAR) 25 MG Tab Take 25 mg by mouth every day.      finasteride (PROSCAR) 5 MG Tab Take  1 Tab by mouth every day. 30 Tab 0    furosemide (LASIX) 40 MG Tab Take 1 Tab by mouth every day. 30 Tab 0    gabapentin (NEURONTIN) 400 MG Cap Take 1 Cap by mouth 4 times a day. 120 Cap 0     No current facility-administered medications on file prior to visit.        PSHx:   Past Surgical History:   Procedure Laterality Date    CT ERCP,DIAGNOSTIC N/A 8/16/2023    Procedure: ERCP (ENDOSCOPIC RETROGRADE CHOLANGIOPANCREATOGRAPHY);  Surgeon: Krishan Godoy M.D.;  Location: SURGERY SAME DAY [facility];  Service: Gastroenterology    CT ERCP,W/REMOVAL STONE,LISET/PANCR DUCTS N/A 8/16/2023    Procedure: ERCP, WITH CALCULUS REMOVAL FROM BILE OR PANCREATIC DUCT;  Surgeon: Krishan Godoy M.D.;  Location: SURGERY SAME DAY [facility];  Service: Gastroenterology    SPHINCTEROTOMY N/A 8/16/2023    Procedure: SPHINCTEROTOMY;  Surgeon: Krishan Godoy M.D.;  Location: SURGERY SAME DAY [facility];  Service: Gastroenterology    MARK BY LAPAROSCOPY N/A 8/15/2023    Procedure: CHOLECYSTECTOMY, LAPAROSCOPIC WITH INTRAOPERATIVE CHOLANGIOGRAM;  Surgeon: Efren Guzman D.O.;  Location: SURGERY [facility];  Service: Gen Robotic    OTHER CARDIAC SURGERY      RHINOPLASTY         Family history   Family History   Problem Relation Age of Onset    Cancer Father          Medications: Reviewed on [facility]  Outpatient Medications Marked as Taking for the 5/2/25 encounter (Office Visit) with Maribel Butler M.D.   Medication Sig Dispense Refill    ALPRAZolam (XANAX) 0.5 MG Tab Take 1 Tablet by mouth as needed for Anxiety (take one tab 1 hour prior to MRI. OK to repeat x 1. do not drive on this med) for up to 1 day. 2 Tablet 0    sildenafil citrate (VIAGRA) 50 MG tablet Take 50 mg by mouth.      vitamin D (VITAMIN D-1000 MAX ST) 1000 UNIT Tab Take 1,000 Units by mouth.      Multiple Vitamin (MULTIVITAMIN ADULT PO) Take  by mouth every day.      BABY ASPIRIN PO Take 81 mg by mouth every day.      Eplerenone 50 MG Tab Take 1 Tablet by mouth every day.       acetaminophen (TYLENOL) 325 MG Tab Take 650 mg by mouth 3 times a day.      fenofibrate (TRICOR) 54 MG tablet Take 54 mg by mouth every day.      tamsulosin (FLOMAX) 0.4 MG capsule Take 0.4 mg by mouth every day.      amLODIPine (NORVASC) 10 MG Tab Take 10 mg by mouth every day.      atorvastatin (LIPITOR) 40 MG Tab Take 40 mg by mouth every day.      carvedilol (COREG) 25 MG Tab Take 25 mg by mouth 2 times a day.      losartan (COZAAR) 25 MG Tab Take 25 mg by mouth every day.      finasteride (PROSCAR) 5 MG Tab Take 1 Tab by mouth every day. 30 Tab 0    furosemide (LASIX) 40 MG Tab Take 1 Tab by mouth every day. 30 Tab 0    gabapentin (NEURONTIN) 400 MG Cap Take 1 Cap by mouth 4 times a day. 120 Cap 0        Allergies:   No Known Allergies    Social Hx:   Social History     Socioeconomic History    Marital status: Single     Spouse name: Not on file    Number of children: Not on file    Years of education: Not on file    Highest education level: Not on file   Occupational History    Not on file   Tobacco Use    Smoking status: Never    Smokeless tobacco: Never   Vaping Use    Vaping status: Never Used   Substance and Sexual Activity    Alcohol use: Yes     Alcohol/week: 0.6 oz     Types: 1 Standard drinks or equivalent per week    Drug use: Never    Sexual activity: Not on file   Other Topics Concern    Not on file   Social History Narrative    Not on file     Social Drivers of Health     Financial Resource Strain: Not on file   Food Insecurity: Not on file   Transportation Needs: Not on file   Physical Activity: Not on file   Stress: Not on file   Social Connections: Not on file   Intimate Partner Violence: Not on file   Housing Stability: Not on file          EXAMINATION     Physical Exam:   Vitals: /57 (BP Location: Right arm, Patient Position: Sitting, BP Cuff Size: Adult)   Pulse 73   Temp 36.8 °C (98.2 °F) (Temporal)   Ht 1.829 m (6')   Wt (!) 126 kg (277 lb 1.9 oz)   SpO2 95%       Constitutional:    Body Habitus: Body mass index is 37.58 kg/m².  Cooperation: Fully cooperates with exam  Appearance: Well-groomed, well-nourished  Eyes: No scleral icterus to suggest severe liver disease, no proptosis to suggest severe hyperthyroid  ENT: No obvious auditory deficits, no obvious tongue lesions, tongue midline, no facial droop   Respiratory:  Breathing comfortable on room air, no audible wheezing  Cardiovascular: Skin appears well-perfused  Psychiatric: Appropriate affect  Gait: ambulates with single-point cane. the patient can toe walk with ease. the patient can heel walk with ease. the patient can tandem walk with ease..    Neurologic:  Strength:    Bilateral LE 5/5 in hip flexors, knee extensors and flexors, ankle dorsiflexors and plantarflexors  Reflexes: 1+ in bilateral patella and trace in bilateral achilles  Sensation: grossly intact bilaterally dermatomes L3-S1   MSK:?No?TTP across lumbar axial spinous processes and paraspinals bilaterally, has?mildly reduced active lumbar ROM with pain with flexion     Special?tests:    Negative FADIR, log roll bilaterally   Positive thigh thrust, DANIEL, distraction bilaterally   Positive facet loading maneuvers bilaterally       MEDICAL DECISION MAKING    Medical Records Review: See under HPI section    DATA    Labs:   Lab Results   Component Value Date/Time    SODIUM 139 10/30/2024 09:54 AM    POTASSIUM 3.9 10/30/2024 09:54 AM    CHLORIDE 104 10/30/2024 09:54 AM    CO2 23 10/30/2024 09:54 AM    ANION 12.0 10/30/2024 09:54 AM    GLUCOSE 88 10/30/2024 09:54 AM    BUN 19 10/30/2024 09:54 AM    CREATININE 1.22 10/30/2024 09:54 AM    CALCIUM 10.4 10/30/2024 09:54 AM    ASTSGOT 20 10/30/2024 09:54 AM    ALTSGPT 14 10/30/2024 09:54 AM    TBILIRUBIN 1.0 10/30/2024 09:54 AM    ALBUMIN 4.6 10/30/2024 09:54 AM    TOTPROTEIN 6.9 10/30/2024 09:54 AM    GLOBULIN 2.3 10/30/2024 09:54 AM    AGRATIO 2.0 10/30/2024 09:54 AM       Lab Results   Component Value Date/Time    PROTHROMBTM 14.2  08/13/2023 08:04 PM    INR 1.11 08/13/2023 08:04 PM        Lab Results   Component Value Date/Time    WBC 7.7 10/30/2024 09:54 AM    RBC 3.89 (L) 10/30/2024 09:54 AM    HEMOGLOBIN 13.4 (L) 10/30/2024 09:54 AM    HEMATOCRIT 38.3 (L) 10/30/2024 09:54 AM    MCV 98.5 (H) 10/30/2024 09:54 AM    MCH 34.4 (H) 10/30/2024 09:54 AM    MCHC 35.0 10/30/2024 09:54 AM    MPV 9.6 10/30/2024 09:54 AM    NEUTSPOLYS 68.50 10/30/2024 09:54 AM    LYMPHOCYTES 18.50 (L) 10/30/2024 09:54 AM    MONOCYTES 10.00 10/30/2024 09:54 AM    EOSINOPHILS 2.00 10/30/2024 09:54 AM    BASOPHILS 0.50 10/30/2024 09:54 AM    ANISOCYTOSIS 1+ 09/24/2019 05:58 AM        Lab Results   Component Value Date/Time    HBA1C <4.2 (L) 02/03/2023 03:13 PM    HBA1C 4.2 09/24/2019 05:58 AM        Imaging:   I personally reviewed the following images, below are my independent reads:  X-ray lumbar spine 4/24/25: Degenerative disc height loss most notable at L5-S1. Multilevel lumbar facet arthropathy. Vascular stent.      IMAGING radiology reads: I reviewed the following radiology reports       Results for orders placed during the hospital encounter of 04/24/25    DX-LUMBAR SPINE-4+ VIEWS    Impression  Degenerative changes of the lumbar spine without obvious fracture or malalignment. No evidence of dynamic instability.                        Diagnosis   Visit Diagnoses     ICD-10-CM   1. Chronic bilateral low back pain without sciatica  M54.50    G89.29   2. Lumbar facet arthropathy  M47.816   3. Sacroiliac joint dysfunction of both sides  M53.3   4. Claustrophobia  F40.240   5. History of bladder cancer  Z85.51         ASSESSMENT AND PLAN:  Xavier Richardson is a 74 y.o. male who presents to clinic for evaluation of low back pain.     Xavier was seen today for new patient.    Diagnoses and all orders for this visit:    Chronic bilateral low back pain without sciatica  -     Referral to Physical Therapy  -     MR-LUMBAR SPINE-W/O; Future    Lumbar facet  arthropathy  -     Referral to Physical Therapy    Sacroiliac joint dysfunction of both sides  -     Referral to Physical Therapy    Claustrophobia  -     ALPRAZolam (XANAX) 0.5 MG Tab; Take 1 Tablet by mouth as needed for Anxiety (take one tab 1 hour prior to MRI. OK to repeat x 1. do not drive on this med) for up to 1 day.    History of bladder cancer  -     MR-LUMBAR SPINE-W/O; Future        Assessment & Plan  Bilateral low back pain without radiation into the lower extremities  Lumbar facet arthropathy  Bilateral sacroiliac joint dysfunction  Patient presents for evaluation of 20+ years of moderate to severe bilateral low back pain without radiation into the lower extremities. The patient's low back pain is likely due to a combination of lumbar facet arthropathy and sacroiliac joint pain based on history and physical exam today, with positive facet loading as well as positive SI joint maneuvers. An updated MRI of the lower back will be ordered to assess for low back pathology. He will be provided with a set of exercises aimed at strengthening the core and lower back muscles to reduce stress on the inflamed joints. A referral for physical therapy will be made to develop a personalized regimen based on his individual biomechanics. His current medication regimen of Tylenol and gabapentin will be maintained. A prescription for Xanax 0.5 mg will be provided to help manage claustrophobia during the MRI procedure. If he experiences severe pain or flare-ups during home exercises, he should discontinue the exercises until his physical therapy appointment.      Physical Therapy: I ordered physical therapy to focus on strengthening and stretching for the low back    Home Exercise Program: I provided the patient with a strengthening and stretching with a home exercise program targeting the low back    Diagnostic Workup: As above MRI of the lumbar spine    Medications: Continue Tylenol and gabapentin    Interventional  Treatment: I would consider the patient for SI joint injection versus lumbar medial branch blocks pending response to conservative care    Follow-up: In 6 weeks      Please note that this dictation was created using voice recognition software. I have made every reasonable attempt to correct obvious errors but there may be errors of grammar and content that I may have overlooked prior to finalization of this note.      Maribel Butler MD  Physical Medicine and Rehabilitation  Interventional Spine and Sports Physiatry  [facility]       GIROGIO Hughes   CC Deanne Doyle.

## 2025-05-02 ENCOUNTER — APPOINTMENT (OUTPATIENT)
Dept: PHYSICAL MEDICINE AND REHAB | Facility: MEDICAL CENTER | Age: 75
End: 2025-05-02
Payer: MEDICARE

## 2025-05-02 VITALS
BODY MASS INDEX: 37.53 KG/M2 | SYSTOLIC BLOOD PRESSURE: 112 MMHG | HEIGHT: 72 IN | OXYGEN SATURATION: 95 % | TEMPERATURE: 98.2 F | DIASTOLIC BLOOD PRESSURE: 57 MMHG | WEIGHT: 277.12 LBS | HEART RATE: 73 BPM

## 2025-05-02 DIAGNOSIS — M54.50 CHRONIC BILATERAL LOW BACK PAIN WITHOUT SCIATICA: Primary | ICD-10-CM

## 2025-05-02 DIAGNOSIS — F40.240 CLAUSTROPHOBIA: ICD-10-CM

## 2025-05-02 DIAGNOSIS — M47.816 LUMBAR FACET ARTHROPATHY: ICD-10-CM

## 2025-05-02 DIAGNOSIS — M53.3 SACROILIAC JOINT DYSFUNCTION OF BOTH SIDES: ICD-10-CM

## 2025-05-02 DIAGNOSIS — G89.29 CHRONIC BILATERAL LOW BACK PAIN WITHOUT SCIATICA: Primary | ICD-10-CM

## 2025-05-02 DIAGNOSIS — Z85.51 HISTORY OF BLADDER CANCER: ICD-10-CM

## 2025-05-02 PROCEDURE — 1125F AMNT PAIN NOTED PAIN PRSNT: CPT | Performed by: STUDENT IN AN ORGANIZED HEALTH CARE EDUCATION/TRAINING PROGRAM

## 2025-05-02 PROCEDURE — 3074F SYST BP LT 130 MM HG: CPT | Performed by: STUDENT IN AN ORGANIZED HEALTH CARE EDUCATION/TRAINING PROGRAM

## 2025-05-02 PROCEDURE — 3078F DIAST BP <80 MM HG: CPT | Performed by: STUDENT IN AN ORGANIZED HEALTH CARE EDUCATION/TRAINING PROGRAM

## 2025-05-02 PROCEDURE — 99204 OFFICE O/P NEW MOD 45 MIN: CPT | Performed by: STUDENT IN AN ORGANIZED HEALTH CARE EDUCATION/TRAINING PROGRAM

## 2025-05-02 RX ORDER — ALPRAZOLAM 0.5 MG
0.5 TABLET ORAL PRN
Qty: 2 TABLET | Refills: 0 | Status: SHIPPED | OUTPATIENT
Start: 2025-05-02 | End: 2025-05-03

## 2025-05-02 ASSESSMENT — FIBROSIS 4 INDEX: FIB4 SCORE: 2.82

## 2025-05-02 ASSESSMENT — PATIENT HEALTH QUESTIONNAIRE - PHQ9: CLINICAL INTERPRETATION OF PHQ2 SCORE: 0

## 2025-05-02 ASSESSMENT — PAIN SCALES - GENERAL: PAINLEVEL_OUTOF10: 7=MODERATE-SEVERE PAIN

## 2025-05-07 ENCOUNTER — APPOINTMENT (OUTPATIENT)
Dept: RADIOLOGY | Facility: MEDICAL CENTER | Age: 75
End: 2025-05-07
Attending: STUDENT IN AN ORGANIZED HEALTH CARE EDUCATION/TRAINING PROGRAM
Payer: MEDICARE

## 2025-05-07 DIAGNOSIS — Z85.51 HISTORY OF BLADDER CANCER: ICD-10-CM

## 2025-05-07 DIAGNOSIS — G89.29 CHRONIC BILATERAL LOW BACK PAIN WITHOUT SCIATICA: ICD-10-CM

## 2025-05-07 DIAGNOSIS — M54.50 CHRONIC BILATERAL LOW BACK PAIN WITHOUT SCIATICA: ICD-10-CM

## 2025-05-07 PROCEDURE — 72148 MRI LUMBAR SPINE W/O DYE: CPT

## 2025-05-16 RX ORDER — GABAPENTIN 400 MG/1
400 CAPSULE ORAL 4 TIMES DAILY
Qty: 360 CAPSULE | Refills: 3 | Status: SHIPPED | OUTPATIENT
Start: 2025-05-16

## 2025-06-05 ENCOUNTER — APPOINTMENT (OUTPATIENT)
Dept: URBAN - METROPOLITAN AREA CLINIC 4 | Facility: CLINIC | Age: 75
Setting detail: DERMATOLOGY
End: 2025-06-05

## 2025-06-05 DIAGNOSIS — Z85.828 PERSONAL HISTORY OF OTHER MALIGNANT NEOPLASM OF SKIN: ICD-10-CM

## 2025-06-05 DIAGNOSIS — L57.0 ACTINIC KERATOSIS: ICD-10-CM

## 2025-06-05 DIAGNOSIS — Z71.89 OTHER SPECIFIED COUNSELING: ICD-10-CM

## 2025-06-05 PROCEDURE — ? LIQUID NITROGEN

## 2025-06-05 PROCEDURE — ? COUNSELING

## 2025-06-05 PROCEDURE — ? SUNSCREEN RECOMMENDATIONS

## 2025-06-05 ASSESSMENT — LOCATION SIMPLE DESCRIPTION DERM
LOCATION SIMPLE: RIGHT HAND
LOCATION SIMPLE: RIGHT CHEEK
LOCATION SIMPLE: RIGHT THUMB

## 2025-06-05 ASSESSMENT — LOCATION ZONE DERM
LOCATION ZONE: FINGER
LOCATION ZONE: FACE
LOCATION ZONE: HAND

## 2025-06-05 ASSESSMENT — LOCATION DETAILED DESCRIPTION DERM
LOCATION DETAILED: RIGHT ULNAR DORSAL HAND
LOCATION DETAILED: 1ST WEB SPACE RIGHT HAND
LOCATION DETAILED: RIGHT INFERIOR CENTRAL MALAR CHEEK
LOCATION DETAILED: RIGHT RADIAL DORSAL HAND

## 2025-06-05 NOTE — PROCEDURE: LIQUID NITROGEN
Show Aperture Variable?: Yes
Consent: The patient's consent was obtained including but not limited to risks of crusting, scabbing, blistering, scarring, darker or lighter pigmentary change, recurrence, incomplete removal and infection.
Duration Of Freeze Thaw-Cycle (Seconds): 3
Post-Care Instructions: I reviewed with the patient in detail post-care instructions. Patient is to wear sunprotection, and avoid picking at any of the treated lesions. Pt may apply Vaseline to crusted or scabbing areas.
Render Post-Care Instructions In Note?: no
Number Of Freeze-Thaw Cycles: 1 freeze-thaw cycle
Detail Level: Detailed
Application Tool (Optional): Cry-AC
Aperture Size (Optional): C

## 2025-06-10 ENCOUNTER — RESULTS FOLLOW-UP (OUTPATIENT)
Dept: MEDICAL GROUP | Facility: MEDICAL CENTER | Age: 75
End: 2025-06-10

## 2025-06-12 NOTE — PROGRESS NOTES
Verbal consent was acquired by the patient to use [employer] ambient listening note generation during this visit Yes     FOLLOW-UP PATIENT VISIT    Interventional Spine and Pain  Physiatry (Physical Medicine and Rehabilitation)     Date of Service: 25   Chief Complaint:   Chief Complaint   Patient presents with    Follow-Up     Chronic bilateral low back pain without sciatica        Patient Name: Xavier Richardson   : 1950   MRN: [id number]       PRIOR HISTORY    Please see new patient note by Dr. Butler for more details.     Interval History  Patient identification: Xavier Richardson,  1950, with history of stroke, bladder cancer, JESUS MANUEL, neuropathy, who presents today for follow-up of low back and right lateral buttock pain.    History of Present Illness  The patient is a 74-year-old male who presents today for follow-up of low back and right buttock pain. He reports persistent low back pain in the bilateral lumbosacral region, which has remained unchanged over the past 6 weeks. The pain is localized to the lower back and does not radiate down the legs. It significantly impacts his daily activities, including walking within his home. He has been engaging in physical therapy, which he finds beneficial, particularly in strengthening his core muscles. He has 2 more physical therapy visits scheduled for the upcoming week and an additional 2 visits for the following week. Additionally, he experiences a focal discomfort in his right superolateral buttock, which is exacerbated during driving. He does not report any associated groin pain. It seem like a focal area he can press on if he pushes hard enough.        PMHx:   Past Medical History[1]    PSHx:   Past Surgical History[2]    Family history   Family History   Problem Relation Age of Onset    Cancer Father        Medications:   Active Medications[3]     Medications Ordered Prior to Encounter[4]    Allergies:   Allergies[5]    Social Hx:    Social History     Socioeconomic History    Marital status: Single     Spouse name: Not on file    Number of children: Not on file    Years of education: Not on file    Highest education level: Not on file   Occupational History    Not on file   Tobacco Use    Smoking status: Never    Smokeless tobacco: Never   Vaping Use    Vaping status: Never Used   Substance and Sexual Activity    Alcohol use: Yes     Alcohol/week: 0.6 oz     Types: 1 Standard drinks or equivalent per week    Drug use: Never    Sexual activity: Not on file   Other Topics Concern    Not on file   Social History Narrative    Not on file     Social Drivers of Health     Financial Resource Strain: Not on file   Food Insecurity: Not on file   Transportation Needs: Not on file   Physical Activity: Not on file   Stress: Not on file   Social Connections: Not on file   Intimate Partner Violence: Not on file   Housing Stability: Not on file         EXAMINATION     Physical Exam:   Vitals: /70   Pulse 72   Temp 36.7 °C (98 °F) (Temporal)   Ht 1.829 m (6')   Wt 124 kg (273 lb 2.4 oz)   SpO2 94%     Constitutional:   Body Habitus: Body mass index is 37.05 kg/m².  Cooperation: Fully cooperates with exam  Appearance: Well-groomed, well-nourished  Respiratory:  Breathing comfortable on room air, no audible wheezing  Cardiovascular: Skin appears well-perfused  Psychiatric: Appropriate affect  Gait: normal gait, no use of ambulatory device, nonantalgic.     MSK:?No?TTP of right greater trochanter. TTP with palpation of right superolateral buttock.    Special?tests:    Positive facet loading maneuvers bilaterally       MEDICAL DECISION MAKING    DATA    Labs:   Lab Results   Component Value Date/Time    SODIUM 139 10/30/2024 09:54 AM    POTASSIUM 3.9 10/30/2024 09:54 AM    CHLORIDE 104 10/30/2024 09:54 AM    CO2 23 10/30/2024 09:54 AM    GLUCOSE 88 10/30/2024 09:54 AM    BUN 19 10/30/2024 09:54 AM    CREATININE 1.22 10/30/2024 09:54 AM        Lab  Results   Component Value Date/Time    PROTHROMBTM 14.2 08/13/2023 08:04 PM    INR 1.11 08/13/2023 08:04 PM        Lab Results   Component Value Date/Time    WBC 7.7 10/30/2024 09:54 AM    RBC 3.89 (L) 10/30/2024 09:54 AM    HEMOGLOBIN 13.4 (L) 10/30/2024 09:54 AM    HEMATOCRIT 38.3 (L) 10/30/2024 09:54 AM    MCV 98.5 (H) 10/30/2024 09:54 AM    MCH 34.4 (H) 10/30/2024 09:54 AM    MCHC 35.0 10/30/2024 09:54 AM    MPV 9.6 10/30/2024 09:54 AM    NEUTSPOLYS 68.50 10/30/2024 09:54 AM    LYMPHOCYTES 18.50 (L) 10/30/2024 09:54 AM    MONOCYTES 10.00 10/30/2024 09:54 AM    EOSINOPHILS 2.00 10/30/2024 09:54 AM    BASOPHILS 0.50 10/30/2024 09:54 AM    ANISOCYTOSIS 1+ 09/24/2019 05:58 AM        Lab Results   Component Value Date/Time    HBA1C <4.2 (L) 02/03/2023 03:13 PM    HBA1C 4.2 09/24/2019 05:58 AM          Imaging:   I personally reviewed the following images, below are my independent reads:  MRI Lumbar Spine 5/7/25: Magnetic artifact leads to subobtimal study, inability to see anterior vertebral bodies. Type 2 Modic changes at L5 and S1. L4-5 mild canal and bilateral subarticular and foraminal stenosis. Multilevel lumbar facet arthropathy.      I reviewed the following radiology reports  MRI Lumbar Spine 5/7/25:   IMPRESSION:  The study is suboptimal due to the magnetic susceptibility artifact. The anterior portion of the vertebral bodies are not clearly seen.  Multifocal degenerative disease as described above. There has been no significant interval change.      X-ray lumbar spine 4/24/25:  IMPRESSION:  Degenerative changes of the lumbar spine without obvious fracture or malalignment. No evidence of dynamic instability.      DIAGNOSIS   Visit Diagnoses     ICD-10-CM   1. Chronic bilateral low back pain without sciatica  M54.50    G89.29   2. Lumbar facet arthropathy  M47.816   3. Right buttock pain  M79.18   4. Myalgia  M79.10         ASSESSMENT and PLAN:     Park Jackelin Richardson is a 74 y.o. male who returns to  clinic for follow-up of low back and right buttock pain.    Xavier was seen today for follow-up.    Diagnoses and all orders for this visit:    Chronic bilateral low back pain without sciatica    Lumbar facet arthropathy    Right buttock pain    Myalgia  -     Trigger Point Injections; Future      Assessment & Plan  Bilateral low back pain without radiation into the lower extremities  Lumbar facet arthropathy  Patient presents for follow-up of bilateral axial low back pain worst with prolonged standing and walking with positive facet loading maneuvers on exam. The MRI results indicate the presence of facet joint arthritis in the lower back and his pain is most consistent with facet-mediated pain. He has been informed about the potential benefits of medial branch blocks and radiofrequency ablation. However, due to his impending relocation in less than a month, these procedures will not be initiated at this time. He has been advised to establish care with a local physician at his new location, who will have access to all his medical records. If he returns to this area for an extended period and requires further assistance, he is encouraged to contact us.    Right posterolateral buttock pain  Myofascial pain  Patient reports bothersome focal pain in the right superolateral buttock with tenderness with deep palpation and without tenderness of the right greater trochanter. Given focal tenderness to palpation this seems most consistent with myofascial pain. A localized ultrasound-guided steroid trigger point injection has been recommended as a potential treatment option which patient is interested in and was ordered today.      Follow up: For ultrasound-guided trigger point injections targeting the right superolateral buttock    Thank you for allowing me to participate in the care of this patient. If you have any questions please not hesitate to contact me.         Please note that this dictation was created using voice  recognition software. I have made every reasonable attempt to correct obvious errors but there may be errors of grammar and content that I may have overlooked prior to finalization of this note.    Maribel Butler MD  Interventional Spine and Sports Physiatry  Physical Medicine and Rehabilitation  [facility]         [1]   Past Medical History:  Diagnosis Date    BPH (benign prostatic hyperplasia)     Hypertension     JESUS MANUEL (obstructive sleep apnea)     Stroke (HCC)    [2]   Past Surgical History:  Procedure Laterality Date    PA ERCP,DIAGNOSTIC N/A 8/16/2023    Procedure: ERCP (ENDOSCOPIC RETROGRADE CHOLANGIOPANCREATOGRAPHY);  Surgeon: Krishan Godoy M.D.;  Location: SURGERY SAME DAY [facility];  Service: Gastroenterology    PA ERCP,W/REMOVAL STONE,LISET/PANCR DUCTS N/A 8/16/2023    Procedure: ERCP, WITH CALCULUS REMOVAL FROM BILE OR PANCREATIC DUCT;  Surgeon: Krishan Godoy M.D.;  Location: SURGERY SAME DAY [facility];  Service: Gastroenterology    SPHINCTEROTOMY N/A 8/16/2023    Procedure: SPHINCTEROTOMY;  Surgeon: Krishan Godoy M.D.;  Location: SURGERY SAME DAY [facility];  Service: Gastroenterology    MARK BY LAPAROSCOPY N/A 8/15/2023    Procedure: CHOLECYSTECTOMY, LAPAROSCOPIC WITH INTRAOPERATIVE CHOLANGIOGRAM;  Surgeon: Efren Guzman D.O.;  Location: SURGERY [facility];  Service: Gen Robotic    OTHER CARDIAC SURGERY      RHINOPLASTY     [3]   Outpatient Medications Marked as Taking for the 6/13/25 encounter (Office Visit) with Maribel Butler M.D.   Medication Sig Dispense Refill    gabapentin (NEURONTIN) 400 MG Cap Take 1 Capsule by mouth 4 times a day. 360 Capsule 3    sildenafil citrate (VIAGRA) 50 MG tablet Take 50 mg by mouth.      vitamin D (VITAMIN D-1000 MAX ST) 1000 UNIT Tab Take 1,000 Units by mouth.      Multiple Vitamin (MULTIVITAMIN ADULT PO) Take  by mouth every day.      BABY ASPIRIN PO Take 81 mg by mouth every day.      Eplerenone 50 MG Tab Take 1 Tablet by mouth every day.       acetaminophen (TYLENOL) 325 MG Tab Take 650 mg by mouth 3 times a day.      fenofibrate (TRICOR) 54 MG tablet Take 54 mg by mouth every day.      tamsulosin (FLOMAX) 0.4 MG capsule Take 0.4 mg by mouth every day.      amLODIPine (NORVASC) 10 MG Tab Take 10 mg by mouth every day.      atorvastatin (LIPITOR) 40 MG Tab Take 40 mg by mouth every day.      carvedilol (COREG) 25 MG Tab Take 25 mg by mouth 2 times a day.      losartan (COZAAR) 25 MG Tab Take 25 mg by mouth every day.      finasteride (PROSCAR) 5 MG Tab Take 1 Tab by mouth every day. 30 Tab 0    furosemide (LASIX) 40 MG Tab Take 1 Tab by mouth every day. 30 Tab 0   [4]   Current Outpatient Medications on File Prior to Visit   Medication Sig Dispense Refill    gabapentin (NEURONTIN) 400 MG Cap Take 1 Capsule by mouth 4 times a day. 360 Capsule 3    sildenafil citrate (VIAGRA) 50 MG tablet Take 50 mg by mouth.      vitamin D (VITAMIN D-1000 MAX ST) 1000 UNIT Tab Take 1,000 Units by mouth.      Multiple Vitamin (MULTIVITAMIN ADULT PO) Take  by mouth every day.      BABY ASPIRIN PO Take 81 mg by mouth every day.      Eplerenone 50 MG Tab Take 1 Tablet by mouth every day.      acetaminophen (TYLENOL) 325 MG Tab Take 650 mg by mouth 3 times a day.      fenofibrate (TRICOR) 54 MG tablet Take 54 mg by mouth every day.      tamsulosin (FLOMAX) 0.4 MG capsule Take 0.4 mg by mouth every day.      amLODIPine (NORVASC) 10 MG Tab Take 10 mg by mouth every day.      atorvastatin (LIPITOR) 40 MG Tab Take 40 mg by mouth every day.      carvedilol (COREG) 25 MG Tab Take 25 mg by mouth 2 times a day.      losartan (COZAAR) 25 MG Tab Take 25 mg by mouth every day.      finasteride (PROSCAR) 5 MG Tab Take 1 Tab by mouth every day. 30 Tab 0    furosemide (LASIX) 40 MG Tab Take 1 Tab by mouth every day. 30 Tab 0     No current facility-administered medications on file prior to visit.   [5] No Known Allergies

## 2025-06-13 ENCOUNTER — APPOINTMENT (OUTPATIENT)
Dept: PHYSICAL MEDICINE AND REHAB | Facility: MEDICAL CENTER | Age: 75
End: 2025-06-13
Payer: MEDICARE

## 2025-06-13 VITALS
WEIGHT: 273.15 LBS | TEMPERATURE: 98 F | OXYGEN SATURATION: 94 % | DIASTOLIC BLOOD PRESSURE: 70 MMHG | HEART RATE: 72 BPM | BODY MASS INDEX: 37 KG/M2 | HEIGHT: 72 IN | SYSTOLIC BLOOD PRESSURE: 136 MMHG

## 2025-06-13 DIAGNOSIS — M47.816 LUMBAR FACET ARTHROPATHY: ICD-10-CM

## 2025-06-13 DIAGNOSIS — M79.18 RIGHT BUTTOCK PAIN: ICD-10-CM

## 2025-06-13 DIAGNOSIS — M79.10 MYALGIA: ICD-10-CM

## 2025-06-13 DIAGNOSIS — M54.50 CHRONIC BILATERAL LOW BACK PAIN WITHOUT SCIATICA: Primary | ICD-10-CM

## 2025-06-13 DIAGNOSIS — G89.29 CHRONIC BILATERAL LOW BACK PAIN WITHOUT SCIATICA: Primary | ICD-10-CM

## 2025-06-13 PROCEDURE — 3078F DIAST BP <80 MM HG: CPT | Performed by: STUDENT IN AN ORGANIZED HEALTH CARE EDUCATION/TRAINING PROGRAM

## 2025-06-13 PROCEDURE — 3075F SYST BP GE 130 - 139MM HG: CPT | Performed by: STUDENT IN AN ORGANIZED HEALTH CARE EDUCATION/TRAINING PROGRAM

## 2025-06-13 PROCEDURE — 1125F AMNT PAIN NOTED PAIN PRSNT: CPT | Performed by: STUDENT IN AN ORGANIZED HEALTH CARE EDUCATION/TRAINING PROGRAM

## 2025-06-13 PROCEDURE — 99214 OFFICE O/P EST MOD 30 MIN: CPT | Performed by: STUDENT IN AN ORGANIZED HEALTH CARE EDUCATION/TRAINING PROGRAM

## 2025-06-13 ASSESSMENT — PAIN SCALES - GENERAL: PAINLEVEL_OUTOF10: 6=MODERATE PAIN

## 2025-06-13 ASSESSMENT — FIBROSIS 4 INDEX: FIB4 SCORE: 2.82

## 2025-06-13 ASSESSMENT — PATIENT HEALTH QUESTIONNAIRE - PHQ9: CLINICAL INTERPRETATION OF PHQ2 SCORE: 0

## 2025-06-19 NOTE — PROCEDURES
Date of Service: 6/20/2025     Physician/s: Maribel Butler MD    Pre-operative Diagnosis: Myalgia (M79.1)    Post-operative Diagnosis: Myalgia (M79.1)    Procedure: Right gluteus luanne 3 trigger point injections    Description of procedure:    The risks, benefits, and alternatives of the procedure were reviewed and discussed with the patient.  Written informed consent was freely obtained. A pre-procedural time-out was conducted by the physician verifying patient’s identity, procedure to be performed, procedure site and side, and allergy verification. Appropriate equipment was determined to be in place for the procedure.     In the office suite exam room the patient was placed in a prone position and the skin areas for injection over the right gluteus luanne was marked. A solution was prepared with 1 mL of 4 mg/mL of dexamethasone, 1 mL of 1% lidocaine and 1 mL of 0.5% bupivicaine. The areas of pain was then prepped and draped in the usual sterile fashion. Ultrasound was confirmed to view the adjacent structures for blood vessels and nerves and to confirm the needle path was not within the structures nor was it too deep to be within the lung. A 27g needle was placed into each of the markings at the areas above under ultrasound guidance with an in plane approach.. After negative aspiration, approximately 1 mL of the above solution was injected. The needle was removed intact after each trigger point injection, and the patient's back was covered with a 4x4 gauze, the area was cleansed with sterile normal saline, and a dressing was applied. There were no complications noted. The images were uploaded to our media tab for permanent storage.    Preprocedural pain: 5/10  Postprocedural pain: 5/10    Maribel Butler MD  Physical Medicine and Rehabilitation  Interventional Spine and Sports Physiatry  [facility]

## 2025-06-20 ENCOUNTER — OFFICE VISIT (OUTPATIENT)
Dept: PHYSICAL MEDICINE AND REHAB | Facility: MEDICAL CENTER | Age: 75
End: 2025-06-20
Attending: STUDENT IN AN ORGANIZED HEALTH CARE EDUCATION/TRAINING PROGRAM
Payer: MEDICARE

## 2025-06-20 VITALS
BODY MASS INDEX: 36.52 KG/M2 | OXYGEN SATURATION: 92 % | DIASTOLIC BLOOD PRESSURE: 56 MMHG | TEMPERATURE: 98.6 F | HEIGHT: 72 IN | HEART RATE: 63 BPM | SYSTOLIC BLOOD PRESSURE: 120 MMHG | WEIGHT: 269.62 LBS

## 2025-06-20 DIAGNOSIS — M79.10 MYALGIA: Primary | ICD-10-CM

## 2025-06-20 PROCEDURE — 1125F AMNT PAIN NOTED PAIN PRSNT: CPT | Performed by: STUDENT IN AN ORGANIZED HEALTH CARE EDUCATION/TRAINING PROGRAM

## 2025-06-20 PROCEDURE — 20552 NJX 1/MLT TRIGGER POINT 1/2: CPT | Performed by: STUDENT IN AN ORGANIZED HEALTH CARE EDUCATION/TRAINING PROGRAM

## 2025-06-20 PROCEDURE — 3074F SYST BP LT 130 MM HG: CPT | Performed by: STUDENT IN AN ORGANIZED HEALTH CARE EDUCATION/TRAINING PROGRAM

## 2025-06-20 PROCEDURE — 3078F DIAST BP <80 MM HG: CPT | Performed by: STUDENT IN AN ORGANIZED HEALTH CARE EDUCATION/TRAINING PROGRAM

## 2025-06-20 PROCEDURE — 76942 ECHO GUIDE FOR BIOPSY: CPT | Performed by: STUDENT IN AN ORGANIZED HEALTH CARE EDUCATION/TRAINING PROGRAM

## 2025-06-20 RX ORDER — BUPIVACAINE HYDROCHLORIDE 5 MG/ML
1 INJECTION, SOLUTION EPIDURAL; INTRACAUDAL; PERINEURAL ONCE
Status: COMPLETED | OUTPATIENT
Start: 2025-06-20 | End: 2025-06-20

## 2025-06-20 RX ORDER — DEXAMETHASONE SODIUM PHOSPHATE 4 MG/ML
4 INJECTION, SOLUTION INTRA-ARTICULAR; INTRALESIONAL; INTRAMUSCULAR; INTRAVENOUS; SOFT TISSUE ONCE
Status: COMPLETED | OUTPATIENT
Start: 2025-06-20 | End: 2025-06-20

## 2025-06-20 RX ADMIN — BUPIVACAINE HYDROCHLORIDE 1 ML: 5 INJECTION, SOLUTION EPIDURAL; INTRACAUDAL; PERINEURAL at 13:30

## 2025-06-20 RX ADMIN — DEXAMETHASONE SODIUM PHOSPHATE 4 MG: 4 INJECTION, SOLUTION INTRA-ARTICULAR; INTRALESIONAL; INTRAMUSCULAR; INTRAVENOUS; SOFT TISSUE at 13:30

## 2025-06-20 RX ADMIN — Medication 1 ML: at 13:30

## 2025-06-20 ASSESSMENT — FIBROSIS 4 INDEX: FIB4 SCORE: 2.82

## 2025-06-20 ASSESSMENT — PATIENT HEALTH QUESTIONNAIRE - PHQ9: CLINICAL INTERPRETATION OF PHQ2 SCORE: 0

## 2025-06-20 ASSESSMENT — PAIN SCALES - GENERAL: PAINLEVEL_OUTOF10: 5=MODERATE PAIN

## 2025-06-24 NOTE — PROGRESS NOTES
Verbal consent was acquired by the patient to use [employer] ambient listening note generation during this visit Yes     FOLLOW-UP PATIENT VISIT    Interventional Spine and Pain  Physiatry (Physical Medicine and Rehabilitation)     Date of Service: 25   Chief Complaint:   Chief Complaint   Patient presents with    Follow-Up     Myalgia      Patient Name: Xavier Richardson   : 1950   MRN: [id number]       PRIOR HISTORY    Please see new patient note by Dr. Butler for more details.     Interval History  Patient identification: Xavier Richardson,  1950, with history of stroke, bladder cancer, JESUS MANUEL, neuropathy, who presents today for follow-up of low back and right lateral buttock pain.     History of Present Illness  The patient is a 74-year-old male who presents today for follow-up of right-sided low back and buttock pain. He reports that the injection administered a week ago provided immediate relief. He does continue to experience some discomfort with certain activities particularly for long sitting, like during his 3-hour drive to the clinic today. He has not observed any new symptoms since the injection. He has been using a self-made pad for seating, which he finds beneficial as it alleviates some of the weight on the right buttock. He also notes that the numbing medication initially seemed to have a positive effect on his lower back pain.    Procedures:  25: Right gluteus luanne trigger point injections, 50% improvement in pain      PMHx:   Past Medical History[1]    PSHx:   Past Surgical History[2]    Family history   Family History   Problem Relation Age of Onset    Cancer Father        Medications:   Active Medications[3]     Medications Ordered Prior to Encounter[4]    Allergies:   Allergies[5]    Social Hx:   Social History     Socioeconomic History    Marital status: Single     Spouse name: Not on file    Number of children: Not on file    Years of education: Not on file     Highest education level: Not on file   Occupational History    Not on file   Tobacco Use    Smoking status: Never    Smokeless tobacco: Never   Vaping Use    Vaping status: Never Used   Substance and Sexual Activity    Alcohol use: Yes     Alcohol/week: 0.6 oz     Types: 1 Standard drinks or equivalent per week    Drug use: Never    Sexual activity: Not on file   Other Topics Concern    Not on file   Social History Narrative    Not on file     Social Drivers of Health     Financial Resource Strain: Not on file   Food Insecurity: Not on file   Transportation Needs: Not on file   Physical Activity: Not on file   Stress: Not on file   Social Connections: Not on file   Intimate Partner Violence: Not on file   Housing Stability: Not on file         EXAMINATION     Physical Exam:   Vitals: /60   Pulse 74   Temp 36.9 °C (98.5 °F) (Temporal)   Ht 1.829 m (6')   Wt 124 kg (274 lb 7.6 oz)   SpO2 92%       Constitutional:   Body Habitus: Body mass index is 37.23 kg/m².  Cooperation: Fully cooperates with exam  Appearance: Well-groomed, well-nourished  Respiratory:  Breathing comfortable on room air, no audible wheezing  Cardiovascular: Skin appears well-perfused  Psychiatric: Appropriate affect  Gait: normal gait, no use of ambulatory device, nonantalgic.       MEDICAL DECISION MAKING    DATA    Labs:   Lab Results   Component Value Date/Time    SODIUM 139 10/30/2024 09:54 AM    POTASSIUM 3.9 10/30/2024 09:54 AM    CHLORIDE 104 10/30/2024 09:54 AM    CO2 23 10/30/2024 09:54 AM    GLUCOSE 88 10/30/2024 09:54 AM    BUN 19 10/30/2024 09:54 AM    CREATININE 1.22 10/30/2024 09:54 AM        Lab Results   Component Value Date/Time    PROTHROMBTM 14.2 08/13/2023 08:04 PM    INR 1.11 08/13/2023 08:04 PM        Lab Results   Component Value Date/Time    WBC 7.7 10/30/2024 09:54 AM    RBC 3.89 (L) 10/30/2024 09:54 AM    HEMOGLOBIN 13.4 (L) 10/30/2024 09:54 AM    HEMATOCRIT 38.3 (L) 10/30/2024 09:54 AM    MCV 98.5 (H)  10/30/2024 09:54 AM    MCH 34.4 (H) 10/30/2024 09:54 AM    MCHC 35.0 10/30/2024 09:54 AM    MPV 9.6 10/30/2024 09:54 AM    NEUTSPOLYS 68.50 10/30/2024 09:54 AM    LYMPHOCYTES 18.50 (L) 10/30/2024 09:54 AM    MONOCYTES 10.00 10/30/2024 09:54 AM    EOSINOPHILS 2.00 10/30/2024 09:54 AM    BASOPHILS 0.50 10/30/2024 09:54 AM    ANISOCYTOSIS 1+ 09/24/2019 05:58 AM        Lab Results   Component Value Date/Time    HBA1C <4.2 (L) 02/03/2023 03:13 PM    HBA1C 4.2 09/24/2019 05:58 AM          Imaging:   Prior personal imaging review:  MRI Lumbar Spine 5/7/25: Magnetic artifact leads to subobtimal study, inability to see anterior vertebral bodies. Type 2 Modic changes at L5 and S1. L4-5 mild canal and bilateral subarticular and foraminal stenosis. Multilevel lumbar facet arthropathy.        I reviewed the following radiology reports  MRI Lumbar Spine 5/7/25:   IMPRESSION:  The study is suboptimal due to the magnetic susceptibility artifact. The anterior portion of the vertebral bodies are not clearly seen.  Multifocal degenerative disease as described above. There has been no significant interval change.        X-ray lumbar spine 4/24/25:  IMPRESSION:  Degenerative changes of the lumbar spine without obvious fracture or malalignment. No evidence of dynamic instability.      DIAGNOSIS   Visit Diagnoses     ICD-10-CM   1. Right buttock pain  M79.18   2. Myalgia  M79.10         ASSESSMENT and PLAN:     Xavier Richardson is a 74 y.o. male who returns to clinic for follow-up of right low back and buttock pain.    Xavier was seen today for follow-up.    Diagnoses and all orders for this visit:    Right buttock pain    Myalgia        Assessment & Plan  Right-sided low back and buttock pain  He reports that the trigger point injections to the right buttock administered a week ago provided significant relief, though he still experiences pain after long drives or when sitting for a long time. The numbing effect of the medication  was noted, and it was explained that the steroid component might take a bit longer to show its full effect. He has been using a self-made pad for seating, which helps alleviate some of the weight from sitting on the buttock. He is advised to continue using the pad for additional support. He is moving to [place] in a week and a half and plans to follow up with a local provider there for further injections such as medial branch blocks and radiofrequency ablation which we discussed is a good option for his right low back pain in the setting of lumbar facet arthropathy. If he experiences any issues or needs further assistance while in [place], he is encouraged to reach out.          Follow up: As needed    Thank you for allowing me to participate in the care of this patient. If you have any questions please not hesitate to contact me.         Please note that this dictation was created using voice recognition software. I have made every reasonable attempt to correct obvious errors but there may be errors of grammar and content that I may have overlooked prior to finalization of this note.    Maribel Butler MD  Interventional Spine and Sports Physiatry  Physical Medicine and Rehabilitation  [facility]         [1]   Past Medical History:  Diagnosis Date    BPH (benign prostatic hyperplasia)     Hypertension     JESUS MANUEL (obstructive sleep apnea)     Stroke (HCC)    [2]   Past Surgical History:  Procedure Laterality Date    [place] ERCP,DIAGNOSTIC N/A 8/16/2023    Procedure: ERCP (ENDOSCOPIC RETROGRADE CHOLANGIOPANCREATOGRAPHY);  Surgeon: Krishan Godoy M.D.;  Location: SURGERY SAME DAY [facility];  Service: Gastroenterology    [place] ERCP,W/REMOVAL STONE,LISET/PANCR DUCTS N/A 8/16/2023    Procedure: ERCP, WITH CALCULUS REMOVAL FROM BILE OR PANCREATIC DUCT;  Surgeon: Krishan Godoy M.D.;  Location: SURGERY SAME DAY [facility];  Service: Gastroenterology    SPHINCTEROTOMY N/A 8/16/2023    Procedure: SPHINCTEROTOMY;  Surgeon: Krishan  JEANNINE Godoy;  Location: SURGERY SAME DAY [facility];  Service: Gastroenterology    MARK BY LAPAROSCOPY N/A 8/15/2023    Procedure: CHOLECYSTECTOMY, LAPAROSCOPIC WITH INTRAOPERATIVE CHOLANGIOGRAM;  Surgeon: Efren Guzman D.O.;  Location: SURGERY [facility];  Service: Gen Robotic    OTHER CARDIAC SURGERY      RHINOPLASTY     [3]   Outpatient Medications Marked as Taking for the 6/25/25 encounter (Office Visit) with Maribel Butler M.D.   Medication Sig Dispense Refill    gabapentin (NEURONTIN) 400 MG Cap Take 1 Capsule by mouth 4 times a day. 360 Capsule 3    sildenafil citrate (VIAGRA) 50 MG tablet Take 50 mg by mouth.      vitamin D (VITAMIN D-1000 MAX ST) 1000 UNIT Tab Take 1,000 Units by mouth.      Multiple Vitamin (MULTIVITAMIN ADULT PO) Take  by mouth every day.      BABY ASPIRIN PO Take 81 mg by mouth every day.      Eplerenone 50 MG Tab Take 1 Tablet by mouth every day.      acetaminophen (TYLENOL) 325 MG Tab Take 650 mg by mouth 3 times a day.      fenofibrate (TRICOR) 54 MG tablet Take 54 mg by mouth every day.      tamsulosin (FLOMAX) 0.4 MG capsule Take 0.4 mg by mouth every day.      amLODIPine (NORVASC) 10 MG Tab Take 10 mg by mouth every day.      atorvastatin (LIPITOR) 40 MG Tab Take 40 mg by mouth every day.      carvedilol (COREG) 25 MG Tab Take 25 mg by mouth 2 times a day.      losartan (COZAAR) 25 MG Tab Take 25 mg by mouth every day.      finasteride (PROSCAR) 5 MG Tab Take 1 Tab by mouth every day. 30 Tab 0    furosemide (LASIX) 40 MG Tab Take 1 Tab by mouth every day. 30 Tab 0   [4]   Current Outpatient Medications on File Prior to Visit   Medication Sig Dispense Refill    gabapentin (NEURONTIN) 400 MG Cap Take 1 Capsule by mouth 4 times a day. 360 Capsule 3    sildenafil citrate (VIAGRA) 50 MG tablet Take 50 mg by mouth.      vitamin D (VITAMIN D-1000 MAX ST) 1000 UNIT Tab Take 1,000 Units by mouth.      Multiple Vitamin (MULTIVITAMIN ADULT PO) Take  by mouth every day.      BABY ASPIRIN  PO Take 81 mg by mouth every day.      Eplerenone 50 MG Tab Take 1 Tablet by mouth every day.      acetaminophen (TYLENOL) 325 MG Tab Take 650 mg by mouth 3 times a day.      fenofibrate (TRICOR) 54 MG tablet Take 54 mg by mouth every day.      tamsulosin (FLOMAX) 0.4 MG capsule Take 0.4 mg by mouth every day.      amLODIPine (NORVASC) 10 MG Tab Take 10 mg by mouth every day.      atorvastatin (LIPITOR) 40 MG Tab Take 40 mg by mouth every day.      carvedilol (COREG) 25 MG Tab Take 25 mg by mouth 2 times a day.      losartan (COZAAR) 25 MG Tab Take 25 mg by mouth every day.      finasteride (PROSCAR) 5 MG Tab Take 1 Tab by mouth every day. 30 Tab 0    furosemide (LASIX) 40 MG Tab Take 1 Tab by mouth every day. 30 Tab 0     No current facility-administered medications on file prior to visit.   [5] No Known Allergies

## 2025-06-25 ENCOUNTER — OFFICE VISIT (OUTPATIENT)
Dept: PHYSICAL MEDICINE AND REHAB | Facility: MEDICAL CENTER | Age: 75
End: 2025-06-25
Payer: MEDICARE

## 2025-06-25 VITALS
HEIGHT: 72 IN | OXYGEN SATURATION: 92 % | BODY MASS INDEX: 37.18 KG/M2 | HEART RATE: 74 BPM | SYSTOLIC BLOOD PRESSURE: 128 MMHG | WEIGHT: 274.47 LBS | DIASTOLIC BLOOD PRESSURE: 60 MMHG | TEMPERATURE: 98.5 F

## 2025-06-25 DIAGNOSIS — M79.10 MYALGIA: ICD-10-CM

## 2025-06-25 DIAGNOSIS — M79.18 RIGHT BUTTOCK PAIN: Primary | ICD-10-CM

## 2025-06-25 PROCEDURE — 3074F SYST BP LT 130 MM HG: CPT | Performed by: STUDENT IN AN ORGANIZED HEALTH CARE EDUCATION/TRAINING PROGRAM

## 2025-06-25 PROCEDURE — 3078F DIAST BP <80 MM HG: CPT | Performed by: STUDENT IN AN ORGANIZED HEALTH CARE EDUCATION/TRAINING PROGRAM

## 2025-06-25 PROCEDURE — 99213 OFFICE O/P EST LOW 20 MIN: CPT | Performed by: STUDENT IN AN ORGANIZED HEALTH CARE EDUCATION/TRAINING PROGRAM

## 2025-06-25 PROCEDURE — 1125F AMNT PAIN NOTED PAIN PRSNT: CPT | Performed by: STUDENT IN AN ORGANIZED HEALTH CARE EDUCATION/TRAINING PROGRAM

## 2025-06-25 ASSESSMENT — PAIN SCALES - GENERAL: PAINLEVEL_OUTOF10: 1=MINIMAL PAIN

## 2025-06-25 ASSESSMENT — FIBROSIS 4 INDEX: FIB4 SCORE: 2.82

## 2025-06-25 ASSESSMENT — PATIENT HEALTH QUESTIONNAIRE - PHQ9: CLINICAL INTERPRETATION OF PHQ2 SCORE: 0

## (undated) DEVICE — GLOVE BIOGEL PI ORTHO SZ 8 PF LF (40PR/BX)

## (undated) DEVICE — SLEEVE VASO CALF MED - (10PR/CA)

## (undated) DEVICE — ELECTRODE DUAL RETURN W/ CORD - (50/PK)

## (undated) DEVICE — CANISTER SUCTION 3000ML MECHANICAL FILTER AUTO SHUTOFF MEDI-VAC NONSTERILE LF DISP  (40EA/CA)

## (undated) DEVICE — CHLORAPREP 26 ML APPLICATOR - ORANGE TINT(25/CA)

## (undated) DEVICE — PACK LAP CHOLE OR - (2EA/CA)

## (undated) DEVICE — MANIFOLD NEPTUNE 1 PORT (20/PK)

## (undated) DEVICE — SENSOR OXIMETER ADULT SPO2 RD SET (20EA/BX)

## (undated) DEVICE — CLIP MED LG INTNL HRZN TI ESCP - (20/BX)

## (undated) DEVICE — COVER LIGHT HANDLE ALC PLUS DISP (18EA/BX)

## (undated) DEVICE — LACTATED RINGERS INJ 1000 ML - (14EA/CA 60CA/PF)

## (undated) DEVICE — TOWEL STOP TIMEOUT SAFETY FLAG (40EA/CA)

## (undated) DEVICE — SUTURE 0 VICRYL PLUS UR-6 - 27 INCH (36/BX)

## (undated) DEVICE — SUCTION INSTRUMENT YANKAUER BULBOUS TIP W/O VENT (50EA/CA)

## (undated) DEVICE — TUBING CLEARLINK DUO-VENT - C-FLO (48EA/CA)

## (undated) DEVICE — TROCAR LAPSCP 100MM 12MM NTHRD - (6/BX)

## (undated) DEVICE — COVER ENDOSCOPE DISTAL SINGLE USE (20EA/BX)

## (undated) DEVICE — CONTAINER, SPECIMEN, STERILE

## (undated) DEVICE — TUBE E-T HI-LO CUFF 7.0MM (10EA/PK)

## (undated) DEVICE — JAGTOME RX 39 PRE-LOADED .025 X 260CM

## (undated) DEVICE — ELECTRODE 850 FOAM ADHESIVE - HYDROGEL RADIOTRNSPRNT (50/PK)

## (undated) DEVICE — MAT PATIENT POSITIONING PREVALON (10EA/CA)

## (undated) DEVICE — BALLOON RETRIEVAL EXTRACTOR PRO RX   9-12MM

## (undated) DEVICE — DERMABOND ADVANCED - (12EA/BX)

## (undated) DEVICE — KIT ANESTHESIA W/CIRCUIT & 3/LT BAG W/FILTER (20EA/CA)

## (undated) DEVICE — SUTURE GENERAL

## (undated) DEVICE — PORT AUXILLARY WATER (50EA/BX)

## (undated) DEVICE — SODIUM CHL IRRIGATION 0.9% 1000ML (12EA/CA)

## (undated) DEVICE — GLOVE BIOGEL PI INDICATOR SZ 7.0 SURGICAL PF LF - (50/BX 4BX/CA)

## (undated) DEVICE — SET LEADWIRE 5 LEAD BEDSIDE DISPOSABLE ECG (1SET OF 5/EA)

## (undated) DEVICE — GOWN WARMING STANDARD FLEX - (30/CA)

## (undated) DEVICE — CATHETER REDDICK ----MUST ORDER IN MULITPLES OF 10----

## (undated) DEVICE — FILM CASSETTE ENDO

## (undated) DEVICE — CANNULA W/SEAL 5X100 Z-THRE - ADED KII (12/BX)

## (undated) DEVICE — KIT CUSTOM PROCEDURE SINGLE FOR ENDO  (15/CA)

## (undated) DEVICE — WATER IRRIGATION STERILE 1000ML (12EA/CA)

## (undated) DEVICE — GLOVE BIOGEL SZ 8 SURGICAL PF LTX - (50PR/BX 4BX/CA)

## (undated) DEVICE — CANISTER SUCTION RIGID RED 1500CC (40EA/CA)

## (undated) DEVICE — DEVICE BIOPSY RX BILIARY SYSTEM CAP (10EA/BX)

## (undated) DEVICE — SUTURE 4-0 MONOCRYL PLUS PS-2 - 27 INCH (36/BX)

## (undated) DEVICE — GLOVE SZ 6.5 BIOGEL PI MICRO - PF LF (50PR/BX)

## (undated) DEVICE — BAG RETRIEVAL 10ML (10EA/BX)

## (undated) DEVICE — BLOCK BITE MAXI DENTAL RETENTION RIM (100EA/BX)

## (undated) DEVICE — DRAPESURG STERI-DRAPE LONG - (10/BX 4BX/CA)

## (undated) DEVICE — TUBE CONNECTING SUCTION - CLEAR PLASTIC STERILE 72 IN (50EA/CA)

## (undated) DEVICE — BUTTON ENDOSCOPY DISPOSABLE

## (undated) DEVICE — SYRINGE STERILE 10 ML LL (200/BX)

## (undated) DEVICE — ENDO PEANUT 5MM DEVICE (12EA/BX)

## (undated) DEVICE — SET SUCTION/IRRIGATION WITH DISPOSABLE TIP (6/CA )PART #0250-070-520 IS A SUB

## (undated) DEVICE — SET TUBING PNEUMOCLEAR HIGH FLOW SMOKE EVACUATION (10EA/BX)

## (undated) DEVICE — SET EXTENSION WITH 2 PORTS (48EA/CA) ***PART #2C8610 IS A SUBSTITUTE*****

## (undated) DEVICE — TROCAR Z THREAD11MM OPTICAL - NON BLADED(6/BX)

## (undated) DEVICE — GOWN SURGEONS X-LARGE - DISP. (30/CA)

## (undated) DEVICE — TROCAR 5X100 NON BLADED Z-TH - READ KII (6/BX)

## (undated) DEVICE — MASK OXYGEN VNYL ADLT MED CONC WITH 7 FOOT TUBING  - (50EA/CA)

## (undated) DEVICE — SYRINGE 30 ML LS (56/BX)